# Patient Record
Sex: MALE | Race: WHITE | NOT HISPANIC OR LATINO | Employment: OTHER | URBAN - NONMETROPOLITAN AREA
[De-identification: names, ages, dates, MRNs, and addresses within clinical notes are randomized per-mention and may not be internally consistent; named-entity substitution may affect disease eponyms.]

---

## 2020-06-05 ENCOUNTER — HOSPITAL ENCOUNTER (INPATIENT)
Facility: HOSPITAL | Age: 70
LOS: 5 days | Discharge: HOME | End: 2020-06-11
Attending: FAMILY MEDICINE | Admitting: INTERNAL MEDICINE

## 2020-06-05 ENCOUNTER — APPOINTMENT (OUTPATIENT)
Dept: GENERAL RADIOLOGY | Facility: HOSPITAL | Age: 70
End: 2020-06-05

## 2020-06-05 DIAGNOSIS — I20.8 ANGINA AT REST (HCC): ICD-10-CM

## 2020-06-05 DIAGNOSIS — I21.4 NSTEMI (NON-ST ELEVATED MYOCARDIAL INFARCTION) (HCC): ICD-10-CM

## 2020-06-05 DIAGNOSIS — J44.9 CHRONIC OBSTRUCTIVE PULMONARY DISEASE, UNSPECIFIED COPD TYPE (HCC): ICD-10-CM

## 2020-06-05 DIAGNOSIS — R07.2 PRECORDIAL PAIN: Primary | ICD-10-CM

## 2020-06-05 DIAGNOSIS — J18.9 PNEUMONIA OF RIGHT LOWER LOBE DUE TO INFECTIOUS ORGANISM: ICD-10-CM

## 2020-06-05 DIAGNOSIS — I25.110 CORONARY ARTERY DISEASE INVOLVING NATIVE CORONARY ARTERY OF NATIVE HEART WITH UNSTABLE ANGINA PECTORIS (HCC): ICD-10-CM

## 2020-06-05 PROBLEM — I10 ESSENTIAL HYPERTENSION: Status: ACTIVE | Noted: 2020-06-05

## 2020-06-05 LAB
ALBUMIN SERPL-MCNC: 4.1 G/DL (ref 3.5–5.2)
ALBUMIN/GLOB SERPL: 1.4 G/DL
ALP SERPL-CCNC: 68 U/L (ref 39–117)
ALT SERPL W P-5'-P-CCNC: 18 U/L (ref 1–41)
ANION GAP SERPL CALCULATED.3IONS-SCNC: 11 MMOL/L (ref 5–15)
AST SERPL-CCNC: 20 U/L (ref 1–40)
BASOPHILS # BLD AUTO: 0.06 10*3/MM3 (ref 0–0.2)
BASOPHILS NFR BLD AUTO: 0.5 % (ref 0–1.5)
BILIRUB SERPL-MCNC: 0.3 MG/DL (ref 0.2–1.2)
BUN BLD-MCNC: 17 MG/DL (ref 8–23)
BUN/CREAT SERPL: 9.2 (ref 7–25)
CALCIUM SPEC-SCNC: 8.8 MG/DL (ref 8.6–10.5)
CHLORIDE SERPL-SCNC: 99 MMOL/L (ref 98–107)
CK SERPL-CCNC: 98 U/L (ref 20–200)
CO2 SERPL-SCNC: 31 MMOL/L (ref 22–29)
CREAT BLD-MCNC: 1.84 MG/DL (ref 0.76–1.27)
DEPRECATED RDW RBC AUTO: 45.4 FL (ref 37–54)
EOSINOPHIL # BLD AUTO: 0.23 10*3/MM3 (ref 0–0.4)
EOSINOPHIL NFR BLD AUTO: 2.1 % (ref 0.3–6.2)
ERYTHROCYTE [DISTWIDTH] IN BLOOD BY AUTOMATED COUNT: 12.7 % (ref 12.3–15.4)
GFR SERPL CREATININE-BSD FRML MDRD: 37 ML/MIN/1.73
GLOBULIN UR ELPH-MCNC: 3 GM/DL
GLUCOSE BLD-MCNC: 153 MG/DL (ref 65–99)
HCT VFR BLD AUTO: 45.3 % (ref 37.5–51)
HGB BLD-MCNC: 14.5 G/DL (ref 13–17.7)
HOLD SPECIMEN: NORMAL
HOLD SPECIMEN: NORMAL
IMM GRANULOCYTES # BLD AUTO: 0.05 10*3/MM3 (ref 0–0.05)
IMM GRANULOCYTES NFR BLD AUTO: 0.5 % (ref 0–0.5)
INR PPP: 0.93 (ref 0.8–1.2)
LIPASE SERPL-CCNC: 29 U/L (ref 13–60)
LYMPHOCYTES # BLD AUTO: 1.78 10*3/MM3 (ref 0.7–3.1)
LYMPHOCYTES NFR BLD AUTO: 16.2 % (ref 19.6–45.3)
MAGNESIUM SERPL-MCNC: 1.9 MG/DL (ref 1.6–2.4)
MCH RBC QN AUTO: 31.3 PG (ref 26.6–33)
MCHC RBC AUTO-ENTMCNC: 32 G/DL (ref 31.5–35.7)
MCV RBC AUTO: 97.8 FL (ref 79–97)
MONOCYTES # BLD AUTO: 0.76 10*3/MM3 (ref 0.1–0.9)
MONOCYTES NFR BLD AUTO: 6.9 % (ref 5–12)
NEUTROPHILS # BLD AUTO: 8.08 10*3/MM3 (ref 1.7–7)
NEUTROPHILS NFR BLD AUTO: 73.8 % (ref 42.7–76)
NRBC BLD AUTO-RTO: 0 /100 WBC (ref 0–0.2)
NT-PROBNP SERPL-MCNC: 2023 PG/ML (ref 5–900)
PLATELET # BLD AUTO: 188 10*3/MM3 (ref 140–450)
PMV BLD AUTO: 10.6 FL (ref 6–12)
POTASSIUM BLD-SCNC: 3.9 MMOL/L (ref 3.5–5.2)
PROCALCITONIN SERPL-MCNC: 0.11 NG/ML (ref 0.1–0.25)
PROT SERPL-MCNC: 7.1 G/DL (ref 6–8.5)
PROTHROMBIN TIME: 12.3 SECONDS (ref 11.1–15.3)
RBC # BLD AUTO: 4.63 10*6/MM3 (ref 4.14–5.8)
SODIUM BLD-SCNC: 141 MMOL/L (ref 136–145)
TROPONIN T SERPL-MCNC: 0.12 NG/ML (ref 0–0.03)
TROPONIN T SERPL-MCNC: 0.16 NG/ML (ref 0–0.03)
WBC NRBC COR # BLD: 10.96 10*3/MM3 (ref 3.4–10.8)
WHOLE BLOOD HOLD SPECIMEN: NORMAL

## 2020-06-05 PROCEDURE — G0378 HOSPITAL OBSERVATION PER HR: HCPCS

## 2020-06-05 PROCEDURE — 83690 ASSAY OF LIPASE: CPT | Performed by: FAMILY MEDICINE

## 2020-06-05 PROCEDURE — 93005 ELECTROCARDIOGRAM TRACING: CPT | Performed by: FAMILY MEDICINE

## 2020-06-05 PROCEDURE — 84145 PROCALCITONIN (PCT): CPT | Performed by: INTERNAL MEDICINE

## 2020-06-05 PROCEDURE — 82550 ASSAY OF CK (CPK): CPT | Performed by: FAMILY MEDICINE

## 2020-06-05 PROCEDURE — 99284 EMERGENCY DEPT VISIT MOD MDM: CPT

## 2020-06-05 PROCEDURE — 83735 ASSAY OF MAGNESIUM: CPT | Performed by: FAMILY MEDICINE

## 2020-06-05 PROCEDURE — 93010 ELECTROCARDIOGRAM REPORT: CPT | Performed by: INTERNAL MEDICINE

## 2020-06-05 PROCEDURE — 25010000002 ENOXAPARIN PER 10 MG: Performed by: INTERNAL MEDICINE

## 2020-06-05 PROCEDURE — 87040 BLOOD CULTURE FOR BACTERIA: CPT | Performed by: INTERNAL MEDICINE

## 2020-06-05 PROCEDURE — 80053 COMPREHEN METABOLIC PANEL: CPT | Performed by: FAMILY MEDICINE

## 2020-06-05 PROCEDURE — 25010000002 CEFTRIAXONE PER 250 MG: Performed by: FAMILY MEDICINE

## 2020-06-05 PROCEDURE — 85025 COMPLETE CBC W/AUTO DIFF WBC: CPT | Performed by: FAMILY MEDICINE

## 2020-06-05 PROCEDURE — 93005 ELECTROCARDIOGRAM TRACING: CPT

## 2020-06-05 PROCEDURE — 85610 PROTHROMBIN TIME: CPT | Performed by: INTERNAL MEDICINE

## 2020-06-05 PROCEDURE — 84484 ASSAY OF TROPONIN QUANT: CPT | Performed by: FAMILY MEDICINE

## 2020-06-05 PROCEDURE — 87040 BLOOD CULTURE FOR BACTERIA: CPT | Performed by: FAMILY MEDICINE

## 2020-06-05 PROCEDURE — 87635 SARS-COV-2 COVID-19 AMP PRB: CPT | Performed by: FAMILY MEDICINE

## 2020-06-05 PROCEDURE — 83880 ASSAY OF NATRIURETIC PEPTIDE: CPT | Performed by: FAMILY MEDICINE

## 2020-06-05 PROCEDURE — 71045 X-RAY EXAM CHEST 1 VIEW: CPT

## 2020-06-05 PROCEDURE — 93005 ELECTROCARDIOGRAM TRACING: CPT | Performed by: INTERNAL MEDICINE

## 2020-06-05 PROCEDURE — 36415 COLL VENOUS BLD VENIPUNCTURE: CPT | Performed by: FAMILY MEDICINE

## 2020-06-05 RX ORDER — CLOPIDOGREL BISULFATE 75 MG/1
75 TABLET ORAL DAILY
Status: DISCONTINUED | OUTPATIENT
Start: 2020-06-06 | End: 2020-06-08

## 2020-06-05 RX ORDER — CLOPIDOGREL BISULFATE 75 MG/1
300 TABLET ORAL ONCE
Status: COMPLETED | OUTPATIENT
Start: 2020-06-05 | End: 2020-06-05

## 2020-06-05 RX ORDER — ASPIRIN 81 MG/1
81 TABLET, CHEWABLE ORAL DAILY
COMMUNITY

## 2020-06-05 RX ORDER — IPRATROPIUM BROMIDE AND ALBUTEROL SULFATE 2.5; .5 MG/3ML; MG/3ML
3 SOLUTION RESPIRATORY (INHALATION) EVERY 6 HOURS PRN
Status: DISCONTINUED | OUTPATIENT
Start: 2020-06-05 | End: 2020-06-11 | Stop reason: HOSPADM

## 2020-06-05 RX ORDER — NALOXONE HCL 0.4 MG/ML
0.4 VIAL (ML) INJECTION
Status: DISCONTINUED | OUTPATIENT
Start: 2020-06-05 | End: 2020-06-11 | Stop reason: HOSPADM

## 2020-06-05 RX ORDER — ATORVASTATIN CALCIUM 40 MG/1
40 TABLET, FILM COATED ORAL NIGHTLY
Status: DISCONTINUED | OUTPATIENT
Start: 2020-06-05 | End: 2020-06-11 | Stop reason: HOSPADM

## 2020-06-05 RX ORDER — ASPIRIN 81 MG/1
81 TABLET, CHEWABLE ORAL DAILY
Status: DISCONTINUED | OUTPATIENT
Start: 2020-06-06 | End: 2020-06-11 | Stop reason: HOSPADM

## 2020-06-05 RX ORDER — HYDRALAZINE HYDROCHLORIDE 20 MG/ML
20 INJECTION INTRAMUSCULAR; INTRAVENOUS EVERY 6 HOURS PRN
Status: DISCONTINUED | OUTPATIENT
Start: 2020-06-05 | End: 2020-06-11 | Stop reason: HOSPADM

## 2020-06-05 RX ORDER — LOSARTAN POTASSIUM AND HYDROCHLOROTHIAZIDE 12.5; 1 MG/1; MG/1
1 TABLET ORAL DAILY
COMMUNITY
Start: 2020-02-19 | End: 2020-06-11 | Stop reason: HOSPADM

## 2020-06-05 RX ORDER — AZITHROMYCIN 250 MG/1
500 TABLET, FILM COATED ORAL ONCE
Status: COMPLETED | OUTPATIENT
Start: 2020-06-05 | End: 2020-06-05

## 2020-06-05 RX ORDER — SODIUM CHLORIDE 0.9 % (FLUSH) 0.9 %
10 SYRINGE (ML) INJECTION EVERY 12 HOURS SCHEDULED
Status: DISCONTINUED | OUTPATIENT
Start: 2020-06-05 | End: 2020-06-11 | Stop reason: HOSPADM

## 2020-06-05 RX ORDER — ASPIRIN 81 MG/1
243 TABLET, CHEWABLE ORAL ONCE
Status: COMPLETED | OUTPATIENT
Start: 2020-06-05 | End: 2020-06-05

## 2020-06-05 RX ORDER — VALSARTAN AND HYDROCHLOROTHIAZIDE 320; 12.5 MG/1; MG/1
1 TABLET, FILM COATED ORAL DAILY
Status: ON HOLD | COMMUNITY
End: 2020-06-05

## 2020-06-05 RX ORDER — BUDESONIDE AND FORMOTEROL FUMARATE DIHYDRATE 80; 4.5 UG/1; UG/1
2 AEROSOL RESPIRATORY (INHALATION)
Status: DISCONTINUED | OUTPATIENT
Start: 2020-06-05 | End: 2020-06-11 | Stop reason: HOSPADM

## 2020-06-05 RX ORDER — MORPHINE SULFATE 2 MG/ML
2 INJECTION, SOLUTION INTRAMUSCULAR; INTRAVENOUS EVERY 4 HOURS PRN
Status: DISCONTINUED | OUTPATIENT
Start: 2020-06-05 | End: 2020-06-11 | Stop reason: HOSPADM

## 2020-06-05 RX ORDER — ONDANSETRON 2 MG/ML
4 INJECTION INTRAMUSCULAR; INTRAVENOUS EVERY 6 HOURS PRN
Status: DISCONTINUED | OUTPATIENT
Start: 2020-06-05 | End: 2020-06-11 | Stop reason: HOSPADM

## 2020-06-05 RX ORDER — SODIUM CHLORIDE 0.9 % (FLUSH) 0.9 %
10 SYRINGE (ML) INJECTION AS NEEDED
Status: DISCONTINUED | OUTPATIENT
Start: 2020-06-05 | End: 2020-06-11 | Stop reason: HOSPADM

## 2020-06-05 RX ORDER — ALBUTEROL SULFATE 90 UG/1
2 AEROSOL, METERED RESPIRATORY (INHALATION) EVERY 4 HOURS PRN
COMMUNITY

## 2020-06-05 RX ADMIN — ATORVASTATIN CALCIUM 40 MG: 40 TABLET, FILM COATED ORAL at 21:24

## 2020-06-05 RX ADMIN — CLOPIDOGREL BISULFATE 300 MG: 75 TABLET ORAL at 21:25

## 2020-06-05 RX ADMIN — METOPROLOL TARTRATE 25 MG: 25 TABLET, FILM COATED ORAL at 21:24

## 2020-06-05 RX ADMIN — SODIUM CHLORIDE, PRESERVATIVE FREE 10 ML: 5 INJECTION INTRAVENOUS at 21:24

## 2020-06-05 RX ADMIN — CEFTRIAXONE SODIUM 1 G: 1 INJECTION, POWDER, FOR SOLUTION INTRAMUSCULAR; INTRAVENOUS at 19:52

## 2020-06-05 RX ADMIN — ASPIRIN 81 MG 243 MG: 81 TABLET ORAL at 18:06

## 2020-06-05 RX ADMIN — AZITHROMYCIN MONOHYDRATE 500 MG: 250 TABLET ORAL at 19:55

## 2020-06-05 RX ADMIN — ENOXAPARIN SODIUM 120 MG: 120 INJECTION SUBCUTANEOUS at 21:24

## 2020-06-05 NOTE — ED NOTES
Patient was placed in face mask during first look triage.  Patient was wearing a face mask throughout encounter.  I wore personal protective equipment throughout the encounter.  Hand hygiene was performed before and after patient encounter.       Eileen Carmen RN  06/05/20 1762

## 2020-06-05 NOTE — ED PROVIDER NOTES
Subjective     Chest Pain   Pain location:  L chest  Pain quality: aching    Pain radiates to:  L arm  Pain severity:  Moderate  Onset quality:  Sudden  Duration:  5 days  Timing:  Constant  Progression:  Worsening  Chronicity:  New  Relieved by:  Nothing  Worsened by:  Movement, exertion and deep breathing  Associated symptoms: shortness of breath and weakness    Associated symptoms: no abdominal pain, no cough, no diaphoresis, no dizziness, no dysphagia, no fatigue, no fever, no headache, no nausea and no vomiting    Risk factors: high cholesterol, hypertension, male sex, obesity and smoking    Risk factors: no coronary artery disease and no diabetes mellitus        Review of Systems   Constitutional: Positive for activity change. Negative for appetite change, chills, diaphoresis, fatigue and fever.   HENT: Negative for congestion, ear discharge, ear pain, nosebleeds, rhinorrhea, sinus pressure, sore throat and trouble swallowing.    Eyes: Negative for discharge and redness.   Respiratory: Positive for chest tightness and shortness of breath. Negative for apnea, cough and wheezing.    Cardiovascular: Positive for chest pain.   Gastrointestinal: Negative for abdominal pain, diarrhea, nausea and vomiting.   Endocrine: Negative for polyuria.   Genitourinary: Negative for dysuria, frequency and urgency.   Musculoskeletal: Negative for myalgias and neck pain.   Skin: Negative for color change and rash.   Allergic/Immunologic: Negative for immunocompromised state.   Neurological: Positive for weakness. Negative for dizziness, seizures, syncope, light-headedness and headaches.   Hematological: Negative for adenopathy. Does not bruise/bleed easily.   Psychiatric/Behavioral: Negative for behavioral problems and confusion.   All other systems reviewed and are negative.      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[facility])    • COPD (chronic obstructive pulmonary disease) (CMS/[facility])    • Hypertension         No Known Allergies    Past Surgical History:   Procedure Laterality Date   • CARDIAC CATHETERIZATION N/A 6/8/2020    Procedure: Left Heart Cath;  Surgeon: April Petersen MD;  Location: [facility] CATH INVASIVE LOCATION;  Service: Cardiology;  Laterality: N/A;       Family History   Problem Relation Age of Onset   • Heart disease Mother         MI   • No Known Problems Father        Social History     Socioeconomic History   • Marital status:      Spouse name: Not on file   • Number of children: Not on file   • Years of education: Not on file   • Highest education level: Not on file   Tobacco Use   • Smoking status: Current Every Day Smoker     Packs/day: 0.25     Types: Cigarettes   • Smokeless tobacco: Never Used   Substance and Sexual Activity   • Alcohol use: Never     Frequency: Never   • Drug use: Never   • Sexual activity: Defer           Objective   Physical Exam   Constitutional: He is oriented to person, place, and time. He appears well-developed and well-nourished.   HENT:   Head: Normocephalic and atraumatic.   Nose: Nose normal.   Mouth/Throat: Oropharynx is clear and moist.   Eyes: Pupils are equal, round, and reactive to light. Conjunctivae and EOM are normal. Right eye exhibits no discharge. Left eye exhibits no discharge. No scleral icterus.   Neck: Normal range of motion. Neck supple. No tracheal deviation present.   Cardiovascular: Normal rate, regular rhythm and normal heart sounds.   No murmur heard.  Pulmonary/Chest: Effort normal and breath sounds normal. No stridor. No respiratory distress. He has no wheezes. He has no rales.   Abdominal: Soft. Bowel sounds are normal. He exhibits no distension and no mass. There is no tenderness. There is no rebound and no guarding.   Musculoskeletal: He exhibits no edema.   Neurological: He is alert and oriented to person, place, and time. Coordination normal.   Skin: Skin is warm and dry. No rash noted. No erythema.   Psychiatric: He has a  normal mood and affect. His behavior is normal. Thought content normal.   Nursing note and vitals reviewed.      ECG 12 Lead    Date/Time: 6/5/2020 7:07 PM  Performed by: Robb Thomas MD  Authorized by: Robb Thomas MD   Interpreted by physician  Rhythm: sinus rhythm  Rate: normal  BPM: 89  ST Segments: ST segments normal                   ED Course             Labs Reviewed   TROPONIN (IN-HOUSE) - Abnormal; Notable for the following components:       Result Value    Troponin T 0.121 (*)     All other components within normal limits    Narrative:     Troponin T Reference Range:  <= 0.03 ng/mL-   Negative for AMI  >0.03 ng/mL-     Abnormal for myocardial necrosis.  Clinicians would have to utilize clinical acumen, EKG, Troponin and serial changes to determine if it is an Acute Myocardial Infarction or myocardial injury due to an underlying chronic condition.       Results may be falsely decreased if patient taking Biotin.     TROPONIN (IN-HOUSE) - Abnormal; Notable for the following components:    Troponin T 0.156 (*)     All other components within normal limits    Narrative:     Troponin T Reference Range:  <= 0.03 ng/mL-   Negative for AMI  >0.03 ng/mL-     Abnormal for myocardial necrosis.  Clinicians would have to utilize clinical acumen, EKG, Troponin and serial changes to determine if it is an Acute Myocardial Infarction or myocardial injury due to an underlying chronic condition.       Results may be falsely decreased if patient taking Biotin.     COMPREHENSIVE METABOLIC PANEL - Abnormal; Notable for the following components:    Glucose 153 (*)     Creatinine 1.84 (*)     CO2 31.0 (*)     eGFR Non  Amer 37 (*)     All other components within normal limits    Narrative:     GFR Normal >60  Chronic Kidney Disease <60  Kidney Failure <15     BNP (IN-HOUSE) - Abnormal; Notable for the following components:    proBNP 2,023.0 (*)     All other components within normal limits    Narrative:      Among patients with dyspnea, NT-proBNP is highly sensitive for the detection of acute congestive heart failure. In addition NT-proBNP of <300 pg/ml effectively rules out acute congestive heart failure with 99% negative predictive value.    Results may be falsely decreased if patient taking Biotin.     CBC WITH AUTO DIFFERENTIAL - Abnormal; Notable for the following components:    WBC 10.96 (*)     MCV 97.8 (*)     Lymphocyte % 16.2 (*)     Neutrophils, Absolute 8.08 (*)     All other components within normal limits   TROPONIN (IN-HOUSE) - Abnormal; Notable for the following components:    Troponin T 0.411 (*)     All other components within normal limits    Narrative:     Troponin T Reference Range:  <= 0.03 ng/mL-   Negative for AMI  >0.03 ng/mL-     Abnormal for myocardial necrosis.  Clinicians would have to utilize clinical acumen, EKG, Troponin and serial changes to determine if it is an Acute Myocardial Infarction or myocardial injury due to an underlying chronic condition.       Results may be falsely decreased if patient taking Biotin.     BASIC METABOLIC PANEL - Abnormal; Notable for the following components:    Glucose 110 (*)     Creatinine 1.64 (*)     CO2 31.0 (*)     eGFR Non  Amer 42 (*)     All other components within normal limits    Narrative:     GFR Normal >60  Chronic Kidney Disease <60  Kidney Failure <15     LIPID PANEL - Abnormal; Notable for the following components:    Total Cholesterol 222 (*)     Triglycerides 160 (*)     HDL Cholesterol 28 (*)     LDL Cholesterol  162 (*)     All other components within normal limits    Narrative:     Cholesterol Reference Ranges  (U.S. Department of Health and Human Services ATP III Classifications)    Desirable          <200 mg/dL  Borderline High    200-239 mg/dL  High Risk          >240 mg/dL      Triglyceride Reference Ranges  (U.S. Department of Health and Human Services ATP III Classifications)    Normal           <150 mg/dL  Borderline  High  150-199 mg/dL  High             200-499 mg/dL  Very High        >500 mg/dL    HDL Reference Ranges  (U.S. Department of Health and Human Services ATP III Classifcations)    Low     <40 mg/dl (major risk factor for CHD)  High    >60 mg/dl ('negative' risk factor for CHD)        LDL Reference Ranges  (U.S. Department of Health and Human Services ATP III Classifcations)    Optimal          <100 mg/dL  Near Optimal     100-129 mg/dL  Borderline High  130-159 mg/dL  High             160-189 mg/dL  Very High        >189 mg/dL   CBC WITH AUTO DIFFERENTIAL - Abnormal; Notable for the following components:    MCV 97.1 (*)     Lymphocyte % 18.9 (*)     Neutrophils, Absolute 7.17 (*)     Monocytes, Absolute 0.91 (*)     All other components within normal limits   TROPONIN (IN-HOUSE) - Abnormal; Notable for the following components:    Troponin T 0.628 (*)     All other components within normal limits    Narrative:     Troponin T Reference Range:  <= 0.03 ng/mL-   Negative for AMI  >0.03 ng/mL-     Abnormal for myocardial necrosis.  Clinicians would have to utilize clinical acumen, EKG, Troponin and serial changes to determine if it is an Acute Myocardial Infarction or myocardial injury due to an underlying chronic condition.       Results may be falsely decreased if patient taking Biotin.     HEMOGLOBIN A1C - Abnormal; Notable for the following components:    Hemoglobin A1C 6.60 (*)     All other components within normal limits    Narrative:     Hemoglobin A1C Ranges:    Increased Risk for Diabetes  5.7% to 6.4%  Diabetes                     >= 6.5%  Diabetic Goal                < 7.0%   CK MB - Abnormal; Notable for the following components:    CKMB 22.49 (*)     All other components within normal limits    Narrative:     Results may be falsely decreased if patient taking Biotin.     BASIC METABOLIC PANEL - Abnormal; Notable for the following components:    Glucose 109 (*)     Creatinine 1.58 (*)     eGFR Non   Amer 44 (*)     All other components within normal limits    Narrative:     GFR Normal >60  Chronic Kidney Disease <60  Kidney Failure <15     BNP (IN-HOUSE) - Abnormal; Notable for the following components:    proBNP 2,934.0 (*)     All other components within normal limits    Narrative:     Among patients with dyspnea, NT-proBNP is highly sensitive for the detection of acute congestive heart failure. In addition NT-proBNP of <300 pg/ml effectively rules out acute congestive heart failure with 99% negative predictive value.    Results may be falsely decreased if patient taking Biotin.     CBC WITH AUTO DIFFERENTIAL - Abnormal; Notable for the following components:    MCV 97.8 (*)     Lymphocyte % 18.5 (*)     Monocytes, Absolute 0.91 (*)     All other components within normal limits   BASIC METABOLIC PANEL - Abnormal; Notable for the following components:    BUN 25 (*)     Creatinine 1.71 (*)     CO2 31.0 (*)     eGFR Non  Amer 40 (*)     All other components within normal limits    Narrative:     GFR Normal >60  Chronic Kidney Disease <60  Kidney Failure <15     CBC WITH AUTO DIFFERENTIAL - Abnormal; Notable for the following components:    MCV 97.3 (*)     Lymphocyte % 16.2 (*)     Neutrophils, Absolute 7.42 (*)     All other components within normal limits   COMPREHENSIVE METABOLIC PANEL - Abnormal; Notable for the following components:    Glucose 101 (*)     BUN 27 (*)     Creatinine 1.91 (*)     Chloride 97 (*)     CO2 30.0 (*)     Calcium 8.2 (*)     eGFR Non  Amer 35 (*)     All other components within normal limits    Narrative:     GFR Normal >60  Chronic Kidney Disease <60  Kidney Failure <15     BASIC METABOLIC PANEL - Abnormal; Notable for the following components:    BUN 24 (*)     Creatinine 1.64 (*)     CO2 31.0 (*)     Calcium 8.3 (*)     eGFR Non  Amer 42 (*)     All other components within normal limits    Narrative:     GFR Normal >60  Chronic Kidney Disease <60  Kidney  Failure <15     LIPID PANEL - Abnormal; Notable for the following components:    HDL Cholesterol 26 (*)     LDL Cholesterol  152 (*)     All other components within normal limits    Narrative:     Cholesterol Reference Ranges  (U.S. Department of Health and Human Services ATP III Classifications)    Desirable          <200 mg/dL  Borderline High    200-239 mg/dL  High Risk          >240 mg/dL      Triglyceride Reference Ranges  (U.S. Department of Health and Human Services ATP III Classifications)    Normal           <150 mg/dL  Borderline High  150-199 mg/dL  High             200-499 mg/dL  Very High        >500 mg/dL    HDL Reference Ranges  (U.S. Department of Health and Human Services ATP III Classifcations)    Low     <40 mg/dl (major risk factor for CHD)  High    >60 mg/dl ('negative' risk factor for CHD)        LDL Reference Ranges  (U.S. Department of Health and Human Services ATP III Classifcations)    Optimal          <100 mg/dL  Near Optimal     100-129 mg/dL  Borderline High  130-159 mg/dL  High             160-189 mg/dL  Very High        >189 mg/dL   CBC WITH AUTO DIFFERENTIAL - Abnormal; Notable for the following components:    RBC 4.11 (*)     Neutrophil % 76.6 (*)     Lymphocyte % 13.3 (*)     Neutrophils, Absolute 7.23 (*)     All other components within normal limits   BLOOD GAS, ARTERIAL - Abnormal; Notable for the following components:    pCO2, Arterial 49.2 (*)     pO2, Arterial 80.7 (*)     HCO3, Arterial 28.9 (*)     Base Excess, Arterial 2.8 (*)     All other components within normal limits   COMPREHENSIVE METABOLIC PANEL - Abnormal; Notable for the following components:    Glucose 104 (*)     Creatinine 1.58 (*)     CO2 30.0 (*)     Calcium 8.5 (*)     eGFR Non  Amer 44 (*)     All other components within normal limits    Narrative:     GFR Normal >60  Chronic Kidney Disease <60  Kidney Failure <15     CBC WITH AUTO DIFFERENTIAL - Abnormal; Notable for the following components:    RBC  "4.10 (*)     Hemoglobin 12.9 (*)     Lymphocyte % 13.5 (*)     Neutrophils, Absolute 7.10 (*)     Monocytes, Absolute 0.96 (*)     All other components within normal limits   CBC WITH AUTO DIFFERENTIAL - Abnormal; Notable for the following components:    RBC 3.86 (*)     Hemoglobin 12.3 (*)     MCV 97.7 (*)     Lymphocyte % 11.8 (*)     Neutrophils, Absolute 7.22 (*)     Monocytes, Absolute 0.99 (*)     All other components within normal limits   COVID-19,[facility] IN-HOUSE, NP SWAB IN TRANSPORT MEDIA 8-10 HR TAT - Normal    Narrative:     Testing performed by Real Time RT-PCR  This test has not been approved by the [employer] but is authorized under the Emergency Use Act (EAU)    https://www.fda.gov/media/731752/download    https://www.fda.gov/media/211180/download       MRSA SCREEN, PCR - Normal    Narrative:     Performed by real-time polymerase chain reaction (qPCR).   LIPASE - Normal   CK - Normal   MAGNESIUM - Normal   PROCALCITONIN - Normal    Narrative:     As a Marker for Sepsis (Non-Neonates):   1. <0.5 ng/mL represents a low risk of severe sepsis and/or septic shock.  1. >2 ng/mL represents a high risk of severe sepsis and/or septic shock.    As a Marker for Lower Respiratory Tract Infections that require antibiotic therapy:  PCT on Admission     Antibiotic Therapy             6-12 Hrs later  > 0.5                Strongly Recommended            >0.25 - <0.5         Recommended  0.1 - 0.25           Discouraged                   Remeasure/reassess PCT  <0.1                 Strongly Discouraged          Remeasure/reassess PCT      As 28 day mortality risk marker: \"Change in Procalcitonin Result\" (> 80 % or <=80 %) if Day 0 (or Day 1) and Day 4 values are available. Refer to http://www.[facility]-pct-calculator.com/   Change in PCT <=80 %   A decrease of PCT levels below or equal to 80 % defines a positive change in PCT test result representing a higher risk for 28-day all-cause mortality of patients diagnosed with " severe sepsis or septic shock.  Change in PCT > 80 %   A decrease of PCT levels of more than 80 % defines a negative change in PCT result representing a lower risk for 28-day all-cause mortality of patients diagnosed with severe sepsis or septic shock.                Results may be falsely decreased if patient taking Biotin.    PROTIME-INR - Normal    Narrative:     Therapeutic range for most indications is 2.0-3.0 INR,  or 2.5-3.5 for mechanical heart valves.   URINALYSIS W/ MICROSCOPIC IF INDICATED (NO CULTURE) - Normal    Narrative:     Urine microscopic not indicated.   PROTIME-INR - Normal    Narrative:     Therapeutic range for most indications is 2.0-3.0 INR,  or 2.5-3.5 for mechanical heart valves.   TSH - Normal   BLOOD CULTURE   BLOOD CULTURE   RAINBOW DRAW    Narrative:     The following orders were created for panel order [place] Draw.  Procedure                               Abnormality         Status                     ---------                               -----------         ------                     Light Blue Top[[id number]]                                                              Green Top (Gel)[[id number]]                                  Final result               Lavender Top[[id number]]                                     Final result               Gold Top - SST[[id number]]                                   Final result                 Please view results for these tests on the individual orders.   BLOOD GAS, ARTERIAL   CBC AND DIFFERENTIAL    Narrative:     The following orders were created for panel order CBC & Differential.  Procedure                               Abnormality         Status                     ---------                               -----------         ------                     CBC Auto Differential[[id number]]        Abnormal            Final result                 Please view results for these tests on the individual orders.   GREEN TOP   LAVENDER TOP   GOLD TOP - SST    CBC AND DIFFERENTIAL    Narrative:     The following orders were created for panel order CBC & Differential.  Procedure                               Abnormality         Status                     ---------                               -----------         ------                     CBC Auto Differential[[id number]]        Abnormal            Final result                 Please view results for these tests on the individual orders.   EXTRA TUBES    Narrative:     The following orders were created for panel order Extra Tubes.  Procedure                               Abnormality         Status                     ---------                               -----------         ------                     Lavender Top[[id number]]                                     Final result                 Please view results for these tests on the individual orders.   LAVENDER TOP   CBC AND DIFFERENTIAL    Narrative:     The following orders were created for panel order CBC & Differential.  Procedure                               Abnormality         Status                     ---------                               -----------         ------                     CBC Auto Differential[[id number]]        Abnormal            Final result                 Please view results for these tests on the individual orders.   CBC AND DIFFERENTIAL    Narrative:     The following orders were created for panel order CBC & Differential.  Procedure                               Abnormality         Status                     ---------                               -----------         ------                     CBC Auto Differential[[id number]]        Abnormal            Final result                 Please view results for these tests on the individual orders.   CBC AND DIFFERENTIAL    Narrative:     The following orders were created for panel order CBC & Differential.  Procedure                               Abnormality         Status                      ---------                               -----------         ------                     CBC Auto Differential[[id number]]        Abnormal            Final result                 Please view results for these tests on the individual orders.   CBC AND DIFFERENTIAL    Narrative:     The following orders were created for panel order CBC & Differential.  Procedure                               Abnormality         Status                     ---------                               -----------         ------                     CBC Auto Differential[[id number]]        Abnormal            Final result                 Please view results for these tests on the individual orders.   CBC AND DIFFERENTIAL    Narrative:     The following orders were created for panel order CBC & Differential.  Procedure                               Abnormality         Status                     ---------                               -----------         ------                     CBC Auto Differential[[id number]]        Abnormal            Final result                 Please view results for these tests on the individual orders.   EXTRA TUBES    Narrative:     The following orders were created for panel order Extra Tubes.  Procedure                               Abnormality         Status                     ---------                               -----------         ------                     Green Top (Gel)[[id number]]                                  Final result                 Please view results for these tests on the individual orders.   GREEN TOP       US Vein Mapping Bilateral   Final Result   1. Venous mapping study of both lower extremities   with measurements described above.      Electronically signed by:  Jorge Martinez MD  6/9/2020 8:55 AM CDT   Workstation: [id number]      US Carotid Bilateral   Final Result   Narrowing in the right carotid bifurcation, proximal internal   carotid artery less than 50%.      Areas of narrowing left carotid  bifurcation, internal carotid   artery in the range of 50-60%.      Electronically signed by:  Jorge Martinez MD  6/9/2020 8:58 AM CDT   Workstation: [id number]      US Renal Bilateral   Final Result   1. Normal appearance of the right kidney   2. Simple appearing left renal cysts   3. Prostate protrudes into the base of the bladder, and is   probably at least somewhat enlarged.      Electronically signed by:  Vivienne Whittaker MD  6/6/2020 2:37 PM CDT   Workstation: [id number]      XR Chest 1 View   Final Result   Small patchy area of opacification right lung base, may represent   infiltrate or less likely asymmetric edema. Short-term interval   follow-up suggested to ensure complete resolution         Electronically signed by:  Vivienne Whittaker MD  6/5/2020 6:44 PM CDT   Workstation: [id number]                                          [facility]    Final diagnoses:   Precordial pain   Angina at rest (CMS/HCC)   Pneumonia of right lower lobe due to infectious organism            Robb Thomas MD  06/13/20 3963

## 2020-06-06 ENCOUNTER — APPOINTMENT (OUTPATIENT)
Dept: ULTRASOUND IMAGING | Facility: HOSPITAL | Age: 70
End: 2020-06-06

## 2020-06-06 ENCOUNTER — APPOINTMENT (OUTPATIENT)
Dept: CARDIOLOGY | Facility: HOSPITAL | Age: 70
End: 2020-06-06

## 2020-06-06 LAB
ANION GAP SERPL CALCULATED.3IONS-SCNC: 9 MMOL/L (ref 5–15)
ANION GAP SERPL CALCULATED.3IONS-SCNC: 9 MMOL/L (ref 5–15)
BASOPHILS # BLD AUTO: 0.07 10*3/MM3 (ref 0–0.2)
BASOPHILS NFR BLD AUTO: 0.7 % (ref 0–1.5)
BH CV ECHO MEAS - ACS: 1.9 CM
BH CV ECHO MEAS - AO ISTHMUS: 2.9 CM
BH CV ECHO MEAS - AO MAX PG (FULL): 4.6 MMHG
BH CV ECHO MEAS - AO MAX PG: 6.4 MMHG
BH CV ECHO MEAS - AO MEAN PG (FULL): 3 MMHG
BH CV ECHO MEAS - AO MEAN PG: 4 MMHG
BH CV ECHO MEAS - AO ROOT AREA (BSA CORRECTED): 1.7
BH CV ECHO MEAS - AO ROOT AREA: 11.9 CM^2
BH CV ECHO MEAS - AO ROOT DIAM: 3.9 CM
BH CV ECHO MEAS - AO V2 MAX: 126 CM/SEC
BH CV ECHO MEAS - AO V2 MEAN: 89 CM/SEC
BH CV ECHO MEAS - AO V2 VTI: 25.6 CM
BH CV ECHO MEAS - ASC AORTA: 3.2 CM
BH CV ECHO MEAS - AVA(I,A): 2.2 CM^2
BH CV ECHO MEAS - AVA(I,D): 2.2 CM^2
BH CV ECHO MEAS - AVA(V,A): 2.2 CM^2
BH CV ECHO MEAS - AVA(V,D): 2.2 CM^2
BH CV ECHO MEAS - BSA(HAYCOCK): 2.4 M^2
BH CV ECHO MEAS - BSA: 2.4 M^2
BH CV ECHO MEAS - BZI_BMI: 32.7 KILOGRAMS/M^2
BH CV ECHO MEAS - BZI_METRIC_HEIGHT: 185.4 CM
BH CV ECHO MEAS - BZI_METRIC_WEIGHT: 112.5 KG
BH CV ECHO MEAS - EDV(CUBED): 277.2 ML
BH CV ECHO MEAS - EDV(MOD-SP2): 150 ML
BH CV ECHO MEAS - EDV(MOD-SP4): 152 ML
BH CV ECHO MEAS - EDV(TEICH): 217.5 ML
BH CV ECHO MEAS - EF(CUBED): 32.8 %
BH CV ECHO MEAS - EF(MOD-SP2): 23.3 %
BH CV ECHO MEAS - EF(MOD-SP4): 27.6 %
BH CV ECHO MEAS - EF(TEICH): 26.1 %
BH CV ECHO MEAS - ESV(CUBED): 186.2 ML
BH CV ECHO MEAS - ESV(MOD-SP2): 115 ML
BH CV ECHO MEAS - ESV(MOD-SP4): 110 ML
BH CV ECHO MEAS - ESV(TEICH): 160.7 ML
BH CV ECHO MEAS - FS: 12.4 %
BH CV ECHO MEAS - IVS/LVPW: 1.2
BH CV ECHO MEAS - IVSD: 1.5 CM
BH CV ECHO MEAS - LA DIMENSION: 3.7 CM
BH CV ECHO MEAS - LA/AO: 0.95
BH CV ECHO MEAS - LV DIASTOLIC VOL/BSA (35-75): 64.5 ML/M^2
BH CV ECHO MEAS - LV MASS(C)D: 428.5 GRAMS
BH CV ECHO MEAS - LV MASS(C)DI: 181.7 GRAMS/M^2
BH CV ECHO MEAS - LV MAX PG: 1.8 MMHG
BH CV ECHO MEAS - LV MEAN PG: 1 MMHG
BH CV ECHO MEAS - LV SYSTOLIC VOL/BSA (12-30): 46.7 ML/M^2
BH CV ECHO MEAS - LV V1 MAX: 66.2 CM/SEC
BH CV ECHO MEAS - LV V1 MEAN: 47.4 CM/SEC
BH CV ECHO MEAS - LV V1 VTI: 13.4 CM
BH CV ECHO MEAS - LVIDD: 6.5 CM
BH CV ECHO MEAS - LVIDS: 5.7 CM
BH CV ECHO MEAS - LVLD AP2: 9.1 CM
BH CV ECHO MEAS - LVLD AP4: 8.8 CM
BH CV ECHO MEAS - LVLS AP2: 8.2 CM
BH CV ECHO MEAS - LVLS AP4: 8.3 CM
BH CV ECHO MEAS - LVOT AREA (M): 4.2 CM^2
BH CV ECHO MEAS - LVOT AREA: 4.2 CM^2
BH CV ECHO MEAS - LVOT DIAM: 2.3 CM
BH CV ECHO MEAS - LVPWD: 1.2 CM
BH CV ECHO MEAS - MR MAX PG: 48.2 MMHG
BH CV ECHO MEAS - MR MAX VEL: 347 CM/SEC
BH CV ECHO MEAS - MV A MAX VEL: 110 CM/SEC
BH CV ECHO MEAS - MV DEC SLOPE: 482 CM/SEC^2
BH CV ECHO MEAS - MV E MAX VEL: 60 CM/SEC
BH CV ECHO MEAS - MV E/A: 0.55
BH CV ECHO MEAS - MV MAX PG: 5.7 MMHG
BH CV ECHO MEAS - MV MEAN PG: 3 MMHG
BH CV ECHO MEAS - MV P1/2T MAX VEL: 75.2 CM/SEC
BH CV ECHO MEAS - MV P1/2T: 45.7 MSEC
BH CV ECHO MEAS - MV V2 MAX: 119 CM/SEC
BH CV ECHO MEAS - MV V2 MEAN: 75.3 CM/SEC
BH CV ECHO MEAS - MV V2 VTI: 20.8 CM
BH CV ECHO MEAS - MVA P1/2T LCG: 2.9 CM^2
BH CV ECHO MEAS - MVA(P1/2T): 4.8 CM^2
BH CV ECHO MEAS - MVA(VTI): 2.7 CM^2
BH CV ECHO MEAS - PA MAX PG: 3.2 MMHG
BH CV ECHO MEAS - PA V2 MAX: 90.1 CM/SEC
BH CV ECHO MEAS - RAP SYSTOLE: 5 MMHG
BH CV ECHO MEAS - RVSP: 16.3 MMHG
BH CV ECHO MEAS - SI(AO): 129.7 ML/M^2
BH CV ECHO MEAS - SI(CUBED): 38.6 ML/M^2
BH CV ECHO MEAS - SI(LVOT): 23.6 ML/M^2
BH CV ECHO MEAS - SI(MOD-SP2): 14.8 ML/M^2
BH CV ECHO MEAS - SI(MOD-SP4): 17.8 ML/M^2
BH CV ECHO MEAS - SI(TEICH): 24.1 ML/M^2
BH CV ECHO MEAS - SV(AO): 305.8 ML
BH CV ECHO MEAS - SV(CUBED): 91 ML
BH CV ECHO MEAS - SV(LVOT): 55.7 ML
BH CV ECHO MEAS - SV(MOD-SP2): 35 ML
BH CV ECHO MEAS - SV(MOD-SP4): 42 ML
BH CV ECHO MEAS - SV(TEICH): 56.8 ML
BH CV ECHO MEAS - TR MAX VEL: 168 CM/SEC
BILIRUB UR QL STRIP: NEGATIVE
BUN BLD-MCNC: 17 MG/DL (ref 8–23)
BUN BLD-MCNC: 18 MG/DL (ref 8–23)
BUN/CREAT SERPL: 10.4 (ref 7–25)
BUN/CREAT SERPL: 11.4 (ref 7–25)
CALCIUM SPEC-SCNC: 8.7 MG/DL (ref 8.6–10.5)
CALCIUM SPEC-SCNC: 8.8 MG/DL (ref 8.6–10.5)
CHLORIDE SERPL-SCNC: 100 MMOL/L (ref 98–107)
CHLORIDE SERPL-SCNC: 101 MMOL/L (ref 98–107)
CHOLEST SERPL-MCNC: 222 MG/DL (ref 0–200)
CK MB SERPL-CCNC: 22.49 NG/ML
CLARITY UR: CLEAR
CO2 SERPL-SCNC: 29 MMOL/L (ref 22–29)
CO2 SERPL-SCNC: 31 MMOL/L (ref 22–29)
COLOR UR: YELLOW
CREAT BLD-MCNC: 1.58 MG/DL (ref 0.76–1.27)
CREAT BLD-MCNC: 1.64 MG/DL (ref 0.76–1.27)
DEPRECATED RDW RBC AUTO: 45.3 FL (ref 37–54)
EOSINOPHIL # BLD AUTO: 0.22 10*3/MM3 (ref 0–0.4)
EOSINOPHIL NFR BLD AUTO: 2.1 % (ref 0.3–6.2)
ERYTHROCYTE [DISTWIDTH] IN BLOOD BY AUTOMATED COUNT: 12.7 % (ref 12.3–15.4)
GFR SERPL CREATININE-BSD FRML MDRD: 42 ML/MIN/1.73
GFR SERPL CREATININE-BSD FRML MDRD: 44 ML/MIN/1.73
GLUCOSE BLD-MCNC: 109 MG/DL (ref 65–99)
GLUCOSE BLD-MCNC: 110 MG/DL (ref 65–99)
GLUCOSE UR STRIP-MCNC: NEGATIVE MG/DL
HBA1C MFR BLD: 6.6 % (ref 4.8–5.6)
HCT VFR BLD AUTO: 43.9 % (ref 37.5–51)
HDLC SERPL-MCNC: 28 MG/DL (ref 40–60)
HGB BLD-MCNC: 14.3 G/DL (ref 13–17.7)
HGB UR QL STRIP.AUTO: NEGATIVE
IMM GRANULOCYTES # BLD AUTO: 0.05 10*3/MM3 (ref 0–0.05)
IMM GRANULOCYTES NFR BLD AUTO: 0.5 % (ref 0–0.5)
KETONES UR QL STRIP: NEGATIVE
LDLC SERPL CALC-MCNC: 162 MG/DL (ref 0–100)
LDLC/HDLC SERPL: 5.79 {RATIO}
LEUKOCYTE ESTERASE UR QL STRIP.AUTO: NEGATIVE
LYMPHOCYTES # BLD AUTO: 1.96 10*3/MM3 (ref 0.7–3.1)
LYMPHOCYTES NFR BLD AUTO: 18.9 % (ref 19.6–45.3)
MAXIMAL PREDICTED HEART RATE: 151 BPM
MCH RBC QN AUTO: 31.6 PG (ref 26.6–33)
MCHC RBC AUTO-ENTMCNC: 32.6 G/DL (ref 31.5–35.7)
MCV RBC AUTO: 97.1 FL (ref 79–97)
MONOCYTES # BLD AUTO: 0.91 10*3/MM3 (ref 0.1–0.9)
MONOCYTES NFR BLD AUTO: 8.8 % (ref 5–12)
NEUTROPHILS # BLD AUTO: 7.17 10*3/MM3 (ref 1.7–7)
NEUTROPHILS NFR BLD AUTO: 69 % (ref 42.7–76)
NITRITE UR QL STRIP: NEGATIVE
NRBC BLD AUTO-RTO: 0 /100 WBC (ref 0–0.2)
NT-PROBNP SERPL-MCNC: 2934 PG/ML (ref 5–900)
PH UR STRIP.AUTO: 7 [PH] (ref 5–9)
PLATELET # BLD AUTO: 169 10*3/MM3 (ref 140–450)
PMV BLD AUTO: 10.6 FL (ref 6–12)
POTASSIUM BLD-SCNC: 4.1 MMOL/L (ref 3.5–5.2)
POTASSIUM BLD-SCNC: 4.3 MMOL/L (ref 3.5–5.2)
PROT UR QL STRIP: NEGATIVE
RBC # BLD AUTO: 4.52 10*6/MM3 (ref 4.14–5.8)
SARS-COV-2 RNA PNL SPEC NAA+PROBE: NOT DETECTED
SODIUM BLD-SCNC: 138 MMOL/L (ref 136–145)
SODIUM BLD-SCNC: 141 MMOL/L (ref 136–145)
SP GR UR STRIP: 1.01 (ref 1–1.03)
STRESS TARGET HR: 128 BPM
TRIGL SERPL-MCNC: 160 MG/DL (ref 0–150)
TROPONIN T SERPL-MCNC: 0.41 NG/ML (ref 0–0.03)
TROPONIN T SERPL-MCNC: 0.63 NG/ML (ref 0–0.03)
UROBILINOGEN UR QL STRIP: NORMAL
VLDLC SERPL-MCNC: 32 MG/DL
WBC NRBC COR # BLD: 10.38 10*3/MM3 (ref 3.4–10.8)
WHOLE BLOOD HOLD SPECIMEN: NORMAL

## 2020-06-06 PROCEDURE — 93306 TTE W/DOPPLER COMPLETE: CPT

## 2020-06-06 PROCEDURE — 4A023N7 MEASUREMENT OF CARDIAC SAMPLING AND PRESSURE, LEFT HEART, PERCUTANEOUS APPROACH: ICD-10-PCS | Performed by: INTERNAL MEDICINE

## 2020-06-06 PROCEDURE — 80061 LIPID PANEL: CPT | Performed by: INTERNAL MEDICINE

## 2020-06-06 PROCEDURE — 94799 UNLISTED PULMONARY SVC/PX: CPT

## 2020-06-06 PROCEDURE — 83036 HEMOGLOBIN GLYCOSYLATED A1C: CPT | Performed by: INTERNAL MEDICINE

## 2020-06-06 PROCEDURE — 99223 1ST HOSP IP/OBS HIGH 75: CPT | Performed by: INTERNAL MEDICINE

## 2020-06-06 PROCEDURE — 83880 ASSAY OF NATRIURETIC PEPTIDE: CPT | Performed by: INTERNAL MEDICINE

## 2020-06-06 PROCEDURE — 25010000002 ENOXAPARIN PER 10 MG: Performed by: INTERNAL MEDICINE

## 2020-06-06 PROCEDURE — 85025 COMPLETE CBC W/AUTO DIFF WBC: CPT | Performed by: INTERNAL MEDICINE

## 2020-06-06 PROCEDURE — B2111ZZ FLUOROSCOPY OF MULTIPLE CORONARY ARTERIES USING LOW OSMOLAR CONTRAST: ICD-10-PCS | Performed by: INTERNAL MEDICINE

## 2020-06-06 PROCEDURE — 94640 AIRWAY INHALATION TREATMENT: CPT

## 2020-06-06 PROCEDURE — 80048 BASIC METABOLIC PNL TOTAL CA: CPT | Performed by: INTERNAL MEDICINE

## 2020-06-06 PROCEDURE — 81003 URINALYSIS AUTO W/O SCOPE: CPT | Performed by: INTERNAL MEDICINE

## 2020-06-06 PROCEDURE — 93306 TTE W/DOPPLER COMPLETE: CPT | Performed by: INTERNAL MEDICINE

## 2020-06-06 PROCEDURE — 25010000002 ONDANSETRON PER 1 MG: Performed by: INTERNAL MEDICINE

## 2020-06-06 PROCEDURE — 82553 CREATINE MB FRACTION: CPT | Performed by: INTERNAL MEDICINE

## 2020-06-06 PROCEDURE — 84484 ASSAY OF TROPONIN QUANT: CPT | Performed by: INTERNAL MEDICINE

## 2020-06-06 PROCEDURE — 25010000002 FUROSEMIDE PER 20 MG: Performed by: INTERNAL MEDICINE

## 2020-06-06 PROCEDURE — 76775 US EXAM ABDO BACK WALL LIM: CPT

## 2020-06-06 RX ORDER — RANOLAZINE 500 MG/1
1000 TABLET, EXTENDED RELEASE ORAL EVERY 12 HOURS SCHEDULED
Status: DISCONTINUED | OUTPATIENT
Start: 2020-06-06 | End: 2020-06-11 | Stop reason: HOSPADM

## 2020-06-06 RX ORDER — FUROSEMIDE 10 MG/ML
40 INJECTION INTRAMUSCULAR; INTRAVENOUS ONCE
Status: COMPLETED | OUTPATIENT
Start: 2020-06-06 | End: 2020-06-06

## 2020-06-06 RX ADMIN — BUDESONIDE AND FORMOTEROL FUMARATE DIHYDRATE 2 PUFF: 80; 4.5 AEROSOL RESPIRATORY (INHALATION) at 20:02

## 2020-06-06 RX ADMIN — RANOLAZINE 1000 MG: 500 TABLET, FILM COATED, EXTENDED RELEASE ORAL at 21:13

## 2020-06-06 RX ADMIN — ONDANSETRON 4 MG: 2 INJECTION INTRAMUSCULAR; INTRAVENOUS at 22:34

## 2020-06-06 RX ADMIN — FUROSEMIDE 40 MG: 10 INJECTION, SOLUTION INTRAMUSCULAR; INTRAVENOUS at 11:20

## 2020-06-06 RX ADMIN — SODIUM CHLORIDE, PRESERVATIVE FREE 10 ML: 5 INJECTION INTRAVENOUS at 21:17

## 2020-06-06 RX ADMIN — METOPROLOL TARTRATE 25 MG: 25 TABLET, FILM COATED ORAL at 08:42

## 2020-06-06 RX ADMIN — ASPIRIN 81 MG 81 MG: 81 TABLET ORAL at 08:42

## 2020-06-06 RX ADMIN — ENOXAPARIN SODIUM 120 MG: 120 INJECTION SUBCUTANEOUS at 08:42

## 2020-06-06 RX ADMIN — ENOXAPARIN SODIUM 120 MG: 120 INJECTION SUBCUTANEOUS at 21:14

## 2020-06-06 RX ADMIN — METOPROLOL TARTRATE 25 MG: 25 TABLET, FILM COATED ORAL at 21:13

## 2020-06-06 RX ADMIN — RANOLAZINE 1000 MG: 500 TABLET, FILM COATED, EXTENDED RELEASE ORAL at 11:20

## 2020-06-06 RX ADMIN — ATORVASTATIN CALCIUM 40 MG: 40 TABLET, FILM COATED ORAL at 21:13

## 2020-06-06 RX ADMIN — SODIUM CHLORIDE, PRESERVATIVE FREE 10 ML: 5 INJECTION INTRAVENOUS at 09:31

## 2020-06-06 RX ADMIN — BUDESONIDE AND FORMOTEROL FUMARATE DIHYDRATE 2 PUFF: 80; 4.5 AEROSOL RESPIRATORY (INHALATION) at 10:59

## 2020-06-06 RX ADMIN — CLOPIDOGREL BISULFATE 75 MG: 75 TABLET ORAL at 08:42

## 2020-06-06 NOTE — NURSING NOTE
Pt states he feels SOB when he tries to lay flat or as he is falling asleep. I have placed 2L NC on pt and will monitor.

## 2020-06-06 NOTE — PLAN OF CARE
Problem: Patient Care Overview  Goal: Plan of Care Review  Outcome: Ongoing (interventions implemented as appropriate)  Flowsheets (Taken 6/6/2020 0001)  Progress: no change  Plan of Care Reviewed With: patient  Outcome Summary: New admit to the floor. No complaints of chest pain. Patient currently resting. Vital signs stable, will continue to monitor.

## 2020-06-06 NOTE — PLAN OF CARE
Problem: Patient Care Overview  Goal: Plan of Care Review  Outcome: Ongoing (interventions implemented as appropriate)  Flowsheets  Taken 6/6/2020 7187  Progress: improving  Outcome Summary: VSS, no c/o chest pain, pt placed on 2L NC d/t c/o SOB when lying down or falling asleep.  Echo and renal u/s performed today.  Pt is COVID negative, currently trying to transfer off covid wing.  will continue to monitor.  Taken 6/6/2020 0840  Plan of Care Reviewed With: patient

## 2020-06-06 NOTE — CONSULTS
[facility]  INPATIENT CONSULT NOTE  Jose Barrett  69 y.o. male    Referring Provider: Robb Thomas MD    Reason for the consult: Chest pain, elevated troponin      Chief complaint: Chest pain      History of present Illness: Patient came in with feeling of chest discomfort.  He is describes in the mid chest somewhat sharp.  He had noticed that over the last 2 or 3 weeks.  First time he had noticed it was when he was doing activities.  He got his tractor stuck in a ditch and try to get help and he said he had problems getting back up to the top of the hill.  Associated with this is shortness of air though he has baseline COPD.  He said he had some arm tingling but not really discomfort into the left arm.  He did have some nausea but no diaphoresis.  He is resting comfortably now.    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  He does have a history of hypertension.  His lipid status is been unknown.            Allergies: No Known Allergies      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[facility])    • COPD (chronic obstructive pulmonary disease) (CMS/[facility])    • Hypertension          History reviewed. No pertinent surgical history.      Family History   Problem Relation Age of Onset   • Heart disease Mother         MI   • No Known Problems Father          Social History     Socioeconomic History   • Marital status:      Spouse name: Not on file   • Number of children: Not on file   • Years of education: Not on file   • Highest education level: Not on file   Tobacco Use   • Smoking status: Current Every Day Smoker     Packs/day: 0.25     Types: Cigarettes   • Smokeless tobacco: Never Used   Substance and Sexual Activity   • Alcohol use: Never     Frequency: Never   • Drug use: Never   • Sexual activity: Defer         Current Facility-Administered Medications   Medication Dose Route Frequency Provider Last Rate Last Dose   • aspirin  chewable tablet 81 mg  81 mg Oral Daily Jc Olivia MD   81 mg at 06/06/20 0842   • atorvastatin (LIPITOR) tablet 40 mg  40 mg Oral Nightly Jc Olivia MD   40 mg at 06/05/20 2124   • budesonide-formoterol (SYMBICORT) 80-4.5 MCG/ACT inhaler 2 puff  2 puff Inhalation BID - RT Jc Olivia MD       • clopidogrel (PLAVIX) tablet 75 mg  75 mg Oral Daily Jc Olivia MD   75 mg at 06/06/20 0842   • enoxaparin (LOVENOX) syringe 120 mg  1 mg/kg Subcutaneous Q12H Jc Olivia MD   120 mg at 06/06/20 0842   • hydrALAZINE (APRESOLINE) injection 20 mg  20 mg Intravenous Q6H PRN Jc Olivia MD       • ipratropium-albuterol (DUO-NEB) nebulizer solution 3 mL  3 mL Nebulization Q6H PRN Jc Olivia MD       • metoprolol tartrate (LOPRESSOR) tablet 25 mg  25 mg Oral Q12H Jc Olivia MD   25 mg at 06/06/20 0842   • morphine injection 2 mg  2 mg Intravenous Q4H PRN Jc Olivia MD        And   • naloxone (NARCAN) injection 0.4 mg  0.4 mg Intravenous Q5 Min PRN Jc Olivia MD       • nitroglycerin (NITROSTAT) ointment 1 inch  1 inch Topical Q6H PRN Jc Olivia MD       • ondansetron (ZOFRAN) injection 4 mg  4 mg Intravenous Q6H PRN Jc Olivia MD       • Pharmacy to Dose enoxaparin (LOVENOX)   Does not apply Continuous PRN Jc Olivia MD       • sodium chloride 0.9 % flush 10 mL  10 mL Intravenous PRN Jc Olivia MD       • sodium chloride 0.9 % flush 10 mL  10 mL Intravenous Q12H Jc Olivia MD   10 mL at 06/05/20 2124   • sodium chloride 0.9 % flush 10 mL  10 mL Intravenous PRN Jc Olivia MD             Review of Systems:     Constitution:  Denies any fatigue, fever or chills.    HENT:  Denies any headache, hearing impairment.    Eyes:  Denies any blurring of vision, or photophobia.     Cardiovascular:  As per history of present illness.     Respiratory:  Denies any COPD, shortness of breath.     Endocrine:  No  "history of hyperlipidemia, diabetes.       Musculoskeletal:  No history of arthritis with musculoskeletal problems.    Gastrointestinal:  No nausea, vomiting, or melena.    Genitourinary:  No dysuria or hematuria.    Neurological:  No history of seizure disorder, stroke, or memory problems.    Psychiatric/Behavioral:  No history of depression, bipolar disorder or schizophrenia.     Hematological:  No history of easy bruising.            OBJECTIVE:    /77   Pulse 74   Temp 97.5 °F (36.4 °C) (Temporal)   Resp 18   Ht 185.4 cm (73\")   Wt 113 kg (248 lb 6.4 oz)   SpO2 95%   BMI 32.77 kg/m²       Physical Exam:     Constitutional:  Well developed and nourished, in no acute distress .  Is oriented to person, place, and time.     Skin:  Warm and dry.     Head:  Normocephalic and atraumatic.     Eyes:  Pupils are equal, round, and reactive to light.     Neck:  Neck supple. No bruit in the carotids.  No elevation of JVD.    Cardiovascular:  [place] in the fifth intercostal space, regular rate, and rhythm.  S1 greater than S2, S4 gallop, 1/6 systolic murmur at the left upper sternal border.  Pulmonary/Chest:  Air entry is equal on both sides.  No wheezing or crackles.      Abdominal:  Soft. No hepatosplenomegaly, bowel sounds are present.    Musculoskeletal:  No kyphoscoliosis.    Neurological:  Is alert and oriented to person, place, and time.  Cranial nerves are intact.  No motor or sensory deficit.    Extremities:  No edema, no radial femoral delay.    Psychiatric:  The patient has a normal mood and affect.  Behavior is normal.        Lab Results (last 24 hours)     Procedure Component Value Units Date/Time    Troponin [[id number]]  (Abnormal) Collected:  06/05/20 1749    Specimen:  Blood Updated:  06/05/20 1821     Troponin T 0.121 ng/mL     Narrative:       Troponin T Reference Range:  <= 0.03 ng/mL-   Negative for AMI  >0.03 ng/mL-     Abnormal for myocardial necrosis.  Clinicians would have to utilize clinical " acumen, EKG, Troponin and serial changes to determine if it is an Acute Myocardial Infarction or myocardial injury due to an underlying chronic condition.       Results may be falsely decreased if patient taking Biotin.      CBC & Differential [[id number]] Collected:  06/05/20 1749    Specimen:  Blood Updated:  06/05/20 1758    Narrative:       The following orders were created for panel order CBC & Differential.  Procedure                               Abnormality         Status                     ---------                               -----------         ------                     CBC Auto Differential[[id number]]        Abnormal            Final result                 Please view results for these tests on the individual orders.    Comprehensive Metabolic Panel [[id number]]  (Abnormal) Collected:  06/05/20 1749    Specimen:  Blood Updated:  06/05/20 1819     Glucose 153 mg/dL      BUN 17 mg/dL      Creatinine 1.84 mg/dL      Sodium 141 mmol/L      Potassium 3.9 mmol/L      Chloride 99 mmol/L      CO2 31.0 mmol/L      Calcium 8.8 mg/dL      Total Protein 7.1 g/dL      Albumin 4.10 g/dL      ALT (SGPT) 18 U/L      AST (SGOT) 20 U/L      Alkaline Phosphatase 68 U/L      Total Bilirubin 0.3 mg/dL      eGFR Non African Amer 37 mL/min/1.73      Globulin 3.0 gm/dL      A/G Ratio 1.4 g/dL      BUN/Creatinine Ratio 9.2     Anion Gap 11.0 mmol/L     Narrative:       GFR Normal >60  Chronic Kidney Disease <60  Kidney Failure <15      BNP [[id number]]  (Abnormal) Collected:  06/05/20 1749    Specimen:  Blood Updated:  06/05/20 1817     proBNP 2,023.0 pg/mL     Narrative:       Among patients with dyspnea, NT-proBNP is highly sensitive for the detection of acute congestive heart failure. In addition NT-proBNP of <300 pg/ml effectively rules out acute congestive heart failure with 99% negative predictive value.    Results may be falsely decreased if patient taking Biotin.      CBC Auto Differential [[id number]]  (Abnormal)  Collected:  06/05/20 1749    Specimen:  Blood Updated:  06/05/20 1758     WBC 10.96 10*3/mm3      RBC 4.63 10*6/mm3      Hemoglobin 14.5 g/dL      Hematocrit 45.3 %      MCV 97.8 fL      MCH 31.3 pg      MCHC 32.0 g/dL      RDW 12.7 %      RDW-SD 45.4 fl      MPV 10.6 fL      Platelets 188 10*3/mm3      Neutrophil % 73.8 %      Lymphocyte % 16.2 %      Monocyte % 6.9 %      Eosinophil % 2.1 %      Basophil % 0.5 %      Immature Grans % 0.5 %      Neutrophils, Absolute 8.08 10*3/mm3      Lymphocytes, Absolute 1.78 10*3/mm3      Monocytes, Absolute 0.76 10*3/mm3      Eosinophils, Absolute 0.23 10*3/mm3      Basophils, Absolute 0.06 10*3/mm3      Immature Grans, Absolute 0.05 10*3/mm3      nRBC 0.0 /100 WBC     Lipase [[id number]]  (Normal) Collected:  06/05/20 1749    Specimen:  Blood Updated:  06/05/20 1842     Lipase 29 U/L     CK [[id number]]  (Normal) Collected:  06/05/20 1749    Specimen:  Blood Updated:  06/05/20 1842     Creatine Kinase 98 U/L     Magnesium [[id number]]  (Normal) Collected:  06/05/20 1749    Specimen:  Blood Updated:  06/05/20 1842     Magnesium 1.9 mg/dL     Protime-INR [[id number]]  (Normal) Collected:  06/05/20 1749    Specimen:  Blood Updated:  06/05/20 2030     Protime 12.3 Seconds      INR 0.93    Narrative:       Therapeutic range for most indications is 2.0-3.0 INR,  or 2.5-3.5 for mechanical heart valves.    COVID-19, [facility] IN-HOUSE, NP SWAB IN TRANSPORT MEDIA 8-10 HR TAT - Swab, Nasopharynx [[id number]]  (Normal) Collected:  06/05/20 1948    Specimen:  Swab from Nasopharynx Updated:  06/06/20 0149     COVID19 Not Detected    Narrative:       Testing performed by Real Time RT-PCR  This test has not been approved by the [facility] but is authorized under the Emergency Use Act (EAU)    https://www.fda.gov/media/167409/download    https://www.fda.gov/media/909049/download        Blood Culture - Blood, Arm, Left [[id number]] Collected:  06/05/20 1952    Specimen:  Blood from Arm, Left Updated:   "06/05/20 1958    Troponin [[id number]]  (Abnormal) Collected:  06/05/20 2032    Specimen:  Blood Updated:  06/05/20 2110     Troponin T 0.156 ng/mL     Narrative:       Troponin T Reference Range:  <= 0.03 ng/mL-   Negative for AMI  >0.03 ng/mL-     Abnormal for myocardial necrosis.  Clinicians would have to utilize clinical acumen, EKG, Troponin and serial changes to determine if it is an Acute Myocardial Infarction or myocardial injury due to an underlying chronic condition.       Results may be falsely decreased if patient taking Biotin.      Procalcitonin [[id number]]  (Normal) Collected:  06/05/20 2032    Specimen:  Blood Updated:  06/05/20 2228     Procalcitonin 0.11 ng/mL     Narrative:       As a Marker for Sepsis (Non-Neonates):   1. <0.5 ng/mL represents a low risk of severe sepsis and/or septic shock.  1. >2 ng/mL represents a high risk of severe sepsis and/or septic shock.    As a Marker for Lower Respiratory Tract Infections that require antibiotic therapy:  PCT on Admission     Antibiotic Therapy             6-12 Hrs later  > 0.5                Strongly Recommended            >0.25 - <0.5         Recommended  0.1 - 0.25           Discouraged                   Remeasure/reassess PCT  <0.1                 Strongly Discouraged          Remeasure/reassess PCT      As 28 day mortality risk marker: \"Change in Procalcitonin Result\" (> 80 % or <=80 %) if Day 0 (or Day 1) and Day 4 values are available. Refer to http://[email or web address]-pct-calculator.[email or web address]/   Change in PCT <=80 %   A decrease of PCT levels below or equal to 80 % defines a positive change in PCT test result representing a higher risk for 28-day all-cause mortality of patients diagnosed with severe sepsis or septic shock.  Change in PCT > 80 %   A decrease of PCT levels of more than 80 % defines a negative change in PCT result representing a lower risk for 28-day all-cause mortality of patients diagnosed with severe sepsis or septic " shock.                Results may be falsely decreased if patient taking Biotin.     Blood Culture - Blood, Arm, Left [[id number]] Collected:  06/05/20 2032    Specimen:  Blood from Arm, Left Updated:  06/05/20 2038    Troponin [[id number]]  (Abnormal) Collected:  06/06/20 0208    Specimen:  Blood Updated:  06/06/20 0304     Troponin T 0.411 ng/mL     Narrative:       Troponin T Reference Range:  <= 0.03 ng/mL-   Negative for AMI  >0.03 ng/mL-     Abnormal for myocardial necrosis.  Clinicians would have to utilize clinical acumen, EKG, Troponin and serial changes to determine if it is an Acute Myocardial Infarction or myocardial injury due to an underlying chronic condition.       Results may be falsely decreased if patient taking Biotin.      Basic Metabolic Panel [[id number]]  (Abnormal) Collected:  06/06/20 0716    Specimen:  Blood Updated:  06/06/20 0756     Glucose 110 mg/dL      BUN 17 mg/dL      Creatinine 1.64 mg/dL      Sodium 141 mmol/L      Potassium 4.3 mmol/L      Chloride 101 mmol/L      CO2 31.0 mmol/L      Calcium 8.8 mg/dL      eGFR Non African Amer 42 mL/min/1.73      BUN/Creatinine Ratio 10.4     Anion Gap 9.0 mmol/L     Narrative:       GFR Normal >60  Chronic Kidney Disease <60  Kidney Failure <15      CBC & Differential [[id number]] Collected:  06/06/20 0716    Specimen:  Blood Updated:  06/06/20 0726    Narrative:       The following orders were created for panel order CBC & Differential.  Procedure                               Abnormality         Status                     ---------                               -----------         ------                     CBC Auto Differential[[id number]]        Abnormal            Final result                 Please view results for these tests on the individual orders.    Lipid Panel [[id number]]  (Abnormal) Collected:  06/06/20 0716    Specimen:  Blood Updated:  06/06/20 0756     Total Cholesterol 222 mg/dL      Triglycerides 160 mg/dL      HDL Cholesterol  28 mg/dL      LDL Cholesterol  162 mg/dL      VLDL Cholesterol 32 mg/dL      LDL/HDL Ratio 5.79    Narrative:       Cholesterol Reference Ranges  (U.S. Department of Health and Human Services ATP III Classifications)    Desirable          <200 mg/dL  Borderline High    200-239 mg/dL  High Risk          >240 mg/dL      Triglyceride Reference Ranges  (U.S. Department of Health and Human Services ATP III Classifications)    Normal           <150 mg/dL  Borderline High  150-199 mg/dL  High             200-499 mg/dL  Very High        >500 mg/dL    HDL Reference Ranges  (U.S. Department of Health and Human Services ATP III Classifcations)    Low     <40 mg/dl (major risk factor for CHD)  High    >60 mg/dl ('negative' risk factor for CHD)        LDL Reference Ranges  (U.S. Department of Health and Human Services ATP III Classifcations)    Optimal          <100 mg/dL  Near Optimal     100-129 mg/dL  Borderline High  130-159 mg/dL  High             160-189 mg/dL  Very High        >189 mg/dL    CBC Auto Differential [[id number]]  (Abnormal) Collected:  06/06/20 0716    Specimen:  Blood Updated:  06/06/20 0726     WBC 10.38 10*3/mm3      RBC 4.52 10*6/mm3      Hemoglobin 14.3 g/dL      Hematocrit 43.9 %      MCV 97.1 fL      MCH 31.6 pg      MCHC 32.6 g/dL      RDW 12.7 %      RDW-SD 45.3 fl      MPV 10.6 fL      Platelets 169 10*3/mm3      Neutrophil % 69.0 %      Lymphocyte % 18.9 %      Monocyte % 8.8 %      Eosinophil % 2.1 %      Basophil % 0.7 %      Immature Grans % 0.5 %      Neutrophils, Absolute 7.17 10*3/mm3      Lymphocytes, Absolute 1.96 10*3/mm3      Monocytes, Absolute 0.91 10*3/mm3      Eosinophils, Absolute 0.22 10*3/mm3      Basophils, Absolute 0.07 10*3/mm3      Immature Grans, Absolute 0.05 10*3/mm3      nRBC 0.0 /100 WBC     Troponin [[id number]]  (Abnormal) Collected:  06/06/20 0716    Specimen:  Blood Updated:  06/06/20 0800     Troponin T 0.628 ng/mL     Narrative:       Troponin T Reference Range:  <=  0.03 ng/mL-   Negative for AMI  >0.03 ng/mL-     Abnormal for myocardial necrosis.  Clinicians would have to utilize clinical acumen, EKG, Troponin and serial changes to determine if it is an Acute Myocardial Infarction or myocardial injury due to an underlying chronic condition.       Results may be falsely decreased if patient taking Biotin.                   A/P: 1.  Elevated troponin consistent with a non-STEMI    2.  Renal insufficiency    3.  Nicotine abuse    4. COPD    5. Hypertension    6.  Heart murmur    7.  Hyperlipidemia      This patient presents with symptoms of chest discomfort consistent with unstable angina.  His troponin is elevated and has slightly risen.  He is pain-free for now.  He is on good dual antiplatelet therapy and anticoagulant therapy.  He has noted that his renal function is higher than it had been on the labs test we have.  This could secondary to dehydration or it could be an effect of his hypertension.      He is on atorvastatin for his elevated cholesterol.    I have drawn a BNP and would like to check his CK-MB also.    We await his echo to assess his LV function and also his heart murmur.    This time he is stable and we will go ahead and add Ranexa.  We will get nephrology to see him and hopefully we could proceed on with cardiac catheterization early in the new week.    Risk and benefits of cardiac catheterization is been explained.  His ASA score is 2 and his MAL score is 2              This document has been electronically signed by April Petersen MD on June 6, 2020 09:06           Part of this note may be an electronic transcription/translation of spoken language to printed text using the Dragon Dictation System.

## 2020-06-06 NOTE — NURSING NOTE
Spoke with Dr. Olivia regarding new troponin result of 0.628. Pt is not having any chest pain at this time.  Dr. Olivia says this is expected and will wait for Cardiology to see pt today.

## 2020-06-06 NOTE — NURSING NOTE
"Patients troponin level trending up. Vital signs are stable. Patient reports an \"occasional tightness in chest, that goes away.\" No pain currently present. MD notified and wants patient and troponin levels to continue to be monitored.   "

## 2020-06-06 NOTE — H&P
[facility] Medicine Services  INPATIENT HISTORY AND PHYSICAL       Patient Care Team:  Provider, No Known as PCP - General    Chief complaint   Chief Complaint   Patient presents with   • Chest Pain       Subjective     Patient is a 69 y.o. male with a past medical history of COPD, black lung disease, essential hypertension and cigarette smoking who presents with complaints of worsening intermittent retrosternal chest pain and shortness of breath of one-week duration.    Patient first noticed left arm numbness and discomfort and retrosternal chest pain with shortness of breath when he was walking uphill about a week ago.  Symptoms lasted about 10 to 15 minutes and subsided with rest.  He has been having episodes intermittently during the week.  His latest episode of retrosternal discomfort started about this afternoon when he woke up a flight of steps to his room.  He immediately felt left arm numbness and retrosternal chest pain which was nonradiating and constant, squeezing in nature.  It was relieved after 15 minutes of rest and patient decided to come to the emergency room for evaluation.  At time of my evaluation patient states that his chest pain is resolved.  He denies shortness of breath.    Review of Systems   Constitutional: Negative for appetite change, chills, fatigue and fever.   HENT: Negative for congestion and sore throat.    Eyes: Negative for pain and redness.   Respiratory: Negative for cough, chest tightness, shortness of breath and wheezing.    Cardiovascular: Positive for chest pain. Negative for palpitations and leg swelling.   Gastrointestinal: Negative for abdominal pain, constipation, diarrhea, nausea and vomiting.   Genitourinary: Negative for dysuria and hematuria.   Musculoskeletal: Negative for arthralgias, joint swelling and neck pain.   Skin: Negative for color change and rash.   Neurological: Negative for dizziness, syncope,  light-headedness and headaches.   Hematological: Negative for adenopathy.   Psychiatric/Behavioral: Negative for agitation and confusion. The patient is not nervous/anxious.      History  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[facility])    • COPD (chronic obstructive pulmonary disease) (CMS/[facility])    • Hypertension      History reviewed. No pertinent surgical history.  Family History   Problem Relation Age of Onset   • Heart disease Mother         MI   • No Known Problems Father      Social History     Tobacco Use   • Smoking status: Current Every Day Smoker     Packs/day: 0.25     Types: Cigarettes   • Smokeless tobacco: Never Used   Substance Use Topics   • Alcohol use: Never     Frequency: Never   • Drug use: Never     Medications Prior to Admission   Medication Sig Dispense Refill Last Dose   • albuterol sulfate  (90 Base) MCG/ACT inhaler Inhale 2 puffs Every 4 (Four) Hours As Needed for Wheezing.   6/5/2020 at Unknown time   • aspirin 81 MG chewable tablet Chew 81 mg Daily.   6/5/2020 at Unknown time   • Fluticasone Furoate-Vilanterol (Breo Ellipta) 100-25 MCG/INH inhaler Inhale 1 puff Daily.   6/4/2020 at Unknown time   • losartan-hydrochlorothiazide (HYZAAR) 100-12.5 MG per tablet Take 1 tablet by mouth Daily.   6/5/2020 at Unknown time     Allergies:  Patient has no known allergies.  Prior to Admission medications    Medication Sig Start Date End Date Taking? Authorizing Provider   albuterol sulfate  (90 Base) MCG/ACT inhaler Inhale 2 puffs Every 4 (Four) Hours As Needed for Wheezing.   Yes ProviderAdela MD   aspirin 81 MG chewable tablet Chew 81 mg Daily.   Yes ProviderAdela MD   Fluticasone Furoate-Vilanterol (Breo Ellipta) 100-25 MCG/INH inhaler Inhale 1 puff Daily.   Yes Adela Madrigal MD   losartan-hydrochlorothiazide (HYZAAR) 100-12.5 MG per tablet Take 1 tablet by mouth Daily. 2/19/20  Yes Adela Madrigal MD      valsartan-hydrochlorothiazide (DIOVAN-HCT) 320-12.5 MG per tablet Take 1 tablet by mouth Daily.  6/5/20  Provider, Adela, MD MAJANO have reviewed the patient's current medications    Objective        Vital Signs  Temp:  [97.9 °F (36.6 °C)-98.2 °F (36.8 °C)] 97.9 °F (36.6 °C)  Heart Rate:  [78-92] 78  Resp:  [20] 20  BP: (142-196)/(81-97) 142/88      Physical Exam   Constitutional: He is oriented to person, place, and time. No distress.   Obese   HENT:   Head: Normocephalic and atraumatic.   Mouth/Throat: Oropharynx is clear and moist. No oropharyngeal exudate.   Eyes: Pupils are equal, round, and reactive to light. Conjunctivae and EOM are normal. No scleral icterus.   Neck: Normal range of motion. Neck supple. No JVD present. No tracheal deviation present. No thyromegaly present.   Cardiovascular: Normal rate, regular rhythm, normal heart sounds and intact distal pulses. Exam reveals no gallop and no friction rub.   No murmur heard.  Pulmonary/Chest: Effort normal and breath sounds normal. No stridor. No respiratory distress. He has no wheezes. He has no rales. He exhibits no tenderness.   Abdominal: Soft. Bowel sounds are normal. He exhibits no distension and no mass. There is no tenderness. There is no rebound and no guarding. No hernia.   Musculoskeletal: Normal range of motion. He exhibits no edema, tenderness or deformity.   Lymphadenopathy:     He has no cervical adenopathy.   Neurological: He is alert and oriented to person, place, and time. No cranial nerve deficit. He exhibits normal muscle tone.   Skin: Skin is warm and dry. No rash noted. He is not diaphoretic. No erythema. No pallor.   Psychiatric: He has a normal mood and affect. His behavior is normal. Judgment and thought content normal.     Results Review:     Results from last 7 days   Lab Units 06/05/20  1749   SODIUM mmol/L 141   POTASSIUM mmol/L 3.9   CHLORIDE mmol/L 99   CO2 mmol/L 31.0*   BUN mg/dL 17   CREATININE mg/dL 1.84*   GLUCOSE  mg/dL 153*   CALCIUM mg/dL 8.8   BILIRUBIN mg/dL 0.3   ALK PHOS U/L 68   ALT (SGPT) U/L 18   AST (SGOT) U/L 20       Results from last 7 days   Lab Units 06/05/20  1749   MAGNESIUM mg/dL 1.9       Results from last 7 days   Lab Units 06/05/20  1749   WBC 10*3/mm3 10.96*   HEMOGLOBIN g/dL 14.5   HEMATOCRIT % 45.3   PLATELETS 10*3/mm3 188       Results from last 7 days   Lab Units 06/05/20  1749   INR  0.93     Imaging Results (Last 7 Days)     Procedure Component Value Units Date/Time    XR Chest 1 View [[id number]] Collected:  06/05/20 1809     Updated:  06/05/20 1845    Narrative:       PROCEDURE: XR CHEST 1 VW    VIEWS:Single    INDICATION: Chest pain    COMPARISON: None    FINDINGS:       - lines/tubes: None    - cardiac: Size within normal limits.  Mild aortic tortuosity. Contour within normal limits.     - lungs: Mild patchy ill-defined opacity in the right lung  base, may represent asymmetric edema or infiltrate.     - pleura: No evidence of  fluid.      - osseous: Unremarkable for age.      Impression:       Small patchy area of opacification right lung base, may represent  infiltrate or less likely asymmetric edema. Short-term interval  follow-up suggested to ensure complete resolution      Electronically signed by:  Vivienne Whittaker MD  6/5/2020 6:44 PM CDT  Workstation: [id number]          Assessment / Plan       Hospital Problem List:  Principal Problem:    NSTEMI (non-ST elevated myocardial infarction) (CMS/[facility])  Active Problems:    Precordial pain    Essential hypertension    COPD (chronic obstructive pulmonary disease) (CMS/[facility])  DWIGHT versus CKD stage III    Plan  - Patient has symptoms concerning for NSTEMI.  - We will monitor the patient on telemetry  - Start subcutaneous Lovenox treatment dose with pharmacy to dose  - Loading dose Plavix and then Plavix 75 mg daily  -Continue aspirin 81 mg daily.  Patient received loading dose of aspirin in the emergency room  - Serial EKGs and troponins to monitor trend  -  P.o. metoprolol 25 mg twice daily  - Nitroglycerin patch PRN for chest weakness- atorvastatin 40 mg daily  -Lipid panel in the morning, hemoglobin A1c  - Echocardiogram  -Cardiology consultation has been sent.  -Duo nebs for COPD.  Oxygen by nasal cannula to keep O2 sat greater than 90%  -COVID-19 test has been sent.  Will follow results.  Patient's chest x-ray picture likely from his known black lung disease rather than pneumonia.  Watch off antibiotics.  - Monitor renal function closely.  Unclear patient's baseline creatinine.  Will have nephrology consultation in the morning  - DVT prophylaxis with subcutaneous Lovenox  -CODE STATUS is full code    I discussed the patient's findings and my recommendations with patient.    Jc Olivia MD  06/05/20  21:54        Part of this note may be an electronic transcription/translation of spoken language to printed text using the Dragon Dictation System.

## 2020-06-06 NOTE — CONSULTS
[facility]  [address] [place] [address]. [address]   - [phone]  F  [phone]     Consultation         PATIENT  DEMOGRAPHICS   PATIENT NAME: Jose Barrett                      PHYSICIAN: Russ Amaya MD  : 1950  MRN: [id number]    Subjective   SUBJECTIVE   Referring Provider: Dr Olivia  Reason for Consultation: [id number] domitila  History of present illness:      Mr. Barrett is a 69-year gentleman who has history of COPD/coal workers pneumoconiosis, hypertension history of smoking came in with shortness of air on exertion for almost 1 week.  According to him he was doing mowing in his yard and noticed shortness of air with chest pain and tingling on his left arm and numbness a week ago.  Since then he has been having now intermittent chest pain for the last 1 week.  Yesterday he was going uphill and noted the similar complaints which improved with rest but then again restarted at rest.  He therefore came to the ER for further evaluation.  Patient has been worked up for acute coronary syndrome.    We have been asked to evaluate for worsening creatinine.  Patient is not aware of any kidney disease in the past.  He usually sees the physician at [facility].  He didn't have any blood work in the last few years.  His creatinine is slowly improving.  It is down to 1.5 now.  He still has some shortness of air and unable to sleep last night.  He denies any problem with urination but does complain of increased frequency and nocturia.    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[facility])    • COPD (chronic obstructive pulmonary disease) (CMS/[facility])    • Hypertension      History reviewed. No pertinent surgical history.  Family History   Problem Relation Age of Onset   • Heart disease Mother         MI   • No Known Problems Father      Social History     Tobacco Use   • Smoking status: Current Every Day Smoker     Packs/day: 0.25     Types: Cigarettes   • Smokeless tobacco:  "Never Used   Substance Use Topics   • Alcohol use: Never     Frequency: Never   • Drug use: Never     Allergies:  Patient has no known allergies.     REVIEW OF SYSTEMS    Review of Systems   Constitutional: Negative for chills and fever.   Respiratory: Positive for shortness of breath. Negative for chest tightness.    Cardiovascular: Positive for chest pain. Negative for leg swelling.   Gastrointestinal: Negative for abdominal pain, diarrhea and nausea.   Genitourinary: Negative for dysuria, flank pain and hematuria.   Neurological: Negative for dizziness, syncope and weakness.       Objective   OBJECTIVE   Vital Signs  Temp:  [97.2 °F (36.2 °C)-98.2 °F (36.8 °C)] 97.5 °F (36.4 °C)  Heart Rate:  [65-92] 75  Resp:  [16-20] 16  BP: (132-196)/(76-97) 142/77    Flowsheet Rows      First Filed Value   Admission Height  185.4 cm (73\") Documented at 06/05/2020 1732   Admission Weight  117 kg (257 lb 9.6 oz) Documented at 06/05/2020 1732           No intake/output data recorded.    PHYSICAL EXAM    Physical Exam   Constitutional: He is oriented to person, place, and time. He appears well-developed.   HENT:   Head: Normocephalic.   Eyes: Pupils are equal, round, and reactive to light.   Cardiovascular: Normal rate, regular rhythm and normal heart sounds.   Pulmonary/Chest: Effort normal and breath sounds normal.   Abdominal: Soft. Bowel sounds are normal.   Musculoskeletal: He exhibits no edema.   Neurological: He is alert and oriented to person, place, and time.   Skin: No rash noted.       RESULTS   Results Review:    Results from last 7 days   Lab Units 06/06/20  0935 06/06/20  0716 06/05/20  1749   SODIUM mmol/L 138 141 141   POTASSIUM mmol/L 4.1 4.3 3.9   CHLORIDE mmol/L 100 101 99   CO2 mmol/L 29.0 31.0* 31.0*   BUN mg/dL 18 17 17   CREATININE mg/dL 1.58* 1.64* 1.84*   CALCIUM mg/dL 8.7 8.8 8.8   BILIRUBIN mg/dL  --   --  0.3   ALK PHOS U/L  --   --  68   ALT (SGPT) U/L  --   --  18   AST (SGOT) U/L  --   --  20   "   GLUCOSE mg/dL 109* 110* 153*       Estimated Creatinine Clearance: 58.1 mL/min (A) (by C-G formula based on SCr of 1.58 mg/dL (H)).    Results from last 7 days   Lab Units 06/05/20  1749   MAGNESIUM mg/dL 1.9             Results from last 7 days   Lab Units 06/06/20  0716 06/05/20  1749   WBC 10*3/mm3 10.38 10.96*   HEMOGLOBIN g/dL 14.3 14.5   PLATELETS 10*3/mm3 169 188       Results from last 7 days   Lab Units 06/05/20  1749   INR  0.93        MEDICATIONS      aspirin 81 mg Oral Daily   atorvastatin 40 mg Oral Nightly   budesonide-formoterol 2 puff Inhalation BID - RT   clopidogrel 75 mg Oral Daily   enoxaparin 1 mg/kg Subcutaneous Q12H   furosemide 40 mg Intravenous Once   metoprolol tartrate 25 mg Oral Q12H   ranolazine 1,000 mg Oral Q12H   sodium chloride 10 mL Intravenous Q12H       Pharmacy to Dose enoxaparin (LOVENOX)      Medications Prior to Admission   Medication Sig Dispense Refill Last Dose   • albuterol sulfate  (90 Base) MCG/ACT inhaler Inhale 2 puffs Every 4 (Four) Hours As Needed for Wheezing.   6/5/2020 at Unknown time   • aspirin 81 MG chewable tablet Chew 81 mg Daily.   6/5/2020 at Unknown time   • Fluticasone Furoate-Vilanterol (Breo Ellipta) 100-25 MCG/INH inhaler Inhale 1 puff Daily.   6/4/2020 at Unknown time   • losartan-hydrochlorothiazide (HYZAAR) 100-12.5 MG per tablet Take 1 tablet by mouth Daily.   6/5/2020 at Unknown time     Assessment/Plan   ASSESSMENT / PLAN      NSTEMI (non-ST elevated myocardial infarction) (CMS/[facility])    Precordial pain    Essential hypertension    COPD (chronic obstructive pulmonary disease) (CMS/[facility])    1.acute kidney injury with a background of CKD 3.  We do not have any baseline creatinine.  His peak creatinine is 1.84 and it is down to 1.5.  He has chronic orthopnea but now complaining of difficulty sleeping due to shortness of air.    I will give him a dose of Lasix today to help with his shortness of air and likely background of LV systolic  dysfunction.  I will get ultrasound of the kidney and urine analysis.  His losartan and hydrochlorothiazide has already been stopped.     2.coal workers pneumoconiosis/COPD    3.non-ST elevation MI with intermittent chest pain and now at rest.  Patient will need a coronary heart catheterization likely on Monday.  We have discussed in detail about contrast-induced nephropathy and its risk.  His GFR is 44 and I think so it can be done in a controlled setting.  He will need IV fluid prior to the heart catheterization.    4.dyslipidemia patient LDL is up to 162.  His HDL is on the low side only 28.  Patient is currently on Lipitor    Thank you for the referral will continue to follow the patient during his hospital stay         I discussed the patients findings and my recommendations with patient and primary care team         This document has been electronically signed by Russ Amaya MD on June 6, 2020 11:14

## 2020-06-07 LAB
ANION GAP SERPL CALCULATED.3IONS-SCNC: 8 MMOL/L (ref 5–15)
BASOPHILS # BLD AUTO: 0.06 10*3/MM3 (ref 0–0.2)
BASOPHILS NFR BLD AUTO: 0.6 % (ref 0–1.5)
BUN BLD-MCNC: 25 MG/DL (ref 8–23)
BUN/CREAT SERPL: 14.6 (ref 7–25)
CALCIUM SPEC-SCNC: 8.6 MG/DL (ref 8.6–10.5)
CHLORIDE SERPL-SCNC: 99 MMOL/L (ref 98–107)
CO2 SERPL-SCNC: 31 MMOL/L (ref 22–29)
CREAT BLD-MCNC: 1.71 MG/DL (ref 0.76–1.27)
DEPRECATED RDW RBC AUTO: 45.4 FL (ref 37–54)
EOSINOPHIL # BLD AUTO: 0.15 10*3/MM3 (ref 0–0.4)
EOSINOPHIL NFR BLD AUTO: 1.5 % (ref 0.3–6.2)
ERYTHROCYTE [DISTWIDTH] IN BLOOD BY AUTOMATED COUNT: 12.7 % (ref 12.3–15.4)
GFR SERPL CREATININE-BSD FRML MDRD: 40 ML/MIN/1.73
GLUCOSE BLD-MCNC: 99 MG/DL (ref 65–99)
HCT VFR BLD AUTO: 44.2 % (ref 37.5–51)
HGB BLD-MCNC: 14.2 G/DL (ref 13–17.7)
IMM GRANULOCYTES # BLD AUTO: 0.03 10*3/MM3 (ref 0–0.05)
IMM GRANULOCYTES NFR BLD AUTO: 0.3 % (ref 0–0.5)
INR PPP: 1.06 (ref 0.8–1.2)
LYMPHOCYTES # BLD AUTO: 1.79 10*3/MM3 (ref 0.7–3.1)
LYMPHOCYTES NFR BLD AUTO: 18.5 % (ref 19.6–45.3)
MCH RBC QN AUTO: 31.4 PG (ref 26.6–33)
MCHC RBC AUTO-ENTMCNC: 32.1 G/DL (ref 31.5–35.7)
MCV RBC AUTO: 97.8 FL (ref 79–97)
MONOCYTES # BLD AUTO: 0.91 10*3/MM3 (ref 0.1–0.9)
MONOCYTES NFR BLD AUTO: 9.4 % (ref 5–12)
NEUTROPHILS # BLD AUTO: 6.76 10*3/MM3 (ref 1.7–7)
NEUTROPHILS NFR BLD AUTO: 69.7 % (ref 42.7–76)
NRBC BLD AUTO-RTO: 0 /100 WBC (ref 0–0.2)
PLATELET # BLD AUTO: 176 10*3/MM3 (ref 140–450)
PMV BLD AUTO: 10.6 FL (ref 6–12)
POTASSIUM BLD-SCNC: 3.9 MMOL/L (ref 3.5–5.2)
PROTHROMBIN TIME: 13.6 SECONDS (ref 11.1–15.3)
RBC # BLD AUTO: 4.52 10*6/MM3 (ref 4.14–5.8)
SODIUM BLD-SCNC: 138 MMOL/L (ref 136–145)
WBC NRBC COR # BLD: 9.7 10*3/MM3 (ref 3.4–10.8)

## 2020-06-07 PROCEDURE — 99232 SBSQ HOSP IP/OBS MODERATE 35: CPT | Performed by: INTERNAL MEDICINE

## 2020-06-07 PROCEDURE — 25010000002 ENOXAPARIN PER 10 MG: Performed by: INTERNAL MEDICINE

## 2020-06-07 PROCEDURE — 85610 PROTHROMBIN TIME: CPT | Performed by: INTERNAL MEDICINE

## 2020-06-07 PROCEDURE — 94799 UNLISTED PULMONARY SVC/PX: CPT

## 2020-06-07 PROCEDURE — 85025 COMPLETE CBC W/AUTO DIFF WBC: CPT | Performed by: INTERNAL MEDICINE

## 2020-06-07 PROCEDURE — 80048 BASIC METABOLIC PNL TOTAL CA: CPT | Performed by: INTERNAL MEDICINE

## 2020-06-07 PROCEDURE — 94660 CPAP INITIATION&MGMT: CPT

## 2020-06-07 RX ORDER — SODIUM CHLORIDE 9 MG/ML
50 INJECTION, SOLUTION INTRAVENOUS CONTINUOUS
Status: DISCONTINUED | OUTPATIENT
Start: 2020-06-07 | End: 2020-06-08

## 2020-06-07 RX ADMIN — SODIUM CHLORIDE, PRESERVATIVE FREE 10 ML: 5 INJECTION INTRAVENOUS at 09:14

## 2020-06-07 RX ADMIN — ASPIRIN 81 MG 81 MG: 81 TABLET ORAL at 09:13

## 2020-06-07 RX ADMIN — ENOXAPARIN SODIUM 120 MG: 120 INJECTION SUBCUTANEOUS at 09:12

## 2020-06-07 RX ADMIN — CLOPIDOGREL BISULFATE 75 MG: 75 TABLET ORAL at 09:13

## 2020-06-07 RX ADMIN — METOPROLOL TARTRATE 25 MG: 25 TABLET, FILM COATED ORAL at 21:00

## 2020-06-07 RX ADMIN — RANOLAZINE 1000 MG: 500 TABLET, FILM COATED, EXTENDED RELEASE ORAL at 09:12

## 2020-06-07 RX ADMIN — ATORVASTATIN CALCIUM 40 MG: 40 TABLET, FILM COATED ORAL at 21:00

## 2020-06-07 RX ADMIN — SODIUM CHLORIDE, PRESERVATIVE FREE 10 ML: 5 INJECTION INTRAVENOUS at 21:01

## 2020-06-07 RX ADMIN — BUDESONIDE AND FORMOTEROL FUMARATE DIHYDRATE 2 PUFF: 80; 4.5 AEROSOL RESPIRATORY (INHALATION) at 06:55

## 2020-06-07 RX ADMIN — METOPROLOL TARTRATE 25 MG: 25 TABLET, FILM COATED ORAL at 09:12

## 2020-06-07 RX ADMIN — SODIUM CHLORIDE 50 ML/HR: 9 INJECTION, SOLUTION INTRAVENOUS at 21:02

## 2020-06-07 RX ADMIN — RANOLAZINE 1000 MG: 500 TABLET, FILM COATED, EXTENDED RELEASE ORAL at 21:00

## 2020-06-07 NOTE — PROGRESS NOTES
[facility]  INPATIENT PROGRESS NOTE    Name: Jose Barrett  Age/Sex: 69 y.o. male  :  1950        PCP: Provider, No Known    Vital Signs  Temp:  [97.5 °F (36.4 °C)-97.9 °F (36.6 °C)] 97.9 °F (36.6 °C)  Heart Rate:  [61-79] 66  Resp:  [16-18] 18  BP: (109-130)/(56-83) 109/56  Body mass index is 32.77 kg/m².     Subjective   Feeling better.  Patient Active Problem List   Diagnosis   • Precordial pain   • NSTEMI (non-ST elevated myocardial infarction) (CMS/[facility])   • Essential hypertension   • COPD (chronic obstructive pulmonary disease) (CMS/[facility])       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[facility])    • COPD (chronic obstructive pulmonary disease) (CMS/[facility])    • Hypertension        Current Facility-Administered Medications   Medication Dose Route Frequency Provider Last Rate Last Dose   • aspirin chewable tablet 81 mg  81 mg Oral Daily Jc Olivia MD   81 mg at 20 0842   • atorvastatin (LIPITOR) tablet 40 mg  40 mg Oral Nightly Jc Olivia MD   40 mg at 20   • budesonide-formoterol (SYMBICORT) 80-4.5 MCG/ACT inhaler 2 puff  2 puff Inhalation BID - RT Jc Olivia MD   2 puff at 20 0655   • clopidogrel (PLAVIX) tablet 75 mg  75 mg Oral Daily Jc Olivia MD   75 mg at 20 0842   • enoxaparin (LOVENOX) syringe 120 mg  1 mg/kg Subcutaneous Q12H Jc Olivia MD   120 mg at 20   • hydrALAZINE (APRESOLINE) injection 20 mg  20 mg Intravenous Q6H PRN Jc Olivia MD       • ipratropium-albuterol (DUO-NEB) nebulizer solution 3 mL  3 mL Nebulization Q6H PRN Jc Olivia MD       • metoprolol tartrate (LOPRESSOR) tablet 25 mg  25 mg Oral Q12H Jc Olivia MD   25 mg at 20   • morphine injection 2 mg  2 mg Intravenous Q4H PRN Jc Olivia MD        And   • naloxone (NARCAN) injection 0.4 mg  0.4 mg Intravenous Q5 Min PRN Jc Olivia MD       •  nitroglycerin (NITROSTAT) ointment 1 inch  1 inch Topical Q6H PRN Jc Olivia MD       • ondansetron (ZOFRAN) injection 4 mg  4 mg Intravenous Q6H PRN Jc Olivia MD   4 mg at 06/06/20 2234   • Pharmacy to Dose enoxaparin (LOVENOX)   Does not apply Continuous PRN Jc Olivia MD       • ranolazine (RANEXA) 12 hr tablet 1,000 mg  1,000 mg Oral Q12H April Petersen MD   1,000 mg at 06/06/20 2113   • sodium chloride 0.9 % flush 10 mL  10 mL Intravenous PRN Jc Olivia MD       • sodium chloride 0.9 % flush 10 mL  10 mL Intravenous Q12H Jc Olivia MD   10 mL at 06/06/20 2117   • sodium chloride 0.9 % flush 10 mL  10 mL Intravenous PRN Jc Olivia MD           History reviewed. No pertinent surgical history.    Social History     Socioeconomic History   • Marital status:      Spouse name: Not on file   • Number of children: Not on file   • Years of education: Not on file   • Highest education level: Not on file   Tobacco Use   • Smoking status: Current Every Day Smoker     Packs/day: 0.25     Types: Cigarettes   • Smokeless tobacco: Never Used   Substance and Sexual Activity   • Alcohol use: Never     Frequency: Never   • Drug use: Never   • Sexual activity: Defer       O/E    Neck: Supple.  No JVD, no thyroid enlargement.  Chest: Air entry equal, normal respiration.  No rhonchi or creps.  Cardiovascular system:  Regular rate and rhythm, no murmurs.  Abdomen: Soft, no tenderness, bowel sounds present, no hepatosplenomegaly.  CNS: Alert, oriented to place and time.  No motor or sensory deficit.  Cranial nerves intact.  Musculoskeletal: No deformity of the back or spine.  Extremities:  No edema.  Pulses equal on both sides.    Lab Results (last 24 hours)     Procedure Component Value Units Date/Time    Basic Metabolic Panel [[id number]]  (Abnormal) Collected:  06/06/20 0935    Specimen:  Blood Updated:  06/06/20 1003     Glucose 109 mg/dL      BUN 18 mg/dL       Creatinine 1.58 mg/dL      Sodium 138 mmol/L      Potassium 4.1 mmol/L      Chloride 100 mmol/L      CO2 29.0 mmol/L      Calcium 8.7 mg/dL      eGFR Non African Amer 44 mL/min/1.73      BUN/Creatinine Ratio 11.4     Anion Gap 9.0 mmol/L     Narrative:       GFR Normal >60  Chronic Kidney Disease <60  Kidney Failure <15      Urinalysis With Microscopic If Indicated (No Culture) - Urine, Clean Catch [[id number]]  (Normal) Collected:  06/06/20 1128    Specimen:  Urine, Clean Catch Updated:  06/06/20 1144     Color, UA Yellow     Appearance, UA Clear     pH, UA 7.0     Specific Gravity, UA 1.008     Glucose, UA Negative     Ketones, UA Negative     Bilirubin, UA Negative     Blood, UA Negative     Protein, UA Negative     Leuk Esterase, UA Negative     Nitrite, UA Negative     Urobilinogen, UA 1.0 E.U./dL    Narrative:       Urine microscopic not indicated.    CBC & Differential [[id number]] Collected:  06/07/20 0710    Specimen:  Blood Updated:  06/07/20 0731    Narrative:       The following orders were created for panel order CBC & Differential.  Procedure                               Abnormality         Status                     ---------                               -----------         ------                     CBC Auto Differential[[id number]]        Abnormal            Final result                 Please view results for these tests on the individual orders.    CBC Auto Differential [[id number]]  (Abnormal) Collected:  06/07/20 0710    Specimen:  Blood Updated:  06/07/20 0731     WBC 9.70 10*3/mm3      RBC 4.52 10*6/mm3      Hemoglobin 14.2 g/dL      Hematocrit 44.2 %      MCV 97.8 fL      MCH 31.4 pg      MCHC 32.1 g/dL      RDW 12.7 %      RDW-SD 45.4 fl      MPV 10.6 fL      Platelets 176 10*3/mm3      Neutrophil % 69.7 %      Lymphocyte % 18.5 %      Monocyte % 9.4 %      Eosinophil % 1.5 %      Basophil % 0.6 %      Immature Grans % 0.3 %      Neutrophils, Absolute 6.76 10*3/mm3      Lymphocytes, Absolute  1.79 10*3/mm3      Monocytes, Absolute 0.91 10*3/mm3      Eosinophils, Absolute 0.15 10*3/mm3      Basophils, Absolute 0.06 10*3/mm3      Immature Grans, Absolute 0.03 10*3/mm3      nRBC 0.0 /100 WBC     Basic Metabolic Panel [[id number]] Collected:  06/07/20 0814    Specimen:  Blood Updated:  06/07/20 0820          A/P  1.NSTEMI-  Patient is feeling better.  Renal saw and gave the patient diuretics with good diuresis.  I am waiting the results of his BMP but the plan will be doing a heart catheterization in the morning and if it remains stable.        The indications, risks and benefits of diagnostic left heart cardiac catheterization, angiography, conscious sedation, and possible blood transfusion were discussed in detail with the patient. The potential complications of 1/2000 chance of death, 1/1000 chance of heart attack or stroke, 1/500 chance of bleeding or clotting of the femoral artery, and 1/500 chance of allergic reaction to contrast were discussed. We also reviewed possible complications of infection and kidney dysfunction. If PCI were performed and intra-coronary stents indicated, we discussed the details about  ESTEVAN. This included a review of the risks of the infrequent, but relatively higher incidence of late thrombosis with ESTEVAN. The importance of maintaining a consistent daily regimen of aspirin and an additional anti-platelet agent  for as long as directed after implantation was emphasized. No contraindications were found.  The patient  appeared to understand and agree to the above.     asa2 mal2          This document has been electronically signed by April Petersen MD on June 7, 2020 08:42         Part of this note may be an electronic transcription/translation of spoken language to printed text using the Dragon Dictation System.

## 2020-06-07 NOTE — PLAN OF CARE
Attempted pt on CPAP 8 Pt stated that he couldn't tolerate the mask, due to being claustrophobic. BIPAP currently on standby.

## 2020-06-07 NOTE — PROGRESS NOTES
[facility]  INPATIENT PROGRESS NOTE    Length of Stay: 0  Date of Admission: 6/5/2020  Primary Care Physician: Provider, No Known    Subjective   Chief Complaint: Chest pain    HPI: Patient is being managed for NSTEMI.  He denies complaints at this time but does note that he has a choking sensation when he falls asleep and suspects he has obstructive sleep apnea.  He is free of chest pain today.    Review of Systems   Constitutional: Negative for appetite change, chills, fatigue and fever.   HENT: Negative for congestion and sore throat.    Eyes: Negative for pain and redness.   Respiratory: Negative for cough, chest tightness, shortness of breath and wheezing.    Cardiovascular: Negative for chest pain, palpitations and leg swelling.   Gastrointestinal: Negative for abdominal pain, constipation, diarrhea, nausea and vomiting.   Genitourinary: Negative for dysuria and hematuria.   Musculoskeletal: Negative for arthralgias, joint swelling and neck pain.   Skin: Negative for color change and rash.   Neurological: Negative for dizziness, syncope, light-headedness and headaches.   Hematological: Negative for adenopathy.   Psychiatric/Behavioral: Negative for agitation and confusion. The patient is not nervous/anxious.      Objective    Temp:  [97.2 °F (36.2 °C)-97.9 °F (36.6 °C)] 97.8 °F (36.6 °C)  Heart Rate:  [65-79] 71  Resp:  [16-20] 18  BP: (130-142)/(72-88) 130/72    Physical Exam   Constitutional: He is oriented to person, place, and time. He appears well-developed and well-nourished. No distress.   HENT:   Head: Normocephalic and atraumatic.   Mouth/Throat: Oropharynx is clear and moist. No oropharyngeal exudate.   Eyes: Pupils are equal, round, and reactive to light. Conjunctivae and EOM are normal. No scleral icterus.   Neck: Normal range of motion. Neck supple. No JVD present. No tracheal deviation present. No thyromegaly present.   Cardiovascular:  Normal rate, regular rhythm, normal heart sounds and intact distal pulses. Exam reveals no gallop and no friction rub.   No murmur heard.  Pulmonary/Chest: Effort normal and breath sounds normal. No stridor. No respiratory distress. He has no wheezes. He has no rales. He exhibits no tenderness.   Abdominal: Soft. Bowel sounds are normal. He exhibits no distension and no mass. There is no tenderness. There is no rebound and no guarding. No hernia.   Musculoskeletal: Normal range of motion. He exhibits no edema, tenderness or deformity.   Lymphadenopathy:     He has no cervical adenopathy.   Neurological: He is alert and oriented to person, place, and time. No cranial nerve deficit. He exhibits normal muscle tone.   Skin: Skin is warm and dry. No rash noted. He is not diaphoretic. No erythema. No pallor.   Psychiatric: He has a normal mood and affect. His behavior is normal. Judgment and thought content normal.     Medication Review:    Current Facility-Administered Medications:   •  aspirin chewable tablet 81 mg, 81 mg, Oral, Daily, Jc Olivia MD, 81 mg at 06/06/20 0842  •  atorvastatin (LIPITOR) tablet 40 mg, 40 mg, Oral, Nightly, Jc Olivia MD, 40 mg at 06/05/20 2124  •  budesonide-formoterol (SYMBICORT) 80-4.5 MCG/ACT inhaler 2 puff, 2 puff, Inhalation, BID - RT, Jc Olivia MD, 2 puff at 06/06/20 1059  •  clopidogrel (PLAVIX) tablet 75 mg, 75 mg, Oral, Daily, Jc Olivia MD, 75 mg at 06/06/20 0842  •  enoxaparin (LOVENOX) syringe 120 mg, 1 mg/kg, Subcutaneous, Q12H, Jc Olivia MD, 120 mg at 06/06/20 0842  •  hydrALAZINE (APRESOLINE) injection 20 mg, 20 mg, Intravenous, Q6H PRN, Jc Olivia MD  •  ipratropium-albuterol (DUO-NEB) nebulizer solution 3 mL, 3 mL, Nebulization, Q6H PRN, Jc Olivia MD  •  metoprolol tartrate (LOPRESSOR) tablet 25 mg, 25 mg, Oral, Q12H, Jc Olivia MD, 25 mg at 06/06/20 0842  •  morphine injection 2 mg, 2 mg,  Intravenous, Q4H PRN **AND** naloxone (NARCAN) injection 0.4 mg, 0.4 mg, Intravenous, Q5 Min PRN, Jc Olivia MD  •  nitroglycerin (NITROSTAT) ointment 1 inch, 1 inch, Topical, Q6H PRN, Jc Olivia MD  •  ondansetron (ZOFRAN) injection 4 mg, 4 mg, Intravenous, Q6H PRN, Jc Olivia MD  •  Pharmacy to Dose enoxaparin (LOVENOX), , Does not apply, Continuous PRN, Jc Olivia MD  •  ranolazine (RANEXA) 12 hr tablet 1,000 mg, 1,000 mg, Oral, Q12H, April Petersen MD, 1,000 mg at 06/06/20 1120  •  sodium chloride 0.9 % flush 10 mL, 10 mL, Intravenous, PRN, Jc Olivia MD  •  sodium chloride 0.9 % flush 10 mL, 10 mL, Intravenous, Q12H, Jc Olivia MD, 10 mL at 06/06/20 0931  •  sodium chloride 0.9 % flush 10 mL, 10 mL, Intravenous, PRN, Jc Olivia MD    I have reviewed the patient's current medications.     Results Review:  I have reviewed the labs, radiology results, and diagnostic studies.    Laboratory Data:   Results from last 7 days   Lab Units 06/06/20  0935 06/06/20  0716 06/05/20  1749   SODIUM mmol/L 138 141 141   POTASSIUM mmol/L 4.1 4.3 3.9   CHLORIDE mmol/L 100 101 99   CO2 mmol/L 29.0 31.0* 31.0*   BUN mg/dL 18 17 17   CREATININE mg/dL 1.58* 1.64* 1.84*   GLUCOSE mg/dL 109* 110* 153*   CALCIUM mg/dL 8.7 8.8 8.8   BILIRUBIN mg/dL  --   --  0.3   ALK PHOS U/L  --   --  68   ALT (SGPT) U/L  --   --  18   AST (SGOT) U/L  --   --  20   ANION GAP mmol/L 9.0 9.0 11.0     Estimated Creatinine Clearance: 58.1 mL/min (A) (by C-G formula based on SCr of 1.58 mg/dL (H)).  Results from last 7 days   Lab Units 06/05/20  1749   MAGNESIUM mg/dL 1.9         Results from last 7 days   Lab Units 06/06/20  0716 06/05/20  1749   WBC 10*3/mm3 10.38 10.96*   HEMOGLOBIN g/dL 14.3 14.5   HEMATOCRIT % 43.9 45.3   PLATELETS 10*3/mm3 169 188     Results from last 7 days   Lab Units 06/05/20  1749   INR  0.93       Culture Data:   Blood Culture   Date Value Ref Range Status      06/05/2020 No growth at 24 hours  Preliminary     No results found for: URINECX  No results found for: RESPCX  No results found for: WOUNDCX  No results found for: STOOLCX  No components found for: BODYFLD    Radiology Data:   Imaging Results (Last 24 Hours)     Procedure Component Value Units Date/Time    US Renal Bilateral [[id number]] Collected:  06/06/20 1326     Updated:  06/06/20 1438    Narrative:       PROCEDURE: US RENAL BILATERAL    INDICATION:  CKD 3, DWIGHT     COMPARISON:  None    TECHNIQUE:  Ultrasound, renal    FINDINGS:    Kidney, right:      size:  Normal, measuring 12.2 x 5.8 x 5.2 cm    echotexture:  Normal    No nephrolithiasis, solid mass, or collecting system dilation    Kidney, left:      size:  Normal, measuring 12.0 x 6.3 x 5.2 cm    echotexture:  Normal    No nephrolithiasis, solid mass, or collecting system dilation.  Simple appearing renal cysts are present, including an exophytic  cyst which measures 3.9 x 3.4 x 4.0 cm. A smaller cyst at the  upper pole measures 2.8 x 1.6 x 1.5 cm.    Urinary bladder:  Grossly within normal limits    Misc:  The prostate protrudes into the base of the bladder and  appears likely prominent        Impression:       1. Normal appearance of the right kidney  2. Simple appearing left renal cysts  3. Prostate protrudes into the base of the bladder, and is  probably at least somewhat enlarged.    Electronically signed by:  Vivienne Whittaker MD  6/6/2020 2:37 PM CDT  Workstation: [id number]          Assessment/Plan     Hospital Problem List:  Principal Problem:    NSTEMI (non-ST elevated myocardial infarction) (CMS/[facility])  Active Problems:    Precordial pain    Essential hypertension    COPD (chronic obstructive pulmonary disease) (CMS/HCC)  Acute systolic congestive heart failure  Coworkers pneumoconiosis  DWIGHT versus CKD stage III  Type 2 diabetes mellitus  Suspected sleep apnea     Plan  - Patient has symptoms concerning for NSTEMI.  - We will monitor the patient on  telemetry  -  Continue subcutaneous Lovenox treatment dose with pharmacy to dose  - Plavix 75 mg daily  -Continue aspirin 81 mg daily.  - P.o. metoprolol 25 mg twice daily  - Nitroglycerin patch PRN for chest weakness  - atorvastatin 40 mg daily  -Lipid panel result noted  -Hemoglobin A1c is in diabetic range.  We will start NovoLog sliding scale for type 2 diabetes mellitus  - Echocardiogram shows EF of 31 to 35% with hypokinetic wall segments.  -Cardiology consultation has been sent.  Input appreciated.  Patient is planned for cardiac catheterization.  -Duo nebs for COPD.  Oxygen by nasal cannula to keep O2 sat greater than 90%  -COVID-19 test was negative. Patient's chest x-ray picture likely from his known black lung disease rather than pneumonia.  Watch off antibiotics.  -Nephrology consultation input appreciated.  Patient received IV Lasix.  -Start CPAP at night with auto CPAP as needed for suspected sleep apnea.  Patient will have outpatient sleep study upon discharge.  -  DVT prophylaxis with subcutaneous Lovenox  -CODE STATUS is full code    Discharge Planning: In progress    Jc Olivia MD   06/06/20   20:22

## 2020-06-07 NOTE — PROGRESS NOTES
"[facility]  [address] [place] [address]. [id number]  T - [phone]  F - [phone]     Progress Note          PATIENT  DEMOGRAPHICS   PATIENT NAME: Jose Barrett                      PHYSICIAN: Russ Amaya MD  : 1950  MRN: [id number]   LOS: 1 day    Patient Care Team:  Provider, No Known as PCP - General  Subjective   SUBJECTIVE   Breathing lot better         Objective   OBJECTIVE   Vital Signs  Temp:  [97.5 °F (36.4 °C)-97.9 °F (36.6 °C)] 97.9 °F (36.6 °C)  Heart Rate:  [61-79] 66  Resp:  [16-18] 18  BP: (109-130)/(56-83) 109/56    Flowsheet Rows      First Filed Value   Admission Height  185.4 cm (73\") Documented at 2020 1732   Admission Weight  117 kg (257 lb 9.6 oz) Documented at 2020 1732           No intake/output data recorded.    PHYSICAL EXAM    Physical Exam   Constitutional: He is oriented to person, place, and time. He appears well-developed. He appears distressed.   HENT:   Head: Normocephalic.   Eyes: Pupils are equal, round, and reactive to light.   Cardiovascular: Normal rate, regular rhythm and normal heart sounds.   Pulmonary/Chest: He is in respiratory distress. He has rales.   Abdominal: Soft. Bowel sounds are normal.   Musculoskeletal: He exhibits edema.   Neurological: He is alert and oriented to person, place, and time.       RESULTS   Results Review:    Results from last 7 days   Lab Units 20  0814 20  0935 20  0716 20  1749   SODIUM mmol/L 138 138 141 141   POTASSIUM mmol/L 3.9 4.1 4.3 3.9   CHLORIDE mmol/L 99 100 101 99   CO2 mmol/L 31.0* 29.0 31.0* 31.0*   BUN mg/dL 25* 18 17 17   CREATININE mg/dL 1.71* 1.58* 1.64* 1.84*   CALCIUM mg/dL 8.6 8.7 8.8 8.8   BILIRUBIN mg/dL  --   --   --  0.3   ALK PHOS U/L  --   --   --  68   ALT (SGPT) U/L  --   --   --  18   AST (SGOT) U/L  --   --   --  20   GLUCOSE mg/dL 99 109* 110* 153*       Estimated Creatinine Clearance: 53.7 mL/min (A) (by C-G formula based on SCr of " 1.71 mg/dL (H)).    Results from last 7 days   Lab Units 06/05/20  1749   MAGNESIUM mg/dL 1.9             Results from last 7 days   Lab Units 06/07/20  0710 06/06/20  0716 06/05/20  1749   WBC 10*3/mm3 9.70 10.38 10.96*   HEMOGLOBIN g/dL 14.2 14.3 14.5   PLATELETS 10*3/mm3 176 169 188       Results from last 7 days   Lab Units 06/05/20  1749   INR  0.93         Imaging Results (Last 24 Hours)     Procedure Component Value Units Date/Time    US Renal Bilateral [[id number]] Collected:  06/06/20 1326     Updated:  06/06/20 1438    Narrative:       PROCEDURE: US RENAL BILATERAL    INDICATION:  CKD 3, DWIGHT     COMPARISON:  None    TECHNIQUE:  Ultrasound, renal    FINDINGS:    Kidney, right:      size:  Normal, measuring 12.2 x 5.8 x 5.2 cm    echotexture:  Normal    No nephrolithiasis, solid mass, or collecting system dilation    Kidney, left:      size:  Normal, measuring 12.0 x 6.3 x 5.2 cm    echotexture:  Normal    No nephrolithiasis, solid mass, or collecting system dilation.  Simple appearing renal cysts are present, including an exophytic  cyst which measures 3.9 x 3.4 x 4.0 cm. A smaller cyst at the  upper pole measures 2.8 x 1.6 x 1.5 cm.    Urinary bladder:  Grossly within normal limits    Misc:  The prostate protrudes into the base of the bladder and  appears likely prominent        Impression:       1. Normal appearance of the right kidney  2. Simple appearing left renal cysts  3. Prostate protrudes into the base of the bladder, and is  probably at least somewhat enlarged.    Electronically signed by:  Vivienne Whittaker MD  6/6/2020 2:37 PM CDT  Workstation: [id number]           MEDICATIONS      aspirin 81 mg Oral Daily   atorvastatin 40 mg Oral Nightly   budesonide-formoterol 2 puff Inhalation BID - RT   clopidogrel 75 mg Oral Daily   metoprolol tartrate 25 mg Oral Q12H   ranolazine 1,000 mg Oral Q12H   sodium chloride 10 mL Intravenous Q12H       Pharmacy to Dose enoxaparin (LOVENOX)        Assessment/Plan      ASSESSMENT / PLAN      NSTEMI (non-ST elevated myocardial infarction) (CMS/[facility])    Precordial pain    Essential hypertension    COPD (chronic obstructive pulmonary disease) (CMS/[facility])    1.acute kidney injury with a background of CKD 3.  We do not have any baseline creatinine.  His peak creatinine is 1.84 and it is down to 1.5 yesterday and now high with diuretic.  He has chronic orthopnea but now complaining of difficulty sleeping due to shortness of air. This helped with iv lasix yesterday. Off O2 now.    ivf from tonight low rate. U/s left renal cyst. bph on u/s UA neg for blood and protein      His losartan and hydrochlorothiazide has already been stopped.      2.coal workers pneumoconiosis/COPD     3.non-ST elevation MI with intermittent chest pain and now at rest.  Patient will need a coronary heart catheterization on Monday.  We have discussed in detail about contrast-induced nephropathy and its risk.  His GFR is 40 and I think so it can be done in a controlled setting.  He will need IV fluid prior to the heart catheterization. Also on lovenox     4.dyslipidemia patient LDL is up to 162.  His HDL is on the low side only 28.  Patient is currently on Lipitor              This document has been electronically signed by Russ Amaya MD on June 7, 2020 10:36

## 2020-06-08 PROBLEM — I25.110 CORONARY ARTERY DISEASE INVOLVING NATIVE CORONARY ARTERY OF NATIVE HEART WITH UNSTABLE ANGINA PECTORIS: Status: ACTIVE | Noted: 2020-06-08

## 2020-06-08 LAB
ALBUMIN SERPL-MCNC: 4.1 G/DL (ref 3.5–5.2)
ALBUMIN/GLOB SERPL: 1.5 G/DL
ALP SERPL-CCNC: 60 U/L (ref 39–117)
ALT SERPL W P-5'-P-CCNC: 17 U/L (ref 1–41)
ANION GAP SERPL CALCULATED.3IONS-SCNC: 9 MMOL/L (ref 5–15)
AST SERPL-CCNC: 19 U/L (ref 1–40)
BASOPHILS # BLD AUTO: 0.07 10*3/MM3 (ref 0–0.2)
BASOPHILS NFR BLD AUTO: 0.7 % (ref 0–1.5)
BILIRUB SERPL-MCNC: 0.7 MG/DL (ref 0.2–1.2)
BUN BLD-MCNC: 27 MG/DL (ref 8–23)
BUN/CREAT SERPL: 14.1 (ref 7–25)
CALCIUM SPEC-SCNC: 8.2 MG/DL (ref 8.6–10.5)
CHLORIDE SERPL-SCNC: 97 MMOL/L (ref 98–107)
CO2 SERPL-SCNC: 30 MMOL/L (ref 22–29)
CREAT BLD-MCNC: 1.91 MG/DL (ref 0.76–1.27)
DEPRECATED RDW RBC AUTO: 45.4 FL (ref 37–54)
EOSINOPHIL # BLD AUTO: 0.17 10*3/MM3 (ref 0–0.4)
EOSINOPHIL NFR BLD AUTO: 1.7 % (ref 0.3–6.2)
ERYTHROCYTE [DISTWIDTH] IN BLOOD BY AUTOMATED COUNT: 12.7 % (ref 12.3–15.4)
GFR SERPL CREATININE-BSD FRML MDRD: 35 ML/MIN/1.73
GLOBULIN UR ELPH-MCNC: 2.8 GM/DL
GLUCOSE BLD-MCNC: 101 MG/DL (ref 65–99)
HCT VFR BLD AUTO: 42.8 % (ref 37.5–51)
HGB BLD-MCNC: 14.1 G/DL (ref 13–17.7)
IMM GRANULOCYTES # BLD AUTO: 0.05 10*3/MM3 (ref 0–0.05)
IMM GRANULOCYTES NFR BLD AUTO: 0.5 % (ref 0–0.5)
LYMPHOCYTES # BLD AUTO: 1.66 10*3/MM3 (ref 0.7–3.1)
LYMPHOCYTES NFR BLD AUTO: 16.2 % (ref 19.6–45.3)
MCH RBC QN AUTO: 32 PG (ref 26.6–33)
MCHC RBC AUTO-ENTMCNC: 32.9 G/DL (ref 31.5–35.7)
MCV RBC AUTO: 97.3 FL (ref 79–97)
MONOCYTES # BLD AUTO: 0.89 10*3/MM3 (ref 0.1–0.9)
MONOCYTES NFR BLD AUTO: 8.7 % (ref 5–12)
MRSA DNA SPEC QL NAA+PROBE: NEGATIVE
NEUTROPHILS # BLD AUTO: 7.42 10*3/MM3 (ref 1.7–7)
NEUTROPHILS NFR BLD AUTO: 72.2 % (ref 42.7–76)
NRBC BLD AUTO-RTO: 0 /100 WBC (ref 0–0.2)
PLATELET # BLD AUTO: 175 10*3/MM3 (ref 140–450)
PMV BLD AUTO: 10.8 FL (ref 6–12)
POTASSIUM BLD-SCNC: 3.8 MMOL/L (ref 3.5–5.2)
PROT SERPL-MCNC: 6.9 G/DL (ref 6–8.5)
RBC # BLD AUTO: 4.4 10*6/MM3 (ref 4.14–5.8)
SODIUM BLD-SCNC: 136 MMOL/L (ref 136–145)
WBC NRBC COR # BLD: 10.26 10*3/MM3 (ref 3.4–10.8)

## 2020-06-08 PROCEDURE — 94799 UNLISTED PULMONARY SVC/PX: CPT

## 2020-06-08 PROCEDURE — C1760 CLOSURE DEV, VASC: HCPCS | Performed by: INTERNAL MEDICINE

## 2020-06-08 PROCEDURE — 87641 MR-STAPH DNA AMP PROBE: CPT | Performed by: NURSE PRACTITIONER

## 2020-06-08 PROCEDURE — 93458 L HRT ARTERY/VENTRICLE ANGIO: CPT | Performed by: INTERNAL MEDICINE

## 2020-06-08 PROCEDURE — 25010000002 MIDAZOLAM PER 1 MG: Performed by: INTERNAL MEDICINE

## 2020-06-08 PROCEDURE — C1894 INTRO/SHEATH, NON-LASER: HCPCS | Performed by: INTERNAL MEDICINE

## 2020-06-08 PROCEDURE — C1769 GUIDE WIRE: HCPCS | Performed by: INTERNAL MEDICINE

## 2020-06-08 PROCEDURE — 85025 COMPLETE CBC W/AUTO DIFF WBC: CPT | Performed by: INTERNAL MEDICINE

## 2020-06-08 PROCEDURE — 94660 CPAP INITIATION&MGMT: CPT

## 2020-06-08 PROCEDURE — 80053 COMPREHEN METABOLIC PANEL: CPT | Performed by: INTERNAL MEDICINE

## 2020-06-08 PROCEDURE — 99223 1ST HOSP IP/OBS HIGH 75: CPT | Performed by: THORACIC SURGERY (CARDIOTHORACIC VASCULAR SURGERY)

## 2020-06-08 PROCEDURE — 25010000002 FENTANYL CITRATE (PF) 100 MCG/2ML SOLUTION: Performed by: INTERNAL MEDICINE

## 2020-06-08 PROCEDURE — 0 IOPAMIDOL PER 1 ML: Performed by: INTERNAL MEDICINE

## 2020-06-08 RX ORDER — DIPHENHYDRAMINE HYDROCHLORIDE 50 MG/ML
25 INJECTION INTRAMUSCULAR; INTRAVENOUS EVERY 6 HOURS PRN
Status: DISCONTINUED | OUTPATIENT
Start: 2020-06-08 | End: 2020-06-11 | Stop reason: HOSPADM

## 2020-06-08 RX ORDER — SODIUM CHLORIDE 9 MG/ML
100 INJECTION, SOLUTION INTRAVENOUS CONTINUOUS
Status: DISCONTINUED | OUTPATIENT
Start: 2020-06-08 | End: 2020-06-09

## 2020-06-08 RX ORDER — FENTANYL CITRATE 50 UG/ML
25 INJECTION, SOLUTION INTRAMUSCULAR; INTRAVENOUS
Status: DISCONTINUED | OUTPATIENT
Start: 2020-06-08 | End: 2020-06-11 | Stop reason: HOSPADM

## 2020-06-08 RX ORDER — HYDROCODONE BITARTRATE AND ACETAMINOPHEN 5; 325 MG/1; MG/1
1 TABLET ORAL EVERY 4 HOURS PRN
Status: DISCONTINUED | OUTPATIENT
Start: 2020-06-08 | End: 2020-06-11 | Stop reason: HOSPADM

## 2020-06-08 RX ORDER — TEMAZEPAM 7.5 MG/1
7.5 CAPSULE ORAL NIGHTLY PRN
Status: DISCONTINUED | OUTPATIENT
Start: 2020-06-08 | End: 2020-06-11 | Stop reason: HOSPADM

## 2020-06-08 RX ORDER — ACETAMINOPHEN 325 MG/1
650 TABLET ORAL EVERY 4 HOURS PRN
Status: DISCONTINUED | OUTPATIENT
Start: 2020-06-08 | End: 2020-06-11 | Stop reason: HOSPADM

## 2020-06-08 RX ORDER — ALPRAZOLAM 0.25 MG/1
0.25 TABLET ORAL 3 TIMES DAILY PRN
Status: DISCONTINUED | OUTPATIENT
Start: 2020-06-08 | End: 2020-06-11 | Stop reason: HOSPADM

## 2020-06-08 RX ORDER — FENTANYL CITRATE 50 UG/ML
INJECTION, SOLUTION INTRAMUSCULAR; INTRAVENOUS AS NEEDED
Status: DISCONTINUED | OUTPATIENT
Start: 2020-06-08 | End: 2020-06-08 | Stop reason: HOSPADM

## 2020-06-08 RX ORDER — LIDOCAINE HYDROCHLORIDE 20 MG/ML
INJECTION, SOLUTION INFILTRATION; PERINEURAL AS NEEDED
Status: DISCONTINUED | OUTPATIENT
Start: 2020-06-08 | End: 2020-06-08 | Stop reason: HOSPADM

## 2020-06-08 RX ORDER — SODIUM CHLORIDE 0.9 % (FLUSH) 0.9 %
3 SYRINGE (ML) INJECTION EVERY 12 HOURS SCHEDULED
Status: DISCONTINUED | OUTPATIENT
Start: 2020-06-08 | End: 2020-06-11 | Stop reason: HOSPADM

## 2020-06-08 RX ORDER — MIDAZOLAM HYDROCHLORIDE 1 MG/ML
INJECTION INTRAMUSCULAR; INTRAVENOUS AS NEEDED
Status: DISCONTINUED | OUTPATIENT
Start: 2020-06-08 | End: 2020-06-08 | Stop reason: HOSPADM

## 2020-06-08 RX ORDER — SODIUM CHLORIDE 0.9 % (FLUSH) 0.9 %
10 SYRINGE (ML) INJECTION AS NEEDED
Status: DISCONTINUED | OUTPATIENT
Start: 2020-06-08 | End: 2020-06-11 | Stop reason: HOSPADM

## 2020-06-08 RX ORDER — NALOXONE HCL 0.4 MG/ML
0.4 VIAL (ML) INJECTION
Status: DISCONTINUED | OUTPATIENT
Start: 2020-06-08 | End: 2020-06-11 | Stop reason: HOSPADM

## 2020-06-08 RX ADMIN — RANOLAZINE 1000 MG: 500 TABLET, FILM COATED, EXTENDED RELEASE ORAL at 20:39

## 2020-06-08 RX ADMIN — SODIUM CHLORIDE 100 ML/HR: 900 INJECTION, SOLUTION INTRAVENOUS at 22:30

## 2020-06-08 RX ADMIN — RANOLAZINE 1000 MG: 500 TABLET, FILM COATED, EXTENDED RELEASE ORAL at 10:31

## 2020-06-08 RX ADMIN — SODIUM CHLORIDE, PRESERVATIVE FREE 10 ML: 5 INJECTION INTRAVENOUS at 20:40

## 2020-06-08 RX ADMIN — ASPIRIN 81 MG 81 MG: 81 TABLET ORAL at 10:31

## 2020-06-08 RX ADMIN — METOPROLOL TARTRATE 25 MG: 25 TABLET, FILM COATED ORAL at 20:39

## 2020-06-08 RX ADMIN — METOPROLOL TARTRATE 25 MG: 25 TABLET, FILM COATED ORAL at 10:31

## 2020-06-08 RX ADMIN — BUDESONIDE AND FORMOTEROL FUMARATE DIHYDRATE 2 PUFF: 80; 4.5 AEROSOL RESPIRATORY (INHALATION) at 07:29

## 2020-06-08 RX ADMIN — BUDESONIDE AND FORMOTEROL FUMARATE DIHYDRATE 2 PUFF: 80; 4.5 AEROSOL RESPIRATORY (INHALATION) at 20:14

## 2020-06-08 RX ADMIN — SODIUM CHLORIDE 100 ML/HR: 900 INJECTION, SOLUTION INTRAVENOUS at 09:16

## 2020-06-08 RX ADMIN — ATORVASTATIN CALCIUM 40 MG: 40 TABLET, FILM COATED ORAL at 20:39

## 2020-06-08 NOTE — CONSULTS
CVTS CONSULTATION          Patient Care Team:  Provider, No Known as PCP - General  Requesting Provider: Nicola     Chief complaint: CAD      SUBJECTIVE       History of Present Illness:  69 y.o. male with HTN(stable, increased risk stroke, rupture), Hyperlipidemia(stable, increased risk cardiovascular events), Obesity(uncontrolled, increased risk cardiovascular events), Smoker(uncontrolled, increased risk cardiovascular events), COPD(stable, increased risk pulmonary complications) and Chronic Kidney Disease(DWIGHT post cath, increased risk renal failure)  Black lung disease.  smokes .25 PPD.  Chest pain with exertion after dealing with his tractor getting stuck in a ditch and walking up hill. He reports the pain has not recurred. He presented for inpatient evaluation with positive troponin. Cardiac cath demonstrated severe multivessel disease and depressed ejection fraction 35%.   No TIA stroke amaurosis.  No MI claudication. No other associated signs, symptoms or modifying factors. Dr. Donis consulted for surgery consideration.     The following portions of the patient's history were reviewed and updated as appropriate: allergies, current medications, past family history, past medical history, past social history, past surgical history and problem list.  Recent images independently reviewed.  Available laboratory values reviewed.    Review of Systems   Constitutional: Negative for activity change, diaphoresis and fatigue.   Eyes: Negative for visual disturbance.   Respiratory: Positive for chest tightness, shortness of breath and wheezing.    Cardiovascular: Positive for chest pain. Negative for palpitations and leg swelling.   Gastrointestinal: Negative for abdominal distention and abdominal pain.   Genitourinary: Negative for difficulty urinating.   Musculoskeletal: Positive for arthralgias.   Skin: Negative.    Neurological: Negative for dizziness, seizures, syncope, speech difficulty and numbness.    "  Hematological: Does not bruise/bleed easily.   Psychiatric/Behavioral: Negative for agitation. The patient is not nervous/anxious.    All other systems reviewed and are negative.       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[facility])    • COPD (chronic obstructive pulmonary disease) (CMS/[facility])    • Hypertension      History reviewed. No pertinent surgical history.  Family History   Problem Relation Age of Onset   • Heart disease Mother         MI   • No Known Problems Father      Social History     Tobacco Use   • Smoking status: Current Every Day Smoker     Packs/day: 0.25     Types: Cigarettes   • Smokeless tobacco: Never Used   Substance Use Topics   • Alcohol use: Never     Frequency: Never   • Drug use: Never     Medications Prior to Admission   Medication Sig Dispense Refill Last Dose   • albuterol sulfate  (90 Base) MCG/ACT inhaler Inhale 2 puffs Every 4 (Four) Hours As Needed for Wheezing.   6/5/2020 at Unknown time   • aspirin 81 MG chewable tablet Chew 81 mg Daily.   6/5/2020 at Unknown time   • Fluticasone Furoate-Vilanterol (Breo Ellipta) 100-25 MCG/INH inhaler Inhale 1 puff Daily.   6/4/2020 at Unknown time   • losartan-hydrochlorothiazide (HYZAAR) 100-12.5 MG per tablet Take 1 tablet by mouth Daily.   6/5/2020 at Unknown time       aspirin 81 mg Oral Daily   atorvastatin 40 mg Oral Nightly   budesonide-formoterol 2 puff Inhalation BID - RT   metoprolol tartrate 25 mg Oral Q12H   ranolazine 1,000 mg Oral Q12H   sodium chloride 10 mL Intravenous Q12H   sodium chloride 3 mL Intravenous Q12H     Allergies:  Patient has no known allergies.    OBJECTIVE        Vital Signs  Temp:  [97.3 °F (36.3 °C)-98.6 °F (37 °C)] 97.9 °F (36.6 °C)  Heart Rate:  [60-79] 66  Resp:  [18-20] 19  BP: (116-153)/(67-88) 150/82    Flowsheet Rows      First Filed Value   Admission Height  185.4 cm (73\") Documented at 06/05/2020 1732   Admission Weight  117 kg (257 lb 9.6 oz) Documented at 06/05/2020 " "1732        185.4 cm (73\")    Physical Exam   Constitutional: He is oriented to person, place, and time. He appears well-nourished.   HENT:   Head: Atraumatic.   Eyes: EOM are normal.   Neck: Neck supple. Carotid bruit is not present.   Cardiovascular: Normal rate, regular rhythm and normal heart sounds.   Pulses:       Dorsalis pedis pulses are 2+ on the right side, and 2+ on the left side.        Posterior tibial pulses are 2+ on the right side, and 2+ on the left side.   Pulmonary/Chest: Effort normal and breath sounds normal.   Abdominal: Soft. Bowel sounds are normal.   Musculoskeletal: Normal range of motion. He exhibits no edema.   Neurological: He is alert and oriented to person, place, and time.   Skin: Skin is warm and dry. Capillary refill takes less than 2 seconds.   Psychiatric: He has a normal mood and affect. Thought content normal.   Vitals reviewed.      Results Review:   Lab Results   Component Value Date    WBC 10.26 06/08/2020    HGB 14.1 06/08/2020    HCT 42.8 06/08/2020    MCV 97.3 (H) 06/08/2020     06/08/2020     Lab Results   Component Value Date    GLUCOSE 101 (H) 06/08/2020    BUN 27 (H) 06/08/2020    CREATININE 1.91 (H) 06/08/2020    EGFRIFNONA 35 (L) 06/08/2020    BCR 14.1 06/08/2020    K 3.8 06/08/2020    CO2 30.0 (H) 06/08/2020    CALCIUM 8.2 (L) 06/08/2020    ALBUMIN 4.10 06/08/2020    AST 19 06/08/2020    ALT 17 06/08/2020     Lab Results   Component Value Date    HGBA1C 6.60 (H) 06/06/2020     Results for orders placed during the hospital encounter of 06/05/20   Transthoracic Echo Complete With Contrast if Necessary Per Protocol    Narrative · The left ventricular cavity is mild-to-moderately dilated.  · The following left ventricular wall segments are hypokinetic: mid   inferolateral and mid inferoseptal.  · Mild mitral valve regurgitation is present  · Estimated EF appears to be in the range of 31 - 35%. Normal left   ventricular wall thickness noted. The following left " ventricular wall   segments are hypokinetic: mid inferolateral and mid inferoseptal. The left   ventricular cavity is mild-to-moderately dilated.        [facility]  CARDIAC CATHETERIZATION NOTE     Date of procedure: 6/8/2020     Referring physician-hospitalist service     Procedures performed:      1.  Selective coronary angiography  2.  Left heart catheterization  3.  Left ventriculogram     Indication: Non-STEMI  Site: Right femoral  Catheters: 5FR JL4, 5FR JR4, 5 Taiwanese pigtail catheter     Procedural details:  The patient was brought to the cath lab and prepped and draped in the usual fashion. Modified Seldinger technique was used to place a 5 Fr sheath in the Common femoral artery. Catheter exchanges were made over a guide-wire through the sheath under fluoroscopic guidance. That was exchanged for a 5 Fr JL4 catheter that was engaged in the ostium of the Left Main coronary artery for left system angiography. That was exchanged for a 5 Fr JR4 catheter which was engaged in the ostium of the Right coronary artery for right system angiography.  6 Taiwanese sheath was placed in the right femoral vein after it was cannulated.  CPK was elevated.  The catheter and sheath were then removed and arterial hemostasis was obtained . There were no obvious complications      Findings:     Blood Loss: <50cc     Specimens: None     Complications: None     Hemodynamics:  Ao 174/70   ,   /12     LVEDP-31     Left ventriculography: LV ANGIO NOT DONE  Selective coronary angiography:   Dominance: RCA     Left Main coronary artery: DISTAL 50%     Left anterior descending artery:OSTIAL 90%, Mid  75 long lesion     Left circumflex:  Ostial Cx looks involve as part of distal LM,Ostial LAD plaque complex.  Mid Cx has Aneurysmal dilatation with what appears to be a plaque between the 2 areas of approximately 75-85%.The vessel then bifurcates to 2 marginals.     Right coronary artery: long plaque complex  startingproximal portion of mid  vessel that begins at 99% and then is 60-70%.  Distal RCA is 75% before PDA and PLM bifurcation. PLM has 80% mid lesion.     Conclusion/Plan: This patient who presents with a  multivessel vessel disease.  There appears to be a involvement of the distal left main ostial LAD and this plaque appears to also involve the ostial circumflex.  Disease in the mid circumflex there is also right coronary is diffusely disease and he has left ventricular dysfunction by echo with a EF about 30 to 35%.     He is not PCI candidate as they do not think we can safely stent the ostial LAD without involving the left main and the ostial circumflex.  Also I do not believe we could do complete revascularization and the risk of his kidney failure would be increased.  We will have cardiovascular surgery reviewed his films but obviously  He is a higher risk candidate but this would give him more complete revascularization.  I spoken to Dr. Donis.           ASSESSMENT/PLAN         NSTEMI (non-ST elevated myocardial infarction) (CMS/[facility])    Precordial pain    Essential hypertension    COPD (chronic obstructive pulmonary disease) (CMS/[facility])    Coronary artery disease involving native coronary artery of native heart with unstable angina pectoris (CMS/[facility])      Assessment & Plan    Detailed discussion with Jose Barrett regarding situation options and plans.  Severe symptomatic CAD not amendable for PCI consideration.  Coronary Artery Bypass Grafting considered pending Myocardial viability study.  Will proceed with preoperative testing as inpatient carotid duplex, MRSA screen, vein mapping, PFT. Dr. Donis to review and plan accordingly.     Risks including but not limited to Mortality 2-3%, Stroke 1-2%, bleeding (return to surgery), transfusion, infection, pulmonary (prolonged mechanical ventilation, tracheostomy), renal dysfunction (dialysis).  Benefits:  Relief of symptoms, reduction in cardiac events  and hospitalization, and improved survival.  Options:  Medical therapy, multivessel PCI discussed. Preoperative teaching completed and questions answered. .Understands and wishes to proceed.      Thank you for the opportunity to participate in this patient's care.       I discussed the patients findings and my recommendations with the patient.               This document has been electronically signed by ZHANNA José on June 8, 2020 14:22

## 2020-06-08 NOTE — PROGRESS NOTES
[facility]  INPATIENT PROGRESS NOTE    Length of Stay: 1  Date of Admission: 6/5/2020  Primary Care Physician: Provider, No Known    Subjective   Chief Complaint: Chest pain    HPI: Patient is being managed for NSTEMI.  He denies complaints at this time but does note that he has a choking sensation when he falls asleep and suspects he has obstructive sleep apnea.  However patient could not tolerate CPAP yesterday due to claustrophobia.  We did sleep better with nasal oxygen nocturnally.  He denies complaints today.    Review of Systems   Constitutional: Negative for appetite change, chills, fatigue and fever.   HENT: Negative for congestion and sore throat.    Eyes: Negative for pain and redness.   Respiratory: Negative for cough, chest tightness, shortness of breath and wheezing.    Cardiovascular: Negative for chest pain, palpitations and leg swelling.   Gastrointestinal: Negative for abdominal pain, constipation, diarrhea, nausea and vomiting.   Genitourinary: Negative for dysuria and hematuria.   Musculoskeletal: Negative for arthralgias, joint swelling and neck pain.   Skin: Negative for color change and rash.   Neurological: Negative for dizziness, syncope, light-headedness and headaches.   Hematological: Negative for adenopathy.   Psychiatric/Behavioral: Negative for agitation and confusion. The patient is not nervous/anxious.      Objective    Temp:  [97.4 °F (36.3 °C)-97.9 °F (36.6 °C)] 97.6 °F (36.4 °C)  Heart Rate:  [61-74] 68  Resp:  [18] 18  BP: (109-128)/(56-83) 116/67    Physical Exam   Constitutional: He is oriented to person, place, and time. He appears well-developed and well-nourished. No distress.   HENT:   Head: Normocephalic and atraumatic.   Mouth/Throat: Oropharynx is clear and moist. No oropharyngeal exudate.   Eyes: Pupils are equal, round, and reactive to light. Conjunctivae and EOM are normal. No scleral icterus.   Neck: Normal  range of motion. Neck supple. No JVD present. No tracheal deviation present. No thyromegaly present.   Cardiovascular: Normal rate, regular rhythm, normal heart sounds and intact distal pulses. Exam reveals no gallop and no friction rub.   No murmur heard.  Pulmonary/Chest: Effort normal and breath sounds normal. No stridor. No respiratory distress. He has no wheezes. He has no rales. He exhibits no tenderness.   Abdominal: Soft. Bowel sounds are normal. He exhibits no distension and no mass. There is no tenderness. There is no rebound and no guarding. No hernia.   Musculoskeletal: Normal range of motion. He exhibits no edema, tenderness or deformity.   Lymphadenopathy:     He has no cervical adenopathy.   Neurological: He is alert and oriented to person, place, and time. No cranial nerve deficit. He exhibits normal muscle tone.   Skin: Skin is warm and dry. No rash noted. He is not diaphoretic. No erythema. No pallor.   Psychiatric: He has a normal mood and affect. His behavior is normal. Judgment and thought content normal.     Medication Review:    Current Facility-Administered Medications:   •  aspirin chewable tablet 81 mg, 81 mg, Oral, Daily, Jc Olivia MD, 81 mg at 06/07/20 0913  •  atorvastatin (LIPITOR) tablet 40 mg, 40 mg, Oral, Nightly, Jc Olivia MD, 40 mg at 06/06/20 2113  •  budesonide-formoterol (SYMBICORT) 80-4.5 MCG/ACT inhaler 2 puff, 2 puff, Inhalation, BID - RT, Jc Olivia MD, 2 puff at 06/07/20 0655  •  clopidogrel (PLAVIX) tablet 75 mg, 75 mg, Oral, Daily, Jc Olivia MD, 75 mg at 06/07/20 0913  •  hydrALAZINE (APRESOLINE) injection 20 mg, 20 mg, Intravenous, Q6H PRN, Jc Olivia MD  •  ipratropium-albuterol (DUO-NEB) nebulizer solution 3 mL, 3 mL, Nebulization, Q6H PRN, Jc Olivia MD  •  metoprolol tartrate (LOPRESSOR) tablet 25 mg, 25 mg, Oral, Q12H, Jc Olivia MD, 25 mg at 06/07/20 0912  •  morphine injection 2 mg, 2 mg,  Intravenous, Q4H PRN **AND** naloxone (NARCAN) injection 0.4 mg, 0.4 mg, Intravenous, Q5 Min PRN, Jc Olivia MD  •  nitroglycerin (NITROSTAT) ointment 1 inch, 1 inch, Topical, Q6H PRN, Jc Olivia MD  •  ondansetron (ZOFRAN) injection 4 mg, 4 mg, Intravenous, Q6H PRN, Jc Olivia MD, 4 mg at 06/06/20 2234  •  ranolazine (RANEXA) 12 hr tablet 1,000 mg, 1,000 mg, Oral, Q12H, April Petersen MD, 1,000 mg at 06/07/20 0912  •  sodium chloride 0.9 % flush 10 mL, 10 mL, Intravenous, PRN, Jc Olivia MD  •  sodium chloride 0.9 % flush 10 mL, 10 mL, Intravenous, Q12H, Jc Olivia MD, 10 mL at 06/07/20 0914  •  sodium chloride 0.9 % flush 10 mL, 10 mL, Intravenous, PRN, Jc Olivia MD  •  sodium chloride 0.9 % infusion, 50 mL/hr, Intravenous, Continuous, Russ Amaya MD    I have reviewed the patient's current medications.     Results Review:  I have reviewed the labs, radiology results, and diagnostic studies.    Laboratory Data:   Results from last 7 days   Lab Units 06/07/20  0814 06/06/20  0935 06/06/20  0716 06/05/20  1749   SODIUM mmol/L 138 138 141 141   POTASSIUM mmol/L 3.9 4.1 4.3 3.9   CHLORIDE mmol/L 99 100 101 99   CO2 mmol/L 31.0* 29.0 31.0* 31.0*   BUN mg/dL 25* 18 17 17   CREATININE mg/dL 1.71* 1.58* 1.64* 1.84*   GLUCOSE mg/dL 99 109* 110* 153*   CALCIUM mg/dL 8.6 8.7 8.8 8.8   BILIRUBIN mg/dL  --   --   --  0.3   ALK PHOS U/L  --   --   --  68   ALT (SGPT) U/L  --   --   --  18   AST (SGOT) U/L  --   --   --  20   ANION GAP mmol/L 8.0 9.0 9.0 11.0     Estimated Creatinine Clearance: 53.7 mL/min (A) (by C-G formula based on SCr of 1.71 mg/dL (H)).  Results from last 7 days   Lab Units 06/05/20  1749   MAGNESIUM mg/dL 1.9         Results from last 7 days   Lab Units 06/07/20  0710 06/06/20  0716 06/05/20  1749   WBC 10*3/mm3 9.70 10.38 10.96*   HEMOGLOBIN g/dL 14.2 14.3 14.5   HEMATOCRIT % 44.2 43.9 45.3   PLATELETS 10*3/mm3 176 169 188     Results from  last 7 days   Lab Units 06/05/20  1749   INR  0.93       Culture Data:   Blood Culture   Date Value Ref Range Status   06/05/2020 No growth at 24 hours  Preliminary   06/05/2020 No growth at 24 hours  Preliminary     No results found for: URINECX  No results found for: RESPCX  No results found for: WOUNDCX  No results found for: STOOLCX  No components found for: BODYFLD    Radiology Data:   Imaging Results (Last 24 Hours)     ** No results found for the last 24 hours. **          Assessment/Plan     Hospital Problem List:  Principal Problem:    NSTEMI (non-ST elevated myocardial infarction) (CMS/[facility])  Active Problems:    Precordial pain    Essential hypertension    COPD (chronic obstructive pulmonary disease) (CMS/[facility])  Acute systolic congestive heart failure  Coworkers pneumoconiosis  DWIGHT versus CKD stage III  Type 2 diabetes mellitus  Suspected sleep apnea     Plan  - Patient has symptoms concerning for NSTEMI.  - We will monitor the patient on telemetry  -  Continue subcutaneous Lovenox treatment dose with pharmacy to dose  - Plavix 75 mg daily  -Continue aspirin 81 mg daily.  - P.o. metoprolol 25 mg twice daily  - Nitroglycerin patch PRN for chest weakness  - atorvastatin 40 mg daily  -Lipid panel result noted  -Hemoglobin A1c is in diabetic range.  We will start NovoLog sliding scale for type 2 diabetes mellitus  - Echocardiogram shows EF of 31 to 35% with hypokinetic wall segments.  -Cardiology consultation has been sent.  Input appreciated.  Patient is planned for cardiac catheterization Monday.  -Duo nebs for COPD. Oxygen by nasal cannula to keep O2 sat greater than 90%  -COVID-19 test was negative. Patient's chest x-ray picture likely from his known black lung disease rather than pneumonia.  Watch off antibiotics.  -Nephrology consultation input appreciated.  Patient received IV Lasix.  -Patient did not tolerate CPAP at night due to claustrophobia.  If patient is willing, consider outpatient sleep study  upon discharge.  -  DVT prophylaxis with subcutaneous Lovenox  -CODE STATUS is full code    Discharge Planning: In progress    Jc Olivia MD   06/07/20   19:06

## 2020-06-08 NOTE — PLAN OF CARE
Problem: Patient Care Overview  Goal: Plan of Care Review  Note:   Patient had heart cath today; has tolerated well today. Will continue to monitor.

## 2020-06-08 NOTE — PROGRESS NOTES
"[facility]  [address] [place] [address]. [id number]  T - [phone]  F - [phone]     Progress Note          PATIENT  DEMOGRAPHICS   PATIENT NAME: Jose Barrett                      PHYSICIAN: Russ Amaya MD  : 1950  MRN: [id number]   LOS: 2 days    Patient Care Team:  Provider, No Known as PCP - General  Subjective   SUBJECTIVE   S/P [facility] HAS SOME SOA         Objective   OBJECTIVE   Vital Signs  Temp:  [97.3 °F (36.3 °C)-98.6 °F (37 °C)] 97.9 °F (36.6 °C)  Heart Rate:  [60-79] 64  Resp:  [18-20] 18  BP: (116-153)/(67-78) 151/78    Flowsheet Rows      First Filed Value   Admission Height  185.4 cm (73\") Documented at 2020 1732   Admission Weight  117 kg (257 lb 9.6 oz) Documented at 2020 1732           I/O last 3 completed shifts:  In: 240 [P.O.:240]  Out: -     PHYSICAL EXAM    Physical Exam   Constitutional: He is oriented to person, place, and time. He appears well-developed. He appears distressed.   HENT:   Head: Normocephalic.   Eyes: Pupils are equal, round, and reactive to light.   Cardiovascular: Normal rate, regular rhythm and normal heart sounds.   Pulmonary/Chest: He is in respiratory distress. He has rales.   Abdominal: Soft. Bowel sounds are normal.   Musculoskeletal: He exhibits edema.   Neurological: He is alert and oriented to person, place, and time.       RESULTS   Results Review:    Results from last 7 days   Lab Units 20  0547 20  0814 20  0935  20  1749   SODIUM mmol/L 136 138 138   < > 141   POTASSIUM mmol/L 3.8 3.9 4.1   < > 3.9   CHLORIDE mmol/L 97* 99 100   < > 99   CO2 mmol/L 30.0* 31.0* 29.0   < > 31.0*   BUN mg/dL 27* 25* 18   < > 17   CREATININE mg/dL 1.91* 1.71* 1.58*   < > 1.84*   CALCIUM mg/dL 8.2* 8.6 8.7   < > 8.8   BILIRUBIN mg/dL 0.7  --   --   --  0.3   ALK PHOS U/L 60  --   --   --  68   ALT (SGPT) U/L 17  --   --   --  18   AST (SGOT) U/L 19  --   --   --  20   GLUCOSE mg/dL 101* 99 109*   < > 153* "    < > = values in this interval not displayed.       Estimated Creatinine Clearance: 48.1 mL/min (A) (by C-G formula based on SCr of 1.91 mg/dL (H)).    Results from last 7 days   Lab Units 06/05/20  1749   MAGNESIUM mg/dL 1.9             Results from last 7 days   Lab Units 06/08/20  0547 06/07/20  0710 06/06/20  0716 06/05/20  1749   WBC 10*3/mm3 10.26 9.70 10.38 10.96*   HEMOGLOBIN g/dL 14.1 14.2 14.3 14.5   PLATELETS 10*3/mm3 175 176 169 188       Results from last 7 days   Lab Units 06/07/20  2116 06/05/20  1749   INR  1.06 0.93         Imaging Results (Last 24 Hours)     ** No results found for the last 24 hours. **           MEDICATIONS      aspirin 81 mg Oral Daily   atorvastatin 40 mg Oral Nightly   budesonide-formoterol 2 puff Inhalation BID - RT   metoprolol tartrate 25 mg Oral Q12H   ranolazine 1,000 mg Oral Q12H   sodium chloride 10 mL Intravenous Q12H   sodium chloride 3 mL Intravenous Q12H       sodium chloride 50 mL/hr Last Rate: 50 mL/hr (06/07/20 2102)   sodium chloride 100 mL/hr Last Rate: 100 mL/hr (06/08/20 0916)       Assessment/Plan   ASSESSMENT / PLAN      NSTEMI (non-ST elevated myocardial infarction) (CMS/[facility])    Precordial pain    Essential hypertension    COPD (chronic obstructive pulmonary disease) (CMS/[facility])    1.acute kidney injury with a background of CKD 3.  We do not have any baseline creatinine.  His peak creatinine is 1.9. He has chronic orthopnea but now complaining of difficulty sleeping due to shortness of air. This helped with iv lasix earlier Off O2 now.    . U/s left renal cyst. bph on u/s UA neg for blood and protein      His losartan and hydrochlorothiazide has already been stopped. May need lasix if gets soa     2.coal workers pneumoconiosis/COPD     3.non-ST elevation MI with intermittent chest pain and now at rest.  Patient will need a coronary heart catheterization on Monday.  multivessel CAD and plan for CT surgery evaluation     4.dyslipidemia patient LDL is up to  162.  His HDL is on the low side only 28.  Patient is currently on Lipitor              This document has been electronically signed by Russ Amaya MD on June 8, 2020 11:01

## 2020-06-08 NOTE — PROGRESS NOTES
[facility] Medicine Services  INPATIENT PROGRESS NOTE    Length of Stay: 2  Date of Admission: 6/5/2020  Primary Care Physician: Provider, No Known    Subjective   Chief Complaint: Chest pain  HPI: Patient states she feels better today.  No specific pain.    Review of Systems   Constitutional: Positive for activity change. Negative for appetite change, chills, fatigue and fever.   Respiratory: Positive for chest tightness. Negative for shortness of breath.    Cardiovascular: Negative for chest pain, palpitations and leg swelling.   Gastrointestinal: Negative for abdominal pain, constipation, diarrhea, nausea and vomiting.   Skin: Negative for wound.   Neurological: Negative for dizziness, weakness, light-headedness, numbness and headaches.        All pertinent negatives and positives are as above. All other systems have been reviewed and are negative unless otherwise stated.     Objective    Temp:  [97.1 °F (36.2 °C)-98.6 °F (37 °C)] 97.1 °F (36.2 °C)  Heart Rate:  [60-79] 67  Resp:  [18-20] 18  BP: (133-153)/(67-88) 137/73    Physical Exam   Constitutional: He appears well-developed and well-nourished.   HENT:   Head: Normocephalic and atraumatic.   Eyes: Pupils are equal, round, and reactive to light. EOM are normal.   Neck: Normal range of motion. Neck supple.   Cardiovascular: Normal rate, regular rhythm, normal heart sounds and intact distal pulses. Exam reveals no gallop and no friction rub.   No murmur heard.  Pulmonary/Chest: Effort normal and breath sounds normal. No respiratory distress. He has no wheezes. He has no rales. He exhibits no tenderness.   Abdominal: Soft. Bowel sounds are normal. He exhibits no distension. There is no tenderness.   Psychiatric: He has a normal mood and affect. His behavior is normal.   Vitals reviewed.      Results Review:  I have reviewed the labs, radiology results, and diagnostic studies.    Laboratory Data:   Results from last 7  days   Lab Units 06/08/20  0547 06/07/20  0814 06/06/20  0935  06/05/20  1749   SODIUM mmol/L 136 138 138   < > 141   POTASSIUM mmol/L 3.8 3.9 4.1   < > 3.9   CHLORIDE mmol/L 97* 99 100   < > 99   CO2 mmol/L 30.0* 31.0* 29.0   < > 31.0*   BUN mg/dL 27* 25* 18   < > 17   CREATININE mg/dL 1.91* 1.71* 1.58*   < > 1.84*   GLUCOSE mg/dL 101* 99 109*   < > 153*   CALCIUM mg/dL 8.2* 8.6 8.7   < > 8.8   BILIRUBIN mg/dL 0.7  --   --   --  0.3   ALK PHOS U/L 60  --   --   --  68   ALT (SGPT) U/L 17  --   --   --  18   AST (SGOT) U/L 19  --   --   --  20   ANION GAP mmol/L 9.0 8.0 9.0   < > 11.0    < > = values in this interval not displayed.     Estimated Creatinine Clearance: 48.1 mL/min (A) (by C-G formula based on SCr of 1.91 mg/dL (H)).  Results from last 7 days   Lab Units 06/05/20  1749   MAGNESIUM mg/dL 1.9         Results from last 7 days   Lab Units 06/08/20  0547 06/07/20  0710 06/06/20  0716 06/05/20  1749   WBC 10*3/mm3 10.26 9.70 10.38 10.96*   HEMOGLOBIN g/dL 14.1 14.2 14.3 14.5   HEMATOCRIT % 42.8 44.2 43.9 45.3   PLATELETS 10*3/mm3 175 176 169 188     Results from last 7 days   Lab Units 06/07/20  2116 06/05/20  1749   INR  1.06 0.93       Culture Data:   Blood Culture   Date Value Ref Range Status   06/05/2020 No growth at 2 days  Preliminary   06/05/2020 No growth at 2 days  Preliminary     No results found for: URINECX  No results found for: RESPCX  No results found for: WOUNDCX  No results found for: STOOLCX  No components found for: BODYFLD    Radiology Data:   Imaging Results (Last 24 Hours)     ** No results found for the last 24 hours. **          I have reviewed the patient's current medications.     Assessment/Plan     Active Hospital Problems    Diagnosis   • **NSTEMI (non-ST elevated myocardial infarction) (CMS/[facility])   • Coronary artery disease involving native coronary artery of native heart with unstable angina pectoris (CMS/[facility])   • Precordial pain   • Essential hypertension   • COPD (chronic  obstructive pulmonary disease) (CMS/[facility])       Plan:    1.  Non-ST elevation myocardial infarction: Patient had left heart cath today and was found to have multivessel coronary artery disease.  Dr. Petersen felt he is not a PCI candidate.  CT surgery has been consulted.  2.  Systolic congestive heart failure with ejection fraction of 30 to 35%: Not in overt heart failure.  3.  Hypertension: Continue home medication.  4.  COPD/coal workers pneumoconiosis: Continue current treatment.  5.  Dyslipidemia: Continue Lipitor.  6.  Acute kidney injury: Nephrology following.  7.  DVT prophylaxis: Lovenox      The patient was evaluated during the global COVID-19 pandemic, and the diagnosis was suspected/considered upon their initial presentation.  Evaluation, treatment, and testing were consistent with current guidelines for patients who present with complaints or symptoms that may be related to COVID-19.    Discharge Planning: I expect patient to be discharged to home in 2-3 days.        This document has been electronically signed by Chapin Khan MD on June 8, 2020 18:13

## 2020-06-09 ENCOUNTER — APPOINTMENT (OUTPATIENT)
Dept: NUCLEAR MEDICINE | Facility: HOSPITAL | Age: 70
End: 2020-06-09

## 2020-06-09 ENCOUNTER — APPOINTMENT (OUTPATIENT)
Dept: ULTRASOUND IMAGING | Facility: HOSPITAL | Age: 70
End: 2020-06-09

## 2020-06-09 ENCOUNTER — APPOINTMENT (OUTPATIENT)
Dept: PULMONOLOGY | Facility: HOSPITAL | Age: 70
End: 2020-06-09

## 2020-06-09 LAB
ANION GAP SERPL CALCULATED.3IONS-SCNC: 5 MMOL/L (ref 5–15)
ARTERIAL PATENCY WRIST A: POSITIVE
ATMOSPHERIC PRESS: 741 MMHG
BASE EXCESS BLDA CALC-SCNC: 2.8 MMOL/L (ref 0–2)
BASOPHILS # BLD AUTO: 0.03 10*3/MM3 (ref 0–0.2)
BASOPHILS NFR BLD AUTO: 0.3 % (ref 0–1.5)
BDY SITE: ABNORMAL
BUN BLD-MCNC: 24 MG/DL (ref 8–23)
BUN/CREAT SERPL: 14.6 (ref 7–25)
CALCIUM SPEC-SCNC: 8.3 MG/DL (ref 8.6–10.5)
CHLORIDE SERPL-SCNC: 102 MMOL/L (ref 98–107)
CHOLEST SERPL-MCNC: 198 MG/DL (ref 0–200)
CO2 SERPL-SCNC: 31 MMOL/L (ref 22–29)
CREAT BLD-MCNC: 1.64 MG/DL (ref 0.76–1.27)
DEPRECATED RDW RBC AUTO: 44.6 FL (ref 37–54)
EOSINOPHIL # BLD AUTO: 0.07 10*3/MM3 (ref 0–0.4)
EOSINOPHIL NFR BLD AUTO: 0.7 % (ref 0.3–6.2)
ERYTHROCYTE [DISTWIDTH] IN BLOOD BY AUTOMATED COUNT: 12.6 % (ref 12.3–15.4)
GFR SERPL CREATININE-BSD FRML MDRD: 42 ML/MIN/1.73
GLUCOSE BLD-MCNC: 95 MG/DL (ref 65–99)
HCO3 BLDA-SCNC: 28.9 MMOL/L (ref 20–26)
HCT VFR BLD AUTO: 39.8 % (ref 37.5–51)
HDLC SERPL-MCNC: 26 MG/DL (ref 40–60)
HGB BLD-MCNC: 13.1 G/DL (ref 13–17.7)
IMM GRANULOCYTES # BLD AUTO: 0.03 10*3/MM3 (ref 0–0.05)
IMM GRANULOCYTES NFR BLD AUTO: 0.3 % (ref 0–0.5)
LDLC SERPL CALC-MCNC: 152 MG/DL (ref 0–100)
LDLC/HDLC SERPL: 5.83 {RATIO}
LYMPHOCYTES # BLD AUTO: 1.26 10*3/MM3 (ref 0.7–3.1)
LYMPHOCYTES NFR BLD AUTO: 13.3 % (ref 19.6–45.3)
Lab: ABNORMAL
MCH RBC QN AUTO: 31.9 PG (ref 26.6–33)
MCHC RBC AUTO-ENTMCNC: 32.9 G/DL (ref 31.5–35.7)
MCV RBC AUTO: 96.8 FL (ref 79–97)
MODALITY: ABNORMAL
MONOCYTES # BLD AUTO: 0.83 10*3/MM3 (ref 0.1–0.9)
MONOCYTES NFR BLD AUTO: 8.8 % (ref 5–12)
NEUTROPHILS # BLD AUTO: 7.23 10*3/MM3 (ref 1.7–7)
NEUTROPHILS NFR BLD AUTO: 76.6 % (ref 42.7–76)
NRBC BLD AUTO-RTO: 0 /100 WBC (ref 0–0.2)
PCO2 BLDA: 49.2 MM HG (ref 35–45)
PH BLDA: 7.38 PH UNITS (ref 7.35–7.45)
PLATELET # BLD AUTO: 156 10*3/MM3 (ref 140–450)
PMV BLD AUTO: 11.2 FL (ref 6–12)
PO2 BLDA: 80.7 MM HG (ref 83–108)
POTASSIUM BLD-SCNC: 4.3 MMOL/L (ref 3.5–5.2)
RBC # BLD AUTO: 4.11 10*6/MM3 (ref 4.14–5.8)
SAO2 % BLDCOA: 95.7 % (ref 94–99)
SODIUM BLD-SCNC: 138 MMOL/L (ref 136–145)
TRIGL SERPL-MCNC: 102 MG/DL (ref 0–150)
TSH SERPL DL<=0.05 MIU/L-ACNC: 1.68 UIU/ML (ref 0.27–4.2)
VENTILATOR MODE: ABNORMAL
VLDLC SERPL-MCNC: 20.4 MG/DL
WBC NRBC COR # BLD: 9.45 10*3/MM3 (ref 3.4–10.8)

## 2020-06-09 PROCEDURE — 94799 UNLISTED PULMONARY SVC/PX: CPT

## 2020-06-09 PROCEDURE — 94729 DIFFUSING CAPACITY: CPT | Performed by: INTERNAL MEDICINE

## 2020-06-09 PROCEDURE — 94010 BREATHING CAPACITY TEST: CPT

## 2020-06-09 PROCEDURE — 82803 BLOOD GASES ANY COMBINATION: CPT

## 2020-06-09 PROCEDURE — 78469 HEART INFARCT IMAGE (3D): CPT | Performed by: INTERNAL MEDICINE

## 2020-06-09 PROCEDURE — 97162 PT EVAL MOD COMPLEX 30 MIN: CPT

## 2020-06-09 PROCEDURE — 99232 SBSQ HOSP IP/OBS MODERATE 35: CPT | Performed by: NURSE PRACTITIONER

## 2020-06-09 PROCEDURE — 80048 BASIC METABOLIC PNL TOTAL CA: CPT | Performed by: INTERNAL MEDICINE

## 2020-06-09 PROCEDURE — 94010 BREATHING CAPACITY TEST: CPT | Performed by: INTERNAL MEDICINE

## 2020-06-09 PROCEDURE — 78469 HEART INFARCT IMAGE (3D): CPT

## 2020-06-09 PROCEDURE — 36600 WITHDRAWAL OF ARTERIAL BLOOD: CPT

## 2020-06-09 PROCEDURE — 94729 DIFFUSING CAPACITY: CPT

## 2020-06-09 PROCEDURE — 93970 EXTREMITY STUDY: CPT

## 2020-06-09 PROCEDURE — 97750 PHYSICAL PERFORMANCE TEST: CPT

## 2020-06-09 PROCEDURE — 93880 EXTRACRANIAL BILAT STUDY: CPT

## 2020-06-09 PROCEDURE — 85025 COMPLETE CBC W/AUTO DIFF WBC: CPT | Performed by: INTERNAL MEDICINE

## 2020-06-09 PROCEDURE — 0 THALLOUS CHLORIDE 1 MCI/ML SOLUTION: Performed by: THORACIC SURGERY (CARDIOTHORACIC VASCULAR SURGERY)

## 2020-06-09 PROCEDURE — 80061 LIPID PANEL: CPT | Performed by: INTERNAL MEDICINE

## 2020-06-09 PROCEDURE — 84443 ASSAY THYROID STIM HORMONE: CPT | Performed by: NURSE PRACTITIONER

## 2020-06-09 PROCEDURE — A9505 TL201 THALLIUM: HCPCS | Performed by: THORACIC SURGERY (CARDIOTHORACIC VASCULAR SURGERY)

## 2020-06-09 RX ORDER — THALLOUS CHLORIDE TL-201 1 MCI/ML
3.2 INJECTION, POWDER, LYOPHILIZED, FOR SOLUTION INTRAVENOUS
Status: COMPLETED | OUTPATIENT
Start: 2020-06-09 | End: 2020-06-09

## 2020-06-09 RX ADMIN — ATORVASTATIN CALCIUM 40 MG: 40 TABLET, FILM COATED ORAL at 20:40

## 2020-06-09 RX ADMIN — TEMAZEPAM 7.5 MG: 7.5 CAPSULE ORAL at 21:34

## 2020-06-09 RX ADMIN — METOPROLOL TARTRATE 25 MG: 25 TABLET, FILM COATED ORAL at 10:03

## 2020-06-09 RX ADMIN — RANOLAZINE 1000 MG: 500 TABLET, FILM COATED, EXTENDED RELEASE ORAL at 10:03

## 2020-06-09 RX ADMIN — THALLOUS CHLORIDE TL-201 3.2 MILLICURIE: 1 INJECTION, POWDER, LYOPHILIZED, FOR SOLUTION INTRAVENOUS at 09:05

## 2020-06-09 RX ADMIN — RANOLAZINE 1000 MG: 500 TABLET, FILM COATED, EXTENDED RELEASE ORAL at 20:40

## 2020-06-09 RX ADMIN — BUDESONIDE AND FORMOTEROL FUMARATE DIHYDRATE 2 PUFF: 80; 4.5 AEROSOL RESPIRATORY (INHALATION) at 08:02

## 2020-06-09 RX ADMIN — ALPRAZOLAM 0.25 MG: 0.25 TABLET ORAL at 08:49

## 2020-06-09 RX ADMIN — ASPIRIN 81 MG 81 MG: 81 TABLET ORAL at 10:03

## 2020-06-09 RX ADMIN — SODIUM CHLORIDE, PRESERVATIVE FREE 10 ML: 5 INJECTION INTRAVENOUS at 20:41

## 2020-06-09 RX ADMIN — BUDESONIDE AND FORMOTEROL FUMARATE DIHYDRATE 2 PUFF: 80; 4.5 AEROSOL RESPIRATORY (INHALATION) at 19:52

## 2020-06-09 RX ADMIN — METOPROLOL TARTRATE 25 MG: 25 TABLET, FILM COATED ORAL at 20:40

## 2020-06-09 NOTE — PLAN OF CARE
Problem: Patient Care Overview  Goal: Plan of Care Review  Outcome: Ongoing (interventions implemented as appropriate)  Flowsheets (Taken 6/9/2020 8474)  Progress: no change  Plan of Care Reviewed With: patient

## 2020-06-09 NOTE — THERAPY DISCHARGE NOTE
Patient Name: Jose Barrett  : 1950    MRN: [id number]                              Today's Date: 2020       Admit Date: 2020    Visit Dx:     ICD-10-CM ICD-9-CM   1. Precordial pain R07.2 786.51   2. Angina at rest (CMS/[facility]) I20.8 413.9   3. Pneumonia of right lower lobe due to infectious organism J18.9 486   4. NSTEMI (non-ST elevated myocardial infarction) (CMS/[facility]) I21.4 410.70     Patient Active Problem List   Diagnosis   • Precordial pain   • NSTEMI (non-ST elevated myocardial infarction) (CMS/[facility])   • Essential hypertension   • COPD (chronic obstructive pulmonary disease) (CMS/[facility])   • Coronary artery disease involving native coronary artery of native heart with unstable angina pectoris (CMS/[facility])     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[facility])    • COPD (chronic obstructive pulmonary disease) (CMS/[facility])    • Hypertension      History reviewed. No pertinent surgical history.  General Information     Row Name 20 1400          PT Evaluation Time/Intention    Document Type  evaluation  -     Mode of Treatment  individual therapy;physical therapy  -     Row Name 20 1400          General Information    Patient Profile Reviewed?  yes  -     Prior Level of Function  independent:;all household mobility;community mobility;bed mobility;ADL's;driving;yard work;using stairs  -     Row Name 20 1400          Relationship/Environment    Lives With  spouse  -[facility] Name 20 1400          Resource/Environmental Concerns    Current Living Arrangements  home/apartment/condo 1 level house  -     Row Name 20 1400          Cognitive Assessment/Intervention- PT/OT    Orientation Status (Cognition)  oriented x 4  -     Row Name 20 1400          Safety Issues, Functional Mobility    Safety Issues Affecting Function (Mobility)  insight into deficits/self awareness  -       User Key  (r) = Recorded By, (t) = Taken By, (c) = Cosigned By     Initials Name Provider Type    Julia Soto, PT Physical Therapist        Mobility     Row Name 06/09/20 1400          Bed Mobility Assessment/Treatment    Bed Mobility Assessment/Treatment  supine-sit;sit-supine  -     Supine-Sit [place] (Bed Mobility)  independent  -     Sit-Supine [place] (Bed Mobility)  independent  -     Row Name 06/09/20 1400          Bed-Chair Transfer    Bed-Chair [place] (Transfers)  independent  -     Row Name 06/09/20 1400          Sit-Stand Transfer    Sit-Stand [place] (Transfers)  independent  -[facility] Name 06/09/20 1400          Gait/Stairs Assessment/Training    Gait/Stairs Assessment/Training  gait/ambulation independence  -     [place] Level (Gait)  independent  -     Distance in Feet (Gait)  70ft to complete 5 meter walk test  -       User Key  (r) = Recorded By, (t) = Taken By, (c) = Cosigned By    Initials Name Provider Type    Julia oSto, PT Physical Therapist        Obj/Interventions     Row Name 06/09/20 1400          General ROM    GENERAL ROM COMMENTS  AROM WFL all extremities  -     Row Name 06/09/20 1400          MMT (Manual Muscle Testing)    General MMT Comments  BUE: grossly 5/5; BLE: grossly 5/5  -     Row Name 06/09/20 1400          Static Sitting Balance    Level of [place] (Unsupported Sitting, Static Balance)  independent  -     Time Able to Maintain Position (Unsupported Sitting, Static Balance)  more than 5 minutes  -[facility] Name 06/09/20 1400          Dynamic Sitting Balance    Level of [place], Reaches Outside Midline (Sitting, Dynamic Balance)  independent  -[facility] Name 06/09/20 1400          Static Standing Balance    Level of [place] (Supported Standing, Static Balance)  independent  -     Time Able to Maintain Position (Supported Standing, Static Balance)  2 to 3 minutes  -[facility] Name 06/09/20 1400          Dynamic Standing Balance    Level of [place], Reaches  Outside Midline (Standing, Dynamic Balance)  independent  -     Time Able to Maintain Position, Reaches Outside Midline (Standing, Dynamic Balance)  more than 5 minutes  -     Row Name 06/09/20 1400          Sensory Assessment/Intervention    Sensory General Assessment  -- pt reports sone numbness in LUE since this episode  -       User Key  (r) = Recorded By, (t) = Taken By, (c) = Cosigned By    Initials Name Provider Type    Julia Soto, PT Physical Therapist        Goals/Plan     [facility] Name 06/09/20 1400          Patient Education Goal (PT)    Activity (Patient Education Goal, PT)  Pt will complete 5 meter walk test with VSS  -     Time Frame (Patient Education Goal, PT)  by discharge  -     Progress/Outcome (Patient Education Goal, PT)  goal met  -       User Key  (r) = Recorded By, (t) = Taken By, (c) = Cosigned By    Initials Name Provider Type    Julia Soto, PT Physical Therapist        Clinical Impression     Row Name 06/09/20 1423 06/09/20 1400       Pain Assessment    Additional Documentation  --  -  Pain Scale: Numbers Pre/Post-Treatment (Group)  -[facility] Name 06/09/20 1423 06/09/20 1400       Pain Scale: Numbers Pre/Post-Treatment    Pain Scale: Numbers, Pretreatment  --  -CYNTHIA  0/10 - no pain  -    Pain Scale: Numbers, Post-Treatment  --  -CYNTHIA  0/10 - no pain  -[facility] Name 06/09/20 1423 06/09/20 1400       Plan of Care Review    Plan of Care Reviewed With  --  -  patient;spouse  -    Outcome Summary  --  PT evaluation and 5 meter walk test completed. Pt independent with transfers and ambulation required to complete test. Pt tolerated test well: trial 1) a4.68 s, trial 2) 4.41s trial 3) 4.69. Patient goal of completely 5 meter walk test;;will discontinue PT at this time.  -[facility] Name 06/09/20 1423 06/09/20 1400       Physical Therapy Clinical Impression    Patient/Family Goals Statement (PT Clinical Impression)  --  return home  -    Criteria for Skilled  Interventions Met (PT Clinical Impression)  --  yes  -CYNTHIA    Rehab Potential (PT Clinical Summary)  --  -CYNTHIA  good, to achieve stated therapy goals  -CYNTHIA    Predicted Duration of Therapy (PT)  --  1 visit  -    Row Name 06/09/20 1400          Vital Signs    Pre Systolic BP Rehab  122  -CYNTHIA     Pre Treatment Diastolic BP  60  -CYNTHIA     Post Systolic BP Rehab  120  -CYNTHIA     Post Treatment Diastolic BP  70  -CYNTHIA     Pretreatment Heart Rate (beats/min)  66  -CYNTHIA     Posttreatment Heart Rate (beats/min)  74  -CYNTHIA     Pre SpO2 (%)  94  -CYNTHIA     O2 Delivery Pre Treatment  room air  -CYNTHIA     Intra SpO2 (%)  90  -CYNTHIA     O2 Delivery Intra Treatment  room air  -CYNTHIA     Post SpO2 (%)  94  -CYNTHIA     O2 Delivery Post Treatment  room air  -CYNTHIA     Pre Patient Position  Sitting  -CYNTHIA     Intra Patient Position  Standing  -CYNTHIA     Post Patient Position  Sitting  -CYNTHIA     Row Name 06/09/20 1400          Positioning and Restraints    Pre-Treatment Position  in bed  -CYNTHIA     Post Treatment Position  bed  -CYNTHIA     In Bed  sitting EOB;notified nsg;call light within reach;encouraged to call for assist;with family/caregiver  -       User Key  (r) = Recorded By, (t) = Taken By, (c) = Cosigned By    Initials Name Provider Type    Julia Soto, PT Physical Therapist        Outcome Measures     Row Name 06/09/20 1400          How much help from another person do you currently need...    Turning from your back to your side while in flat bed without using bedrails?  4  -CYNTHIA     Moving from lying on back to sitting on the side of a flat bed without bedrails?  4  -CYNTHIA     Moving to and from a bed to a chair (including a wheelchair)?  4  -CYNTHIA     Standing up from a chair using your arms (e.g., wheelchair, bedside chair)?  4  -CYNTHIA     Climbing 3-5 steps with a railing?  4  -CYNTHIA     To walk in hospital room?  4  -CYNTHIA     AM-PAC 6 Clicks Score (PT)  24  -     Row Name 06/09/20 1400          Functional Assessment    Outcome Measure Options  AM-PAC 6 Clicks Basic  Mobility (PT)  -       User Key  (r) = Recorded By, (t) = Taken By, (c) = Cosigned By    Initials Name Provider Type    Julia Soto PT Physical Therapist          PT Recommendation and Plan     Outcome Summary/Treatment Plan (PT)  Anticipated Discharge Disposition (PT): home  Plan of Care Reviewed With: patient  Outcome Summary: PT evaluation and 5 meter walk test completed. Pt independent with transfers and ambulation required to complete test. Pt tolerated test well: trial 1) a4.68 s, trial 2) 4.41s trial 3) 4.69. Patient goal of completely 5 meter walk test;;will discontinue PT at this time.     Time Calculation:   PT Charges     Row Name 06/09/20 1549             Time Calculation    Start Time  1400  -CYNTHIA      Stop Time  1429  -      Time Calculation (min)  29 min  -      PT Received On  06/09/20  -        User Key  (r) = Recorded By, (t) = Taken By, (c) = Cosigned By    Initials Name Provider Type    Julia Soto PT Physical Therapist        Therapy Charges for Today     Code Description Service Date Service Provider Modifiers Qty    28915574083  PT EVAL MOD COMPLEXITY 1 6/9/2020 Julia Blood, PT GP 1    [id number]  PT PHYS PERF TEST/MEASURE EA 15MIN 6/9/2020 Julia Blood, PT GP 1          PT G-Codes  Outcome Measure Options: AM-PAC 6 Clicks Basic Mobility (PT)  AM-PAC 6 Clicks Score (PT): 24    PT Discharge Summary  Anticipated Discharge Disposition (PT): home  Reason for Discharge: All goals achieved  Outcomes Achieved: Able to achieve all goals within established timeline    Julia Blood PT  6/9/2020

## 2020-06-09 NOTE — PROGRESS NOTES
[facility] Medicine Services  INPATIENT PROGRESS NOTE    Length of Stay: 3  Date of Admission: 6/5/2020  Primary Care Physician: Provider, No Known    Subjective   Chief Complaint: Chest pain  HPI: Patient states he has no pain.  Feels good.    Review of Systems   Constitutional: Positive for activity change. Negative for appetite change, chills, fatigue and fever.   Respiratory: Positive for chest tightness. Negative for shortness of breath.    Cardiovascular: Negative for chest pain, palpitations and leg swelling.   Gastrointestinal: Negative for abdominal pain, constipation, diarrhea, nausea and vomiting.   Skin: Negative for wound.   Neurological: Negative for dizziness, weakness, light-headedness, numbness and headaches.        All pertinent negatives and positives are as above. All other systems have been reviewed and are negative unless otherwise stated.     Objective    Temp:  [96 °F (35.6 °C)-98.4 °F (36.9 °C)] 96 °F (35.6 °C)  Heart Rate:  [65-78] 65  Resp:  [18-20] 20  BP: (124-150)/(65-78) 138/74    Physical Exam   Constitutional: He appears well-developed and well-nourished.   HENT:   Head: Normocephalic and atraumatic.   Eyes: Pupils are equal, round, and reactive to light. EOM are normal.   Neck: Normal range of motion. Neck supple.   Cardiovascular: Normal rate, regular rhythm, normal heart sounds and intact distal pulses. Exam reveals no gallop and no friction rub.   No murmur heard.  Pulmonary/Chest: Effort normal and breath sounds normal. No respiratory distress. He has no wheezes. He has no rales. He exhibits no tenderness.   Abdominal: Soft. Bowel sounds are normal. He exhibits no distension. There is no tenderness.   Psychiatric: He has a normal mood and affect. His behavior is normal.   Vitals reviewed.      Results Review:  I have reviewed the labs, radiology results, and diagnostic studies.    Laboratory Data:   Results from last 7 days   Lab Units  06/09/20  0650 06/08/20  0547 06/07/20  0814  06/05/20  1749   SODIUM mmol/L 138 136 138   < > 141   POTASSIUM mmol/L 4.3 3.8 3.9   < > 3.9   CHLORIDE mmol/L 102 97* 99   < > 99   CO2 mmol/L 31.0* 30.0* 31.0*   < > 31.0*   BUN mg/dL 24* 27* 25*   < > 17   CREATININE mg/dL 1.64* 1.91* 1.71*   < > 1.84*   GLUCOSE mg/dL 95 101* 99   < > 153*   CALCIUM mg/dL 8.3* 8.2* 8.6   < > 8.8   BILIRUBIN mg/dL  --  0.7  --   --  0.3   ALK PHOS U/L  --  60  --   --  68   ALT (SGPT) U/L  --  17  --   --  18   AST (SGOT) U/L  --  19  --   --  20   ANION GAP mmol/L 5.0 9.0 8.0   < > 11.0    < > = values in this interval not displayed.     Estimated Creatinine Clearance: 55.7 mL/min (A) (by C-G formula based on SCr of 1.64 mg/dL (H)).  Results from last 7 days   Lab Units 06/05/20  1749   MAGNESIUM mg/dL 1.9         Results from last 7 days   Lab Units 06/09/20  0650 06/08/20  0547 06/07/20  0710 06/06/20  0716 06/05/20  1749   WBC 10*3/mm3 9.45 10.26 9.70 10.38 10.96*   HEMOGLOBIN g/dL 13.1 14.1 14.2 14.3 14.5   HEMATOCRIT % 39.8 42.8 44.2 43.9 45.3   PLATELETS 10*3/mm3 156 175 176 169 188     Results from last 7 days   Lab Units 06/07/20  2116 06/05/20  1749   INR  1.06 0.93       Culture Data:   No results found for: BLOODCX  No results found for: URINECX  No results found for: RESPCX  No results found for: WOUNDCX  No results found for: STOOLCX  No components found for: BODYFLD    Radiology Data:   Imaging Results (Last 24 Hours)     Procedure Component Value Units Date/Time    US Carotid Bilateral [[id number]] Collected:  06/09/20 0807     Updated:  06/09/20 0859    Narrative:       Doppler duplex carotid exam.         HISTORY:Atherosclerosis.  Carotid imaging prior to coronary  bypass surgery.    COMPARISON: None       TECHNIQUE: Grayscale, color Doppler and pulsed Doppler  interrogation of the common, internal and external carotid  arteries was performed with special attention to the internal  carotids. Limited evaluation of the  vertebral and subclavian  arteries was also performed.    FINDINGS:     On the right, peak systolic internal carotid artery velocity  114.1 cm/s. ICA over CCA ratio 1.6. This suggest narrowing of  less than 50%.    On the left, peak systolic internal carotid artery velocity 127  cm/s. ICA over CCA ratio 2.1. This suggests narrowing in the  borderline range 50-60%.     Each vertebral artery has antegrade flow. There are mild  atherosclerotic changes, intimal thickening.      Impression:       Narrowing in the right carotid bifurcation, proximal internal  carotid artery less than 50%.    Areas of narrowing left carotid bifurcation, internal carotid  artery in the range of 50-60%.    Electronically signed by:  Jorge Martinez MD  6/9/2020 8:58 AM CDT  Workstation: [email or web address]     Vein Mapping Bilateral [[id number]] Collected:  06/09/20 0807     Updated:  06/09/20 0857    Narrative:         PROCEDURE: Ultrasound, venous mapping of both lower extremities    COMPARISON: No comparison    HISTORY: Venous mapping prior to coronary artery bypass surgery    FINDINGS:     Right lower extremity:  Greater saphenous vein, proximal thigh: 3.69    mm  Greater saphenous vein, mid thigh: 4.01    mm  Greater saphenous vein, distal thigh: 2.89    mm  Greater saphenous vein, knee: 3.36    mm  Greater saphenous vein, proximal calf: 2.41    mm  Greater saphenous vein, mid calf: 2.24    mm  Greater saphenous vein, distal calf: 2.96    mm    Left lower extremity:  Greater saphenous vein, proximal thigh: 4.56    mm  Greater saphenous vein, mid thigh: 3.68    mm  Greater saphenous vein, distal thigh: 3.84    mm  Greater saphenous vein, knee: 3.45    mm  Greater saphenous vein, proximal calf: 3.76    mm  Greater saphenous vein, mid calf: 3.52    mm  Greater saphenous vein, distal calf: 2.32    mm      Impression:       1. Venous mapping study of both lower extremities  with measurements described above.    Electronically signed by:  Jorge Martinez MD   6/9/2020 8:55 AM CDT  Workstation: [email or web address]          I have reviewed the patient's current medications.     Assessment/Plan     Active Hospital Problems    Diagnosis   • **NSTEMI (non-ST elevated myocardial infarction) (CMS/[facility])   • Coronary artery disease involving native coronary artery of native heart with unstable angina pectoris (CMS/[facility])   • Precordial pain   • Essential hypertension   • COPD (chronic obstructive pulmonary disease) (CMS/[facility])       Plan:    1.  Non-ST elevation myocardial infarction: Work-up in progress to determine the viability of patient is a candidate for drug revascularization  2.  Systolic congestive heart failure with ejection fraction of 30 to 35%: Not in overt heart failure.  Continue fluid restriction, salt restriction, and weight monitoring.  3.  Hypertension: Continue home medication.  4.  COPD/coal workers pneumoconiosis: Continue current treatment.  5.  Dyslipidemia: Continue Lipitor.  6.  Acute kidney injury: Nephrology following.  7.  DVT prophylaxis: Lovenox      The patient was evaluated during the global COVID-19 pandemic, and the diagnosis was suspected/considered upon their initial presentation.  Evaluation, treatment, and testing were consistent with current guidelines for patients who present with complaints or symptoms that may be related to COVID-19.    Discharge Planning: I expect patient to be discharged to home in 2-3 days.        This document has been electronically signed by Chapin Khan MD on June 9, 2020 15:07

## 2020-06-09 NOTE — PROGRESS NOTES
"[facility]  [address] [place] [address]. [address]  T - [phone]  F - [phone]     Progress Note          PATIENT  DEMOGRAPHICS   PATIENT NAME: Jose Barrett                      PHYSICIAN: Russ Amaya MD  : 1950  MRN: [id number]   LOS: 3 days    Patient Care Team:  Provider, No Known as PCP - General  Subjective   SUBJECTIVE   Feels well, no soa no chest pain         Objective   OBJECTIVE   Vital Signs  Temp:  [97.1 °F (36.2 °C)-98.4 °F (36.9 °C)] 97.9 °F (36.6 °C)  Heart Rate:  [63-78] 76  Resp:  [18-20] 20  BP: (124-153)/(65-88) 141/76    Flowsheet Rows      First Filed Value   Admission Height  185.4 cm (73\") Documented at 2020 1732   Admission Weight  117 kg (257 lb 9.6 oz) Documented at 2020 1732           I/O last 3 completed shifts:  In: 1960 [P.O.:720; I.V.:1240]  Out: -     PHYSICAL EXAM    Physical Exam   Constitutional: He is oriented to person, place, and time. He appears well-developed. He appears distressed.   HENT:   Head: Normocephalic.   Eyes: Pupils are equal, round, and reactive to light.   Cardiovascular: Normal rate, regular rhythm and normal heart sounds.   Pulmonary/Chest: He is in respiratory distress. He has rales.   Abdominal: Soft. Bowel sounds are normal.   Musculoskeletal: He exhibits edema.   Neurological: He is alert and oriented to person, place, and time.       RESULTS   Results Review:    Results from last 7 days   Lab Units 20  0650 20  0547 20  0814  20  1749   SODIUM mmol/L 138 136 138   < > 141   POTASSIUM mmol/L 4.3 3.8 3.9   < > 3.9   CHLORIDE mmol/L 102 97* 99   < > 99   CO2 mmol/L 31.0* 30.0* 31.0*   < > 31.0*   BUN mg/dL 24* 27* 25*   < > 17   CREATININE mg/dL 1.64* 1.91* 1.71*   < > 1.84*   CALCIUM mg/dL 8.3* 8.2* 8.6   < > 8.8   BILIRUBIN mg/dL  --  0.7  --   --  0.3   ALK PHOS U/L  --  60  --   --  68   ALT (SGPT) U/L  --  17  --   --  18   AST (SGOT) U/L  --  19  --   --  20   GLUCOSE " mg/dL 95 101* 99   < > 153*    < > = values in this interval not displayed.       Estimated Creatinine Clearance: 55.7 mL/min (A) (by C-G formula based on SCr of 1.64 mg/dL (H)).    Results from last 7 days   Lab Units 06/05/20  1749   MAGNESIUM mg/dL 1.9             Results from last 7 days   Lab Units 06/09/20  0650 06/08/20  0547 06/07/20  0710 06/06/20  0716 06/05/20  1749   WBC 10*3/mm3 9.45 10.26 9.70 10.38 10.96*   HEMOGLOBIN g/dL 13.1 14.1 14.2 14.3 14.5   PLATELETS 10*3/mm3 156 175 176 169 188       Results from last 7 days   Lab Units 06/07/20  2116 06/05/20  1749   INR  1.06 0.93         Imaging Results (Last 24 Hours)     Procedure Component Value Units Date/Time    US Carotid Bilateral [[id number]] Collected:  06/09/20 0807     Updated:  06/09/20 0859    Narrative:       Doppler duplex carotid exam.         HISTORY:Atherosclerosis.  Carotid imaging prior to coronary  bypass surgery.    COMPARISON: None       TECHNIQUE: Grayscale, color Doppler and pulsed Doppler  interrogation of the common, internal and external carotid  arteries was performed with special attention to the internal  carotids. Limited evaluation of the vertebral and subclavian  arteries was also performed.    FINDINGS:     On the right, peak systolic internal carotid artery velocity  114.1 cm/s. ICA over CCA ratio 1.6. This suggest narrowing of  less than 50%.    On the left, peak systolic internal carotid artery velocity 127  cm/s. ICA over CCA ratio 2.1. This suggests narrowing in the  borderline range 50-60%.     Each vertebral artery has antegrade flow. There are mild  atherosclerotic changes, intimal thickening.      Impression:       Narrowing in the right carotid bifurcation, proximal internal  carotid artery less than 50%.    Areas of narrowing left carotid bifurcation, internal carotid  artery in the range of 50-60%.    Electronically signed by:  Jorge Martinez MD  6/9/2020 8:58 AM CDT  Workstation: [id number]    US Vein Mapping  Bilateral [[id number]] Collected:  06/09/20 0807     Updated:  06/09/20 0857    Narrative:         PROCEDURE: Ultrasound, venous mapping of both lower extremities    COMPARISON: No comparison    HISTORY: Venous mapping prior to coronary artery bypass surgery    FINDINGS:     Right lower extremity:  Greater saphenous vein, proximal thigh: 3.69    mm  Greater saphenous vein, mid thigh: 4.01    mm  Greater saphenous vein, distal thigh: 2.89    mm  Greater saphenous vein, knee: 3.36    mm  Greater saphenous vein, proximal calf: 2.41    mm  Greater saphenous vein, mid calf: 2.24    mm  Greater saphenous vein, distal calf: 2.96    mm    Left lower extremity:  Greater saphenous vein, proximal thigh: 4.56    mm  Greater saphenous vein, mid thigh: 3.68    mm  Greater saphenous vein, distal thigh: 3.84    mm  Greater saphenous vein, knee: 3.45    mm  Greater saphenous vein, proximal calf: 3.76    mm  Greater saphenous vein, mid calf: 3.52    mm  Greater saphenous vein, distal calf: 2.32    mm      Impression:       1. Venous mapping study of both lower extremities  with measurements described above.    Electronically signed by:  Jorge Martinez MD  6/9/2020 8:55 AM CDT  Workstation: [id number]           MEDICATIONS      aspirin 81 mg Oral Daily   atorvastatin 40 mg Oral Nightly   budesonide-formoterol 2 puff Inhalation BID - RT   metoprolol tartrate 25 mg Oral Q12H   ranolazine 1,000 mg Oral Q12H   sodium chloride 10 mL Intravenous Q12H   sodium chloride 3 mL Intravenous Q12H       sodium chloride 100 mL/hr Last Rate: 100 mL/hr (06/08/20 2230)       Assessment/Plan   ASSESSMENT / PLAN      NSTEMI (non-ST elevated myocardial infarction) (CMS/HCC)    Precordial pain    Essential hypertension    COPD (chronic obstructive pulmonary disease) (CMS/HCC)    Coronary artery disease involving native coronary artery of native heart with unstable angina pectoris (CMS/HCC)    1.acute kidney injury with a background of CKD 3.  We do not have any  baseline creatinine.  His peak creatinine is 1.9.     . U/s left renal cyst. bph on u/s UA neg for blood and protein      His losartan and hydrochlorothiazide has already been stopped.cr better. Stop ivf. May try diuretic / losartan tomorrow      2.coal workers pneumoconiosis/COPD     3.non-ST elevation MI with intermittent chest pain and now at rest.  Patient will need a coronary heart catheterization on Monday.  multivessel CAD and currently doing work up for surgery.ejection fraction 35%     4.dyslipidemia patient LDL is up to 162.  His HDL is on the low side only 28.  Patient is currently on Lipitor              This document has been electronically signed by Russ Amaya MD on June 9, 2020 10:26

## 2020-06-09 NOTE — PLAN OF CARE
Problem: Patient Care Overview  Goal: Plan of Care Review  Flowsheets (Taken 6/9/2020 8591)  Outcome Summary: patient has had multiple tests today. no complaints of pain. soa at times.

## 2020-06-09 NOTE — PROGRESS NOTES
"Discharge Planning Assessment  [facility]     Patient Name: Jose Barrett  MRN: [id number]  Today's Date: 6/9/2020    Admit Date: 6/5/2020    Discharge Needs Assessment     Row Name 06/09/20 1014       Living Environment    Lives With  spouse    Name(s) of Who Lives With Patient  Larry Barrett (spouse)    Current Living Arrangements  home/apartment/condo Information on face sheet reviewed with patient.     Primary Care Provided by  self    Provides Primary Care For  no one    Family Caregiver if Needed  spouse    Family Caregiver Names  Peewee Barrett (spouse)    Able to Return to Prior Arrangements  yes       Resource/Environmental Concerns    Resource/Environmental Concerns  none    Transportation Concerns  car, none       Transition Planning    Patient/Family Anticipates Transition to  home with family    Patient/Family Anticipated Services at Transition  none    Transportation Anticipated  car, drives self;family or friend will provide If unable to drive, spouse available to assist.        Discharge Needs Assessment    Readmission Within the Last 30 Days  no previous admission in last 30 days    Concerns to be Addressed  denies needs/concerns at this time;no discharge needs identified    Equipment Currently Used at Home  none    Anticipated Changes Related to Illness  none    Equipment Needed After Discharge  none        Discharge Plan     Row Name 06/09/20 1016       Plan    Plan  home with no services    Plan Comments  LACE assessment completed via telephone with patient. Patient was unsure if he had rx coverage. NAILA contacted [facility] to verify coverage. SWRK informed that patient receives some medication through work comp and others . MARY GRACEK confirmed patient has Medicare part D on file at pharmacy. Note left on CM handoff to f/u and verify ranexa coverage for patient at discharge. Patient did not have a PCP listed. He is retired but voiced that he f/u as needed with provider at "[facility] [place] and is unable to provide name of provider. Patient does not have a preference re: PCP, if needed at d/c. Patient denies any needs and/or concerns at this time. Anticipate patient will f/u outpatient with CT surgeon and  return for scheduled CABG. Continue with medical management.....Gabbie CASTANON    Row Name 06/09/20 0923       Plan    Plan Comments  Chart notes reviewed. SWRK attempted to contact patient's room to complete LACE assessment. No answer.....Gabbie CASTANON    Row Name 06/09/20 0844       Plan    Plan Comments  Continue stay review - Patients heart cath showing severe MVD. CT surgery consulted and doing CABG work-up. Dr. Donis to review and plan accordingly.   JESSICA Peace        Destination      Coordination has not been started for this encounter.      Durable Medical Equipment      Coordination has not been started for this encounter.      Dialysis/Infusion      Coordination has not been started for this encounter.      Home Medical Care      Coordination has not been started for this encounter.      Therapy      Coordination has not been started for this encounter.      Community Resources      Coordination has not been started for this encounter.        Expected Discharge Date and Time     Expected Discharge Date Expected Discharge Time    Jun 12, 2020         Demographic Summary     Row Name 06/09/20 1011       General Information    Admission Type  inpatient    Arrived From  emergency department    Referral Source  high risk screening LACE score 10    Preferred Language  English     Used During This Interaction  no    General Information Comments  SWRK contacted patient and completed LACE assessment with patient via telephone.        Contact Information    Contact Information Obtained for          Functional Status     Row Name 06/09/20 1013       Functional Status    Usual Activity Tolerance  good    Current Activity Tolerance  good    Functional Status " Comments  Patient is independent with ADL's.        Functional Status, IADL    Medications  independent Pharmacy, [facility], confirmed. Patient unsure of rx coverage. SWRK called [facility]. Patient has part D plan on file.     Meal Preparation  independent    Housekeeping  independent    Laundry  independent    Shopping  independent    IADL Comments  Patient has support from spouse.        Mental Status Summary    Recent Changes in Mental Status/Cognitive Functioning  no changes       Employment/    Employment Status  retired        Psychosocial    No documentation.       Abuse/Neglect    No documentation.       Legal    No documentation.       Substance Abuse    No documentation.       Patient Forms    No documentation.           LG Ramos

## 2020-06-10 ENCOUNTER — APPOINTMENT (OUTPATIENT)
Dept: NUCLEAR MEDICINE | Facility: HOSPITAL | Age: 70
End: 2020-06-10

## 2020-06-10 ENCOUNTER — ANESTHESIA EVENT (OUTPATIENT)
Dept: PERIOP | Facility: HOSPITAL | Age: 70
End: 2020-06-10

## 2020-06-10 LAB
ALBUMIN SERPL-MCNC: 3.6 G/DL (ref 3.5–5.2)
ALBUMIN/GLOB SERPL: 1.3 G/DL
ALP SERPL-CCNC: 55 U/L (ref 39–117)
ALT SERPL W P-5'-P-CCNC: 25 U/L (ref 1–41)
ANION GAP SERPL CALCULATED.3IONS-SCNC: 6 MMOL/L (ref 5–15)
AST SERPL-CCNC: 27 U/L (ref 1–40)
BACTERIA SPEC AEROBE CULT: NORMAL
BACTERIA SPEC AEROBE CULT: NORMAL
BASOPHILS # BLD AUTO: 0.04 10*3/MM3 (ref 0–0.2)
BASOPHILS NFR BLD AUTO: 0.4 % (ref 0–1.5)
BILIRUB SERPL-MCNC: 0.6 MG/DL (ref 0.2–1.2)
BUN BLD-MCNC: 22 MG/DL (ref 8–23)
BUN/CREAT SERPL: 13.9 (ref 7–25)
CALCIUM SPEC-SCNC: 8.5 MG/DL (ref 8.6–10.5)
CHLORIDE SERPL-SCNC: 100 MMOL/L (ref 98–107)
CO2 SERPL-SCNC: 30 MMOL/L (ref 22–29)
CREAT BLD-MCNC: 1.58 MG/DL (ref 0.76–1.27)
DEPRECATED RDW RBC AUTO: 43.9 FL (ref 37–54)
EOSINOPHIL # BLD AUTO: 0.15 10*3/MM3 (ref 0–0.4)
EOSINOPHIL NFR BLD AUTO: 1.6 % (ref 0.3–6.2)
ERYTHROCYTE [DISTWIDTH] IN BLOOD BY AUTOMATED COUNT: 12.5 % (ref 12.3–15.4)
GFR SERPL CREATININE-BSD FRML MDRD: 44 ML/MIN/1.73
GLOBULIN UR ELPH-MCNC: 2.7 GM/DL
GLUCOSE BLD-MCNC: 104 MG/DL (ref 65–99)
HCT VFR BLD AUTO: 39.7 % (ref 37.5–51)
HGB BLD-MCNC: 12.9 G/DL (ref 13–17.7)
IMM GRANULOCYTES # BLD AUTO: 0.03 10*3/MM3 (ref 0–0.05)
IMM GRANULOCYTES NFR BLD AUTO: 0.3 % (ref 0–0.5)
LYMPHOCYTES # BLD AUTO: 1.29 10*3/MM3 (ref 0.7–3.1)
LYMPHOCYTES NFR BLD AUTO: 13.5 % (ref 19.6–45.3)
MCH RBC QN AUTO: 31.5 PG (ref 26.6–33)
MCHC RBC AUTO-ENTMCNC: 32.5 G/DL (ref 31.5–35.7)
MCV RBC AUTO: 96.8 FL (ref 79–97)
MONOCYTES # BLD AUTO: 0.96 10*3/MM3 (ref 0.1–0.9)
MONOCYTES NFR BLD AUTO: 10 % (ref 5–12)
NEUTROPHILS # BLD AUTO: 7.1 10*3/MM3 (ref 1.7–7)
NEUTROPHILS NFR BLD AUTO: 74.2 % (ref 42.7–76)
NRBC BLD AUTO-RTO: 0 /100 WBC (ref 0–0.2)
PLATELET # BLD AUTO: 161 10*3/MM3 (ref 140–450)
PMV BLD AUTO: 11.1 FL (ref 6–12)
POTASSIUM BLD-SCNC: 4 MMOL/L (ref 3.5–5.2)
PROT SERPL-MCNC: 6.3 G/DL (ref 6–8.5)
RBC # BLD AUTO: 4.1 10*6/MM3 (ref 4.14–5.8)
SODIUM BLD-SCNC: 136 MMOL/L (ref 136–145)
WBC NRBC COR # BLD: 9.57 10*3/MM3 (ref 3.4–10.8)

## 2020-06-10 PROCEDURE — A9505 TL201 THALLIUM: HCPCS | Performed by: THORACIC SURGERY (CARDIOTHORACIC VASCULAR SURGERY)

## 2020-06-10 PROCEDURE — 94660 CPAP INITIATION&MGMT: CPT

## 2020-06-10 PROCEDURE — 99232 SBSQ HOSP IP/OBS MODERATE 35: CPT | Performed by: NURSE PRACTITIONER

## 2020-06-10 PROCEDURE — 99232 SBSQ HOSP IP/OBS MODERATE 35: CPT | Performed by: INTERNAL MEDICINE

## 2020-06-10 PROCEDURE — 94762 N-INVAS EAR/PLS OXIMTRY CONT: CPT

## 2020-06-10 PROCEDURE — 94799 UNLISTED PULMONARY SVC/PX: CPT

## 2020-06-10 PROCEDURE — 85025 COMPLETE CBC W/AUTO DIFF WBC: CPT | Performed by: INTERNAL MEDICINE

## 2020-06-10 PROCEDURE — 80053 COMPREHEN METABOLIC PANEL: CPT | Performed by: NURSE PRACTITIONER

## 2020-06-10 PROCEDURE — 0 THALLOUS CHLORIDE 1 MCI/ML SOLUTION: Performed by: THORACIC SURGERY (CARDIOTHORACIC VASCULAR SURGERY)

## 2020-06-10 RX ORDER — ATORVASTATIN CALCIUM 40 MG/1
40 TABLET, FILM COATED ORAL NIGHTLY
Qty: 30 TABLET | Refills: 0 | Status: SHIPPED | OUTPATIENT
Start: 2020-06-10 | End: 2020-08-07

## 2020-06-10 RX ORDER — SODIUM CHLORIDE 0.9 % (FLUSH) 0.9 %
10 SYRINGE (ML) INJECTION AS NEEDED
Status: CANCELLED | OUTPATIENT
Start: 2020-06-18

## 2020-06-10 RX ORDER — ISOSORBIDE MONONITRATE 60 MG/1
60 TABLET, EXTENDED RELEASE ORAL
Qty: 30 TABLET | Refills: 0 | Status: SHIPPED | OUTPATIENT
Start: 2020-06-11 | End: 2020-07-01 | Stop reason: HOSPADM

## 2020-06-10 RX ORDER — RANOLAZINE 1000 MG/1
1000 TABLET, EXTENDED RELEASE ORAL EVERY 12 HOURS SCHEDULED
Qty: 60 TABLET | Refills: 0 | Status: SHIPPED | OUTPATIENT
Start: 2020-06-10 | End: 2020-07-01 | Stop reason: HOSPADM

## 2020-06-10 RX ORDER — BUPIVACAINE HCL/0.9 % NACL/PF 0.1 %
2 PLASTIC BAG, INJECTION (ML) EPIDURAL ONCE
Status: CANCELLED | OUTPATIENT
Start: 2020-06-18 | End: 2020-06-10

## 2020-06-10 RX ORDER — THALLOUS CHLORIDE TL-201 1 MCI/ML
1.1 INJECTION, POWDER, LYOPHILIZED, FOR SOLUTION INTRAVENOUS
Status: COMPLETED | OUTPATIENT
Start: 2020-06-10 | End: 2020-06-10

## 2020-06-10 RX ORDER — LOSARTAN POTASSIUM 25 MG/1
25 TABLET ORAL
Qty: 30 TABLET | Refills: 0 | Status: SHIPPED | OUTPATIENT
Start: 2020-06-11 | End: 2020-07-01 | Stop reason: HOSPADM

## 2020-06-10 RX ORDER — SODIUM CHLORIDE 0.9 % (FLUSH) 0.9 %
3 SYRINGE (ML) INJECTION EVERY 12 HOURS SCHEDULED
Status: CANCELLED | OUTPATIENT
Start: 2020-06-18

## 2020-06-10 RX ORDER — DEXTROSE AND SODIUM CHLORIDE 5; .45 G/100ML; G/100ML
100 INJECTION, SOLUTION INTRAVENOUS CONTINUOUS
Status: CANCELLED | OUTPATIENT
Start: 2020-06-18

## 2020-06-10 RX ORDER — NITROGLYCERIN 0.4 MG/1
0.4 TABLET SUBLINGUAL
Status: CANCELLED | OUTPATIENT
Start: 2020-06-18

## 2020-06-10 RX ORDER — ISOSORBIDE MONONITRATE 60 MG/1
60 TABLET, EXTENDED RELEASE ORAL
Status: DISCONTINUED | OUTPATIENT
Start: 2020-06-10 | End: 2020-06-11 | Stop reason: HOSPADM

## 2020-06-10 RX ORDER — LOSARTAN POTASSIUM 25 MG/1
25 TABLET ORAL
Status: DISCONTINUED | OUTPATIENT
Start: 2020-06-10 | End: 2020-06-11 | Stop reason: HOSPADM

## 2020-06-10 RX ORDER — DOCUSATE SODIUM 100 MG/1
100 CAPSULE, LIQUID FILLED ORAL 2 TIMES DAILY
Status: DISCONTINUED | OUTPATIENT
Start: 2020-06-10 | End: 2020-06-11 | Stop reason: HOSPADM

## 2020-06-10 RX ADMIN — ATORVASTATIN CALCIUM 40 MG: 40 TABLET, FILM COATED ORAL at 20:39

## 2020-06-10 RX ADMIN — DOCUSATE SODIUM 100 MG: 100 CAPSULE, LIQUID FILLED ORAL at 20:38

## 2020-06-10 RX ADMIN — TEMAZEPAM 7.5 MG: 7.5 CAPSULE ORAL at 21:44

## 2020-06-10 RX ADMIN — ASPIRIN 81 MG 81 MG: 81 TABLET ORAL at 08:44

## 2020-06-10 RX ADMIN — BUDESONIDE AND FORMOTEROL FUMARATE DIHYDRATE 2 PUFF: 80; 4.5 AEROSOL RESPIRATORY (INHALATION) at 07:58

## 2020-06-10 RX ADMIN — BUDESONIDE AND FORMOTEROL FUMARATE DIHYDRATE 2 PUFF: 80; 4.5 AEROSOL RESPIRATORY (INHALATION) at 19:45

## 2020-06-10 RX ADMIN — RANOLAZINE 1000 MG: 500 TABLET, FILM COATED, EXTENDED RELEASE ORAL at 20:38

## 2020-06-10 RX ADMIN — LOSARTAN POTASSIUM 25 MG: 25 TABLET, FILM COATED ORAL at 13:34

## 2020-06-10 RX ADMIN — METOPROLOL TARTRATE 25 MG: 25 TABLET, FILM COATED ORAL at 08:44

## 2020-06-10 RX ADMIN — METOPROLOL TARTRATE 25 MG: 25 TABLET, FILM COATED ORAL at 20:39

## 2020-06-10 RX ADMIN — RANOLAZINE 1000 MG: 500 TABLET, FILM COATED, EXTENDED RELEASE ORAL at 08:43

## 2020-06-10 RX ADMIN — SODIUM CHLORIDE, PRESERVATIVE FREE 10 ML: 5 INJECTION INTRAVENOUS at 09:46

## 2020-06-10 RX ADMIN — THALLOUS CHLORIDE TL-201 1.1 MILLICURIE: 1 INJECTION, POWDER, LYOPHILIZED, FOR SOLUTION INTRAVENOUS at 09:01

## 2020-06-10 RX ADMIN — ISOSORBIDE MONONITRATE 60 MG: 60 TABLET, EXTENDED RELEASE ORAL at 09:46

## 2020-06-10 RX ADMIN — SODIUM CHLORIDE, PRESERVATIVE FREE 10 ML: 5 INJECTION INTRAVENOUS at 20:39

## 2020-06-10 NOTE — NURSING NOTE
D/c orders were placed, but Codi Holloway had ordered overnight oximetry. Called Dr. Khan regarding this. Patient and family request to stay tonight to have this test done.  Dr. Khan said to cancel d/c for tonight

## 2020-06-10 NOTE — PLAN OF CARE
Problem: Patient Care Overview  Goal: Plan of Care Review  Outcome: Ongoing (interventions implemented as appropriate)  Flowsheets (Taken 6/10/2020 3285)  Progress: no change  Plan of Care Reviewed With: patient

## 2020-06-10 NOTE — PLAN OF CARE
Problem: NPPV/CPAP (Adult)  Goal: Signs and Symptoms of Listed Potential Problems Will be Absent, Minimized or Managed (NPPV/CPAP)  Outcome: Ongoing (interventions implemented as appropriate)  Note:   Standby.

## 2020-06-10 NOTE — PLAN OF CARE
Problem: Patient Care Overview  Goal: Plan of Care Review  Note:   Patient to have overnight oximetry. He states that has soa at night and will put his oxygen on that is at bedside when he feels like he needs it.

## 2020-06-10 NOTE — PROGRESS NOTES
[facility]  INPATIENT PROGRESS NOTE    Name: Jose Barrett  Age/Sex: 69 y.o. male  :  1950        PCP: Provider, No Known    Vital Signs  Temp:  [96 °F (35.6 °C)-98.3 °F (36.8 °C)] 97.2 °F (36.2 °C)  Heart Rate:  [63-80] 64  Resp:  [18-20] 18  BP: (130-165)/(61-87) 154/72  Body mass index is 32.51 kg/m².     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[facility])    • COPD (chronic obstructive pulmonary disease) (CMS/[facility])    • Hypertension        Current Facility-Administered Medications   Medication Dose Route Frequency Provider Last Rate Last Dose   • acetaminophen (TYLENOL) tablet 650 mg  650 mg Oral Q4H PRN April Petersen MD       • ALPRAZolam (XANAX) tablet 0.25 mg  0.25 mg Oral TID PRN April Petersen MD   0.25 mg at 20 0849   • aspirin chewable tablet 81 mg  81 mg Oral Daily April Petersen MD   81 mg at 20 1003   • atorvastatin (LIPITOR) tablet 40 mg  40 mg Oral Nightly April Petersen MD   40 mg at 20 2040   • budesonide-formoterol (SYMBICORT) 80-4.5 MCG/ACT inhaler 2 puff  2 puff Inhalation BID - RT April Petersen MD   2 puff at 06/10/20 0758   • diphenhydrAMINE (BENADRYL) injection 25 mg  25 mg Intravenous Q6H PRN April Petersen MD       • fentaNYL citrate (PF) (SUBLIMAZE) injection 25 mcg  25 mcg Intravenous Q1H PRN April Petersen MD        And   • naloxone (NARCAN) injection 0.4 mg  0.4 mg Intravenous Q5 Min PRN April Petersen MD       • hydrALAZINE (APRESOLINE) injection 20 mg  20 mg Intravenous Q6H PRN April Petersen MD        • HYDROcodone-acetaminophen (NORCO) 5-325 MG per tablet 1 tablet  1 tablet Oral Q4H PRN April Petersen MD       • ipratropium-albuterol (DUO-NEB) nebulizer solution 3 mL  3 mL Nebulization Q6H PRN April Petersen MD       • isosorbide mononitrate (IMDUR) 24 hr tablet 60 mg  60 mg Oral Q24H April Petersen MD       • metoprolol tartrate (LOPRESSOR) tablet 25 mg  25 mg Oral Q12H April Petersen MD   25 mg at 06/09/20 2040   • morphine injection 2 mg  2 mg Intravenous Q4H PRN April Petersen MD        And   • naloxone (NARCAN) injection 0.4 mg  0.4 mg Intravenous Q5 Min PRN April Petersen MD       • ondansetron (ZOFRAN) injection 4 mg  4 mg Intravenous Q6H PRN April Petersen MD   4 mg at 06/06/20 2234   • ranolazine (RANEXA) 12 hr tablet 1,000 mg  1,000 mg Oral Q12H April Petersen MD   1,000 mg at 06/09/20 2040   • sodium chloride 0.9 % flush 10 mL  10 mL Intravenous PRN April Petersen MD       • sodium chloride 0.9 % flush 10 mL  10 mL Intravenous Q12H April Petersen MD   10 mL at 06/09/20 2041   • sodium chloride 0.9 % flush 10 mL  10 mL Intravenous PRN April Petersen MD       • sodium chloride 0.9 % flush 10 mL  10 mL Intravenous PRN April Petersen MD       • sodium chloride 0.9 % flush 3 mL  3 mL Intravenous Q12H April Petersen MD       • temazepam (RESTORIL) capsule 7.5 mg  7.5 mg Oral Nightly PRN April Petersen MD   7.5 mg at 06/09/20 2134       History reviewed. No pertinent surgical history.    Social History     Socioeconomic History   • Marital status:      Spouse name: Not on file   • Number of children: Not on file   • Years of education: Not on file   • Highest education level: Not on file   Tobacco Use   • Smoking status: Current Every Day Smoker     Packs/day: 0.25     Types: Cigarettes   • Smokeless tobacco: Never Used   Substance and Sexual Activity   • Alcohol use: Never     Frequency: Never   • Drug use: Never      • Sexual activity: Defer       O/E    Neck: Supple.  No JVD, no thyroid enlargement.  Chest: Air entry equal, normal respiration.  No rhonchi or creps.  Cardiovascular system:  Regular rate and rhythm, no murmurs.  Abdomen: Soft, no tenderness, bowel sounds present, no hepatosplenomegaly.  CNS: Alert, oriented to place and time.  No motor or sensory deficit.  Cranial nerves intact.  Musculoskeletal: No deformity of the back or spine.  Extremities:  No edema.  Pulses equal on both sides.    Lab Results (last 24 hours)     Procedure Component Value Units Date/Time    Blood Gas, Arterial [[id number]]  (Abnormal) Collected:  06/09/20 1200    Specimen:  Arterial Blood Updated:  06/09/20 1212     Site Right Radial     Terrence's Test Positive     pH, Arterial 7.377 pH units      pCO2, Arterial 49.2 mm Hg      Comment: 83 Value above reference range        pO2, Arterial 80.7 mm Hg      Comment: 84 Value below reference range        HCO3, Arterial 28.9 mmol/L      Comment: 83 Value above reference range        Base Excess, Arterial 2.8 mmol/L      Comment: 83 Value above reference range        O2 Saturation, Arterial 95.7 %      Barometric Pressure for Blood Gas 741 mmHg      Modality Room Air     Ventilator Mode NA     Collected by PM     Comment: Meter: [id number]     :  [id number]       Comprehensive Metabolic Panel [[id number]]  (Abnormal) Collected:  06/10/20 0608    Specimen:  Blood Updated:  06/10/20 0629     Glucose 104 mg/dL      BUN 22 mg/dL      Creatinine 1.58 mg/dL      Sodium 136 mmol/L      Potassium 4.0 mmol/L      Chloride 100 mmol/L      CO2 30.0 mmol/L      Calcium 8.5 mg/dL      Total Protein 6.3 g/dL      Albumin 3.60 g/dL      ALT (SGPT) 25 U/L      AST (SGOT) 27 U/L      Alkaline Phosphatase 55 U/L      Total Bilirubin 0.6 mg/dL      eGFR Non African Amer 44 mL/min/1.73      Globulin 2.7 gm/dL      A/G Ratio 1.3 g/dL      BUN/Creatinine Ratio 13.9     Anion Gap 6.0 mmol/L     Narrative:        GFR Normal >60  Chronic Kidney Disease <60  Kidney Failure <15      CBC & Differential [[id number]] Collected:  06/10/20 0609    Specimen:  Blood Updated:  06/10/20 0612    Narrative:       The following orders were created for panel order CBC & Differential.  Procedure                               Abnormality         Status                     ---------                               -----------         ------                     CBC Auto Differential[[id number]]        Abnormal            Final result                 Please view results for these tests on the individual orders.    CBC Auto Differential [[id number]]  (Abnormal) Collected:  06/10/20 0609    Specimen:  Blood Updated:  06/10/20 0612     WBC 9.57 10*3/mm3      RBC 4.10 10*6/mm3      Hemoglobin 12.9 g/dL      Hematocrit 39.7 %      MCV 96.8 fL      MCH 31.5 pg      MCHC 32.5 g/dL      RDW 12.5 %      RDW-SD 43.9 fl      MPV 11.1 fL      Platelets 161 10*3/mm3      Neutrophil % 74.2 %      Lymphocyte % 13.5 %      Monocyte % 10.0 %      Eosinophil % 1.6 %      Basophil % 0.4 %      Immature Grans % 0.3 %      Neutrophils, Absolute 7.10 10*3/mm3      Lymphocytes, Absolute 1.29 10*3/mm3      Monocytes, Absolute 0.96 10*3/mm3      Eosinophils, Absolute 0.15 10*3/mm3      Basophils, Absolute 0.04 10*3/mm3      Immature Grans, Absolute 0.03 10*3/mm3      nRBC 0.0 /100 WBC           A/P  nstemi-  Stable. Change to po imdur.  TL-201 Viability study.  nimco lovett in process..        This document has been electronically signed by April Petersen MD on Pau 10, 2020 08:37         Part of this note may be an electronic transcription/translation of spoken language to printed text using the Dragon Dictation System.

## 2020-06-10 NOTE — PROGRESS NOTES
"  Cardiothoracic - Vascular Surgery Daily Note        LOS: 4 days   Patient Care Team:  Provider, No Known as PCP - General    Chief Complaint: CAD      Subjective     The following portions of the patient's history were reviewed and updated as appropriate: allergies, current medications, past family history, past medical history, past social history, past surgical history and problem list.     Subjective:  Symptoms:  He reports chest pain (short period yesterday. resolved).    Diet:  Adequate intake.    Activity level: Normal.          Objective     Vital Signs  Temp:  [96 °F (35.6 °C)-98.3 °F (36.8 °C)] 97.2 °F (36.2 °C)  Heart Rate:  [63-80] 64  Resp:  [18-20] 18  BP: (130-165)/(61-87) 154/72  Body mass index is 32.51 kg/m².    Intake/Output Summary (Last 24 hours) at 6/10/2020 1110  Last data filed at 6/10/2020 0800  Gross per 24 hour   Intake 1720 ml   Output --   Net 1720 ml     I/O this shift:  In: 240 [P.O.:240]  Out: -     Wt Readings from Last 3 Encounters:   06/10/20 112 kg (246 lb 6.4 oz)         Physical Exam   Objective:  General Appearance:  Well-appearing.    Vital signs: (most recent): Blood pressure 154/72, pulse 64, temperature 97.2 °F (36.2 °C), temperature source Oral, resp. rate 18, height 185.4 cm (73\"), weight 112 kg (246 lb 6.4 oz), SpO2 97 %.  Vital signs are normal.    Output: Producing urine.    Lungs:  Normal effort and normal respiratory rate.  Breath sounds clear to auscultation.    Heart: Normal rate.  Regular rhythm.    Abdomen: Abdomen is soft.  Bowel sounds are normal.     Extremities: Normal range of motion.    Neurological: Patient is alert and oriented to person, place and time.    Skin:  Warm and dry.                Results Review:    Lab Results   Component Value Date    WBC 9.57 06/10/2020    HGB 12.9 (L) 06/10/2020    HCT 39.7 06/10/2020    MCV 96.8 06/10/2020     06/10/2020     Lab Results   Component Value Date    GLUCOSE 104 (H) 06/10/2020    BUN 22 06/10/2020    " "CREATININE 1.58 (H) 06/10/2020    EGFRIFNONA 44 (L) 06/10/2020    BCR 13.9 06/10/2020    K 4.0 06/10/2020    CO2 30.0 (H) 06/10/2020    CALCIUM 8.5 (L) 06/10/2020    ALBUMIN 3.60 06/10/2020    AST 27 06/10/2020    ALT 25 06/10/2020       Calcium Calcium   Date/Time Value Ref Range Status   06/10/2020 0608 8.5 (L) 8.6 - 10.5 mg/dL Final   06/09/2020 0650 8.3 (L) 8.6 - 10.5 mg/dL Final   06/08/2020 0547 8.2 (L) 8.6 - 10.5 mg/dL Final      Magnesium No results found for: MG     Imaging Results (Last 24 Hours)     ** No results found for the last 24 hours. **                                aspirin 81 mg Oral Daily   atorvastatin 40 mg Oral Nightly   budesonide-formoterol 2 puff Inhalation BID - RT   isosorbide mononitrate 60 mg Oral Q24H   losartan 25 mg Oral Q24H   metoprolol tartrate 25 mg Oral Q12H   [START ON 6/17/2020] mupirocin 1 application Each Nare Q12H   ranolazine 1,000 mg Oral Q12H   sodium chloride 10 mL Intravenous Q12H   sodium chloride 3 mL Intravenous Q12H                ASSESSMENT/PLAN       NSTEMI (non-ST elevated myocardial infarction) (CMS/[facility])    Precordial pain    Essential hypertension    COPD (chronic obstructive pulmonary disease) (CMS/[facility])    Coronary artery disease involving native coronary artery of native heart with unstable angina pectoris (CMS/[facility])      Assessment & Plan    CAD: Medical management ASA, STATIN, BETA, ARB. Short period of chest pain yesterday. Resolved. On Imdur. Reports \"feels better with oxygen\", does not meet for home oxygen currently. Will order overnight oximetry. Viability study pending. Will go ahead and schedule CABG for Thursday 18th. Will Pre-Op with anesthesia today. May dc home from CT standpoint to return as outpatient next week.          This document has been electronically signed by ZHANNA Joés on Pau 10, 2020 11:10        "

## 2020-06-10 NOTE — PROGRESS NOTES
[facility] Medicine Services  INPATIENT PROGRESS NOTE    Length of Stay: 4  Date of Admission: 6/5/2020  Primary Care Physician: Provider, No Known    Subjective   Chief Complaint: Chest pain  HPI: Patient states he has no pain.  Feels good.  He did have a little bit of chest pain last night.    Review of Systems   Constitutional: Positive for activity change. Negative for appetite change, chills, fatigue and fever.   Respiratory: Positive for chest tightness. Negative for shortness of breath.    Cardiovascular: Negative for chest pain, palpitations and leg swelling.   Gastrointestinal: Negative for abdominal pain, constipation, diarrhea, nausea and vomiting.   Skin: Negative for wound.   Neurological: Negative for dizziness, weakness, light-headedness, numbness and headaches.        All pertinent negatives and positives are as above. All other systems have been reviewed and are negative unless otherwise stated.     Objective    Temp:  [97.2 °F (36.2 °C)-98.3 °F (36.8 °C)] 97.5 °F (36.4 °C)  Heart Rate:  [62-80] 66  Resp:  [18] 18  BP: (108-165)/(58-87) 117/61    Physical Exam   Constitutional: He appears well-developed and well-nourished.   HENT:   Head: Normocephalic and atraumatic.   Eyes: Pupils are equal, round, and reactive to light. EOM are normal.   Neck: Normal range of motion. Neck supple.   Cardiovascular: Normal rate, regular rhythm, normal heart sounds and intact distal pulses. Exam reveals no gallop and no friction rub.   No murmur heard.  Pulmonary/Chest: Effort normal and breath sounds normal. No respiratory distress. He has no wheezes. He has no rales. He exhibits no tenderness.   Abdominal: Soft. Bowel sounds are normal. He exhibits no distension. There is no tenderness.   Psychiatric: He has a normal mood and affect. His behavior is normal.   Vitals reviewed.      Results Review:  I have reviewed the labs, radiology results, and diagnostic  studies.    Laboratory Data:   Results from last 7 days   Lab Units 06/10/20  0608 06/09/20  0650 06/08/20  0547  06/05/20  1749   SODIUM mmol/L 136 138 136   < > 141   POTASSIUM mmol/L 4.0 4.3 3.8   < > 3.9   CHLORIDE mmol/L 100 102 97*   < > 99   CO2 mmol/L 30.0* 31.0* 30.0*   < > 31.0*   BUN mg/dL 22 24* 27*   < > 17   CREATININE mg/dL 1.58* 1.64* 1.91*   < > 1.84*   GLUCOSE mg/dL 104* 95 101*   < > 153*   CALCIUM mg/dL 8.5* 8.3* 8.2*   < > 8.8   BILIRUBIN mg/dL 0.6  --  0.7  --  0.3   ALK PHOS U/L 55  --  60  --  68   ALT (SGPT) U/L 25  --  17  --  18   AST (SGOT) U/L 27  --  19  --  20   ANION GAP mmol/L 6.0 5.0 9.0   < > 11.0    < > = values in this interval not displayed.     Estimated Creatinine Clearance: 57.9 mL/min (A) (by C-G formula based on SCr of 1.58 mg/dL (H)).  Results from last 7 days   Lab Units 06/05/20  1749   MAGNESIUM mg/dL 1.9         Results from last 7 days   Lab Units 06/10/20  0609 06/09/20  0650 06/08/20  0547 06/07/20  0710 06/06/20  0716   WBC 10*3/mm3 9.57 9.45 10.26 9.70 10.38   HEMOGLOBIN g/dL 12.9* 13.1 14.1 14.2 14.3   HEMATOCRIT % 39.7 39.8 42.8 44.2 43.9   PLATELETS 10*3/mm3 161 156 175 176 169     Results from last 7 days   Lab Units 06/07/20  2116 06/05/20  1749   INR  1.06 0.93       Culture Data:   No results found for: BLOODCX  No results found for: URINECX  No results found for: RESPCX  No results found for: WOUNDCX  No results found for: STOOLCX  No components found for: BODYFLD    Radiology Data:   Imaging Results (Last 24 Hours)     ** No results found for the last 24 hours. **          I have reviewed the patient's current medications.     Assessment/Plan     Active Hospital Problems    Diagnosis   • **NSTEMI (non-ST elevated myocardial infarction) (CMS/[facility])   • Coronary artery disease involving native coronary artery of native heart with unstable angina pectoris (CMS/[facility])   • Precordial pain   • Essential hypertension   • COPD (chronic obstructive pulmonary disease)  (CMS/[facility])       Plan:    1.  Non-ST elevation myocardial infarction: Work-up in progress to determine the viability of patient is a candidate for drug revascularization.  Viability study pending.  Will do overnight oximetry tonight.  2.  Systolic congestive heart failure with ejection fraction of 30 to 35%: Not in overt heart failure.  Continue fluid restriction, salt restriction, and weight monitoring.  3.  Hypertension: Continue home medication.  4.  COPD/coal workers pneumoconiosis: Continue current treatment.  5.  Dyslipidemia: Continue Lipitor.  6.  Acute kidney injury: Nephrology following.  7.  DVT prophylaxis: Lovenox      The patient was evaluated during the global COVID-19 pandemic, and the diagnosis was suspected/considered upon their initial presentation.  Evaluation, treatment, and testing were consistent with current guidelines for patients who present with complaints or symptoms that may be related to COVID-19.    Discharge Planning: I expect patient to be discharged to home in 1-2 days.        This document has been electronically signed by Chapin Khan MD on Pau 10, 2020 15:39

## 2020-06-10 NOTE — ANESTHESIA PREPROCEDURE EVALUATION
Anesthesia Evaluation     Patient summary reviewed and Nursing notes reviewed   no history of anesthetic complications:  NPO Solid Status: > 8 hours  NPO Liquid Status: > 2 hours           Airway   Mallampati: III  TM distance: >3 FB  Neck ROM: full  Large neck circumference and Possible difficult intubation  Dental - normal exam   (+) upper dentures and lower dentures    Pulmonary     breath sounds clear to auscultation  (+) a smoker (quit 2 weeks ago) Current Abstained day of surgery, COPD (with hx of Black Lung disease) severe, shortness of breath, sleep apnea, decreased breath sounds,   (-) wheezes  Cardiovascular - normal exam  Exercise tolerance: poor (<4 METS)    ECG reviewed  PT is on anticoagulation therapy  Patient on routine beta blocker and Beta blocker given within 24 hours of surgery  Rhythm: regular  Rate: normal    (+) hypertension well controlled 2 medications or greater, past MI  1-7 days, CAD, angina with exertion, CHF (EF 30-35%) Systolic <55%, orthopnea, VUONG, hyperlipidemia,  carotid artery disease right carotid  (-) PND, PVD    ROS comment: Cath 6/10/20:    Left Main DISTAL 50%  LAD: OSTIAL 90%, Mid  75 long lesion   Left circumflex: diffuse disease  Mid Cx has Aneurysmal dilatation with what appears to be a plaque between the 2 areas of approximately 75-85%.  RCA:60-99%    · The left ventricular cavity is mild-to-moderately dilated.  · The following left ventricular wall segments are hypokinetic: mid inferolateral and mid inferoseptal.  · Mild mitral valve regurgitation is present  · Estimated EF appears to be in the range of 31 - 35%. Normal left ventricular wall thickness noted. The following left ventricular wall segments are hypokinetic: mid inferolateral and mid inferoseptal. The left ventricular cavity is mild-to-moderately dilated.    Neuro/Psych- negative ROS  (-) seizures, neuromuscular disease, TIA, CVA, dizziness/light headedness, weakness, numbness  GI/Hepatic/Renal/Endo    (+)  obesity,   renal disease CRI,   (-) GERD, hepatitis, liver disease, diabetes, no thyroid disorder    Musculoskeletal (-) negative ROS    Abdominal  - normal exam   Substance History - negative use  (-) alcohol use, drug use     OB/GYN negative ob/gyn ROS         Other - negative ROS       (-) history of cancer                Anesthesia Plan    ASA 4     general   (A-line/CVC/SGC/Post OP Vent  [place] at induction    I discussed with the patient the relevant risks of general anesthesia including, but not limited to, nausea, vomiting, disorientation, post-op delirium, nerve injury, oral/dental injury, awareness, stroke, and death.  I also reviewed any clinically relevant lab and imaging results.)  intravenous induction   Postoperative Plan: Expected vent after surgery  Anesthetic plan, all risks, benefits, and alternatives have been provided, discussed and informed consent has been obtained with: patient and spouse/significant other.  Use of blood products discussed with patient  Consented to blood products.

## 2020-06-10 NOTE — PROGRESS NOTES
"[facility]  [address] [place] [address]. [address]   - [phone]  F - [phone]     Progress Note          PATIENT  DEMOGRAPHICS   PATIENT NAME: Jose Barrett                      PHYSICIAN: Russ Amaya MD  : 1950  MRN: [id number]   LOS: 4 days    Patient Care Team:  Provider, No Known as PCP - General  Subjective   SUBJECTIVE   Feels well, no soa no chest pain         Objective   OBJECTIVE   Vital Signs  Temp:  [96 °F (35.6 °C)-98.3 °F (36.8 °C)] 97.2 °F (36.2 °C)  Heart Rate:  [63-80] 64  Resp:  [18-20] 18  BP: (130-165)/(61-87) 154/72    Flowsheet Rows      First Filed Value   Admission Height  185.4 cm (73\") Documented at 2020 1732   Admission Weight  117 kg (257 lb 9.6 oz) Documented at 2020 1732           I/O last 3 completed shifts:  In: 1480 [P.O.:480; I.V.:1000]  Out: -     PHYSICAL EXAM    Physical Exam   Constitutional: He is oriented to person, place, and time. He appears well-developed. He appears distressed.   HENT:   Head: Normocephalic.   Eyes: Pupils are equal, round, and reactive to light.   Cardiovascular: Normal rate, regular rhythm and normal heart sounds.   Pulmonary/Chest: He is in respiratory distress. He has rales.   Abdominal: Soft. Bowel sounds are normal.   Musculoskeletal: He exhibits edema.   Neurological: He is alert and oriented to person, place, and time.       RESULTS   Results Review:    Results from last 7 days   Lab Units 06/10/20  0608 20  0650 20  0547  20  1749   SODIUM mmol/L 136 138 136   < > 141   POTASSIUM mmol/L 4.0 4.3 3.8   < > 3.9   CHLORIDE mmol/L 100 102 97*   < > 99   CO2 mmol/L 30.0* 31.0* 30.0*   < > 31.0*   BUN mg/dL 22 24* 27*   < > 17   CREATININE mg/dL 1.58* 1.64* 1.91*   < > 1.84*   CALCIUM mg/dL 8.5* 8.3* 8.2*   < > 8.8   BILIRUBIN mg/dL 0.6  --  0.7  --  0.3   ALK PHOS U/L 55  --  60  --  68   ALT (SGPT) U/L 25  --  17  --  18   AST (SGOT) U/L 27  --  19  --  20   GLUCOSE mg/dL " 104* 95 101*   < > 153*    < > = values in this interval not displayed.       Estimated Creatinine Clearance: 57.9 mL/min (A) (by C-G formula based on SCr of 1.58 mg/dL (H)).    Results from last 7 days   Lab Units 06/05/20  1749   MAGNESIUM mg/dL 1.9             Results from last 7 days   Lab Units 06/10/20  0609 06/09/20  0650 06/08/20  0547 06/07/20  0710 06/06/20  0716   WBC 10*3/mm3 9.57 9.45 10.26 9.70 10.38   HEMOGLOBIN g/dL 12.9* 13.1 14.1 14.2 14.3   PLATELETS 10*3/mm3 161 156 175 176 169       Results from last 7 days   Lab Units 06/07/20  2116 06/05/20  1749   INR  1.06 0.93         Imaging Results (Last 24 Hours)     ** No results found for the last 24 hours. **           MEDICATIONS      aspirin 81 mg Oral Daily   atorvastatin 40 mg Oral Nightly   budesonide-formoterol 2 puff Inhalation BID - RT   isosorbide mononitrate 60 mg Oral Q24H   metoprolol tartrate 25 mg Oral Q12H   ranolazine 1,000 mg Oral Q12H   sodium chloride 10 mL Intravenous Q12H   sodium chloride 3 mL Intravenous Q12H          Assessment/Plan   ASSESSMENT / PLAN      NSTEMI (non-ST elevated myocardial infarction) (CMS/HCC)    Precordial pain    Essential hypertension    COPD (chronic obstructive pulmonary disease) (CMS/[facility])    Coronary artery disease involving native coronary artery of native heart with unstable angina pectoris (CMS/[facility])    1.acute kidney injury with a background of CKD 3.  We do not have any baseline creatinine.  His peak creatinine is 1.9. cr down to 1.5.    . U/s left renal cyst. bph on u/s UA neg for blood and protein      His losartan and hydrochlorothiazide has already been stopped.cr better. off ivf. Try losartan low dose     2.coal workers pneumoconiosis/COPD     3.non-ST elevation MI with intermittent chest pain and now at rest.  Patient will need a coronary heart catheterization on Monday.  multivessel CAD and currently doing work up for surgery.ejection fraction 35%     4.dyslipidemia patient LDL is up to 162.   His HDL is on the low side only 28.  Patient is currently on Lipitor              This document has been electronically signed by Russ Amaya MD on Pau 10, 2020 10:37

## 2020-06-11 VITALS
OXYGEN SATURATION: 97 % | DIASTOLIC BLOOD PRESSURE: 65 MMHG | WEIGHT: 246.2 LBS | BODY MASS INDEX: 32.63 KG/M2 | SYSTOLIC BLOOD PRESSURE: 128 MMHG | HEART RATE: 62 BPM | HEIGHT: 73 IN | TEMPERATURE: 97.2 F | RESPIRATION RATE: 16 BRPM

## 2020-06-11 LAB
BASOPHILS # BLD AUTO: 0.03 10*3/MM3 (ref 0–0.2)
BASOPHILS NFR BLD AUTO: 0.3 % (ref 0–1.5)
DEPRECATED RDW RBC AUTO: 45 FL (ref 37–54)
EOSINOPHIL # BLD AUTO: 0.11 10*3/MM3 (ref 0–0.4)
EOSINOPHIL NFR BLD AUTO: 1.2 % (ref 0.3–6.2)
ERYTHROCYTE [DISTWIDTH] IN BLOOD BY AUTOMATED COUNT: 12.7 % (ref 12.3–15.4)
HCT VFR BLD AUTO: 37.7 % (ref 37.5–51)
HGB BLD-MCNC: 12.3 G/DL (ref 13–17.7)
HOLD SPECIMEN: NORMAL
IMM GRANULOCYTES # BLD AUTO: 0.04 10*3/MM3 (ref 0–0.05)
IMM GRANULOCYTES NFR BLD AUTO: 0.4 % (ref 0–0.5)
LYMPHOCYTES # BLD AUTO: 1.12 10*3/MM3 (ref 0.7–3.1)
LYMPHOCYTES NFR BLD AUTO: 11.8 % (ref 19.6–45.3)
MCH RBC QN AUTO: 31.9 PG (ref 26.6–33)
MCHC RBC AUTO-ENTMCNC: 32.6 G/DL (ref 31.5–35.7)
MCV RBC AUTO: 97.7 FL (ref 79–97)
MONOCYTES # BLD AUTO: 0.99 10*3/MM3 (ref 0.1–0.9)
MONOCYTES NFR BLD AUTO: 10.4 % (ref 5–12)
NEUTROPHILS # BLD AUTO: 7.22 10*3/MM3 (ref 1.7–7)
NEUTROPHILS NFR BLD AUTO: 75.9 % (ref 42.7–76)
NRBC BLD AUTO-RTO: 0 /100 WBC (ref 0–0.2)
PLATELET # BLD AUTO: 150 10*3/MM3 (ref 140–450)
PMV BLD AUTO: 11 FL (ref 6–12)
RBC # BLD AUTO: 3.86 10*6/MM3 (ref 4.14–5.8)
WBC NRBC COR # BLD: 9.51 10*3/MM3 (ref 3.4–10.8)

## 2020-06-11 PROCEDURE — 94799 UNLISTED PULMONARY SVC/PX: CPT

## 2020-06-11 PROCEDURE — 85025 COMPLETE CBC W/AUTO DIFF WBC: CPT | Performed by: INTERNAL MEDICINE

## 2020-06-11 PROCEDURE — 99232 SBSQ HOSP IP/OBS MODERATE 35: CPT | Performed by: NURSE PRACTITIONER

## 2020-06-11 RX ADMIN — LOSARTAN POTASSIUM 25 MG: 25 TABLET, FILM COATED ORAL at 08:01

## 2020-06-11 RX ADMIN — ASPIRIN 81 MG 81 MG: 81 TABLET ORAL at 08:01

## 2020-06-11 RX ADMIN — RANOLAZINE 1000 MG: 500 TABLET, FILM COATED, EXTENDED RELEASE ORAL at 08:01

## 2020-06-11 RX ADMIN — ISOSORBIDE MONONITRATE 60 MG: 60 TABLET, EXTENDED RELEASE ORAL at 08:02

## 2020-06-11 RX ADMIN — DOCUSATE SODIUM 100 MG: 100 CAPSULE, LIQUID FILLED ORAL at 08:02

## 2020-06-11 RX ADMIN — BUDESONIDE AND FORMOTEROL FUMARATE DIHYDRATE 2 PUFF: 80; 4.5 AEROSOL RESPIRATORY (INHALATION) at 07:37

## 2020-06-11 RX ADMIN — SODIUM CHLORIDE, PRESERVATIVE FREE 10 ML: 5 INJECTION INTRAVENOUS at 08:02

## 2020-06-11 RX ADMIN — METOPROLOL TARTRATE 25 MG: 25 TABLET, FILM COATED ORAL at 08:02

## 2020-06-11 NOTE — PLAN OF CARE
Problem: Patient Care Overview  Goal: Plan of Care Review  Outcome: Ongoing (interventions implemented as appropriate)  Flowsheets (Taken 6/11/2020 0616)  Progress: no change  Plan of Care Reviewed With: patient

## 2020-06-11 NOTE — PROGRESS NOTES
"[facility]  [address] [place] [address]. [address]  T - [phone]  F - [phone]     Progress Note          PATIENT  DEMOGRAPHICS   PATIENT NAME: Jose Barrett                      PHYSICIAN: Russ Amaya MD  : 1950  MRN: [id number]   LOS: 5 days    Patient Care Team:  Provider, No Known as PCP - General  Subjective   SUBJECTIVE   Feels well, no soa no chest pain. Slept well with O2         Objective   OBJECTIVE   Vital Signs  Temp:  [97.2 °F (36.2 °C)-98.1 °F (36.7 °C)] 97.2 °F (36.2 °C)  Heart Rate:  [60-88] 62  Resp:  [16-18] 16  BP: (108-155)/(55-76) 128/65    Flowsheet Rows      First Filed Value   Admission Height  185.4 cm (73\") Documented at 2020 1732   Admission Weight  117 kg (257 lb 9.6 oz) Documented at 2020 1732           I/O last 3 completed shifts:  In: 720 [P.O.:720]  Out: -     PHYSICAL EXAM    Physical Exam   Constitutional: He is oriented to person, place, and time. He appears well-developed. He appears distressed.   HENT:   Head: Normocephalic.   Eyes: Pupils are equal, round, and reactive to light.   Cardiovascular: Normal rate, regular rhythm and normal heart sounds.   Pulmonary/Chest: He is in respiratory distress. He has rales.   Abdominal: Soft. Bowel sounds are normal.   Musculoskeletal: He exhibits edema.   Neurological: He is alert and oriented to person, place, and time.       RESULTS   Results Review:    Results from last 7 days   Lab Units 06/10/20  0608 20  0650 20  0547  20  1749   SODIUM mmol/L 136 138 136   < > 141   POTASSIUM mmol/L 4.0 4.3 3.8   < > 3.9   CHLORIDE mmol/L 100 102 97*   < > 99   CO2 mmol/L 30.0* 31.0* 30.0*   < > 31.0*   BUN mg/dL 22 24* 27*   < > 17   CREATININE mg/dL 1.58* 1.64* 1.91*   < > 1.84*   CALCIUM mg/dL 8.5* 8.3* 8.2*   < > 8.8   BILIRUBIN mg/dL 0.6  --  0.7  --  0.3   ALK PHOS U/L 55  --  60  --  68   ALT (SGPT) U/L 25  --  17  --  18   AST (SGOT) U/L 27  --  19  --  20   "   GLUCOSE mg/dL 104* 95 101*   < > 153*    < > = values in this interval not displayed.       Estimated Creatinine Clearance: 57.9 mL/min (A) (by C-G formula based on SCr of 1.58 mg/dL (H)).    Results from last 7 days   Lab Units 06/05/20  1749   MAGNESIUM mg/dL 1.9             Results from last 7 days   Lab Units 06/11/20  0613 06/10/20  0609 06/09/20  0650 06/08/20  0547 06/07/20  0710   WBC 10*3/mm3 9.51 9.57 9.45 10.26 9.70   HEMOGLOBIN g/dL 12.3* 12.9* 13.1 14.1 14.2   PLATELETS 10*3/mm3 150 161 156 175 176       Results from last 7 days   Lab Units 06/07/20  2116 06/05/20  1749   INR  1.06 0.93         Imaging Results (Last 24 Hours)     ** No results found for the last 24 hours. **           MEDICATIONS      aspirin 81 mg Oral Daily   atorvastatin 40 mg Oral Nightly   budesonide-formoterol 2 puff Inhalation BID - RT   docusate sodium 100 mg Oral BID   isosorbide mononitrate 60 mg Oral Q24H   losartan 25 mg Oral Q24H   metoprolol tartrate 25 mg Oral Q12H   [START ON 6/17/2020] mupirocin 1 application Each Nare Q12H   ranolazine 1,000 mg Oral Q12H   sodium chloride 10 mL Intravenous Q12H   sodium chloride 3 mL Intravenous Q12H          Assessment/Plan   ASSESSMENT / PLAN      NSTEMI (non-ST elevated myocardial infarction) (CMS/[facility])    Precordial pain    Essential hypertension    COPD (chronic obstructive pulmonary disease) (CMS/[facility])    Coronary artery disease involving native coronary artery of native heart with unstable angina pectoris (CMS/[facility])    1.acute kidney injury with a background of CKD 3.  We do not have any baseline creatinine.  His peak creatinine is 1.9. cr down to 1.5. No new labs    U/s left renal cyst. bph on u/s UA neg for blood and protein      His losartan and hydrochlorothiazide has already been stopped.cr better. off ivf. On losartan low dose     2.coal workers pneumoconiosis/COPD     3.non-ST elevation MI with intermittent chest pain and now at rest.  Patient will need a coronary heart  catheterization on Monday.  multivessel CAD and currently doing work up for surgery.ejection fraction 35% cabg next week     4.dyslipidemia patient LDL is up to 162.  His HDL is on the low side only 28.  Patient is currently on Lipitor              This document has been electronically signed by Russ Amaya MD on June 11, 2020 11:10

## 2020-06-11 NOTE — PLAN OF CARE
Problem: NPPV/CPAP (Adult)  Goal: Signs and Symptoms of Listed Potential Problems Will be Absent, Minimized or Managed (NPPV/CPAP)  Outcome: Ongoing (interventions implemented as appropriate)  Note:   Standby for sleep study.

## 2020-06-11 NOTE — DISCHARGE PLACEMENT REQUEST
"Patricia Barrett (69 y.o. Male)     Date of Birth Social Security Number Address Home Phone MRN    [id number]  [id number] [facility] [address] [phone] [id number]    Judaism Marital Status          Oriental orthodox        Admission Date Admission Type Admitting Provider Attending Provider Department, Room/Bed    6/5/20 Emergency Levi Costello MD Popescu, Tudor, MD [address], 332/1    Discharge Date Discharge Disposition Discharge Destination         Home or Self Care              Attending Provider:  Levi Costello MD    Allergies:  No Known Allergies    Isolation:  None   Infection:  None   Code Status:  CPR    Ht:  185.4 cm (73\")   Wt:  112 kg (246 lb 3.2 oz)    Admission Cmt:  None   Principal Problem:  NSTEMI (non-ST elevated myocardial infarction) (CMS/[facility]) [I21.4]                 Active Insurance as of 6/5/2020     Primary Coverage     Payor Plan Insurance Group Employer/Plan Group    MEDICARE MEDICARE A & B      Payor Plan Address Payor Plan Phone Number Payor Plan Fax Number Effective Dates    [address] [phone]  8/1/2015 - None Entered    [place] [address]       Subscriber Name Subscriber Birth Date Member ID       PATRICIA BARRETT 1950 [id number]                 Emergency Contacts      (Rel.) Home Phone Work Phone Mobile Phone    YOKASTA BARRETT (Spouse) [phone] -- [phone]            Emergency Contact Information     Name Relation Home Work Mobile    YOKASTA BARRETT Spouse [phone]          Insurance Information                MEDICARE/MEDICARE A & B Phone: [phone]    Subscriber: Patricia Barrettne Subscriber#: [id number]    Group#:  Precert#:              History & Physical      Jc Olivia MD at 06/05/20 2003                [facility] Medicine Services  INPATIENT HISTORY AND PHYSICAL       Patient Care Team:  Provider, No Known as PCP - " General    Chief complaint   Chief Complaint   Patient presents with   • Chest Pain       Subjective     Patient is a 69 y.o. male with a past medical history of COPD, black lung disease, essential hypertension and cigarette smoking who presents with complaints of worsening intermittent retrosternal chest pain and shortness of breath of one-week duration.    Patient first noticed left arm numbness and discomfort and retrosternal chest pain with shortness of breath when he was walking uphill about a week ago.  Symptoms lasted about 10 to 15 minutes and subsided with rest.  He has been having episodes intermittently during the week.  His latest episode of retrosternal discomfort started about this afternoon when he woke up a flight of steps to his room.  He immediately felt left arm numbness and retrosternal chest pain which was nonradiating and constant, squeezing in nature.  It was relieved after 15 minutes of rest and patient decided to come to the emergency room for evaluation.  At time of my evaluation patient states that his chest pain is resolved.  He denies shortness of breath.    Review of Systems   Constitutional: Negative for appetite change, chills, fatigue and fever.   HENT: Negative for congestion and sore throat.    Eyes: Negative for pain and redness.   Respiratory: Negative for cough, chest tightness, shortness of breath and wheezing.    Cardiovascular: Positive for chest pain. Negative for palpitations and leg swelling.   Gastrointestinal: Negative for abdominal pain, constipation, diarrhea, nausea and vomiting.   Genitourinary: Negative for dysuria and hematuria.   Musculoskeletal: Negative for arthralgias, joint swelling and neck pain.   Skin: Negative for color change and rash.   Neurological: Negative for dizziness, syncope, light-headedness and headaches.   Hematological: Negative for adenopathy.   Psychiatric/Behavioral: Negative for agitation and confusion. The patient is not nervous/anxious.       History  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[facility])    • COPD (chronic obstructive pulmonary disease) (CMS/[facility])    • Hypertension      History reviewed. No pertinent surgical history.  Family History   Problem Relation Age of Onset   • Heart disease Mother         MI   • No Known Problems Father      Social History     Tobacco Use   • Smoking status: Current Every Day Smoker     Packs/day: 0.25     Types: Cigarettes   • Smokeless tobacco: Never Used   Substance Use Topics   • Alcohol use: Never     Frequency: Never   • Drug use: Never     Medications Prior to Admission   Medication Sig Dispense Refill Last Dose   • albuterol sulfate  (90 Base) MCG/ACT inhaler Inhale 2 puffs Every 4 (Four) Hours As Needed for Wheezing.   6/5/2020 at Unknown time   • aspirin 81 MG chewable tablet Chew 81 mg Daily.   6/5/2020 at Unknown time   • Fluticasone Furoate-Vilanterol (Breo Ellipta) 100-25 MCG/INH inhaler Inhale 1 puff Daily.   6/4/2020 at Unknown time   • losartan-hydrochlorothiazide (HYZAAR) 100-12.5 MG per tablet Take 1 tablet by mouth Daily.   6/5/2020 at Unknown time     Allergies:  Patient has no known allergies.  Prior to Admission medications    Medication Sig Start Date End Date Taking? Authorizing Provider   albuterol sulfate  (90 Base) MCG/ACT inhaler Inhale 2 puffs Every 4 (Four) Hours As Needed for Wheezing.   Yes Adela Madrigal MD   aspirin 81 MG chewable tablet Chew 81 mg Daily.   Yes Adela Madrigal MD   Fluticasone Furoate-Vilanterol (Breo Ellipta) 100-25 MCG/INH inhaler Inhale 1 puff Daily.   Yes Adela Madrigal MD   losartan-hydrochlorothiazide (HYZAAR) 100-12.5 MG per tablet Take 1 tablet by mouth Daily. 2/19/20  Yes Adela Madrigal MD   valsartan-hydrochlorothiazide (DIOVAN-HCT) 320-12.5 MG per tablet Take 1 tablet by mouth Daily.  6/5/20  Adela Madrigal MD       I have reviewed the patient's current medications    Objective         Vital Signs  Temp:  [97.9 °F (36.6 °C)-98.2 °F (36.8 °C)] 97.9 °F (36.6 °C)  Heart Rate:  [78-92] 78  Resp:  [20] 20  BP: (142-196)/(81-97) 142/88      Physical Exam   Constitutional: He is oriented to person, place, and time. No distress.   Obese   HENT:   Head: Normocephalic and atraumatic.   Mouth/Throat: Oropharynx is clear and moist. No oropharyngeal exudate.   Eyes: Pupils are equal, round, and reactive to light. Conjunctivae and EOM are normal. No scleral icterus.   Neck: Normal range of motion. Neck supple. No JVD present. No tracheal deviation present. No thyromegaly present.   Cardiovascular: Normal rate, regular rhythm, normal heart sounds and intact distal pulses. Exam reveals no gallop and no friction rub.   No murmur heard.  Pulmonary/Chest: Effort normal and breath sounds normal. No stridor. No respiratory distress. He has no wheezes. He has no rales. He exhibits no tenderness.   Abdominal: Soft. Bowel sounds are normal. He exhibits no distension and no mass. There is no tenderness. There is no rebound and no guarding. No hernia.   Musculoskeletal: Normal range of motion. He exhibits no edema, tenderness or deformity.   Lymphadenopathy:     He has no cervical adenopathy.   Neurological: He is alert and oriented to person, place, and time. No cranial nerve deficit. He exhibits normal muscle tone.   Skin: Skin is warm and dry. No rash noted. He is not diaphoretic. No erythema. No pallor.   Psychiatric: He has a normal mood and affect. His behavior is normal. Judgment and thought content normal.     Results Review:     Results from last 7 days   Lab Units 06/05/20  1749   SODIUM mmol/L 141   POTASSIUM mmol/L 3.9   CHLORIDE mmol/L 99   CO2 mmol/L 31.0*   BUN mg/dL 17   CREATININE mg/dL 1.84*   GLUCOSE mg/dL 153*   CALCIUM mg/dL 8.8   BILIRUBIN mg/dL 0.3   ALK PHOS U/L 68   ALT (SGPT) U/L 18   AST (SGOT) U/L 20       Results from last 7 days   Lab Units 06/05/20  1749   MAGNESIUM mg/dL 1.9        Results from last 7 days   Lab Units 06/05/20  1749   WBC 10*3/mm3 10.96*   HEMOGLOBIN g/dL 14.5   HEMATOCRIT % 45.3   PLATELETS 10*3/mm3 188       Results from last 7 days   Lab Units 06/05/20  1749   INR  0.93     Imaging Results (Last 7 Days)     Procedure Component Value Units Date/Time    XR Chest 1 View [[id number]] Collected:  06/05/20 1809     Updated:  06/05/20 1845    Narrative:       PROCEDURE: XR CHEST 1 VW    VIEWS:Single    INDICATION: Chest pain    COMPARISON: None    FINDINGS:       - lines/tubes: None    - cardiac: Size within normal limits.  Mild aortic tortuosity. Contour within normal limits.     - lungs: Mild patchy ill-defined opacity in the right lung  base, may represent asymmetric edema or infiltrate.     - pleura: No evidence of  fluid.      - osseous: Unremarkable for age.      Impression:       Small patchy area of opacification right lung base, may represent  infiltrate or less likely asymmetric edema. Short-term interval  follow-up suggested to ensure complete resolution      Electronically signed by:  Vivienne Whittaker MD  6/5/2020 6:44 PM CDT  Workstation: [id number]          Assessment / Plan       Hospital Problem List:  Principal Problem:    NSTEMI (non-ST elevated myocardial infarction) (CMS/HCC)  Active Problems:    Precordial pain    Essential hypertension    COPD (chronic obstructive pulmonary disease) (CMS/[facility])  DWIGHT versus CKD stage III    Plan  - Patient has symptoms concerning for NSTEMI.  - We will monitor the patient on telemetry  - Start subcutaneous Lovenox treatment dose with pharmacy to dose  - Loading dose Plavix and then Plavix 75 mg daily  -Continue aspirin 81 mg daily.  Patient received loading dose of aspirin in the emergency room  - Serial EKGs and troponins to monitor trend  - P.o. metoprolol 25 mg twice daily  - Nitroglycerin patch PRN for chest weakness- atorvastatin 40 mg daily  -Lipid panel in the morning, hemoglobin A1c  - Echocardiogram  -Cardiology  "consultation has been sent.  -Duo nebs for COPD.  Oxygen by nasal cannula to keep O2 sat greater than 90%  -COVID-19 test has been sent.  Will follow results.  Patient's chest x-ray picture likely from his known black lung disease rather than pneumonia.  Watch off antibiotics.  - Monitor renal function closely.  Unclear patient's baseline creatinine.  Will have nephrology consultation in the morning  - DVT prophylaxis with subcutaneous Lovenox  -CODE STATUS is full code    I discussed the patient's findings and my recommendations with patient.    Jc Olivia MD  20  21:54        Part of this note may be an electronic transcription/translation of spoken language to printed text using the Dragon Dictation System.           Electronically signed by Jc Olivia MD at 20 1114          Physician Progress Notes (last 24 hours) (Notes from 06/10/20 1126 through 20 1126)      Russ Amaya MD at 20 1110          [facility]  [address] [place] [address]. [address]  T - [phone]  F - [phone]     Progress Note          PATIENT  DEMOGRAPHICS   PATIENT NAME: Jose Barrett                      PHYSICIAN: Russ Amaya MD  : 1950  MRN: [id number]   LOS: 5 days    Patient Care Team:  Provider, No Known as PCP - General  Subjective   SUBJECTIVE   Feels well, no soa no chest pain. Slept well with O2         Objective   OBJECTIVE   Vital Signs  Temp:  [97.2 °F (36.2 °C)-98.1 °F (36.7 °C)] 97.2 °F (36.2 °C)  Heart Rate:  [60-88] 62  Resp:  [16-18] 16  BP: (108-155)/(55-76) 128/65    Flowsheet Rows      First Filed Value   Admission Height  185.4 cm (73\") Documented at 2020   Admission Weight  117 kg (257 lb 9.6 oz) Documented at 2020           I/O last 3 completed shifts:  In: 720 [P.O.:720]  Out: -     PHYSICAL EXAM    Physical Exam   Constitutional: He is oriented to person, place, and time. He appears well-developed. He " appears distressed.   HENT:   Head: Normocephalic.   Eyes: Pupils are equal, round, and reactive to light.   Cardiovascular: Normal rate, regular rhythm and normal heart sounds.   Pulmonary/Chest: He is in respiratory distress. He has rales.   Abdominal: Soft. Bowel sounds are normal.   Musculoskeletal: He exhibits edema.   Neurological: He is alert and oriented to person, place, and time.       RESULTS   Results Review:    Results from last 7 days   Lab Units 06/10/20  0608 06/09/20  0650 06/08/20  0547  06/05/20  1749   SODIUM mmol/L 136 138 136   < > 141   POTASSIUM mmol/L 4.0 4.3 3.8   < > 3.9   CHLORIDE mmol/L 100 102 97*   < > 99   CO2 mmol/L 30.0* 31.0* 30.0*   < > 31.0*   BUN mg/dL 22 24* 27*   < > 17   CREATININE mg/dL 1.58* 1.64* 1.91*   < > 1.84*   CALCIUM mg/dL 8.5* 8.3* 8.2*   < > 8.8   BILIRUBIN mg/dL 0.6  --  0.7  --  0.3   ALK PHOS U/L 55  --  60  --  68   ALT (SGPT) U/L 25  --  17  --  18   AST (SGOT) U/L 27  --  19  --  20   GLUCOSE mg/dL 104* 95 101*   < > 153*    < > = values in this interval not displayed.       Estimated Creatinine Clearance: 57.9 mL/min (A) (by C-G formula based on SCr of 1.58 mg/dL (H)).    Results from last 7 days   Lab Units 06/05/20  1749   MAGNESIUM mg/dL 1.9             Results from last 7 days   Lab Units 06/11/20  0613 06/10/20  0609 06/09/20  0650 06/08/20  0547 06/07/20  0710   WBC 10*3/mm3 9.51 9.57 9.45 10.26 9.70   HEMOGLOBIN g/dL 12.3* 12.9* 13.1 14.1 14.2   PLATELETS 10*3/mm3 150 161 156 175 176       Results from last 7 days   Lab Units 06/07/20  2116 06/05/20  1749   INR  1.06 0.93         Imaging Results (Last 24 Hours)     ** No results found for the last 24 hours. **           MEDICATIONS      aspirin 81 mg Oral Daily   atorvastatin 40 mg Oral Nightly   budesonide-formoterol 2 puff Inhalation BID - RT   docusate sodium 100 mg Oral BID   isosorbide mononitrate 60 mg Oral Q24H   losartan 25 mg Oral Q24H   metoprolol tartrate 25 mg Oral Q12H   [START ON  6/17/2020] mupirocin 1 application Each Nare Q12H   ranolazine 1,000 mg Oral Q12H   sodium chloride 10 mL Intravenous Q12H   sodium chloride 3 mL Intravenous Q12H          Assessment/Plan   ASSESSMENT / PLAN      NSTEMI (non-ST elevated myocardial infarction) (CMS/[facility])    Precordial pain    Essential hypertension    COPD (chronic obstructive pulmonary disease) (CMS/HCC)    Coronary artery disease involving native coronary artery of native heart with unstable angina pectoris (CMS/[facility])    1.acute kidney injury with a background of CKD 3.  We do not have any baseline creatinine.  His peak creatinine is 1.9. cr down to 1.5. No new labs    U/s left renal cyst. bph on u/s UA neg for blood and protein      His losartan and hydrochlorothiazide has already been stopped.cr better. off ivf. On losartan low dose     2.coal workers pneumoconiosis/COPD     3.non-ST elevation MI with intermittent chest pain and now at rest.  Patient will need a coronary heart catheterization on Monday.  multivessel CAD and currently doing work up for surgery.ejection fraction 35% cabg next week     4.dyslipidemia patient LDL is up to 162.  His HDL is on the low side only 28.  Patient is currently on Lipitor              This document has been electronically signed by Russ Amaya MD on June 11, 2020 11:10           Electronically signed by Russ Amaya MD at 06/11/20 1111     Gabbie Enriquez APRN at 06/11/20 1026          [facility] Progress Note   LOS: 5 days   Patient Care Team:  Provider, No Known as PCP - General    Chief Complaint:    Chief Complaint   Patient presents with   • Chest Pain        Subjective     Interval History:   Patient Denies: no complaints today  Patient Complaints: no complaints today  History taken from: patient family RN    Patient sitting on side of bed, states is ready to go home. O2 dropped while walking, so he will be going home with oxygen.      Objective     Vital Sign Min/Max for last 24 hours  Temp   "Min: 97.5 °F (36.4 °C)  Max: 98.1 °F (36.7 °C)   BP  Min: 108/60  Max: 155/76   Pulse  Min: 60  Max: 88   Resp  Min: 16  Max: 18   SpO2  Min: 85 %  Max: 97 %   Flow (L/min)  Min: 2  Max: 2   Weight  Min: 112 kg (246 lb 3.2 oz)  Max: 112 kg (246 lb 3.2 oz)     Flowsheet Rows      First Filed Value   Admission Height  185.4 cm (73\") Documented at 06/05/2020 1732   Admission Weight  117 kg (257 lb 9.6 oz) Documented at 06/05/2020 1732            06/09/20  0607 06/10/20  0606 06/11/20  0441   Weight: 112 kg (247 lb 3.2 oz) 112 kg (246 lb 6.4 oz) 112 kg (246 lb 3.2 oz)       Physical Exam:  Physical Exam   Constitutional: He is oriented to person, place, and time. He appears well-developed and well-nourished. No distress.   HENT:   Head: Normocephalic and atraumatic.   Right Ear: External ear normal.   Left Ear: External ear normal.   Eyes: Pupils are equal, round, and reactive to light. Conjunctivae are normal.   Neck: Neck supple. No JVD present.   Cardiovascular: Normal rate, regular rhythm, S1 normal, S2 normal, normal heart sounds and intact distal pulses. PMI is not displaced. Exam reveals no gallop, no S3, no S4 and no friction rub.   No murmur heard.  Pulmonary/Chest: Effort normal. He has no wheezes. He has rales.   Abdominal: Soft. Bowel sounds are normal. He exhibits no distension.   Musculoskeletal: He exhibits no edema or tenderness.   Neurological: He is alert and oriented to person, place, and time.   Skin: Skin is warm and dry. Capillary refill takes 2 to 3 seconds. No rash noted. He is not diaphoretic. No erythema. No pallor.   Psychiatric: He has a normal mood and affect. His behavior is normal. Judgment and thought content normal.        Results Review:     Results from last 7 days   Lab Units 06/10/20  0608 06/09/20  0650 06/08/20  0547  06/05/20  1749   SODIUM mmol/L 136 138 136   < > 141   POTASSIUM mmol/L 4.0 4.3 3.8   < > 3.9   CHLORIDE mmol/L 100 102 97*   < > 99   CO2 mmol/L 30.0* 31.0* 30.0*   " < > 31.0*   BUN mg/dL 22 24* 27*   < > 17   CREATININE mg/dL 1.58* 1.64* 1.91*   < > 1.84*   CALCIUM mg/dL 8.5* 8.3* 8.2*   < > 8.8   BILIRUBIN mg/dL 0.6  --  0.7  --  0.3   ALK PHOS U/L 55  --  60  --  68   ALT (SGPT) U/L 25  --  17  --  18   AST (SGOT) U/L 27  --  19  --  20   GLUCOSE mg/dL 104* 95 101*   < > 153*    < > = values in this interval not displayed.       Estimated Creatinine Clearance: 57.9 mL/min (A) (by C-G formula based on SCr of 1.58 mg/dL (H)).    Results from last 7 days   Lab Units 06/05/20  1749   MAGNESIUM mg/dL 1.9             Results from last 7 days   Lab Units 06/11/20  0613 06/10/20  0609 06/09/20  0650 06/08/20  0547 06/07/20  0710   WBC 10*3/mm3 9.51 9.57 9.45 10.26 9.70   HEMOGLOBIN g/dL 12.3* 12.9* 13.1 14.1 14.2   PLATELETS 10*3/mm3 150 161 156 175 176       Results from last 7 days   Lab Units 06/07/20  2116 06/05/20  1749   INR  1.06 0.93     Lab Results   Component Value Date    PROBNP 2,934.0 (H) 06/06/2020       I/O last 3 completed shifts:  In: 720 [P.O.:720]  Out: -     Cardiographics:  ECG/EMG Results (last 24 hours)     Procedure Component Value Units Date/Time    SCANNED EKG [[id number]] Resulted:  06/05/20      Updated:  06/08/20 1147        Results for orders placed during the hospital encounter of 06/05/20   Transthoracic Echo Complete With Contrast if Necessary Per Protocol    Narrative · The left ventricular cavity is mild-to-moderately dilated.  · The following left ventricular wall segments are hypokinetic: mid   inferolateral and mid inferoseptal.  · Mild mitral valve regurgitation is present  · Estimated EF appears to be in the range of 31 - 35%. Normal left   ventricular wall thickness noted. The following left ventricular wall   segments are hypokinetic: mid inferolateral and mid inferoseptal. The left   ventricular cavity is mild-to-moderately dilated.          @LPGRADLAST    Xr Chest 1 View    Result Date: 6/5/2020  Small patchy area of opacification right lung  base, may represent infiltrate or less likely asymmetric edema. Short-term interval follow-up suggested to ensure complete resolution Electronically signed by:  Vivienne Whittaker MD  6/5/2020 6:44 PM CDT Workstation: [id number]    Us Carotid Bilateral    Result Date: 6/9/2020  Narrowing in the right carotid bifurcation, proximal internal carotid artery less than 50%. Areas of narrowing left carotid bifurcation, internal carotid artery in the range of 50-60%. Electronically signed by:  Jorge Martinez MD  6/9/2020 8:58 AM CDT Workstation: [email or web address]    Us Renal Bilateral    Result Date: 6/6/2020  1. Normal appearance of the right kidney 2. Simple appearing left renal cysts 3. Prostate protrudes into the base of the bladder, and is probably at least somewhat enlarged. Electronically signed by:  Vivienne Whittaker MD  6/6/2020 2:37 PM CDT Workstation: [id number]    Us Vein Mapping Bilateral    Result Date: 6/9/2020  1. Venous mapping study of both lower extremities with measurements described above. Electronically signed by:  Jorge Martinez MD  6/9/2020 8:55 AM CDT Workstation: [id number]      Medication Review:     Current Facility-Administered Medications:   •  acetaminophen (TYLENOL) tablet 650 mg, 650 mg, Oral, Q4H PRN, April Petersen MD  •  ALPRAZolam (XANAX) tablet 0.25 mg, 0.25 mg, Oral, TID PRN, April Petersen MD, 0.25 mg at 06/09/20 0849  •  aspirin chewable tablet 81 mg, 81 mg, Oral, Daily, April Petersen MD, 81 mg at 06/11/20 0801  •  atorvastatin (LIPITOR) tablet 40 mg, 40 mg, Oral, Nightly, April Petersen MD, 40 mg at 06/10/20 2039  •  budesonide-formoterol (SYMBICORT) 80-4.5 MCG/ACT inhaler 2 puff, 2 puff, Inhalation, BID - RT, April Petersen MD, 2 puff at 06/11/20 0737  •  diphenhydrAMINE (BENADRYL) injection 25 mg, 25 mg, Intravenous, Q6H PRN, April Petersen MD  •  docusate sodium (COLACE) capsule 100 mg, 100 mg, Oral, BID, Chapin Khan MD, 100 mg at 06/11/20 0802  •  fentaNYL citrate (PF)  (SUBLIMAZE) injection 25 mcg, 25 mcg, Intravenous, Q1H PRN **AND** naloxone (NARCAN) injection 0.4 mg, 0.4 mg, Intravenous, Q5 Min PRN, April Petersen MD  •  hydrALAZINE (APRESOLINE) injection 20 mg, 20 mg, Intravenous, Q6H PRN, April Petersen MD  •  HYDROcodone-acetaminophen (NORCO) 5-325 MG per tablet 1 tablet, 1 tablet, Oral, Q4H PRN, April Petersen MD  •  ipratropium-albuterol (DUO-NEB) nebulizer solution 3 mL, 3 mL, Nebulization, Q6H PRN, April Petersen MD  •  isosorbide mononitrate (IMDUR) 24 hr tablet 60 mg, 60 mg, Oral, Q24H, April Petersen MD, 60 mg at 06/11/20 0802  •  losartan (COZAAR) tablet 25 mg, 25 mg, Oral, Q24H, Russ Amaya MD, 25 mg at 06/11/20 0801  •  metoprolol tartrate (LOPRESSOR) tablet 25 mg, 25 mg, Oral, Q12H, April Petersne MD, 25 mg at 06/11/20 0802  •  morphine injection 2 mg, 2 mg, Intravenous, Q4H PRN **AND** naloxone (NARCAN) injection 0.4 mg, 0.4 mg, Intravenous, Q5 Min PRN, April Petersen MD  •  [START ON 6/17/2020] mupirocin (BACTROBAN) 2 % nasal ointment 1 application, 1 application, Each Nare, Q12H, Codi Holloway, [email or web address]  •  ondansetron (ZOFRAN) injection 4 mg, 4 mg, Intravenous, Q6H PRN, April Petersen MD, 4 mg at 06/06/20 2234  •  ranolazine (RANEXA) 12 hr tablet 1,000 mg, 1,000 mg, Oral, Q12H, April Petersen MD, 1,000 mg at 06/11/20 0801  •  sodium chloride 0.9 % flush 10 mL, 10 mL, Intravenous, PRN, April Petersen MD  •  sodium chloride 0.9 % flush 10 mL, 10 mL, Intravenous, Q12H, April Petersen MD, 10 mL at 06/11/20 0802  •  sodium chloride 0.9 % flush 10 mL, 10 mL, Intravenous, PRNicola ALONSO Dolph Martel, MD  •  sodium chloride 0.9 % flush 10 mL, 10 mL, Intravenous, PRNNicola Dolph Martel, MD  •  sodium chloride 0.9 % flush 3 mL, 3 mL, Intravenous, Q12H, April Petersen MD  •  temazepam (RESTORIL) capsule 7.5 mg, 7.5 mg, Oral, Nightly PRNicola ALONSO Dolph Martel, MD, 7.5 mg at 06/10/20  2144    Assessment/Plan       NSTEMI (non-ST elevated myocardial infarction) (CMS/[facility])    Precordial pain    Essential hypertension    COPD (chronic obstructive pulmonary disease) (CMS/[facility])    Coronary artery disease involving native coronary artery of native heart with unstable angina pectoris (CMS/[facility])    NSTEMI: echocardiogram as well showed some component of ICM with LV dysfunction with a reduced EF to 35%. No s/s of HF upon exam, VSS.   S/p LHC performed yesterday with Dr. Petersen was found to have MVD. Severe symptomatic CAD not amendable for PCI consideration.  C ABG scheduled for next week.  ·  Myocardial viability study testing- viability noted in each wall area by 3 and 24 hours with the least uptake in the Basal portion of the Inferior lateral wall , where there is a small focal segment that does not take up the Thallium. This appears to be the only area that does not show any viabilty.     -81mg ASA  -Atorvastatin 40mg  -Metoprolol 25mg BID  -Imdur 60mg  -Ranexa 1000mg BID  Recommended daily weight monitoring. Information provided.  Recommended restricted dietary sodium intake of 2g/day  Fluid restrictions of 2L/day.  Discussed patient action plan for heart failure;  Recommended avoiding NSAIDs use.       DWIGHT in the setting of CKDIII: Baseline unknown. Creatinine improved  -Losartan restarted .     COPD: hx of coal miners pneumoconiosis; Management per primary      Plan for disposition: d/c home today per primary              This document has been electronically signed by ZHANNA Burrell on June 11, 2020 10:26       Electronically signed by Gabbie Enriquez APRN at 06/11/20 1034     Chapin Khan MD at 06/10/20 1539              [facility] Medicine Services  INPATIENT PROGRESS NOTE    Length of Stay: 4  Date of Admission: 6/5/2020  Primary Care Physician: Provider, No Known    Subjective   Chief Complaint: Chest pain  HPI: Patient states he has no  pain.  Feels good.  He did have a little bit of chest pain last night.    Review of Systems   Constitutional: Positive for activity change. Negative for appetite change, chills, fatigue and fever.   Respiratory: Positive for chest tightness. Negative for shortness of breath.    Cardiovascular: Negative for chest pain, palpitations and leg swelling.   Gastrointestinal: Negative for abdominal pain, constipation, diarrhea, nausea and vomiting.   Skin: Negative for wound.   Neurological: Negative for dizziness, weakness, light-headedness, numbness and headaches.        All pertinent negatives and positives are as above. All other systems have been reviewed and are negative unless otherwise stated.     Objective    Temp:  [97.2 °F (36.2 °C)-98.3 °F (36.8 °C)] 97.5 °F (36.4 °C)  Heart Rate:  [62-80] 66  Resp:  [18] 18  BP: (108-165)/(58-87) 117/61    Physical Exam   Constitutional: He appears well-developed and well-nourished.   HENT:   Head: Normocephalic and atraumatic.   Eyes: Pupils are equal, round, and reactive to light. EOM are normal.   Neck: Normal range of motion. Neck supple.   Cardiovascular: Normal rate, regular rhythm, normal heart sounds and intact distal pulses. Exam reveals no gallop and no friction rub.   No murmur heard.  Pulmonary/Chest: Effort normal and breath sounds normal. No respiratory distress. He has no wheezes. He has no rales. He exhibits no tenderness.   Abdominal: Soft. Bowel sounds are normal. He exhibits no distension. There is no tenderness.   Psychiatric: He has a normal mood and affect. His behavior is normal.   Vitals reviewed.      Results Review:  I have reviewed the labs, radiology results, and diagnostic studies.    Laboratory Data:   Results from last 7 days   Lab Units 06/10/20  0608 06/09/20  0650 06/08/20  0547  06/05/20  1749   SODIUM mmol/L 136 138 136   < > 141   POTASSIUM mmol/L 4.0 4.3 3.8   < > 3.9   CHLORIDE mmol/L 100 102 97*   < > 99   CO2 mmol/L 30.0* 31.0* 30.0*   <  > 31.0*   BUN mg/dL 22 24* 27*   < > 17   CREATININE mg/dL 1.58* 1.64* 1.91*   < > 1.84*   GLUCOSE mg/dL 104* 95 101*   < > 153*   CALCIUM mg/dL 8.5* 8.3* 8.2*   < > 8.8   BILIRUBIN mg/dL 0.6  --  0.7  --  0.3   ALK PHOS U/L 55  --  60  --  68   ALT (SGPT) U/L 25  --  17  --  18   AST (SGOT) U/L 27  --  19  --  20   ANION GAP mmol/L 6.0 5.0 9.0   < > 11.0    < > = values in this interval not displayed.     Estimated Creatinine Clearance: 57.9 mL/min (A) (by C-G formula based on SCr of 1.58 mg/dL (H)).  Results from last 7 days   Lab Units 06/05/20  1749   MAGNESIUM mg/dL 1.9         Results from last 7 days   Lab Units 06/10/20  0609 06/09/20  0650 06/08/20  0547 06/07/20  0710 06/06/20  0716   WBC 10*3/mm3 9.57 9.45 10.26 9.70 10.38   HEMOGLOBIN g/dL 12.9* 13.1 14.1 14.2 14.3   HEMATOCRIT % 39.7 39.8 42.8 44.2 43.9   PLATELETS 10*3/mm3 161 156 175 176 169     Results from last 7 days   Lab Units 06/07/20  2116 06/05/20  1749   INR  1.06 0.93       Culture Data:   No results found for: BLOODCX  No results found for: URINECX  No results found for: RESPCX  No results found for: WOUNDCX  No results found for: STOOLCX  No components found for: BODYFLD    Radiology Data:   Imaging Results (Last 24 Hours)     ** No results found for the last 24 hours. **          I have reviewed the patient's current medications.     Assessment/Plan     Active Hospital Problems    Diagnosis   • **NSTEMI (non-ST elevated myocardial infarction) (CMS/[facility])   • Coronary artery disease involving native coronary artery of native heart with unstable angina pectoris (CMS/[facility])   • Precordial pain   • Essential hypertension   • COPD (chronic obstructive pulmonary disease) (CMS/[facility])       Plan:    1.  Non-ST elevation myocardial infarction: Work-up in progress to determine the viability of patient is a candidate for drug revascularization.  Viability study pending.  Will do overnight oximetry tonight.  2.  Systolic congestive heart failure with  "ejection fraction of 30 to 35%: Not in overt heart failure.  Continue fluid restriction, salt restriction, and weight monitoring.  3.  Hypertension: Continue home medication.  4.  COPD/coal workers pneumoconiosis: Continue current treatment.  5.  Dyslipidemia: Continue Lipitor.  6.  Acute kidney injury: Nephrology following.  7.  DVT prophylaxis: Lovenox      The patient was evaluated during the global COVID-19 pandemic, and the diagnosis was suspected/considered upon their initial presentation.  Evaluation, treatment, and testing were consistent with current guidelines for patients who present with complaints or symptoms that may be related to COVID-19.    Discharge Planning: I expect patient to be discharged to home in 1-2 days.        This document has been electronically signed by Chapin Khan MD on Pau 10, 2020 15:39        Electronically signed by Chapin Khan MD at 06/10/20 1540       [address] [address]  Dept. Phone:  [phone]  Dept. Fax:   Date Ordered: 2020         Patient:  Jose Barrett MRN:  [id number]   [address] [facility] [address] :  1950  SSN:    Phone: [phone] Sex:  M     Weight: 112 kg (246 lb 3.2 oz)         Ht Readings from Last 1 Encounters:   20 185.4 cm (73\")         Home Oxygen Therapy          (Order ID: [id number])    Diagnosis:  NSTEMI (non-ST elevated myocardial infarction) (CMS/HCC) (I21.4 [ICD-10-CM] 410.70 [ICD-9-CM])  Chronic obstructive pulmonary disease, unspecified COPD type (CMS/HCC) (J44.9 [ICD-10-CM] 496 [ICD-9-CM])   Quantity:  1     Delivery Modality: Nasal Cannula  Liters Per Minute: 2  Duration: Continuous  Equipment:  Oxygen Concentrator &  &  Portable Gaseous Oxygen System & Portable Oxygen Contents Gaseous &  Conserving Regulator  Length of Need (99 Months = Lifetime): 99 Months = Lifetime        Authorizing Provider's " Phone: [phone]   Authorizing Provider:Chapin Khan MD  Authorizing Provider's NPI: [id number]  Order Entered By: Chapin Khan MD 6/11/2020 10:13 AM     Electronically signed by: Chapin Khan MD 6/11/2020 10:13 AM           06/11/20 0918 06/11/20 0924   Oxygen Therapy   SpO2 (!) 85 %  (ambulating) 95 %  (ambulating)   Pulse Oximetry Type Intermittent Intermittent   Device (Oxygen Therapy) room air  (ambulating) nasal cannula  (ambulating)   Flow (L/min)  --  2

## 2020-06-11 NOTE — SIGNIFICANT NOTE
06/11/20 0918 06/11/20 0924   Oxygen Therapy   SpO2 (!) 85 %  (ambulating) 95 %  (ambulating)   Pulse Oximetry Type Intermittent Intermittent   Device (Oxygen Therapy) room air  (ambulating) nasal cannula  (ambulating)   Flow (L/min)  --  2

## 2020-06-11 NOTE — PROGRESS NOTES
"[facility] Progress Note   LOS: 5 days   Patient Care Team:  Provider, No Known as PCP - General    Chief Complaint:    Chief Complaint   Patient presents with   • Chest Pain        Subjective     Interval History:   Patient Denies: no complaints today  Patient Complaints: no complaints today  History taken from: patient family RN    Patient sitting on side of bed, states is ready to go home. O2 dropped while walking, so he will be going home with oxygen.      Objective     Vital Sign Min/Max for last 24 hours  Temp  Min: 97.5 °F (36.4 °C)  Max: 98.1 °F (36.7 °C)   BP  Min: 108/60  Max: 155/76   Pulse  Min: 60  Max: 88   Resp  Min: 16  Max: 18   SpO2  Min: 85 %  Max: 97 %   Flow (L/min)  Min: 2  Max: 2   Weight  Min: 112 kg (246 lb 3.2 oz)  Max: 112 kg (246 lb 3.2 oz)     Flowsheet Rows      First Filed Value   Admission Height  185.4 cm (73\") Documented at 06/05/2020 1732   Admission Weight  117 kg (257 lb 9.6 oz) Documented at 06/05/2020 1732            06/09/20  0607 06/10/20  0606 06/11/20  0441   Weight: 112 kg (247 lb 3.2 oz) 112 kg (246 lb 6.4 oz) 112 kg (246 lb 3.2 oz)       Physical Exam:  Physical Exam   Constitutional: He is oriented to person, place, and time. He appears well-developed and well-nourished. No distress.   HENT:   Head: Normocephalic and atraumatic.   Right Ear: External ear normal.   Left Ear: External ear normal.   Eyes: Pupils are equal, round, and reactive to light. Conjunctivae are normal.   Neck: Neck supple. No JVD present.   Cardiovascular: Normal rate, regular rhythm, S1 normal, S2 normal, normal heart sounds and intact distal pulses. PMI is not displaced. Exam reveals no gallop, no S3, no S4 and no friction rub.   No murmur heard.  Pulmonary/Chest: Effort normal. He has no wheezes. He has rales.   Abdominal: Soft. Bowel sounds are normal. He exhibits no distension.   Musculoskeletal: He exhibits no edema or tenderness.   Neurological: He is alert and oriented to person, " place, and time.   Skin: Skin is warm and dry. Capillary refill takes 2 to 3 seconds. No rash noted. He is not diaphoretic. No erythema. No pallor.   Psychiatric: He has a normal mood and affect. His behavior is normal. Judgment and thought content normal.        Results Review:     Results from last 7 days   Lab Units 06/10/20  0608 06/09/20  0650 06/08/20  0547  06/05/20  1749   SODIUM mmol/L 136 138 136   < > 141   POTASSIUM mmol/L 4.0 4.3 3.8   < > 3.9   CHLORIDE mmol/L 100 102 97*   < > 99   CO2 mmol/L 30.0* 31.0* 30.0*   < > 31.0*   BUN mg/dL 22 24* 27*   < > 17   CREATININE mg/dL 1.58* 1.64* 1.91*   < > 1.84*   CALCIUM mg/dL 8.5* 8.3* 8.2*   < > 8.8   BILIRUBIN mg/dL 0.6  --  0.7  --  0.3   ALK PHOS U/L 55  --  60  --  68   ALT (SGPT) U/L 25  --  17  --  18   AST (SGOT) U/L 27  --  19  --  20   GLUCOSE mg/dL 104* 95 101*   < > 153*    < > = values in this interval not displayed.       Estimated Creatinine Clearance: 57.9 mL/min (A) (by C-G formula based on SCr of 1.58 mg/dL (H)).    Results from last 7 days   Lab Units 06/05/20  1749   MAGNESIUM mg/dL 1.9             Results from last 7 days   Lab Units 06/11/20  0613 06/10/20  0609 06/09/20  0650 06/08/20  0547 06/07/20  0710   WBC 10*3/mm3 9.51 9.57 9.45 10.26 9.70   HEMOGLOBIN g/dL 12.3* 12.9* 13.1 14.1 14.2   PLATELETS 10*3/mm3 150 161 156 175 176       Results from last 7 days   Lab Units 06/07/20  2116 06/05/20  1749   INR  1.06 0.93     Lab Results   Component Value Date    PROBNP 2,934.0 (H) 06/06/2020       I/O last 3 completed shifts:  In: 720 [P.O.:720]  Out: -     Cardiographics:  ECG/EMG Results (last 24 hours)     Procedure Component Value Units Date/Time    SCANNED EKG [[id number]] Resulted:  06/05/20      Updated:  06/08/20 1147        Results for orders placed during the hospital encounter of 06/05/20   Transthoracic Echo Complete With Contrast if Necessary Per Protocol    Narrative · The left ventricular cavity is mild-to-moderately  dilated.  · The following left ventricular wall segments are hypokinetic: mid   inferolateral and mid inferoseptal.  · Mild mitral valve regurgitation is present  · Estimated EF appears to be in the range of 31 - 35%. Normal left   ventricular wall thickness noted. The following left ventricular wall   segments are hypokinetic: mid inferolateral and mid inferoseptal. The left   ventricular cavity is mild-to-moderately dilated.          @[email or web address]    Xr Chest 1 View    Result Date: 6/5/2020  Small patchy area of opacification right lung base, may represent infiltrate or less likely asymmetric edema. Short-term interval follow-up suggested to ensure complete resolution Electronically signed by:  Vivienne Whittaker MD  6/5/2020 6:44 PM CDT Workstation: [id number]    Us Carotid Bilateral    Result Date: 6/9/2020  Narrowing in the right carotid bifurcation, proximal internal carotid artery less than 50%. Areas of narrowing left carotid bifurcation, internal carotid artery in the range of 50-60%. Electronically signed by:  Jorge Martinez MD  6/9/2020 8:58 AM CDT Workstation: [id number]    Us Renal Bilateral    Result Date: 6/6/2020  1. Normal appearance of the right kidney 2. Simple appearing left renal cysts 3. Prostate protrudes into the base of the bladder, and is probably at least somewhat enlarged. Electronically signed by:  Vivienne Whittaker MD  6/6/2020 2:37 PM CDT Workstation: [id number]    Us Vein Mapping Bilateral    Result Date: 6/9/2020  1. Venous mapping study of both lower extremities with measurements described above. Electronically signed by:  Jorge Martinez MD  6/9/2020 8:55 AM CDT Workstation: [id number]      Medication Review:     Current Facility-Administered Medications:   •  acetaminophen (TYLENOL) tablet 650 mg, 650 mg, Oral, Q4H PRN, April Petersen MD  •  ALPRAZolam (XANAX) tablet 0.25 mg, 0.25 mg, Oral, TID PRN, April Petersen MD, 0.25 mg at 06/09/20 0849  •  aspirin chewable tablet 81 mg, 81 mg, Oral, Daily,  April Petersen MD, 81 mg at 06/11/20 0801  •  atorvastatin (LIPITOR) tablet 40 mg, 40 mg, Oral, Nightly, April Petersen MD, 40 mg at 06/10/20 2039  •  budesonide-formoterol (SYMBICORT) 80-4.5 MCG/ACT inhaler 2 puff, 2 puff, Inhalation, BID - RT, April Petersen MD, 2 puff at 06/11/20 0737  •  diphenhydrAMINE (BENADRYL) injection 25 mg, 25 mg, Intravenous, Q6H PRN, April Petersen MD  •  docusate sodium (COLACE) capsule 100 mg, 100 mg, Oral, BID, Chapin Khan MD, 100 mg at 06/11/20 0802  •  fentaNYL citrate (PF) (SUBLIMAZE) injection 25 mcg, 25 mcg, Intravenous, Q1H PRN **AND** naloxone (NARCAN) injection 0.4 mg, 0.4 mg, Intravenous, Q5 Min PRN, April Petersen MD  •  hydrALAZINE (APRESOLINE) injection 20 mg, 20 mg, Intravenous, Q6H PRN, April Petersen MD  •  HYDROcodone-acetaminophen (NORCO) 5-325 MG per tablet 1 tablet, 1 tablet, Oral, Q4H PRN, April Petersen MD  •  ipratropium-albuterol (DUO-NEB) nebulizer solution 3 mL, 3 mL, Nebulization, Q6H PRN, April Petersen MD  •  isosorbide mononitrate (IMDUR) 24 hr tablet 60 mg, 60 mg, Oral, Q24H, April Petersen MD, 60 mg at 06/11/20 0802  •  losartan (COZAAR) tablet 25 mg, 25 mg, Oral, Q24H, Russ Amaya MD, 25 mg at 06/11/20 0801  •  metoprolol tartrate (LOPRESSOR) tablet 25 mg, 25 mg, Oral, Q12H, April Petersen MD, 25 mg at 06/11/20 0802  •  morphine injection 2 mg, 2 mg, Intravenous, Q4H PRN **AND** naloxone (NARCAN) injection 0.4 mg, 0.4 mg, Intravenous, Q5 Min PRN, April Petersen MD  •  [START ON 6/17/2020] mupirocin (BACTROBAN) 2 % nasal ointment 1 application, 1 application, Each Nare, Q12H, Codi Holloway APRN  •  ondansetron (ZOFRAN) injection 4 mg, 4 mg, Intravenous, Q6H PRN, April Petersen MD, 4 mg at 06/06/20 2234  •  ranolazine (RANEXA) 12 hr tablet 1,000 mg, 1,000 mg, Oral, Q12H, April Petersen MD, 1,000 mg at 06/11/20 0801  •  sodium chloride 0.9 % flush 10 mL, 10 mL,  Intravenous, PRN, April Petersen MD  •  sodium chloride 0.9 % flush 10 mL, 10 mL, Intravenous, Q12H, April Petersen MD, 10 mL at 06/11/20 0802  •  sodium chloride 0.9 % flush 10 mL, 10 mL, Intravenous, PRNNicola Dolph Martel, MD  •  sodium chloride 0.9 % flush 10 mL, 10 mL, Intravenous, PRNNicola Dolph Martel, MD  •  sodium chloride 0.9 % flush 3 mL, 3 mL, Intravenous, Q12H, April Petersen MD  •  temazepam (RESTORIL) capsule 7.5 mg, 7.5 mg, Oral, Nightly PRN, April Petersen MD, 7.5 mg at 06/10/20 2144    Assessment/Plan       NSTEMI (non-ST elevated myocardial infarction) (CMS/[facility])    Precordial pain    Essential hypertension    COPD (chronic obstructive pulmonary disease) (CMS/[facility])    Coronary artery disease involving native coronary artery of native heart with unstable angina pectoris (CMS/[facility])    NSTEMI: echocardiogram as well showed some component of ICM with LV dysfunction with a reduced EF to 35%. No s/s of HF upon exam, VSS.   S/p LHC performed yesterday with Dr. Petersen was found to have MVD. Severe symptomatic CAD not amendable for PCI consideration.  CABG scheduled for next week.  ·  Myocardial viability study testing- viability noted in each wall area by 3 and 24 hours with the least uptake in the Basal portion of the Inferior lateral wall , where there is a small focal segment that does not take up the Thallium. This appears to be the only area that does not show any viabilty.     -81mg ASA  -Atorvastatin 40mg  -Metoprolol 25mg BID  -Imdur 60mg  -Ranexa 1000mg BID  Recommended daily weight monitoring. Information provided.  Recommended restricted dietary sodium intake of 2g/day  Fluid restrictions of 2L/day.  Discussed patient action plan for heart failure;  Recommended avoiding NSAIDs use.       DWIGHT in the setting of CKDIII: Baseline unknown. Creatinine improved  -Losartan restarted .     COPD: hx of coal miners pneumoconiosis; Management per primary      Plan for disposition:  d/c home today per primary              This document has been electronically signed by ZHANNA Burrell on June 11, 2020 10:26

## 2020-06-12 ENCOUNTER — READMISSION MANAGEMENT (OUTPATIENT)
Dept: CALL CENTER | Facility: HOSPITAL | Age: 70
End: 2020-06-12

## 2020-06-12 ENCOUNTER — NURSE TRIAGE (OUTPATIENT)
Dept: CALL CENTER | Facility: HOSPITAL | Age: 70
End: 2020-06-12

## 2020-06-12 NOTE — OUTREACH NOTE
Prep Survey      Responses   [facility] facility patient discharged from?  [place]   Is LACE score < 7 ?  No   Eligibility  Readm Mgmt   Discharge diagnosis  NSTEMI (non-ST elevated myocardial infarction   Does the patient have one of the following disease processes/diagnoses(primary or secondary)?  Acute MI (STEMI,NSTEMI)   Does the patient have Home health ordered?  No   Is there a DME ordered?  Yes   What DME was ordered?  Oxygen per [facility]    General alerts for this patient  Heart Cath    Prep survey completed?  Yes          Celina Lira RN

## 2020-06-13 NOTE — TELEPHONE ENCOUNTER
States he has had not had a BM in several days. States he was given colace in the hospital without any relief. Asking if okay to give colace with senna? Explained should be fine since not severe kidney disease, but if no improvement after a couple doses check with provider Monday.     Discussed coffee, prunes, and suppository as well. Call back if any problems/questions.     Reason for Disposition  • Treating constipation with Over-The-Counter (OTC) medicines, questions about    Additional Information  • Negative: [1] Abdomen pain is main symptom AND [2] male  • Negative: [1] Abdomen pain is main symptom AND [2] adult female  • Negative: Rectal bleeding or blood in stool is main symptom  • Negative: Rectal pain or itching is main symptom  • Negative: Constipation in a cancer patient who is currently (or recently) receiving chemotherapy or radiation therapy, or cancer patient who has metastatic or end-stage cancer and is receiving palliative care  • Negative: Patient sounds very sick or weak to the triager  • Negative: [1] Vomiting AND [2] abdomen looks much more swollen than usual  • Negative: [1] Vomiting AND [2] contains bile (green color)  • Negative: [1] Constant abdominal pain AND [2] present > 2 hours  • Negative: [1] Rectal pain or fullness from fecal impaction (rectum full of stool) AND [2] NOT better after SITZ bath, suppository or enema  • Negative: [1] Intermittent mild abdominal pain AND [2] fever  • Negative: Abdomen is more swollen than usual  • Negative: Last bowel movement (BM) > 4 days ago  • Negative: Leaking stool  • Negative: Unable to have a bowel movement (BM) without manually removing stool (using finger to pull out stool or perform disimpaction)  • Negative: Unable to have a bowel movement (BM) without laxative or enema  • Negative: [1] Constipation persists > 1 week AND [2] no improvement after using CARE ADVICE  • Negative: [1] Weight loss > 10 pounds (5 kg) AND [2] not dieting  • Negative:  "Pencil-like, narrow stools  • Negative: Taking new prescription medication  • Negative: [1] Minor bleeding from rectum (e.g., blood just on toilet paper, few drops, streaks on surface of normal formed BM) AND [2] 3 or more times  • Negative: [1] Uses laxative (e.g., PEG / Miralax. Milk of magnesia) or enema AND [2] > once a month  • Negative: Constipation is a chronic symptom (recurrent or ongoing AND present > 4 weeks)  • Negative: Mild constipation    Answer Assessment - Initial Assessment Questions  1. STOOL PATTERN OR FREQUENCY: \"How often do you pass bowel movements (BMs)?\"  (Normal range: tid to q 3 days)  \"When was the last BM passed?\"        See note  2. STRAINING: \"Do you have to strain to have a BM?\"       n/a  3. RECTAL PAIN: \"Does your rectum hurt when the stool comes out?\" If so, ask: \"Do you have hemorrhoids? How bad is the pain?\"  (Scale 1-10; or mild, moderate, severe)      N/a  4. STOOL COMPOSITION: \"Are the stools hard?\"       mA  5. BLOOD ON STOOLS: \"Has there been any blood on the toilet tissue or on the surface of the BM?\" If so, ask: \"When was the last time?\"       n/a  6. CHRONIC CONSTIPATION: \"Is this a new problem for you?\"  If no, ask: \"How long have you had this problem?\" (days, weeks, months)       n/a  7. CHANGES IN DIET: \"Have there been any recent changes in your diet?\"       n/a  8. MEDICATIONS: \"Have you been taking any new medications?\"      n/a  9. LAXATIVES: \"Have you been using any laxatives or enemas?\"  If yes, ask \"What, how often, and when was the last time?\"      n/a  10. CAUSE: \"What do you think is causing the constipation?\"         n/a  11. OTHER SYMPTOMS: \"Do you have any other symptoms?\" (e.g., abdominal pain, fever, vomiting)        n/a  12. PREGNANCY: \"Is there any chance you are pregnant?\" \"When was your last menstrual period?\"        n/a    Protocols used: CONSTIPATION-ADULT-      "

## 2020-06-15 ENCOUNTER — READMISSION MANAGEMENT (OUTPATIENT)
Dept: CALL CENTER | Facility: HOSPITAL | Age: 70
End: 2020-06-15

## 2020-06-15 NOTE — OUTREACH NOTE
AMI Week 1 Survey      Responses   [facility] patient discharged from?  [place]   Does the patient have one of the following disease processes/diagnoses(primary or secondary)?  Acute MI (STEMI,NSTEMI)   Is there a successful TCM telephone encounter documented?  No   Week 1 attempt successful?  Yes   Call start time  1016   Call end time  1041   Discharge diagnosis  NSTEMI (non-ST elevated myocardial infarction   Person spoke with today (if not patient) and relationship  pt and wife, [place]   Meds reviewed with patient/caregiver?  Yes   Is the patient having any side effects they believe may be caused by any medication additions or changes?  No   Does the patient have all prescriptions related to this admission filled (includes statins,anticoagulants,HTN meds,anti-arrhythmia meds)  Yes   Is the patient taking all medications as directed (includes completed medication regime)?  Yes   Comments regarding appointments  cardiac surgery scheduled for 6/18/20   Does the patient have a primary care provider?   No   Does the patient have an appointment with their PCP,cardiologist,or clinic within 7 days of discharge?  Yes   Comments regarding PCP  pt in process of getting PCP, has nephrologist, cardiac surgeon and cardiologist   Has the patient kept scheduled appointments due by today?  N/A   Has home health visited the patient within 72 hours of discharge?  N/A   Psychosocial issues?  No   Did the patient receive a copy of their discharge instructions?  Yes   Nursing interventions  Reviewed instructions with patient   What is the patient's perception of their health status since discharge?  Improving   Nursing interventions  Nurse provided patient education   Is the patient/caregiver able to teach back signs and symptoms of when to call for help immediately:  Sudden chest discomfort, Sudden discomfort in arms, back, neck or jaw, Shortness of breath at any time, Sudden sweating or clammy skin, Nausea or vomiting,  Dizziness or lightheadedness   Nursing interventions  Nurse provided patient education   Is the pateint /caregiver able to teach back the importance of cardiac rehab?  Yes   Nursing interventions  Provided education on importance of cardiac rehab   Is the patient/caregiver able to teach back lifestyle changes to help prevent MIs  Quit smoking, Heart healthy diet, Regular exercise as approved by provider, Reducing stress   Is the patient/caregiver able to teach back ways to prevent a second heart attack:  Take medications, Follow up with MD, Participate in Cardiac Rehab, Manage risk factors   If the patient is a current smoker, are they able to teach back resources for cessation?  -- [pt has not smiked since hospital stay, plans to quit from now on]   Is the patient/caregiver able to teach back the hierarchy of who to call/visit for symptoms/problems? PCP, Specialist, Home health nurse, Urgent Care, ED, 911  Yes   Additional teach back comments  wife states pt has severe constipation, has been taking stool softeners, but no BM, had stopped taking usual Fibercon , states was afraid of renal damage, advised to contact cardiac surgeon for advice on taking miralax, magnesium citrate which he has in the past, encouraged fresh fruit and prune juice, wife agreed with plan, pt will be receiving pre-op instructions this week for cardiac surgery on 6/18/20, and wife stated wanted to resolve constipation prior to surgery   Week 1 call completed?  Yes          Emely Brower RN

## 2020-06-16 LAB — SARS-COV-2 N GENE RESP QL NAA+PROBE: NOT DETECTED

## 2020-06-16 PROCEDURE — 87635 SARS-COV-2 COVID-19 AMP PRB: CPT | Performed by: THORACIC SURGERY (CARDIOTHORACIC VASCULAR SURGERY)

## 2020-06-17 RX ORDER — TRANEXAMIC ACID 100 MG/ML
2.5 INJECTION, SOLUTION INTRAVENOUS ONCE
Status: DISCONTINUED | OUTPATIENT
Start: 2020-06-18 | End: 2020-06-18

## 2020-06-18 ENCOUNTER — HOSPITAL ENCOUNTER (INPATIENT)
Facility: HOSPITAL | Age: 70
LOS: 13 days | Discharge: HOME | End: 2020-07-01
Attending: THORACIC SURGERY (CARDIOTHORACIC VASCULAR SURGERY) | Admitting: THORACIC SURGERY (CARDIOTHORACIC VASCULAR SURGERY)

## 2020-06-18 ENCOUNTER — APPOINTMENT (OUTPATIENT)
Dept: GENERAL RADIOLOGY | Facility: HOSPITAL | Age: 70
End: 2020-06-18

## 2020-06-18 ENCOUNTER — ANESTHESIA (OUTPATIENT)
Dept: PERIOP | Facility: HOSPITAL | Age: 70
End: 2020-06-18

## 2020-06-18 DIAGNOSIS — Z74.09 IMPAIRED MOBILITY AND ADLS: ICD-10-CM

## 2020-06-18 DIAGNOSIS — Z48.812 SURGICAL AFTERCARE, CIRCULATORY SYSTEM: ICD-10-CM

## 2020-06-18 DIAGNOSIS — Z74.09 IMPAIRED FUNCTIONAL MOBILITY, BALANCE, GAIT, AND ENDURANCE: Primary | ICD-10-CM

## 2020-06-18 DIAGNOSIS — N17.9 AKI (ACUTE KIDNEY INJURY) (HCC): ICD-10-CM

## 2020-06-18 DIAGNOSIS — Z78.9 IMPAIRED MOBILITY AND ADLS: ICD-10-CM

## 2020-06-18 PROBLEM — I25.110 ATHEROSCLEROTIC HEART DISEASE OF NATIVE CORONARY ARTERY WITH UNSTABLE ANGINA PECTORIS: Status: ACTIVE | Noted: 2020-06-18

## 2020-06-18 LAB
A-A DO2: 185.2 MMHG
A-A DO2: 223 MMHG
A-A DO2: 336.6 MMHG
ABO GROUP BLD: NORMAL
ACT BLD: 142 SECONDS (ref 82–152)
ACT BLD: 155 SECONDS (ref 82–152)
ACT BLD: 364 SECONDS (ref 82–152)
ACT BLD: 421 SECONDS (ref 82–152)
ACT BLD: 425 SECONDS (ref 82–152)
ACT BLD: 429 SECONDS (ref 82–152)
ACT BLD: 493 SECONDS (ref 82–152)
ALBUMIN SERPL-MCNC: 3.2 G/DL (ref 3.5–5.2)
ANION GAP SERPL CALCULATED.3IONS-SCNC: 7 MMOL/L (ref 5–15)
ANISOCYTOSIS BLD QL: ABNORMAL
APTT PPP: 34.9 SECONDS (ref 20–40.3)
ARTERIAL PATENCY WRIST A: ABNORMAL
ATMOSPHERIC PRESS: 747 MMHG
ATMOSPHERIC PRESS: 747 MMHG
ATMOSPHERIC PRESS: 748 MMHG
BASE EXCESS BLDA CALC-SCNC: -1 MMOL/L (ref 0–2)
BASE EXCESS BLDA CALC-SCNC: -1.8 MMOL/L (ref 0–2)
BASE EXCESS BLDA CALC-SCNC: -2 MMOL/L (ref 0–2)
BASE EXCESS BLDA CALC-SCNC: -2.7 MMOL/L (ref 0–2)
BASE EXCESS BLDA CALC-SCNC: -3 MMOL/L (ref 0–2)
BASE EXCESS BLDA CALC-SCNC: 1 MMOL/L (ref -5–5)
BASE EXCESS BLDA CALC-SCNC: 2 MMOL/L (ref -5–5)
BASE EXCESS BLDA CALC-SCNC: 4 MMOL/L (ref -5–5)
BASE EXCESS BLDA CALC-SCNC: 4 MMOL/L (ref -5–5)
BASE EXCESS BLDA CALC-SCNC: 5 MMOL/L (ref -5–5)
BASE EXCESS BLDA CALC-SCNC: 6 MMOL/L (ref -5–5)
BASE EXCESS BLDA CALC-SCNC: 6 MMOL/L (ref -5–5)
BDY SITE: ABNORMAL
BLD GP AB SCN SERPL QL: NEGATIVE
BUN BLD-MCNC: 21 MG/DL (ref 8–23)
BUN/CREAT SERPL: 11.2 (ref 7–25)
CA-I BLD-MCNC: 4.62 MG/DL (ref 4.6–5.6)
CA-I BLD-MCNC: 4.65 MG/DL (ref 4.6–5.6)
CA-I BLD-MCNC: 5 MG/DL (ref 4.6–5.6)
CA-I BLD-MCNC: 5.03 MG/DL (ref 4.6–5.6)
CA-I BLDA-SCNC: 0.94 MMOL/L (ref 1.2–1.32)
CA-I BLDA-SCNC: 0.98 MMOL/L (ref 1.2–1.32)
CA-I BLDA-SCNC: 1 MMOL/L (ref 1.2–1.32)
CA-I BLDA-SCNC: 1.02 MMOL/L (ref 1.2–1.32)
CA-I BLDA-SCNC: 1.03 MMOL/L (ref 1.2–1.32)
CA-I BLDA-SCNC: 1.11 MMOL/L (ref 1.2–1.32)
CA-I BLDA-SCNC: 1.14 MMOL/L (ref 1.2–1.32)
CALCIUM SPEC-SCNC: 8.9 MG/DL (ref 8.6–10.5)
CHLORIDE SERPL-SCNC: 105 MMOL/L (ref 98–107)
CO2 BLDA-SCNC: 29 MMOL/L (ref 24–29)
CO2 BLDA-SCNC: 29 MMOL/L (ref 24–29)
CO2 BLDA-SCNC: 30 MMOL/L (ref 24–29)
CO2 BLDA-SCNC: 31 MMOL/L (ref 24–29)
CO2 BLDA-SCNC: 32 MMOL/L (ref 24–29)
CO2 SERPL-SCNC: 24 MMOL/L (ref 22–29)
COHGB MFR BLD: 0.8 % (ref 0–5)
COHGB MFR BLD: 0.9 % (ref 0–5)
COHGB MFR BLD: 1.2 % (ref 0–5)
COHGB MFR BLD: 1.3 % (ref 0–5)
CREAT BLD-MCNC: 1.87 MG/DL (ref 0.76–1.27)
DEPRECATED RDW RBC AUTO: 43.8 FL (ref 37–54)
ERYTHROCYTE [DISTWIDTH] IN BLOOD BY AUTOMATED COUNT: 12.6 % (ref 12.3–15.4)
GFR SERPL CREATININE-BSD FRML MDRD: 36 ML/MIN/1.73
GLUCOSE BLD-MCNC: 126 MG/DL (ref 65–99)
GLUCOSE BLDA-MCNC: 123 MMOL/L (ref 65–95)
GLUCOSE BLDA-MCNC: 128 MMOL/L (ref 65–95)
GLUCOSE BLDA-MCNC: 150 MMOL/L (ref 65–95)
GLUCOSE BLDA-MCNC: 156 MMOL/L (ref 65–95)
GLUCOSE BLDC GLUCOMTR-MCNC: 102 MG/DL (ref 70–130)
GLUCOSE BLDC GLUCOMTR-MCNC: 112 MG/DL (ref 70–130)
GLUCOSE BLDC GLUCOMTR-MCNC: 121 MG/DL (ref 70–130)
GLUCOSE BLDC GLUCOMTR-MCNC: 123 MG/DL (ref 70–130)
GLUCOSE BLDC GLUCOMTR-MCNC: 124 MG/DL (ref 70–130)
GLUCOSE BLDC GLUCOMTR-MCNC: 134 MG/DL (ref 70–130)
GLUCOSE BLDC GLUCOMTR-MCNC: 99 MG/DL (ref 70–130)
HCO3 BLDA-SCNC: 23 MMOL/L (ref 20–26)
HCO3 BLDA-SCNC: 23.3 MMOL/L (ref 20–26)
HCO3 BLDA-SCNC: 24.2 MMOL/L (ref 20–26)
HCO3 BLDA-SCNC: 24.8 MMOL/L (ref 20–26)
HCO3 BLDA-SCNC: 26.4 MMOL/L (ref 20–26)
HCO3 BLDA-SCNC: 27.1 MMOL/L (ref 22–26)
HCO3 BLDA-SCNC: 27.2 MMOL/L (ref 22–26)
HCO3 BLDA-SCNC: 28.9 MMOL/L (ref 22–26)
HCO3 BLDA-SCNC: 29.3 MMOL/L (ref 22–26)
HCO3 BLDA-SCNC: 30.1 MMOL/L (ref 22–26)
HCO3 BLDA-SCNC: 30.2 MMOL/L (ref 22–26)
HCO3 BLDA-SCNC: 30.5 MMOL/L (ref 22–26)
HCT VFR BLD AUTO: 31.1 % (ref 37.5–51)
HCT VFR BLD CALC: 30.9 % (ref 38–51)
HCT VFR BLD CALC: 31 % (ref 38–51)
HCT VFR BLD CALC: 32.3 % (ref 38–51)
HCT VFR BLD CALC: 33.9 % (ref 38–51)
HCT VFR BLDA CALC: 24 % (ref 38–51)
HCT VFR BLDA CALC: 24 % (ref 38–51)
HCT VFR BLDA CALC: 25 % (ref 38–51)
HCT VFR BLDA CALC: 32 % (ref 38–51)
HGB BLD-MCNC: 10.2 G/DL (ref 13–17.7)
HGB BLDA-MCNC: 10.1 G/DL (ref 14–18)
HGB BLDA-MCNC: 10.1 G/DL (ref 14–18)
HGB BLDA-MCNC: 10.5 G/DL (ref 14–18)
HGB BLDA-MCNC: 10.9 G/DL (ref 12–17)
HGB BLDA-MCNC: 11 G/DL (ref 14–18)
HGB BLDA-MCNC: 8.2 G/DL (ref 12–17)
HGB BLDA-MCNC: 8.2 G/DL (ref 12–17)
HGB BLDA-MCNC: 8.5 G/DL (ref 12–17)
HOROWITZ INDEX BLD+IHG-RTO: 50 %
HOROWITZ INDEX BLD+IHG-RTO: 50 %
HOROWITZ INDEX BLD+IHG-RTO: 60 %
HOROWITZ INDEX BLD+IHG-RTO: 70 %
HOROWITZ INDEX BLD+IHG-RTO: 70 %
HYPOCHROMIA BLD QL: ABNORMAL
INR PPP: 1.55 (ref 0.8–1.2)
LYMPHOCYTES # BLD MANUAL: 1.54 10*3/MM3 (ref 0.7–3.1)
LYMPHOCYTES NFR BLD MANUAL: 8 % (ref 19.6–45.3)
LYMPHOCYTES NFR BLD MANUAL: 9 % (ref 5–12)
Lab: ABNORMAL
Lab: NORMAL
MAGNESIUM SERPL-MCNC: 3 MG/DL (ref 1.6–2.4)
MCH RBC QN AUTO: 31.3 PG (ref 26.6–33)
MCHC RBC AUTO-ENTMCNC: 32.8 G/DL (ref 31.5–35.7)
MCV RBC AUTO: 95.4 FL (ref 79–97)
METHGB BLD QL: 0.3 % (ref 0–3)
METHGB BLD QL: 0.3 % (ref 0–3)
METHGB BLD QL: 0.9 % (ref 0–3)
METHGB BLD QL: 1 % (ref 0–3)
MODALITY: ABNORMAL
MONOCYTES # BLD AUTO: 1.73 10*3/MM3 (ref 0.1–0.9)
NEUTROPHILS # BLD AUTO: 15.93 10*3/MM3 (ref 1.7–7)
NEUTROPHILS NFR BLD MANUAL: 83 % (ref 42.7–76)
NOTE: ABNORMAL
OXYHGB MFR BLDV: 62.3 % (ref 94–99)
OXYHGB MFR BLDV: 93 % (ref 94–99)
OXYHGB MFR BLDV: 96.2 % (ref 94–99)
OXYHGB MFR BLDV: 97.4 % (ref 94–99)
PAW @ PEAK INSP FLOW SETTING VENT: 29 CMH2O
PCO2 BLDA: 44.5 MM HG (ref 35–45)
PCO2 BLDA: 44.5 MM HG (ref 35–45)
PCO2 BLDA: 45.5 MM HG (ref 35–45)
PCO2 BLDA: 46.2 MM HG (ref 35–45)
PCO2 BLDA: 47.5 MM HG (ref 35–45)
PCO2 BLDA: 47.5 MM HG (ref 35–45)
PCO2 BLDA: 48.8 MM HG (ref 35–45)
PCO2 BLDA: 49.1 MM HG (ref 35–45)
PCO2 BLDA: 49.3 MM HG (ref 35–45)
PCO2 BLDA: 50.5 MM HG (ref 35–45)
PCO2 BLDA: 51.4 MM HG (ref 35–45)
PCO2 BLDA: 56.2 MM HG (ref 35–45)
PCO2 TEMP ADJ BLD: ABNORMAL MM[HG]
PEEP RESPIRATORY: 5 CM[H2O]
PH BLDA: 7.28 PH UNITS (ref 7.35–7.45)
PH BLDA: 7.3 PH UNITS (ref 7.35–7.45)
PH BLDA: 7.32 PH UNITS (ref 7.35–7.45)
PH BLDA: 7.33 PH UNITS (ref 7.35–7.45)
PH BLDA: 7.33 PH UNITS (ref 7.35–7.45)
PH BLDA: 7.33 PH UNITS (ref 7.35–7.6)
PH BLDA: 7.37 PH UNITS (ref 7.35–7.6)
PH BLDA: 7.38 PH UNITS (ref 7.35–7.6)
PH BLDA: 7.38 PH UNITS (ref 7.35–7.6)
PH BLDA: 7.4 PH UNITS (ref 7.35–7.6)
PH BLDA: 7.41 PH UNITS (ref 7.35–7.6)
PH BLDA: 7.42 PH UNITS (ref 7.35–7.6)
PH, TEMP CORRECTED: ABNORMAL
PHOSPHATE SERPL-MCNC: 3.2 MG/DL (ref 2.5–4.5)
PLATELET # BLD AUTO: 158 10*3/MM3 (ref 140–450)
PMV BLD AUTO: 10.7 FL (ref 6–12)
PO2 BLDA: 101 MM HG (ref 83–108)
PO2 BLDA: 117 MM HG (ref 83–108)
PO2 BLDA: 359 MMHG (ref 80–105)
PO2 BLDA: 369 MMHG (ref 80–105)
PO2 BLDA: 38.9 MM HG (ref 83–108)
PO2 BLDA: 45 MMHG (ref 80–105)
PO2 BLDA: 451 MMHG (ref 80–105)
PO2 BLDA: 452 MMHG (ref 80–105)
PO2 BLDA: 456 MMHG (ref 80–105)
PO2 BLDA: 493 MMHG (ref 80–105)
PO2 BLDA: 79.1 MM HG (ref 83–108)
PO2 BLDA: 81.7 MM HG (ref 83–108)
PO2 TEMP ADJ BLD: ABNORMAL MM[HG]
POTASSIUM BLD-SCNC: 4.5 MMOL/L (ref 3.5–5.2)
POTASSIUM BLDA-SCNC: 3.8 MMOL/L (ref 3.5–4.9)
POTASSIUM BLDA-SCNC: 4.3 MMOL/L (ref 3.4–4.5)
POTASSIUM BLDA-SCNC: 4.3 MMOL/L (ref 3.4–4.5)
POTASSIUM BLDA-SCNC: 4.3 MMOL/L (ref 3.5–4.9)
POTASSIUM BLDA-SCNC: 4.4 MMOL/L (ref 3.4–4.5)
POTASSIUM BLDA-SCNC: 4.4 MMOL/L (ref 3.5–4.9)
POTASSIUM BLDA-SCNC: 4.5 MMOL/L (ref 3.4–4.5)
POTASSIUM BLDA-SCNC: 4.7 MMOL/L (ref 3.5–4.9)
POTASSIUM BLDA-SCNC: 4.8 MMOL/L (ref 3.5–4.9)
POTASSIUM BLDA-SCNC: 4.9 MMOL/L (ref 3.5–4.9)
POTASSIUM BLDA-SCNC: 5 MMOL/L (ref 3.5–4.9)
PROTHROMBIN TIME: 18.4 SECONDS (ref 11.1–15.3)
RBC # BLD AUTO: 3.26 10*6/MM3 (ref 4.14–5.8)
RH BLD: POSITIVE
SAO2 % BLDA: 100 % (ref 95–98)
SAO2 % BLDA: 79 % (ref 95–98)
SAO2 % BLDCOA: 63.8 % (ref 94–99)
SAO2 % BLDCOA: 95.1 % (ref 94–99)
SAO2 % BLDCOA: 95.4 % (ref 94–99)
SAO2 % BLDCOA: 97.3 % (ref 94–99)
SAO2 % BLDCOA: 98.5 % (ref 94–99)
SET MECH RESP RATE: 14
SET MECH RESP RATE: 16
SMALL PLATELETS BLD QL SMEAR: ADEQUATE
SODIUM BLD-SCNC: 136 MMOL/L (ref 136–145)
SODIUM BLDA-SCNC: 134 MMOL/L (ref 138–146)
SODIUM BLDA-SCNC: 135 MMOL/L (ref 138–146)
SODIUM BLDA-SCNC: 136 MMOL/L (ref 136–146)
SODIUM BLDA-SCNC: 137 MMOL/L (ref 136–146)
SODIUM BLDA-SCNC: 137 MMOL/L (ref 138–146)
T&S EXPIRATION DATE: NORMAL
VENTILATOR MODE: ABNORMAL
VENTILATOR MODE: AC
VT ON VENT VENT: 600 ML
VT ON VENT VENT: 650 ML
VT ON VENT VENT: 650 ML
VT ON VENT VENT: 659 ML
WBC MORPH BLD: NORMAL
WBC NRBC COR # BLD: 19.19 10*3/MM3 (ref 3.4–10.8)

## 2020-06-18 PROCEDURE — 82803 BLOOD GASES ANY COMBINATION: CPT

## 2020-06-18 PROCEDURE — C1729 CATH, DRAINAGE: HCPCS | Performed by: THORACIC SURGERY (CARDIOTHORACIC VASCULAR SURGERY)

## 2020-06-18 PROCEDURE — 85007 BL SMEAR W/DIFF WBC COUNT: CPT | Performed by: NURSE PRACTITIONER

## 2020-06-18 PROCEDURE — C1894 INTRO/SHEATH, NON-LASER: HCPCS | Performed by: THORACIC SURGERY (CARDIOTHORACIC VASCULAR SURGERY)

## 2020-06-18 PROCEDURE — 33518 CABG ARTERY-VEIN TWO: CPT | Performed by: THORACIC SURGERY (CARDIOTHORACIC VASCULAR SURGERY)

## 2020-06-18 PROCEDURE — 33508 ENDOSCOPIC VEIN HARVEST: CPT | Performed by: THORACIC SURGERY (CARDIOTHORACIC VASCULAR SURGERY)

## 2020-06-18 PROCEDURE — 86900 BLOOD TYPING SEROLOGIC ABO: CPT

## 2020-06-18 PROCEDURE — 94640 AIRWAY INHALATION TREATMENT: CPT

## 2020-06-18 PROCEDURE — 25010000002 FENTANYL CITRATE (PF) 100 MCG/2ML SOLUTION: Performed by: NURSE ANESTHETIST, CERTIFIED REGISTERED

## 2020-06-18 PROCEDURE — 25010000002 DOBUTAMINE 250 MG/20ML SOLUTION: Performed by: NURSE ANESTHETIST, CERTIFIED REGISTERED

## 2020-06-18 PROCEDURE — C1751 CATH, INF, PER/CENT/MIDLINE: HCPCS | Performed by: ANESTHESIOLOGY

## 2020-06-18 PROCEDURE — 85730 THROMBOPLASTIN TIME PARTIAL: CPT | Performed by: NURSE PRACTITIONER

## 2020-06-18 PROCEDURE — 86850 RBC ANTIBODY SCREEN: CPT | Performed by: NURSE PRACTITIONER

## 2020-06-18 PROCEDURE — 93010 ELECTROCARDIOGRAM REPORT: CPT | Performed by: INTERNAL MEDICINE

## 2020-06-18 PROCEDURE — 63710000001 INSULIN DETEMIR PER 5 UNITS: Performed by: NURSE PRACTITIONER

## 2020-06-18 PROCEDURE — 85347 COAGULATION TIME ACTIVATED: CPT

## 2020-06-18 PROCEDURE — 94002 VENT MGMT INPAT INIT DAY: CPT

## 2020-06-18 PROCEDURE — 84132 ASSAY OF SERUM POTASSIUM: CPT

## 2020-06-18 PROCEDURE — 25010000002 CEFAZOLIN PER 500 MG: Performed by: NURSE PRACTITIONER

## 2020-06-18 PROCEDURE — 94799 UNLISTED PULMONARY SVC/PX: CPT

## 2020-06-18 PROCEDURE — 25010000002 MORPHINE PER 10 MG: Performed by: NURSE PRACTITIONER

## 2020-06-18 PROCEDURE — 85014 HEMATOCRIT: CPT

## 2020-06-18 PROCEDURE — 25010000002 PHENYLEPHRINE PER 1 ML: Performed by: NURSE ANESTHETIST, CERTIFIED REGISTERED

## 2020-06-18 PROCEDURE — 82330 ASSAY OF CALCIUM: CPT

## 2020-06-18 PROCEDURE — 02100Z9 BYPASS CORONARY ARTERY, ONE ARTERY FROM LEFT INTERNAL MAMMARY, OPEN APPROACH: ICD-10-PCS | Performed by: THORACIC SURGERY (CARDIOTHORACIC VASCULAR SURGERY)

## 2020-06-18 PROCEDURE — 86901 BLOOD TYPING SEROLOGIC RH(D): CPT

## 2020-06-18 PROCEDURE — 33967 INSERT I-AORT PERCUT DEVICE: CPT | Performed by: THORACIC SURGERY (CARDIOTHORACIC VASCULAR SURGERY)

## 2020-06-18 PROCEDURE — 25010000002 PROTAMINE SULFATE PER 10 MG: Performed by: NURSE ANESTHETIST, CERTIFIED REGISTERED

## 2020-06-18 PROCEDURE — 86901 BLOOD TYPING SEROLOGIC RH(D): CPT | Performed by: NURSE PRACTITIONER

## 2020-06-18 PROCEDURE — 85025 COMPLETE CBC W/AUTO DIFF WBC: CPT | Performed by: NURSE PRACTITIONER

## 2020-06-18 PROCEDURE — 25010000002 HEPARIN (PORCINE) PER 1000 UNITS: Performed by: NURSE ANESTHETIST, CERTIFIED REGISTERED

## 2020-06-18 PROCEDURE — A4648 IMPLANTABLE TISSUE MARKER: HCPCS | Performed by: THORACIC SURGERY (CARDIOTHORACIC VASCULAR SURGERY)

## 2020-06-18 PROCEDURE — 25010000003 INSULIN REGULAR HUMAN PER 5 UNITS: Performed by: NURSE PRACTITIONER

## 2020-06-18 PROCEDURE — 86900 BLOOD TYPING SEROLOGIC ABO: CPT | Performed by: NURSE PRACTITIONER

## 2020-06-18 PROCEDURE — 06BP4ZZ EXCISION OF RIGHT SAPHENOUS VEIN, PERCUTANEOUS ENDOSCOPIC APPROACH: ICD-10-PCS | Performed by: THORACIC SURGERY (CARDIOTHORACIC VASCULAR SURGERY)

## 2020-06-18 PROCEDURE — 82947 ASSAY GLUCOSE BLOOD QUANT: CPT

## 2020-06-18 PROCEDURE — 33533 CABG ARTERIAL SINGLE: CPT | Performed by: THORACIC SURGERY (CARDIOTHORACIC VASCULAR SURGERY)

## 2020-06-18 PROCEDURE — 83050 HGB METHEMOGLOBIN QUAN: CPT

## 2020-06-18 PROCEDURE — 86923 COMPATIBILITY TEST ELECTRIC: CPT

## 2020-06-18 PROCEDURE — 25010000002 DOBUTAMINE PER 250 MG: Performed by: NURSE PRACTITIONER

## 2020-06-18 PROCEDURE — 83735 ASSAY OF MAGNESIUM: CPT | Performed by: NURSE PRACTITIONER

## 2020-06-18 PROCEDURE — 25010000002 AMIODARONE IN DEXTROSE 5% 360-4.14 MG/200ML-% SOLUTION: Performed by: NURSE PRACTITIONER

## 2020-06-18 PROCEDURE — C1713 ANCHOR/SCREW BN/BN,TIS/BN: HCPCS | Performed by: THORACIC SURGERY (CARDIOTHORACIC VASCULAR SURGERY)

## 2020-06-18 PROCEDURE — 82805 BLOOD GASES W/O2 SATURATION: CPT

## 2020-06-18 PROCEDURE — 84295 ASSAY OF SERUM SODIUM: CPT

## 2020-06-18 PROCEDURE — 25010000002 AMIODARONE PER 30 MG: Performed by: NURSE ANESTHETIST, CERTIFIED REGISTERED

## 2020-06-18 PROCEDURE — 25010000002 MIDAZOLAM PER 1 MG: Performed by: NURSE ANESTHETIST, CERTIFIED REGISTERED

## 2020-06-18 PROCEDURE — 85610 PROTHROMBIN TIME: CPT | Performed by: NURSE PRACTITIONER

## 2020-06-18 PROCEDURE — 021109W BYPASS CORONARY ARTERY, TWO ARTERIES FROM AORTA WITH AUTOLOGOUS VENOUS TISSUE, OPEN APPROACH: ICD-10-PCS | Performed by: THORACIC SURGERY (CARDIOTHORACIC VASCULAR SURGERY)

## 2020-06-18 PROCEDURE — 25010000003 HEPARIN LOCK FLUSH PER 10 UNITS: Performed by: THORACIC SURGERY (CARDIOTHORACIC VASCULAR SURGERY)

## 2020-06-18 PROCEDURE — 82375 ASSAY CARBOXYHB QUANT: CPT

## 2020-06-18 PROCEDURE — 93005 ELECTROCARDIOGRAM TRACING: CPT | Performed by: NURSE PRACTITIONER

## 2020-06-18 PROCEDURE — 5A02210 ASSISTANCE WITH CARDIAC OUTPUT USING BALLOON PUMP, CONTINUOUS: ICD-10-PCS | Performed by: THORACIC SURGERY (CARDIOTHORACIC VASCULAR SURGERY)

## 2020-06-18 PROCEDURE — C1760 CLOSURE DEV, VASC: HCPCS | Performed by: THORACIC SURGERY (CARDIOTHORACIC VASCULAR SURGERY)

## 2020-06-18 PROCEDURE — 25010000002 HEPARIN (PORCINE) PER 1000 UNITS: Performed by: THORACIC SURGERY (CARDIOTHORACIC VASCULAR SURGERY)

## 2020-06-18 PROCEDURE — 25010000002 PAPAVERINE PER 60 MG: Performed by: THORACIC SURGERY (CARDIOTHORACIC VASCULAR SURGERY)

## 2020-06-18 PROCEDURE — 25010000002 AMIODARONE IN DEXTROSE 5% 360-4.14 MG/200ML-% SOLUTION: Performed by: NURSE ANESTHETIST, CERTIFIED REGISTERED

## 2020-06-18 PROCEDURE — 36600 WITHDRAWAL OF ARTERIAL BLOOD: CPT

## 2020-06-18 PROCEDURE — 82962 GLUCOSE BLOOD TEST: CPT

## 2020-06-18 PROCEDURE — 25010000002 HYDROMORPHONE 1 MG/ML SOLUTION: Performed by: NURSE ANESTHETIST, CERTIFIED REGISTERED

## 2020-06-18 PROCEDURE — 71045 X-RAY EXAM CHEST 1 VIEW: CPT

## 2020-06-18 PROCEDURE — 80069 RENAL FUNCTION PANEL: CPT | Performed by: NURSE PRACTITIONER

## 2020-06-18 PROCEDURE — 5A1221Z PERFORMANCE OF CARDIAC OUTPUT, CONTINUOUS: ICD-10-PCS | Performed by: THORACIC SURGERY (CARDIOTHORACIC VASCULAR SURGERY)

## 2020-06-18 DEVICE — SS SUTURE, 3 PER SLEEVE
Type: IMPLANTABLE DEVICE | Site: STERNUM | Status: FUNCTIONAL
Brand: MYO/WIRE II

## 2020-06-18 DEVICE — ABSORBABLE HEMOSTAT (OXIDIZED REGENERATED CELLULOSE)
Type: IMPLANTABLE DEVICE | Site: CHEST | Status: FUNCTIONAL
Brand: SURGICEL NU-KNIT

## 2020-06-18 DEVICE — WR SUT NONABS MF SS V40 1/2CIR TC 6/0 18IN M649G: Type: IMPLANTABLE DEVICE | Site: STERNUM | Status: FUNCTIONAL

## 2020-06-18 DEVICE — CLIP LIGAT VASC HORIZON TI SM/WD RED 24CT: Type: IMPLANTABLE DEVICE | Site: CHEST | Status: FUNCTIONAL

## 2020-06-18 DEVICE — CLIP LIGAT VASC HORIZON TI MD BLU 6CT: Type: IMPLANTABLE DEVICE | Site: CHEST | Status: FUNCTIONAL

## 2020-06-18 DEVICE — PLEDGET INCISIONLINE REINF TFE SFT PTFE 1.5X4.8X9.5MM WHT: Type: IMPLANTABLE DEVICE | Site: CHEST | Status: FUNCTIONAL

## 2020-06-18 RX ORDER — NITROGLYCERIN 20 MG/100ML
5-200 INJECTION INTRAVENOUS CONTINUOUS PRN
Status: DISCONTINUED | OUTPATIENT
Start: 2020-06-18 | End: 2020-06-20

## 2020-06-18 RX ORDER — SODIUM CHLORIDE 9 MG/ML
INJECTION, SOLUTION INTRAVENOUS CONTINUOUS PRN
Status: DISCONTINUED | OUTPATIENT
Start: 2020-06-18 | End: 2020-06-18 | Stop reason: SURG

## 2020-06-18 RX ORDER — MAGNESIUM SULFATE HEPTAHYDRATE 40 MG/ML
2 INJECTION, SOLUTION INTRAVENOUS 4 TIMES DAILY PRN
Status: DISPENSED | OUTPATIENT
Start: 2020-06-18 | End: 2020-06-19

## 2020-06-18 RX ORDER — POLYETHYLENE GLYCOL 3350 17 G/17G
17 POWDER, FOR SOLUTION ORAL DAILY PRN
Status: DISCONTINUED | OUTPATIENT
Start: 2020-06-18 | End: 2020-06-26

## 2020-06-18 RX ORDER — ACETAMINOPHEN 325 MG/1
650 TABLET ORAL EVERY 4 HOURS PRN
Status: DISCONTINUED | OUTPATIENT
Start: 2020-06-18 | End: 2020-06-24 | Stop reason: SDUPTHER

## 2020-06-18 RX ORDER — DEXTROSE AND SODIUM CHLORIDE 5; .45 G/100ML; G/100ML
30 INJECTION, SOLUTION INTRAVENOUS CONTINUOUS
Status: DISCONTINUED | OUTPATIENT
Start: 2020-06-18 | End: 2020-06-20

## 2020-06-18 RX ORDER — OXYCODONE HYDROCHLORIDE 5 MG/1
5 TABLET ORAL EVERY 4 HOURS PRN
Status: DISCONTINUED | OUTPATIENT
Start: 2020-06-18 | End: 2020-07-01 | Stop reason: HOSPADM

## 2020-06-18 RX ORDER — VECURONIUM BROMIDE 20 MG/20ML
INJECTION, POWDER, LYOPHILIZED, FOR SOLUTION INTRAVENOUS AS NEEDED
Status: DISCONTINUED | OUTPATIENT
Start: 2020-06-18 | End: 2020-06-18 | Stop reason: SURG

## 2020-06-18 RX ORDER — SODIUM CHLORIDE 9 MG/ML
INJECTION, SOLUTION INTRAVENOUS
Status: DISPENSED
Start: 2020-06-18 | End: 2020-06-18

## 2020-06-18 RX ORDER — PHENYLEPHRINE HCL IN 0.9% NACL 0.5 MG/5ML
.5-3 SYRINGE (ML) INTRAVENOUS CONTINUOUS PRN
Status: DISCONTINUED | OUTPATIENT
Start: 2020-06-18 | End: 2020-06-20

## 2020-06-18 RX ORDER — GUAIFENESIN 600 MG/1
1200 TABLET, EXTENDED RELEASE ORAL EVERY 12 HOURS SCHEDULED
Status: DISCONTINUED | OUTPATIENT
Start: 2020-06-18 | End: 2020-07-01 | Stop reason: HOSPADM

## 2020-06-18 RX ORDER — DEXTROSE AND SODIUM CHLORIDE 5; .45 G/100ML; G/100ML
100 INJECTION, SOLUTION INTRAVENOUS CONTINUOUS
Status: DISCONTINUED | OUTPATIENT
Start: 2020-06-18 | End: 2020-06-18

## 2020-06-18 RX ORDER — SODIUM CHLORIDE 9 MG/ML
75 INJECTION, SOLUTION INTRAVENOUS CONTINUOUS
Status: DISCONTINUED | OUTPATIENT
Start: 2020-06-18 | End: 2020-06-18

## 2020-06-18 RX ORDER — FAMOTIDINE 20 MG/1
20 TABLET, FILM COATED ORAL EVERY 12 HOURS SCHEDULED
Status: DISCONTINUED | OUTPATIENT
Start: 2020-06-18 | End: 2020-07-01 | Stop reason: HOSPADM

## 2020-06-18 RX ORDER — PAPAVERINE HYDROCHLORIDE 30 MG/ML
INJECTION INTRAMUSCULAR; INTRAVENOUS AS NEEDED
Status: DISCONTINUED | OUTPATIENT
Start: 2020-06-18 | End: 2020-06-18 | Stop reason: HOSPADM

## 2020-06-18 RX ORDER — FAMOTIDINE 10 MG/ML
20 INJECTION, SOLUTION INTRAVENOUS EVERY 12 HOURS SCHEDULED
Status: DISCONTINUED | OUTPATIENT
Start: 2020-06-18 | End: 2020-06-21 | Stop reason: CLARIF

## 2020-06-18 RX ORDER — HEPARIN SODIUM 1000 [USP'U]/ML
INJECTION, SOLUTION INTRAVENOUS; SUBCUTANEOUS AS NEEDED
Status: DISCONTINUED | OUTPATIENT
Start: 2020-06-18 | End: 2020-06-18 | Stop reason: SURG

## 2020-06-18 RX ORDER — MORPHINE SULFATE 2 MG/ML
2 INJECTION, SOLUTION INTRAMUSCULAR; INTRAVENOUS
Status: DISPENSED | OUTPATIENT
Start: 2020-06-18 | End: 2020-06-19

## 2020-06-18 RX ORDER — MIDAZOLAM HYDROCHLORIDE 1 MG/ML
INJECTION INTRAMUSCULAR; INTRAVENOUS AS NEEDED
Status: DISCONTINUED | OUTPATIENT
Start: 2020-06-18 | End: 2020-06-18 | Stop reason: SURG

## 2020-06-18 RX ORDER — SODIUM CHLORIDE, SODIUM GLUCONATE, SODIUM ACETATE, POTASSIUM CHLORIDE AND MAGNESIUM CHLORIDE 526; 502; 368; 37; 30 MG/100ML; MG/100ML; MG/100ML; MG/100ML; MG/100ML
INJECTION, SOLUTION INTRAVENOUS CONTINUOUS PRN
Status: COMPLETED | OUTPATIENT
Start: 2020-06-18 | End: 2020-06-18

## 2020-06-18 RX ORDER — ALBUTEROL SULFATE 2.5 MG/3ML
2.5 SOLUTION RESPIRATORY (INHALATION) ONCE AS NEEDED
Status: DISCONTINUED | OUTPATIENT
Start: 2020-06-18 | End: 2020-06-18

## 2020-06-18 RX ORDER — OXYCODONE HYDROCHLORIDE 5 MG/1
10 TABLET ORAL EVERY 4 HOURS PRN
Status: DISCONTINUED | OUTPATIENT
Start: 2020-06-18 | End: 2020-07-01 | Stop reason: HOSPADM

## 2020-06-18 RX ORDER — FENTANYL CITRATE 50 UG/ML
INJECTION, SOLUTION INTRAMUSCULAR; INTRAVENOUS AS NEEDED
Status: DISCONTINUED | OUTPATIENT
Start: 2020-06-18 | End: 2020-06-18 | Stop reason: SURG

## 2020-06-18 RX ORDER — DEXTROSE MONOHYDRATE 25 G/50ML
25-50 INJECTION, SOLUTION INTRAVENOUS
Status: DISCONTINUED | OUTPATIENT
Start: 2020-06-18 | End: 2020-07-01 | Stop reason: HOSPADM

## 2020-06-18 RX ORDER — ONDANSETRON 2 MG/ML
4 INJECTION INTRAMUSCULAR; INTRAVENOUS EVERY 6 HOURS PRN
Status: DISCONTINUED | OUTPATIENT
Start: 2020-06-18 | End: 2020-07-01 | Stop reason: HOSPADM

## 2020-06-18 RX ORDER — DOBUTAMINE HYDROCHLORIDE 100 MG/100ML
5 INJECTION INTRAVENOUS CONTINUOUS PRN
Status: DISCONTINUED | OUTPATIENT
Start: 2020-06-18 | End: 2020-06-20

## 2020-06-18 RX ORDER — HEPARIN SODIUM (PORCINE) LOCK FLUSH IV SOLN 100 UNIT/ML 100 UNIT/ML
SOLUTION INTRAVENOUS AS NEEDED
Status: DISCONTINUED | OUTPATIENT
Start: 2020-06-18 | End: 2020-06-18 | Stop reason: HOSPADM

## 2020-06-18 RX ORDER — BUPIVACAINE HCL/0.9 % NACL/PF 0.1 %
2 PLASTIC BAG, INJECTION (ML) EPIDURAL ONCE
Status: COMPLETED | OUTPATIENT
Start: 2020-06-18 | End: 2020-06-18

## 2020-06-18 RX ORDER — ACETAMINOPHEN 500 MG
1000 TABLET ORAL EVERY 6 HOURS
Status: DISCONTINUED | OUTPATIENT
Start: 2020-06-18 | End: 2020-07-01 | Stop reason: HOSPADM

## 2020-06-18 RX ORDER — ATORVASTATIN CALCIUM 40 MG/1
40 TABLET, FILM COATED ORAL NIGHTLY
Status: DISCONTINUED | OUTPATIENT
Start: 2020-06-18 | End: 2020-07-01 | Stop reason: HOSPADM

## 2020-06-18 RX ORDER — BUPIVACAINE HCL/0.9 % NACL/PF 0.1 %
2 PLASTIC BAG, INJECTION (ML) EPIDURAL EVERY 8 HOURS
Status: COMPLETED | OUTPATIENT
Start: 2020-06-18 | End: 2020-06-20

## 2020-06-18 RX ORDER — IPRATROPIUM BROMIDE AND ALBUTEROL SULFATE 2.5; .5 MG/3ML; MG/3ML
3 SOLUTION RESPIRATORY (INHALATION) ONCE
Status: COMPLETED | OUTPATIENT
Start: 2020-06-18 | End: 2020-06-18

## 2020-06-18 RX ORDER — BISACODYL 10 MG
10 SUPPOSITORY, RECTAL RECTAL DAILY PRN
Status: DISCONTINUED | OUTPATIENT
Start: 2020-06-19 | End: 2020-07-01 | Stop reason: HOSPADM

## 2020-06-18 RX ORDER — SODIUM CHLORIDE 0.9 % (FLUSH) 0.9 %
10 SYRINGE (ML) INJECTION AS NEEDED
Status: DISCONTINUED | OUTPATIENT
Start: 2020-06-18 | End: 2020-06-18 | Stop reason: HOSPADM

## 2020-06-18 RX ORDER — CALCIUM CHLORIDE 100 MG/ML
INJECTION INTRAVENOUS; INTRAVENTRICULAR AS NEEDED
Status: DISCONTINUED | OUTPATIENT
Start: 2020-06-18 | End: 2020-06-18 | Stop reason: SURG

## 2020-06-18 RX ORDER — SODIUM CHLORIDE 0.9 % (FLUSH) 0.9 %
3 SYRINGE (ML) INJECTION EVERY 12 HOURS SCHEDULED
Status: DISCONTINUED | OUTPATIENT
Start: 2020-06-18 | End: 2020-06-18 | Stop reason: HOSPADM

## 2020-06-18 RX ORDER — ONDANSETRON 2 MG/ML
4 INJECTION INTRAMUSCULAR; INTRAVENOUS ONCE AS NEEDED
Status: DISCONTINUED | OUTPATIENT
Start: 2020-06-18 | End: 2020-06-18

## 2020-06-18 RX ORDER — IPRATROPIUM BROMIDE AND ALBUTEROL SULFATE 2.5; .5 MG/3ML; MG/3ML
3 SOLUTION RESPIRATORY (INHALATION)
Status: DISCONTINUED | OUTPATIENT
Start: 2020-06-18 | End: 2020-07-01 | Stop reason: HOSPADM

## 2020-06-18 RX ORDER — DOBUTAMINE 12.5 MG/ML
INJECTION, SOLUTION, CONCENTRATE INTRAVENOUS CONTINUOUS PRN
Status: DISCONTINUED | OUTPATIENT
Start: 2020-06-18 | End: 2020-06-18 | Stop reason: SURG

## 2020-06-18 RX ORDER — POTASSIUM CHLORIDE 29.8 MG/ML
20 INJECTION INTRAVENOUS
Status: DISCONTINUED | OUTPATIENT
Start: 2020-06-18 | End: 2020-07-01 | Stop reason: HOSPADM

## 2020-06-18 RX ORDER — PROTAMINE SULFATE 10 MG/ML
INJECTION, SOLUTION INTRAVENOUS AS NEEDED
Status: DISCONTINUED | OUTPATIENT
Start: 2020-06-18 | End: 2020-06-18 | Stop reason: SURG

## 2020-06-18 RX ORDER — BUDESONIDE AND FORMOTEROL FUMARATE DIHYDRATE 80; 4.5 UG/1; UG/1
2 AEROSOL RESPIRATORY (INHALATION)
Status: DISCONTINUED | OUTPATIENT
Start: 2020-06-18 | End: 2020-07-01 | Stop reason: HOSPADM

## 2020-06-18 RX ORDER — AMIODARONE HYDROCHLORIDE 50 MG/ML
INJECTION, SOLUTION INTRAVENOUS AS NEEDED
Status: DISCONTINUED | OUTPATIENT
Start: 2020-06-18 | End: 2020-06-18 | Stop reason: SURG

## 2020-06-18 RX ORDER — NITROGLYCERIN 0.4 MG/1
0.4 TABLET SUBLINGUAL
Status: DISCONTINUED | OUTPATIENT
Start: 2020-06-18 | End: 2020-06-18 | Stop reason: HOSPADM

## 2020-06-18 RX ORDER — AMOXICILLIN 250 MG
2 CAPSULE ORAL 2 TIMES DAILY
Status: DISCONTINUED | OUTPATIENT
Start: 2020-06-18 | End: 2020-07-01 | Stop reason: HOSPADM

## 2020-06-18 RX ORDER — SODIUM CHLORIDE, SODIUM GLUCONATE, SODIUM ACETATE, POTASSIUM CHLORIDE, AND MAGNESIUM CHLORIDE 526; 502; 368; 37; 30 MG/100ML; MG/100ML; MG/100ML; MG/100ML; MG/100ML
INJECTION, SOLUTION INTRAVENOUS CONTINUOUS PRN
Status: DISCONTINUED | OUTPATIENT
Start: 2020-06-18 | End: 2020-06-18 | Stop reason: SURG

## 2020-06-18 RX ORDER — ASCORBIC ACID 500 MG
500 TABLET ORAL 2 TIMES DAILY
Status: DISCONTINUED | OUTPATIENT
Start: 2020-06-18 | End: 2020-07-01 | Stop reason: HOSPADM

## 2020-06-18 RX ORDER — DEXTROSE AND SODIUM CHLORIDE 5; .45 G/100ML; G/100ML
INJECTION, SOLUTION INTRAVENOUS
Status: DISPENSED
Start: 2020-06-18 | End: 2020-06-18

## 2020-06-18 RX ADMIN — MIDAZOLAM HYDROCHLORIDE 2 MG: 2 INJECTION, SOLUTION INTRAMUSCULAR; INTRAVENOUS at 12:35

## 2020-06-18 RX ADMIN — ATORVASTATIN CALCIUM 40 MG: 40 TABLET, FILM COATED ORAL at 20:41

## 2020-06-18 RX ADMIN — FENTANYL CITRATE 50 MCG: 50 INJECTION, SOLUTION INTRAMUSCULAR; INTRAVENOUS at 08:10

## 2020-06-18 RX ADMIN — DEXTROSE AND SODIUM CHLORIDE 30 ML/HR: 5; 450 INJECTION, SOLUTION INTRAVENOUS at 14:30

## 2020-06-18 RX ADMIN — SODIUM CHLORIDE 500 ML: 9 INJECTION, SOLUTION INTRAVENOUS at 15:30

## 2020-06-18 RX ADMIN — VECURONIUM BROMIDE 6 MG: 1 INJECTION, POWDER, LYOPHILIZED, FOR SOLUTION INTRAVENOUS at 09:10

## 2020-06-18 RX ADMIN — VECURONIUM BROMIDE 10 MG: 1 INJECTION, POWDER, LYOPHILIZED, FOR SOLUTION INTRAVENOUS at 07:25

## 2020-06-18 RX ADMIN — MORPHINE SULFATE 2 MG: 2 INJECTION, SOLUTION INTRAMUSCULAR; INTRAVENOUS at 21:17

## 2020-06-18 RX ADMIN — DOBUTAMINE HYDROCHLORIDE 5 MCG/KG/MIN: 100 INJECTION INTRAVENOUS at 20:10

## 2020-06-18 RX ADMIN — SODIUM CHLORIDE, SODIUM GLUCONATE, SODIUM ACETATE, POTASSIUM CHLORIDE, AND MAGNESIUM CHLORIDE: 526; 502; 368; 37; 30 INJECTION, SOLUTION INTRAVENOUS at 12:55

## 2020-06-18 RX ADMIN — SODIUM CHLORIDE: 900 INJECTION, SOLUTION INTRAVENOUS at 07:34

## 2020-06-18 RX ADMIN — FENTANYL CITRATE 50 MCG: 50 INJECTION, SOLUTION INTRAMUSCULAR; INTRAVENOUS at 08:38

## 2020-06-18 RX ADMIN — PHENYLEPHRINE HYDROCHLORIDE 200 MCG: 10 INJECTION INTRAVENOUS at 07:19

## 2020-06-18 RX ADMIN — VECURONIUM BROMIDE 6 MG: 1 INJECTION, POWDER, LYOPHILIZED, FOR SOLUTION INTRAVENOUS at 10:04

## 2020-06-18 RX ADMIN — PROTAMINE SULFATE 350 MG: 10 INJECTION, SOLUTION INTRAVENOUS at 13:12

## 2020-06-18 RX ADMIN — FAMOTIDINE 20 MG: 20 TABLET, FILM COATED ORAL at 20:42

## 2020-06-18 RX ADMIN — Medication 500 MG: at 20:42

## 2020-06-18 RX ADMIN — PHENYLEPHRINE HYDROCHLORIDE 1 MCG/KG/MIN: 10 INJECTION INTRAVENOUS at 12:50

## 2020-06-18 RX ADMIN — SODIUM CHLORIDE, SODIUM GLUCONATE, SODIUM ACETATE, POTASSIUM CHLORIDE, AND MAGNESIUM CHLORIDE: 526; 502; 368; 37; 30 INJECTION, SOLUTION INTRAVENOUS at 07:34

## 2020-06-18 RX ADMIN — MIDAZOLAM HYDROCHLORIDE 2 MG: 2 INJECTION, SOLUTION INTRAMUSCULAR; INTRAVENOUS at 07:07

## 2020-06-18 RX ADMIN — AMIODARONE HYDROCHLORIDE 1 MCG/MIN: 1.8 INJECTION, SOLUTION INTRAVENOUS at 12:35

## 2020-06-18 RX ADMIN — AMIODARONE HYDROCHLORIDE 0.5 MG/MIN: 1.8 INJECTION, SOLUTION INTRAVENOUS at 18:30

## 2020-06-18 RX ADMIN — MIDAZOLAM HYDROCHLORIDE 2 MG: 2 INJECTION, SOLUTION INTRAMUSCULAR; INTRAVENOUS at 13:45

## 2020-06-18 RX ADMIN — FENTANYL CITRATE 50 MCG: 50 INJECTION, SOLUTION INTRAMUSCULAR; INTRAVENOUS at 09:44

## 2020-06-18 RX ADMIN — HEPARIN SODIUM 30000 UNITS: 1000 INJECTION INTRAVENOUS; SUBCUTANEOUS at 09:54

## 2020-06-18 RX ADMIN — CALCIUM CHLORIDE 1 G: 100 INJECTION, SOLUTION INTRAVENOUS at 13:38

## 2020-06-18 RX ADMIN — Medication 2 G: at 16:14

## 2020-06-18 RX ADMIN — FENTANYL CITRATE 100 MCG: 50 INJECTION, SOLUTION INTRAMUSCULAR; INTRAVENOUS at 07:15

## 2020-06-18 RX ADMIN — FENTANYL CITRATE 200 MCG: 50 INJECTION, SOLUTION INTRAMUSCULAR; INTRAVENOUS at 08:08

## 2020-06-18 RX ADMIN — VECURONIUM BROMIDE 6 MG: 1 INJECTION, POWDER, LYOPHILIZED, FOR SOLUTION INTRAVENOUS at 11:07

## 2020-06-18 RX ADMIN — VECURONIUM BROMIDE 6 MG: 1 INJECTION, POWDER, LYOPHILIZED, FOR SOLUTION INTRAVENOUS at 08:22

## 2020-06-18 RX ADMIN — DEXMEDETOMIDINE HYDROCHLORIDE 0.2 MCG/KG/HR: 100 INJECTION, SOLUTION INTRAVENOUS at 12:56

## 2020-06-18 RX ADMIN — PHENYLEPHRINE HYDROCHLORIDE 100 MCG: 10 INJECTION INTRAVENOUS at 08:01

## 2020-06-18 RX ADMIN — SODIUM CHLORIDE 1 UNITS/HR: 9 INJECTION, SOLUTION INTRAVENOUS at 16:08

## 2020-06-18 RX ADMIN — FENTANYL CITRATE 50 MCG: 50 INJECTION, SOLUTION INTRAMUSCULAR; INTRAVENOUS at 09:49

## 2020-06-18 RX ADMIN — FENTANYL CITRATE 50 MCG: 50 INJECTION, SOLUTION INTRAMUSCULAR; INTRAVENOUS at 12:57

## 2020-06-18 RX ADMIN — VECURONIUM BROMIDE 6 MG: 1 INJECTION, POWDER, LYOPHILIZED, FOR SOLUTION INTRAVENOUS at 13:09

## 2020-06-18 RX ADMIN — FENTANYL CITRATE 50 MCG: 50 INJECTION, SOLUTION INTRAMUSCULAR; INTRAVENOUS at 14:00

## 2020-06-18 RX ADMIN — SODIUM CHLORIDE 75 ML/HR: 9 INJECTION, SOLUTION INTRAVENOUS at 05:50

## 2020-06-18 RX ADMIN — Medication 2 G: at 07:24

## 2020-06-18 RX ADMIN — AMIODARONE HYDROCHLORIDE 300 MG: 50 INJECTION, SOLUTION INTRAVENOUS at 12:35

## 2020-06-18 RX ADMIN — Medication 2 G: at 22:35

## 2020-06-18 RX ADMIN — MIDAZOLAM HYDROCHLORIDE 2 MG: 2 INJECTION, SOLUTION INTRAMUSCULAR; INTRAVENOUS at 10:10

## 2020-06-18 RX ADMIN — TRANEXAMIC ACID 1000 MG: 100 INJECTION, SOLUTION INTRAVENOUS at 07:45

## 2020-06-18 RX ADMIN — SODIUM CHLORIDE, PRESERVATIVE FREE 3 ML: 5 INJECTION INTRAVENOUS at 15:00

## 2020-06-18 RX ADMIN — DOBUTAMINE 5 MCG/KG/MIN: 12.5 INJECTION, SOLUTION, CONCENTRATE INTRAVENOUS at 12:35

## 2020-06-18 RX ADMIN — TRANEXAMIC ACID 1344 MG: 100 INJECTION, SOLUTION INTRAVENOUS at 08:02

## 2020-06-18 RX ADMIN — DEXMEDETOMIDINE HYDROCHLORIDE 0.2 MCG/KG/HR: 100 INJECTION, SOLUTION INTRAVENOUS at 18:44

## 2020-06-18 RX ADMIN — HYDROMORPHONE HYDROCHLORIDE 0.5 MG: 1 INJECTION, SOLUTION INTRAMUSCULAR; INTRAVENOUS; SUBCUTANEOUS at 19:16

## 2020-06-18 RX ADMIN — FENTANYL CITRATE 50 MCG: 50 INJECTION, SOLUTION INTRAMUSCULAR; INTRAVENOUS at 09:45

## 2020-06-18 RX ADMIN — SENNOSIDES AND DOCUSATE SODIUM 2 TABLET: 8.6; 5 TABLET ORAL at 20:42

## 2020-06-18 RX ADMIN — PHENYLEPHRINE HYDROCHLORIDE 200 MCG: 10 INJECTION INTRAVENOUS at 07:22

## 2020-06-18 RX ADMIN — IPRATROPIUM BROMIDE AND ALBUTEROL SULFATE 3 ML: 2.5; .5 SOLUTION RESPIRATORY (INHALATION) at 06:26

## 2020-06-18 RX ADMIN — HEPARIN SODIUM 5000 UNITS: 1000 INJECTION INTRAVENOUS; SUBCUTANEOUS at 07:59

## 2020-06-18 RX ADMIN — VECURONIUM BROMIDE 6 MG: 1 INJECTION, POWDER, LYOPHILIZED, FOR SOLUTION INTRAVENOUS at 12:06

## 2020-06-18 RX ADMIN — INSULIN DETEMIR 35 UNITS: 100 INJECTION, SOLUTION SUBCUTANEOUS at 21:10

## 2020-06-18 RX ADMIN — ACETAMINOPHEN 1000 MG: 325 TABLET, FILM COATED ORAL at 21:08

## 2020-06-18 NOTE — ANESTHESIA POSTPROCEDURE EVALUATION
Patient: Jose Barrett    Procedure Summary     Date:  06/18/20 Room / Location:  [facility] OR 04 / [facility] OR    Anesthesia Start:  0708 Anesthesia Stop:  1430    Procedure:  CORONARY ARTERY BYPASS GRAFTING X 3 UTILIZING THE LEFT INTERNAL MAMMARY ARTERY AND ENDOSCOPICALLY HARVESTED VEIN FROM THE LEFT LEG    (CELL SAVER) (N/A Chest) Diagnosis:       Coronary artery disease involving native coronary artery of native heart with unstable angina pectoris (CMS/HCC)      (Coronary artery disease involving native coronary artery of native heart with unstable angina pectoris (CMS/HCC) [I25.110])    Surgeon:  Edi Donis MD Provider:  Stefan Mendez MD    Anesthesia Type:  general ASA Status:  4          Anesthesia Type: general    Vitals  Vitals Value Taken Time   BP     Temp     Pulse 79 6/18/2020  2:33 PM   Resp     SpO2 100 % 6/18/2020  2:33 PM   Vitals shown include unvalidated device data.        Post Anesthesia Care and Evaluation    Patient location during evaluation: ICU  Patient participation: complete - patient cannot participate  Post-procedure mental status: SEDATED.  Pain management: adequate  Airway patency: patent  Anesthetic complications: No anesthetic complications  PONV Status: none  Cardiovascular status: acceptable and hemodynamically stable  Respiratory status: acceptable, ETT and ventilator  Hydration status: acceptable

## 2020-06-18 NOTE — ANESTHESIA PROCEDURE NOTES
Airway  Urgency: elective    Date/Time: 6/18/2020 7:19 AM  Airway not difficult    General Information and Staff    Patient location during procedure: OR  CRNA: Puja Proctor CRNA    Indications and Patient Condition  Indications for airway management: airway protection    Preoxygenated: yes  Mask difficulty assessment: 0 - not attempted    Final Airway Details  Final airway type: endotracheal airway      Successful airway: ETT  Cuffed: yes   Successful intubation technique: video laryngoscopy  Facilitating devices/methods: intubating stylet  Endotracheal tube insertion site: oral  Blade: [place]  Blade size: 4  ETT size (mm): 7.5  Cormack-Lehane Classification: grade IIa - partial view of glottis  Placement verified by: capnometry   Measured from: lips  ETT/EBT  to lips (cm): 20  Number of attempts at approach: 1  Assessment: lips, teeth, and gum same as pre-op and atraumatic intubation

## 2020-06-18 NOTE — ANESTHESIA PROCEDURE NOTES
Central Line    Pre-sedation assessment completed: 6/18/2020 7:06 AM    Patient reassessed immediately prior to procedure    Patient location during procedure: OR  Start time: 6/18/2020 7:26 AM  Stop Time:6/18/2020 7:31 AM  Indications: MD/Surgeon request, vascular access and central pressure monitoring  Staff  Anesthesiologist: Stefan Mendez MD  Preanesthetic Checklist  Completed: patient identified, surgical consent, pre-op evaluation, timeout performed, IV checked, risks and benefits discussed and monitors and equipment checked  Central Line Prep  Sterile Tech:gloves, cap, gown, mask and sterile barriers  Prep: chloraprep  no medical exclusion documented for following all elements of maximal sterile barrier technique  Patient monitoring: blood pressure monitoring, continuous pulse oximetry and EKG  Central Line Procedure  Laterality:right  Location:internal jugular  Catheter Type:Cordis and Gobles-Mac  Catheter Size:9 Fr  Guidance:landmark technique  Assessment  Post procedure:biopatch applied, line sutured and occlusive dressing applied  Assessement:blood return through all ports, free fluid flow and Jerrell Test  Complications:no  Patient Tolerance:patient tolerated the procedure well with no apparent complications

## 2020-06-18 NOTE — ANESTHESIA PROCEDURE NOTES
Arterial Line    Pre-sedation assessment completed: 6/18/2020 7:06 AM    Patient reassessed immediately prior to procedure    Patient location during procedure: OR  Start time: 6/18/2020 7:11 AM  Stop Time:6/18/2020 7:12 AM       Line placed for hemodynamic monitoring, ABGs/Labs/ISTAT and MD/Surgeon request.  Performed By   RYANN: Chapin Valencia SRNA  Preanesthetic Checklist  Completed: patient identified, surgical consent, pre-op evaluation, timeout performed, IV checked, risks and benefits discussed and monitors and equipment checked  Arterial Line Prep   Sterile Tech: cap, gloves and sterile barriers  Prep: ChloraPrep  Patient monitoring: EKG, continuous pulse oximetry and blood pressure monitoring  Arterial Line Procedure   Laterality:left  Location:  radial artery  Catheter size: 18 G   Guidance: landmark technique  Number of attempts: 1  Successful placement: yes  Post Assessment   Dressing Type: wrist guard applied, secured with tape and occlusive dressing applied.   Complications no  Circ/Move/Sens Assessment: normal and unchanged.   Patient Tolerance: patient tolerated the procedure well with no apparent complications  Additional Notes  Pt tolerated procedure well.  No complications noted.  Placed by ROBERT Freed, oversaw by Puja ALONSO CRNA.

## 2020-06-19 ENCOUNTER — READMISSION MANAGEMENT (OUTPATIENT)
Dept: CALL CENTER | Facility: HOSPITAL | Age: 70
End: 2020-06-19

## 2020-06-19 ENCOUNTER — APPOINTMENT (OUTPATIENT)
Dept: GENERAL RADIOLOGY | Facility: HOSPITAL | Age: 70
End: 2020-06-19

## 2020-06-19 LAB
A-A DO2: 111.7 MMHG
A-A DO2: 132.4 MMHG
ALBUMIN SERPL-MCNC: 3.3 G/DL (ref 3.5–5.2)
ANION GAP SERPL CALCULATED.3IONS-SCNC: 8 MMOL/L (ref 5–15)
ARTERIAL PATENCY WRIST A: ABNORMAL
ATMOSPHERIC PRESS: 748 MMHG
BASE EXCESS BLDA CALC-SCNC: -1.4 MMOL/L (ref 0–2)
BASE EXCESS BLDA CALC-SCNC: -2.1 MMOL/L (ref 0–2)
BASE EXCESS BLDA CALC-SCNC: -3.3 MMOL/L (ref 0–2)
BASOPHILS # BLD AUTO: 0.04 10*3/MM3 (ref 0–0.2)
BASOPHILS NFR BLD AUTO: 0.2 % (ref 0–1.5)
BDY SITE: ABNORMAL
BUN BLD-MCNC: 24 MG/DL (ref 8–23)
BUN/CREAT SERPL: 12.8 (ref 7–25)
CA-I BLD-MCNC: 4.64 MG/DL (ref 4.6–5.6)
CA-I BLD-MCNC: 4.65 MG/DL (ref 4.6–5.6)
CALCIUM SPEC-SCNC: 7.9 MG/DL (ref 8.6–10.5)
CHLORIDE SERPL-SCNC: 105 MMOL/L (ref 98–107)
CO2 SERPL-SCNC: 24 MMOL/L (ref 22–29)
COHGB MFR BLD: 0.5 % (ref 0–5)
COHGB MFR BLD: 1 % (ref 0–5)
CREAT BLD-MCNC: 1.88 MG/DL (ref 0.76–1.27)
DEPRECATED RDW RBC AUTO: 45.6 FL (ref 37–54)
EOSINOPHIL # BLD AUTO: 0 10*3/MM3 (ref 0–0.4)
EOSINOPHIL NFR BLD AUTO: 0 % (ref 0.3–6.2)
ERYTHROCYTE [DISTWIDTH] IN BLOOD BY AUTOMATED COUNT: 13 % (ref 12.3–15.4)
GAS FLOW AIRWAY: 6 LPM
GFR SERPL CREATININE-BSD FRML MDRD: 36 ML/MIN/1.73
GLUCOSE BLD-MCNC: 122 MG/DL (ref 65–99)
GLUCOSE BLDA-MCNC: 109 MMOL/L (ref 65–95)
GLUCOSE BLDA-MCNC: 114 MMOL/L (ref 65–95)
GLUCOSE BLDC GLUCOMTR-MCNC: 101 MG/DL (ref 70–130)
GLUCOSE BLDC GLUCOMTR-MCNC: 101 MG/DL (ref 70–130)
GLUCOSE BLDC GLUCOMTR-MCNC: 105 MG/DL (ref 70–130)
GLUCOSE BLDC GLUCOMTR-MCNC: 112 MG/DL (ref 70–130)
GLUCOSE BLDC GLUCOMTR-MCNC: 114 MG/DL (ref 70–130)
GLUCOSE BLDC GLUCOMTR-MCNC: 115 MG/DL (ref 70–130)
GLUCOSE BLDC GLUCOMTR-MCNC: 116 MG/DL (ref 70–130)
GLUCOSE BLDC GLUCOMTR-MCNC: 119 MG/DL (ref 70–130)
GLUCOSE BLDC GLUCOMTR-MCNC: 121 MG/DL (ref 70–130)
GLUCOSE BLDC GLUCOMTR-MCNC: 122 MG/DL (ref 70–130)
GLUCOSE BLDC GLUCOMTR-MCNC: 123 MG/DL (ref 70–130)
GLUCOSE BLDC GLUCOMTR-MCNC: 126 MG/DL (ref 70–130)
GLUCOSE BLDC GLUCOMTR-MCNC: 137 MG/DL (ref 70–130)
GLUCOSE BLDC GLUCOMTR-MCNC: 150 MG/DL (ref 70–130)
GLUCOSE BLDC GLUCOMTR-MCNC: 152 MG/DL (ref 70–130)
GLUCOSE BLDC GLUCOMTR-MCNC: 153 MG/DL (ref 70–130)
GLUCOSE BLDC GLUCOMTR-MCNC: 160 MG/DL (ref 70–130)
GLUCOSE BLDC GLUCOMTR-MCNC: 161 MG/DL (ref 70–130)
GLUCOSE BLDC GLUCOMTR-MCNC: 162 MG/DL (ref 70–130)
GLUCOSE BLDC GLUCOMTR-MCNC: 93 MG/DL (ref 70–130)
GLUCOSE BLDC GLUCOMTR-MCNC: 93 MG/DL (ref 70–130)
HCO3 BLDA-SCNC: 23.1 MMOL/L (ref 20–26)
HCO3 BLDA-SCNC: 24.9 MMOL/L (ref 20–26)
HCO3 BLDA-SCNC: 25 MMOL/L (ref 20–26)
HCT VFR BLD AUTO: 30.7 % (ref 37.5–51)
HCT VFR BLD CALC: 32 % (ref 38–51)
HCT VFR BLD CALC: 32.5 % (ref 38–51)
HGB BLD-MCNC: 10.1 G/DL (ref 13–17.7)
HGB BLDA-MCNC: 10.5 G/DL (ref 14–18)
HGB BLDA-MCNC: 10.6 G/DL (ref 14–18)
HOROWITZ INDEX BLD+IHG-RTO: 35 %
HOROWITZ INDEX BLD+IHG-RTO: 40 %
IMM GRANULOCYTES # BLD AUTO: 0.12 10*3/MM3 (ref 0–0.05)
IMM GRANULOCYTES NFR BLD AUTO: 0.7 % (ref 0–0.5)
INR PPP: 1.38 (ref 0.8–1.2)
LYMPHOCYTES # BLD AUTO: 0.7 10*3/MM3 (ref 0.7–3.1)
LYMPHOCYTES NFR BLD AUTO: 4.2 % (ref 19.6–45.3)
Lab: ABNORMAL
MAGNESIUM SERPL-MCNC: 2.6 MG/DL (ref 1.6–2.4)
MCH RBC QN AUTO: 31.5 PG (ref 26.6–33)
MCHC RBC AUTO-ENTMCNC: 32.9 G/DL (ref 31.5–35.7)
MCV RBC AUTO: 95.6 FL (ref 79–97)
METHGB BLD QL: 0.3 % (ref 0–3)
METHGB BLD QL: 0.9 % (ref 0–3)
MODALITY: ABNORMAL
MONOCYTES # BLD AUTO: 1.45 10*3/MM3 (ref 0.1–0.9)
MONOCYTES NFR BLD AUTO: 8.8 % (ref 5–12)
NEUTROPHILS # BLD AUTO: 14.2 10*3/MM3 (ref 1.7–7)
NEUTROPHILS NFR BLD AUTO: 86.1 % (ref 42.7–76)
NOTE: ABNORMAL
NOTE: ABNORMAL
NRBC BLD AUTO-RTO: 0 /100 WBC (ref 0–0.2)
OXYHGB MFR BLDV: 91.7 % (ref 94–99)
OXYHGB MFR BLDV: 96.4 % (ref 94–99)
PAW @ PEAK INSP FLOW SETTING VENT: 26 CMH2O
PCO2 BLDA: 45.9 MM HG (ref 35–45)
PCO2 BLDA: 48 MM HG (ref 35–45)
PCO2 BLDA: 52.9 MM HG (ref 35–45)
PCO2 TEMP ADJ BLD: ABNORMAL MM[HG]
PCO2 TEMP ADJ BLD: ABNORMAL MM[HG]
PEEP RESPIRATORY: 5 CM[H2O]
PEEP RESPIRATORY: 5 CM[H2O]
PH BLDA: 7.28 PH UNITS (ref 7.35–7.45)
PH BLDA: 7.31 PH UNITS (ref 7.35–7.45)
PH BLDA: 7.32 PH UNITS (ref 7.35–7.45)
PH, TEMP CORRECTED: ABNORMAL
PH, TEMP CORRECTED: ABNORMAL
PHOSPHATE SERPL-MCNC: 4.1 MG/DL (ref 2.5–4.5)
PLATELET # BLD AUTO: 162 10*3/MM3 (ref 140–450)
PMV BLD AUTO: 11 FL (ref 6–12)
PO2 BLDA: 102 MM HG (ref 83–108)
PO2 BLDA: 75.3 MM HG (ref 83–108)
PO2 BLDA: 95.3 MM HG (ref 83–108)
PO2 TEMP ADJ BLD: ABNORMAL MM[HG]
PO2 TEMP ADJ BLD: ABNORMAL MM[HG]
POTASSIUM BLD-SCNC: 4 MMOL/L (ref 3.5–5.2)
POTASSIUM BLDA-SCNC: 3.7 MMOL/L (ref 3.4–4.5)
POTASSIUM BLDA-SCNC: 3.8 MMOL/L (ref 3.4–4.5)
PROTHROMBIN TIME: 16.8 SECONDS (ref 11.1–15.3)
PSV: 10 CMH2O
PSV: 8 CMH2O
RBC # BLD AUTO: 3.21 10*6/MM3 (ref 4.14–5.8)
SAO2 % BLDCOA: 93.4 % (ref 94–99)
SAO2 % BLDCOA: 97.2 % (ref 94–99)
SAO2 % BLDCOA: 97.7 % (ref 94–99)
SET MECH RESP RATE: 10
SODIUM BLD-SCNC: 137 MMOL/L (ref 136–145)
SODIUM BLDA-SCNC: 137 MMOL/L (ref 136–146)
SODIUM BLDA-SCNC: 137 MMOL/L (ref 136–146)
VENTILATOR MODE: ABNORMAL
VT ON VENT VENT: 587 ML
WBC NRBC COR # BLD: 16.51 10*3/MM3 (ref 3.4–10.8)

## 2020-06-19 PROCEDURE — 85025 COMPLETE CBC W/AUTO DIFF WBC: CPT | Performed by: NURSE PRACTITIONER

## 2020-06-19 PROCEDURE — 82805 BLOOD GASES W/O2 SATURATION: CPT

## 2020-06-19 PROCEDURE — 82962 GLUCOSE BLOOD TEST: CPT

## 2020-06-19 PROCEDURE — 82375 ASSAY CARBOXYHB QUANT: CPT

## 2020-06-19 PROCEDURE — 94799 UNLISTED PULMONARY SVC/PX: CPT

## 2020-06-19 PROCEDURE — 71045 X-RAY EXAM CHEST 1 VIEW: CPT

## 2020-06-19 PROCEDURE — 99024 POSTOP FOLLOW-UP VISIT: CPT | Performed by: NURSE PRACTITIONER

## 2020-06-19 PROCEDURE — 25010000002 CEFAZOLIN PER 500 MG: Performed by: NURSE PRACTITIONER

## 2020-06-19 PROCEDURE — 94003 VENT MGMT INPAT SUBQ DAY: CPT

## 2020-06-19 PROCEDURE — 80069 RENAL FUNCTION PANEL: CPT | Performed by: NURSE PRACTITIONER

## 2020-06-19 PROCEDURE — 93010 ELECTROCARDIOGRAM REPORT: CPT | Performed by: INTERNAL MEDICINE

## 2020-06-19 PROCEDURE — 25010000002 AMIODARONE IN DEXTROSE 5% 360-4.14 MG/200ML-% SOLUTION: Performed by: NURSE PRACTITIONER

## 2020-06-19 PROCEDURE — 25010000002 MORPHINE PER 10 MG: Performed by: NURSE PRACTITIONER

## 2020-06-19 PROCEDURE — 94640 AIRWAY INHALATION TREATMENT: CPT

## 2020-06-19 PROCEDURE — 83735 ASSAY OF MAGNESIUM: CPT | Performed by: NURSE PRACTITIONER

## 2020-06-19 PROCEDURE — 93005 ELECTROCARDIOGRAM TRACING: CPT | Performed by: NURSE PRACTITIONER

## 2020-06-19 PROCEDURE — 25010000002 MAGNESIUM SULFATE 2 GM/50ML SOLUTION: Performed by: NURSE PRACTITIONER

## 2020-06-19 PROCEDURE — 82803 BLOOD GASES ANY COMBINATION: CPT

## 2020-06-19 PROCEDURE — 85610 PROTHROMBIN TIME: CPT | Performed by: NURSE PRACTITIONER

## 2020-06-19 PROCEDURE — 25010000002 DOBUTAMINE PER 250 MG: Performed by: NURSE PRACTITIONER

## 2020-06-19 PROCEDURE — 83050 HGB METHEMOGLOBIN QUAN: CPT

## 2020-06-19 RX ORDER — SODIUM CHLORIDE 0.9 % (FLUSH) 0.9 %
20 SYRINGE (ML) INJECTION AS NEEDED
Status: DISCONTINUED | OUTPATIENT
Start: 2020-06-19 | End: 2020-07-01 | Stop reason: HOSPADM

## 2020-06-19 RX ORDER — SODIUM CHLORIDE 9 MG/ML
INJECTION, SOLUTION INTRAVENOUS
Status: COMPLETED
Start: 2020-06-19 | End: 2020-06-19

## 2020-06-19 RX ORDER — AMIODARONE HYDROCHLORIDE 200 MG/1
400 TABLET ORAL EVERY 12 HOURS SCHEDULED
Status: DISCONTINUED | OUTPATIENT
Start: 2020-06-19 | End: 2020-06-26

## 2020-06-19 RX ORDER — SODIUM CHLORIDE 9 MG/ML
INJECTION, SOLUTION INTRAVENOUS
Status: COMPLETED
Start: 2020-06-19 | End: 2020-06-20

## 2020-06-19 RX ORDER — ASPIRIN 81 MG/1
81 TABLET, CHEWABLE ORAL DAILY
Status: DISCONTINUED | OUTPATIENT
Start: 2020-06-19 | End: 2020-07-01 | Stop reason: HOSPADM

## 2020-06-19 RX ORDER — SODIUM CHLORIDE 0.9 % (FLUSH) 0.9 %
10 SYRINGE (ML) INJECTION EVERY 12 HOURS SCHEDULED
Status: DISCONTINUED | OUTPATIENT
Start: 2020-06-20 | End: 2020-07-01 | Stop reason: HOSPADM

## 2020-06-19 RX ORDER — CLOPIDOGREL BISULFATE 75 MG/1
75 TABLET ORAL DAILY
Status: DISCONTINUED | OUTPATIENT
Start: 2020-06-19 | End: 2020-07-01 | Stop reason: HOSPADM

## 2020-06-19 RX ORDER — SODIUM CHLORIDE 0.9 % (FLUSH) 0.9 %
10 SYRINGE (ML) INJECTION AS NEEDED
Status: DISCONTINUED | OUTPATIENT
Start: 2020-06-19 | End: 2020-07-01 | Stop reason: HOSPADM

## 2020-06-19 RX ADMIN — SODIUM CHLORIDE 500 ML: 9 INJECTION, SOLUTION INTRAVENOUS at 03:14

## 2020-06-19 RX ADMIN — Medication 2 G: at 06:30

## 2020-06-19 RX ADMIN — DEXMEDETOMIDINE HYDROCHLORIDE 0.6 MCG/KG/HR: 100 INJECTION, SOLUTION INTRAVENOUS at 06:05

## 2020-06-19 RX ADMIN — OXYCODONE HYDROCHLORIDE 10 MG: 5 TABLET ORAL at 19:13

## 2020-06-19 RX ADMIN — IPRATROPIUM BROMIDE AND ALBUTEROL SULFATE 3 ML: 2.5; .5 SOLUTION RESPIRATORY (INHALATION) at 15:02

## 2020-06-19 RX ADMIN — MORPHINE SULFATE 2 MG: 2 INJECTION, SOLUTION INTRAMUSCULAR; INTRAVENOUS at 10:41

## 2020-06-19 RX ADMIN — ASPIRIN 81 MG 81 MG: 81 TABLET ORAL at 13:55

## 2020-06-19 RX ADMIN — MORPHINE SULFATE 2 MG: 2 INJECTION, SOLUTION INTRAMUSCULAR; INTRAVENOUS at 03:43

## 2020-06-19 RX ADMIN — OXYCODONE HYDROCHLORIDE 5 MG: 5 TABLET ORAL at 15:22

## 2020-06-19 RX ADMIN — GUAIFENESIN 1200 MG: 600 TABLET, EXTENDED RELEASE ORAL at 20:52

## 2020-06-19 RX ADMIN — ACETAMINOPHEN 1000 MG: 325 TABLET, FILM COATED ORAL at 03:14

## 2020-06-19 RX ADMIN — ACETAMINOPHEN 1000 MG: 325 TABLET, FILM COATED ORAL at 15:22

## 2020-06-19 RX ADMIN — CLOPIDOGREL BISULFATE 75 MG: 75 TABLET ORAL at 13:55

## 2020-06-19 RX ADMIN — FAMOTIDINE 20 MG: 10 INJECTION, SOLUTION INTRAVENOUS at 20:52

## 2020-06-19 RX ADMIN — IPRATROPIUM BROMIDE AND ALBUTEROL SULFATE 3 ML: 2.5; .5 SOLUTION RESPIRATORY (INHALATION) at 19:27

## 2020-06-19 RX ADMIN — DEXMEDETOMIDINE HYDROCHLORIDE 0.6 MCG/KG/HR: 100 INJECTION, SOLUTION INTRAVENOUS at 08:59

## 2020-06-19 RX ADMIN — ACETAMINOPHEN 1000 MG: 325 TABLET, FILM COATED ORAL at 09:01

## 2020-06-19 RX ADMIN — SENNOSIDES AND DOCUSATE SODIUM 2 TABLET: 8.6; 5 TABLET ORAL at 08:58

## 2020-06-19 RX ADMIN — DEXMEDETOMIDINE HYDROCHLORIDE 0.3 MCG/KG/HR: 100 INJECTION, SOLUTION INTRAVENOUS at 01:23

## 2020-06-19 RX ADMIN — AMIODARONE HYDROCHLORIDE 400 MG: 200 TABLET ORAL at 13:55

## 2020-06-19 RX ADMIN — MAGNESIUM SULFATE HEPTAHYDRATE 2 G: 40 INJECTION, SOLUTION INTRAVENOUS at 05:15

## 2020-06-19 RX ADMIN — MORPHINE SULFATE 2 MG: 2 INJECTION, SOLUTION INTRAMUSCULAR; INTRAVENOUS at 12:20

## 2020-06-19 RX ADMIN — AMIODARONE HYDROCHLORIDE 400 MG: 200 TABLET ORAL at 20:52

## 2020-06-19 RX ADMIN — BUDESONIDE AND FORMOTEROL FUMARATE DIHYDRATE 2 PUFF: 80; 4.5 AEROSOL RESPIRATORY (INHALATION) at 19:29

## 2020-06-19 RX ADMIN — SENNOSIDES AND DOCUSATE SODIUM 2 TABLET: 8.6; 5 TABLET ORAL at 20:52

## 2020-06-19 RX ADMIN — Medication 500 MG: at 20:52

## 2020-06-19 RX ADMIN — Medication 2 G: at 15:22

## 2020-06-19 RX ADMIN — ATORVASTATIN CALCIUM 40 MG: 40 TABLET, FILM COATED ORAL at 20:52

## 2020-06-19 RX ADMIN — ACETAMINOPHEN 1000 MG: 325 TABLET, FILM COATED ORAL at 21:00

## 2020-06-19 RX ADMIN — Medication 500 MG: at 09:01

## 2020-06-19 RX ADMIN — MORPHINE SULFATE 2 MG: 2 INJECTION, SOLUTION INTRAMUSCULAR; INTRAVENOUS at 13:41

## 2020-06-19 RX ADMIN — MORPHINE SULFATE 2 MG: 2 INJECTION, SOLUTION INTRAMUSCULAR; INTRAVENOUS at 08:01

## 2020-06-19 RX ADMIN — MORPHINE SULFATE 2 MG: 2 INJECTION, SOLUTION INTRAMUSCULAR; INTRAVENOUS at 06:46

## 2020-06-19 RX ADMIN — MUPIROCIN 1 APPLICATION: 20 OINTMENT TOPICAL at 09:02

## 2020-06-19 RX ADMIN — DOBUTAMINE HYDROCHLORIDE 5 MCG/KG/MIN: 100 INJECTION INTRAVENOUS at 04:20

## 2020-06-19 RX ADMIN — MORPHINE SULFATE 2 MG: 2 INJECTION, SOLUTION INTRAMUSCULAR; INTRAVENOUS at 11:21

## 2020-06-19 RX ADMIN — FAMOTIDINE 20 MG: 10 INJECTION, SOLUTION INTRAVENOUS at 08:57

## 2020-06-19 RX ADMIN — OXYCODONE HYDROCHLORIDE 10 MG: 5 TABLET ORAL at 23:19

## 2020-06-19 RX ADMIN — MORPHINE SULFATE 2 MG: 2 INJECTION, SOLUTION INTRAMUSCULAR; INTRAVENOUS at 02:31

## 2020-06-19 RX ADMIN — AMIODARONE HYDROCHLORIDE 0.5 MG/MIN: 1.8 INJECTION, SOLUTION INTRAVENOUS at 04:20

## 2020-06-20 ENCOUNTER — APPOINTMENT (OUTPATIENT)
Dept: GENERAL RADIOLOGY | Facility: HOSPITAL | Age: 70
End: 2020-06-20

## 2020-06-20 LAB
ANION GAP SERPL CALCULATED.3IONS-SCNC: 12 MMOL/L (ref 5–15)
BUN BLD-MCNC: 34 MG/DL (ref 8–23)
BUN/CREAT SERPL: 13.9 (ref 7–25)
CALCIUM SPEC-SCNC: 8.2 MG/DL (ref 8.6–10.5)
CHLORIDE SERPL-SCNC: 100 MMOL/L (ref 98–107)
CO2 SERPL-SCNC: 21 MMOL/L (ref 22–29)
CREAT BLD-MCNC: 2.44 MG/DL (ref 0.76–1.27)
GFR SERPL CREATININE-BSD FRML MDRD: 26 ML/MIN/1.73
GLUCOSE BLD-MCNC: 129 MG/DL (ref 65–99)
GLUCOSE BLDC GLUCOMTR-MCNC: 122 MG/DL (ref 70–130)
GLUCOSE BLDC GLUCOMTR-MCNC: 140 MG/DL (ref 70–130)
MAGNESIUM SERPL-MCNC: 3 MG/DL (ref 1.6–2.4)
POTASSIUM BLD-SCNC: 4.4 MMOL/L (ref 3.5–5.2)
POTASSIUM BLD-SCNC: 4.7 MMOL/L (ref 3.5–5.2)
SODIUM BLD-SCNC: 133 MMOL/L (ref 136–145)
SODIUM UR-SCNC: <20 MMOL/L

## 2020-06-20 PROCEDURE — 93005 ELECTROCARDIOGRAM TRACING: CPT | Performed by: THORACIC SURGERY (CARDIOTHORACIC VASCULAR SURGERY)

## 2020-06-20 PROCEDURE — 84300 ASSAY OF URINE SODIUM: CPT | Performed by: INTERNAL MEDICINE

## 2020-06-20 PROCEDURE — 99024 POSTOP FOLLOW-UP VISIT: CPT | Performed by: NURSE PRACTITIONER

## 2020-06-20 PROCEDURE — 82962 GLUCOSE BLOOD TEST: CPT

## 2020-06-20 PROCEDURE — 93010 ELECTROCARDIOGRAM REPORT: CPT | Performed by: INTERNAL MEDICINE

## 2020-06-20 PROCEDURE — 94799 UNLISTED PULMONARY SVC/PX: CPT

## 2020-06-20 PROCEDURE — 97162 PT EVAL MOD COMPLEX 30 MIN: CPT

## 2020-06-20 PROCEDURE — 80048 BASIC METABOLIC PNL TOTAL CA: CPT | Performed by: NURSE PRACTITIONER

## 2020-06-20 PROCEDURE — 25010000002 AMIODARONE IN DEXTROSE 5% 150-4.21 MG/100ML-% SOLUTION: Performed by: THORACIC SURGERY (CARDIOTHORACIC VASCULAR SURGERY)

## 2020-06-20 PROCEDURE — 97166 OT EVAL MOD COMPLEX 45 MIN: CPT

## 2020-06-20 PROCEDURE — 25010000002 ONDANSETRON PER 1 MG: Performed by: NURSE PRACTITIONER

## 2020-06-20 PROCEDURE — 83735 ASSAY OF MAGNESIUM: CPT | Performed by: THORACIC SURGERY (CARDIOTHORACIC VASCULAR SURGERY)

## 2020-06-20 PROCEDURE — 71045 X-RAY EXAM CHEST 1 VIEW: CPT

## 2020-06-20 PROCEDURE — 25010000002 ALBUMIN HUMAN 25% PER 50 ML: Performed by: INTERNAL MEDICINE

## 2020-06-20 PROCEDURE — 84132 ASSAY OF SERUM POTASSIUM: CPT | Performed by: THORACIC SURGERY (CARDIOTHORACIC VASCULAR SURGERY)

## 2020-06-20 PROCEDURE — P9047 ALBUMIN (HUMAN), 25%, 50ML: HCPCS | Performed by: INTERNAL MEDICINE

## 2020-06-20 RX ORDER — NICOTINE POLACRILEX 4 MG
15 LOZENGE BUCCAL
Status: DISCONTINUED | OUTPATIENT
Start: 2020-06-20 | End: 2020-07-01 | Stop reason: HOSPADM

## 2020-06-20 RX ORDER — ALBUMIN (HUMAN) 12.5 G/50ML
25 SOLUTION INTRAVENOUS 3 TIMES DAILY
Status: DISCONTINUED | OUTPATIENT
Start: 2020-06-20 | End: 2020-06-20

## 2020-06-20 RX ORDER — SODIUM CHLORIDE 9 MG/ML
75 INJECTION, SOLUTION INTRAVENOUS CONTINUOUS
Status: DISCONTINUED | OUTPATIENT
Start: 2020-06-20 | End: 2020-06-21

## 2020-06-20 RX ORDER — ALBUMIN (HUMAN) 12.5 G/50ML
25 SOLUTION INTRAVENOUS 3 TIMES DAILY
Status: COMPLETED | OUTPATIENT
Start: 2020-06-20 | End: 2020-06-21

## 2020-06-20 RX ORDER — DILTIAZEM HCL IN NACL,ISO-OSM 125 MG/125
10 PLASTIC BAG, INJECTION (ML) INTRAVENOUS CONTINUOUS
Status: DISCONTINUED | OUTPATIENT
Start: 2020-06-20 | End: 2020-06-23

## 2020-06-20 RX ORDER — DEXTROSE MONOHYDRATE 25 G/50ML
25 INJECTION, SOLUTION INTRAVENOUS
Status: DISCONTINUED | OUTPATIENT
Start: 2020-06-20 | End: 2020-07-01 | Stop reason: HOSPADM

## 2020-06-20 RX ORDER — LANOLIN ALCOHOL/MO/W.PET/CERES
400 CREAM (GRAM) TOPICAL 2 TIMES DAILY
Status: DISCONTINUED | OUTPATIENT
Start: 2020-06-20 | End: 2020-06-23

## 2020-06-20 RX ADMIN — IPRATROPIUM BROMIDE AND ALBUTEROL SULFATE 3 ML: 2.5; .5 SOLUTION RESPIRATORY (INHALATION) at 10:43

## 2020-06-20 RX ADMIN — AMIODARONE HYDROCHLORIDE 400 MG: 200 TABLET ORAL at 21:06

## 2020-06-20 RX ADMIN — CLOPIDOGREL BISULFATE 75 MG: 75 TABLET ORAL at 08:55

## 2020-06-20 RX ADMIN — IPRATROPIUM BROMIDE AND ALBUTEROL SULFATE 3 ML: 2.5; .5 SOLUTION RESPIRATORY (INHALATION) at 19:34

## 2020-06-20 RX ADMIN — GUAIFENESIN 1200 MG: 600 TABLET, EXTENDED RELEASE ORAL at 21:05

## 2020-06-20 RX ADMIN — ACETAMINOPHEN 1000 MG: 325 TABLET, FILM COATED ORAL at 09:00

## 2020-06-20 RX ADMIN — OXYCODONE HYDROCHLORIDE 10 MG: 5 TABLET ORAL at 08:54

## 2020-06-20 RX ADMIN — Medication 2 G: at 00:29

## 2020-06-20 RX ADMIN — SENNOSIDES AND DOCUSATE SODIUM 2 TABLET: 8.6; 5 TABLET ORAL at 08:54

## 2020-06-20 RX ADMIN — SODIUM CHLORIDE 500 ML: 9 INJECTION, SOLUTION INTRAVENOUS at 18:12

## 2020-06-20 RX ADMIN — ALBUMIN HUMAN 25 G: 0.25 SOLUTION INTRAVENOUS at 15:33

## 2020-06-20 RX ADMIN — IPRATROPIUM BROMIDE AND ALBUTEROL SULFATE 3 ML: 2.5; .5 SOLUTION RESPIRATORY (INHALATION) at 14:22

## 2020-06-20 RX ADMIN — ATORVASTATIN CALCIUM 40 MG: 40 TABLET, FILM COATED ORAL at 21:06

## 2020-06-20 RX ADMIN — Medication 500 MG: at 08:53

## 2020-06-20 RX ADMIN — IPRATROPIUM BROMIDE AND ALBUTEROL SULFATE 3 ML: 2.5; .5 SOLUTION RESPIRATORY (INHALATION) at 06:31

## 2020-06-20 RX ADMIN — AMIODARONE HYDROCHLORIDE 400 MG: 200 TABLET ORAL at 08:54

## 2020-06-20 RX ADMIN — ASPIRIN 81 MG 81 MG: 81 TABLET ORAL at 08:54

## 2020-06-20 RX ADMIN — ACETAMINOPHEN 1000 MG: 325 TABLET, FILM COATED ORAL at 03:25

## 2020-06-20 RX ADMIN — Medication 400 MG: at 11:17

## 2020-06-20 RX ADMIN — ACETAMINOPHEN 1000 MG: 325 TABLET, FILM COATED ORAL at 23:22

## 2020-06-20 RX ADMIN — ONDANSETRON 4 MG: 2 SOLUTION INTRAMUSCULAR; INTRAVENOUS at 05:14

## 2020-06-20 RX ADMIN — SODIUM CHLORIDE 75 ML/HR: 9 INJECTION, SOLUTION INTRAVENOUS at 20:19

## 2020-06-20 RX ADMIN — Medication 400 MG: at 21:06

## 2020-06-20 RX ADMIN — AMIODARONE HYDROCHLORIDE 150 MG: 1.5 INJECTION, SOLUTION INTRAVENOUS at 18:52

## 2020-06-20 RX ADMIN — METOPROLOL TARTRATE 12.5 MG: 25 TABLET, FILM COATED ORAL at 08:53

## 2020-06-20 RX ADMIN — ALBUMIN HUMAN 25 G: 0.25 SOLUTION INTRAVENOUS at 20:18

## 2020-06-20 RX ADMIN — FAMOTIDINE 20 MG: 20 TABLET, FILM COATED ORAL at 21:05

## 2020-06-20 RX ADMIN — SODIUM CHLORIDE, PRESERVATIVE FREE 10 ML: 5 INJECTION INTRAVENOUS at 08:57

## 2020-06-20 RX ADMIN — SENNOSIDES AND DOCUSATE SODIUM 2 TABLET: 8.6; 5 TABLET ORAL at 21:06

## 2020-06-20 RX ADMIN — BUDESONIDE AND FORMOTEROL FUMARATE DIHYDRATE 2 PUFF: 80; 4.5 AEROSOL RESPIRATORY (INHALATION) at 06:33

## 2020-06-20 RX ADMIN — MUPIROCIN 1 APPLICATION: 20 OINTMENT TOPICAL at 08:54

## 2020-06-20 RX ADMIN — GUAIFENESIN 1200 MG: 600 TABLET, EXTENDED RELEASE ORAL at 08:54

## 2020-06-20 RX ADMIN — Medication 500 MG: at 21:05

## 2020-06-20 RX ADMIN — OXYCODONE HYDROCHLORIDE 10 MG: 5 TABLET ORAL at 03:25

## 2020-06-20 RX ADMIN — FAMOTIDINE 20 MG: 20 TABLET, FILM COATED ORAL at 08:54

## 2020-06-20 NOTE — OUTREACH NOTE
AMI Week 2 Survey      Responses   [facility] patient discharged from?  [place]   Does the patient have one of the following disease processes/diagnoses(primary or secondary)?  Acute MI (STEMI,NSTEMI)   Week 2 attempt successful?  No   Revoke  Readmitted          Madyson Austin RN

## 2020-06-21 ENCOUNTER — APPOINTMENT (OUTPATIENT)
Dept: ULTRASOUND IMAGING | Facility: HOSPITAL | Age: 70
End: 2020-06-21

## 2020-06-21 LAB
A-A DO2: 137.7 MMHG
ALBUMIN SERPL-MCNC: 3.8 G/DL (ref 3.5–5.2)
ALBUMIN/GLOB SERPL: 1.4 G/DL
ALP SERPL-CCNC: 50 U/L (ref 39–117)
ALT SERPL W P-5'-P-CCNC: 10 U/L (ref 1–41)
ANION GAP SERPL CALCULATED.3IONS-SCNC: 14 MMOL/L (ref 5–15)
ANION GAP SERPL CALCULATED.3IONS-SCNC: 17 MMOL/L (ref 5–15)
ARTERIAL PATENCY WRIST A: ABNORMAL
ARTERIAL PATENCY WRIST A: ABNORMAL
ARTERIAL PATENCY WRIST A: POSITIVE
ARTERIAL PATENCY WRIST A: POSITIVE
AST SERPL-CCNC: 38 U/L (ref 1–40)
ATMOSPHERIC PRESS: 745 MMHG
ATMOSPHERIC PRESS: 746 MMHG
BASE EXCESS BLDA CALC-SCNC: -2 MMOL/L (ref 0–2)
BASE EXCESS BLDA CALC-SCNC: -2.9 MMOL/L (ref 0–2)
BASE EXCESS BLDA CALC-SCNC: -3.3 MMOL/L (ref 0–2)
BASE EXCESS BLDA CALC-SCNC: -5.8 MMOL/L (ref 0–2)
BDY SITE: ABNORMAL
BILIRUB SERPL-MCNC: 0.3 MG/DL (ref 0.2–1.2)
BUN BLD-MCNC: 56 MG/DL (ref 8–23)
BUN BLD-MCNC: 60 MG/DL (ref 8–23)
BUN/CREAT SERPL: 12 (ref 7–25)
BUN/CREAT SERPL: 12.4 (ref 7–25)
CA-I BLD-MCNC: 4.32 MG/DL (ref 4.6–5.6)
CALCIUM SPEC-SCNC: 8.2 MG/DL (ref 8.6–10.5)
CALCIUM SPEC-SCNC: 8.3 MG/DL (ref 8.6–10.5)
CHLORIDE SERPL-SCNC: 95 MMOL/L (ref 98–107)
CHLORIDE SERPL-SCNC: 95 MMOL/L (ref 98–107)
CO2 SERPL-SCNC: 19 MMOL/L (ref 22–29)
CO2 SERPL-SCNC: 22 MMOL/L (ref 22–29)
COHGB MFR BLD: 0.8 % (ref 0–5)
CREAT BLD-MCNC: 4.67 MG/DL (ref 0.76–1.27)
CREAT BLD-MCNC: 4.83 MG/DL (ref 0.76–1.27)
EPAP: 8
GAS FLOW AIRWAY: 5 LPM
GFR SERPL CREATININE-BSD FRML MDRD: 12 ML/MIN/1.73
GFR SERPL CREATININE-BSD FRML MDRD: 13 ML/MIN/1.73
GFR SERPL CREATININE-BSD FRML MDRD: ABNORMAL ML/MIN/{1.73_M2}
GFR SERPL CREATININE-BSD FRML MDRD: ABNORMAL ML/MIN/{1.73_M2}
GLOBULIN UR ELPH-MCNC: 2.7 GM/DL
GLUCOSE BLD-MCNC: 130 MG/DL (ref 65–99)
GLUCOSE BLD-MCNC: 132 MG/DL (ref 65–99)
GLUCOSE BLDA-MCNC: 110 MMOL/L (ref 65–95)
GLUCOSE BLDC GLUCOMTR-MCNC: 108 MG/DL (ref 70–130)
GLUCOSE BLDC GLUCOMTR-MCNC: 119 MG/DL (ref 70–130)
GLUCOSE BLDC GLUCOMTR-MCNC: 130 MG/DL (ref 70–130)
GLUCOSE BLDC GLUCOMTR-MCNC: 135 MG/DL (ref 70–130)
GLUCOSE BLDC GLUCOMTR-MCNC: 147 MG/DL (ref 70–130)
HBV SURFACE AG SERPL QL IA: NORMAL
HCO3 BLDA-SCNC: 22.5 MMOL/L (ref 20–26)
HCO3 BLDA-SCNC: 23.5 MMOL/L (ref 20–26)
HCO3 BLDA-SCNC: 23.8 MMOL/L (ref 20–26)
HCO3 BLDA-SCNC: 24.7 MMOL/L (ref 20–26)
HCT VFR BLD CALC: 28.4 % (ref 38–51)
HGB BLDA-MCNC: 9.3 G/DL (ref 14–18)
HOROWITZ INDEX BLD+IHG-RTO: 40 %
HOROWITZ INDEX BLD+IHG-RTO: 40 %
HOROWITZ INDEX BLD+IHG-RTO: 70 %
Lab: ABNORMAL
MAGNESIUM SERPL-MCNC: 3.1 MG/DL (ref 1.6–2.4)
METHGB BLD QL: 0.2 % (ref 0–3)
MODALITY: ABNORMAL
NOTE: ABNORMAL
OXYHGB MFR BLDV: 95.6 % (ref 94–99)
PCO2 BLDA: 49.3 MM HG (ref 35–45)
PCO2 BLDA: 50.1 MM HG (ref 35–45)
PCO2 BLDA: 50.4 MM HG (ref 35–45)
PCO2 BLDA: 57.7 MM HG (ref 35–45)
PCO2 TEMP ADJ BLD: ABNORMAL MM[HG]
PH BLDA: 7.2 PH UNITS (ref 7.35–7.45)
PH BLDA: 7.29 PH UNITS (ref 7.35–7.45)
PH BLDA: 7.29 PH UNITS (ref 7.35–7.45)
PH BLDA: 7.3 PH UNITS (ref 7.35–7.45)
PH, TEMP CORRECTED: ABNORMAL
PO2 BLDA: 220 MM HG (ref 83–108)
PO2 BLDA: 65.8 MM HG (ref 83–108)
PO2 BLDA: 87.5 MM HG (ref 83–108)
PO2 BLDA: 93.6 MM HG (ref 83–108)
PO2 TEMP ADJ BLD: ABNORMAL MM[HG]
POTASSIUM BLD-SCNC: 4.9 MMOL/L (ref 3.5–5.2)
POTASSIUM BLD-SCNC: 5 MMOL/L (ref 3.5–5.2)
POTASSIUM BLDA-SCNC: 4.2 MMOL/L (ref 3.4–4.5)
PROT SERPL-MCNC: 6.5 G/DL (ref 6–8.5)
SAO2 % BLDCOA: 100 % (ref 94–99)
SAO2 % BLDCOA: 90.7 % (ref 94–99)
SAO2 % BLDCOA: 96.6 % (ref 94–99)
SAO2 % BLDCOA: 97.3 % (ref 94–99)
SET MECH RESP RATE: 18
SET MECH RESP RATE: 22
SODIUM BLD-SCNC: 131 MMOL/L (ref 136–145)
SODIUM BLD-SCNC: 131 MMOL/L (ref 136–145)
SODIUM BLDA-SCNC: 134 MMOL/L (ref 136–146)
VENTILATOR MODE: ABNORMAL
VT ON VENT VENT: 500 ML
VT ON VENT VENT: 550 ML
VT ON VENT VENT: 600 ML

## 2020-06-21 PROCEDURE — 76937 US GUIDE VASCULAR ACCESS: CPT

## 2020-06-21 PROCEDURE — 94660 CPAP INITIATION&MGMT: CPT

## 2020-06-21 PROCEDURE — 82803 BLOOD GASES ANY COMBINATION: CPT

## 2020-06-21 PROCEDURE — 82375 ASSAY CARBOXYHB QUANT: CPT

## 2020-06-21 PROCEDURE — 82962 GLUCOSE BLOOD TEST: CPT

## 2020-06-21 PROCEDURE — 87340 HEPATITIS B SURFACE AG IA: CPT | Performed by: INTERNAL MEDICINE

## 2020-06-21 PROCEDURE — 83050 HGB METHEMOGLOBIN QUAN: CPT

## 2020-06-21 PROCEDURE — 76775 US EXAM ABDO BACK WALL LIM: CPT

## 2020-06-21 PROCEDURE — 25010000002 ALBUMIN HUMAN 25% PER 50 ML: Performed by: INTERNAL MEDICINE

## 2020-06-21 PROCEDURE — 94760 N-INVAS EAR/PLS OXIMETRY 1: CPT

## 2020-06-21 PROCEDURE — 82805 BLOOD GASES W/O2 SATURATION: CPT

## 2020-06-21 PROCEDURE — 94799 UNLISTED PULMONARY SVC/PX: CPT

## 2020-06-21 PROCEDURE — 83735 ASSAY OF MAGNESIUM: CPT | Performed by: NURSE PRACTITIONER

## 2020-06-21 PROCEDURE — 97530 THERAPEUTIC ACTIVITIES: CPT

## 2020-06-21 PROCEDURE — 99024 POSTOP FOLLOW-UP VISIT: CPT | Performed by: THORACIC SURGERY (CARDIOTHORACIC VASCULAR SURGERY)

## 2020-06-21 PROCEDURE — 80053 COMPREHEN METABOLIC PANEL: CPT | Performed by: NURSE PRACTITIONER

## 2020-06-21 PROCEDURE — 36600 WITHDRAWAL OF ARTERIAL BLOOD: CPT

## 2020-06-21 PROCEDURE — 97535 SELF CARE MNGMENT TRAINING: CPT

## 2020-06-21 PROCEDURE — P9047 ALBUMIN (HUMAN), 25%, 50ML: HCPCS | Performed by: INTERNAL MEDICINE

## 2020-06-21 RX ORDER — PHENYLEPHRINE HCL IN 0.9% NACL 0.5 MG/5ML
.5-3 SYRINGE (ML) INTRAVENOUS
Status: DISCONTINUED | OUTPATIENT
Start: 2020-06-21 | End: 2020-06-23

## 2020-06-21 RX ORDER — SODIUM CHLORIDE 9 MG/ML
INJECTION, SOLUTION INTRAVENOUS
Status: DISPENSED
Start: 2020-06-21 | End: 2020-06-22

## 2020-06-21 RX ORDER — ALBUMIN (HUMAN) 12.5 G/50ML
12.5 SOLUTION INTRAVENOUS AS NEEDED
Status: DISCONTINUED | OUTPATIENT
Start: 2020-06-21 | End: 2020-06-22

## 2020-06-21 RX ADMIN — BUDESONIDE AND FORMOTEROL FUMARATE DIHYDRATE 2 PUFF: 80; 4.5 AEROSOL RESPIRATORY (INHALATION) at 20:22

## 2020-06-21 RX ADMIN — IPRATROPIUM BROMIDE AND ALBUTEROL SULFATE 3 ML: 2.5; .5 SOLUTION RESPIRATORY (INHALATION) at 11:31

## 2020-06-21 RX ADMIN — IPRATROPIUM BROMIDE AND ALBUTEROL SULFATE 3 ML: 2.5; .5 SOLUTION RESPIRATORY (INHALATION) at 06:56

## 2020-06-21 RX ADMIN — ALBUMIN HUMAN 25 G: 0.25 SOLUTION INTRAVENOUS at 08:45

## 2020-06-21 RX ADMIN — FAMOTIDINE 20 MG: 10 INJECTION, SOLUTION INTRAVENOUS at 09:42

## 2020-06-21 RX ADMIN — GUAIFENESIN 1200 MG: 600 TABLET, EXTENDED RELEASE ORAL at 23:05

## 2020-06-21 RX ADMIN — Medication 500 MG: at 23:08

## 2020-06-21 RX ADMIN — SODIUM CHLORIDE, PRESERVATIVE FREE 10 ML: 5 INJECTION INTRAVENOUS at 23:08

## 2020-06-21 RX ADMIN — SENNOSIDES AND DOCUSATE SODIUM 2 TABLET: 8.6; 5 TABLET ORAL at 08:40

## 2020-06-21 RX ADMIN — METOPROLOL TARTRATE 12.5 MG: 25 TABLET, FILM COATED ORAL at 08:41

## 2020-06-21 RX ADMIN — Medication 500 MG: at 08:41

## 2020-06-21 RX ADMIN — BUDESONIDE AND FORMOTEROL FUMARATE DIHYDRATE 2 PUFF: 80; 4.5 AEROSOL RESPIRATORY (INHALATION) at 06:56

## 2020-06-21 RX ADMIN — ACETAMINOPHEN 1000 MG: 325 TABLET, FILM COATED ORAL at 05:45

## 2020-06-21 RX ADMIN — ACETAMINOPHEN 1000 MG: 325 TABLET, FILM COATED ORAL at 17:15

## 2020-06-21 RX ADMIN — ACETAMINOPHEN 1000 MG: 325 TABLET, FILM COATED ORAL at 23:05

## 2020-06-21 RX ADMIN — ALBUMIN HUMAN 25 G: 0.25 SOLUTION INTRAVENOUS at 23:04

## 2020-06-21 RX ADMIN — FAMOTIDINE 20 MG: 20 TABLET, FILM COATED ORAL at 23:06

## 2020-06-21 RX ADMIN — SODIUM CHLORIDE, PRESERVATIVE FREE 10 ML: 5 INJECTION INTRAVENOUS at 08:41

## 2020-06-21 RX ADMIN — Medication 400 MG: at 23:07

## 2020-06-21 RX ADMIN — IPRATROPIUM BROMIDE AND ALBUTEROL SULFATE 3 ML: 2.5; .5 SOLUTION RESPIRATORY (INHALATION) at 16:16

## 2020-06-21 RX ADMIN — ASPIRIN 81 MG 81 MG: 81 TABLET ORAL at 08:41

## 2020-06-21 RX ADMIN — SENNOSIDES AND DOCUSATE SODIUM 2 TABLET: 8.6; 5 TABLET ORAL at 23:06

## 2020-06-21 RX ADMIN — SODIUM CHLORIDE 75 ML/HR: 9 INJECTION, SOLUTION INTRAVENOUS at 09:39

## 2020-06-21 RX ADMIN — CLOPIDOGREL BISULFATE 75 MG: 75 TABLET ORAL at 08:41

## 2020-06-21 RX ADMIN — AMIODARONE HYDROCHLORIDE 400 MG: 200 TABLET ORAL at 23:05

## 2020-06-21 RX ADMIN — MUPIROCIN 1 APPLICATION: 20 OINTMENT TOPICAL at 08:39

## 2020-06-21 RX ADMIN — IPRATROPIUM BROMIDE AND ALBUTEROL SULFATE 3 ML: 2.5; .5 SOLUTION RESPIRATORY (INHALATION) at 20:11

## 2020-06-21 RX ADMIN — ALBUMIN HUMAN 25 G: 0.25 SOLUTION INTRAVENOUS at 17:31

## 2020-06-21 RX ADMIN — Medication 400 MG: at 08:41

## 2020-06-21 RX ADMIN — ATORVASTATIN CALCIUM 40 MG: 40 TABLET, FILM COATED ORAL at 23:04

## 2020-06-21 RX ADMIN — AMIODARONE HYDROCHLORIDE 400 MG: 200 TABLET ORAL at 08:40

## 2020-06-22 ENCOUNTER — APPOINTMENT (OUTPATIENT)
Dept: INTERVENTIONAL RADIOLOGY/VASCULAR | Facility: HOSPITAL | Age: 70
End: 2020-06-22

## 2020-06-22 ENCOUNTER — APPOINTMENT (OUTPATIENT)
Dept: GENERAL RADIOLOGY | Facility: HOSPITAL | Age: 70
End: 2020-06-22

## 2020-06-22 LAB
ALBUMIN SERPL-MCNC: 4 G/DL (ref 3.5–5.2)
ALBUMIN/GLOB SERPL: 1.7 G/DL
ALP SERPL-CCNC: 50 U/L (ref 39–117)
ALT SERPL W P-5'-P-CCNC: 16 U/L (ref 1–41)
ANION GAP SERPL CALCULATED.3IONS-SCNC: 14 MMOL/L (ref 5–15)
ARTERIAL PATENCY WRIST A: ABNORMAL
ARTERIAL PATENCY WRIST A: POSITIVE
ARTERIAL PATENCY WRIST A: POSITIVE
AST SERPL-CCNC: 98 U/L (ref 1–40)
ATMOSPHERIC PRESS: 746 MMHG
BASE EXCESS BLDA CALC-SCNC: -3 MMOL/L (ref 0–2)
BASE EXCESS BLDA CALC-SCNC: -4 MMOL/L (ref 0–2)
BASE EXCESS BLDA CALC-SCNC: -4.4 MMOL/L (ref 0–2)
BASOPHILS # BLD AUTO: 0.02 10*3/MM3 (ref 0–0.2)
BASOPHILS NFR BLD AUTO: 0.2 % (ref 0–1.5)
BDY SITE: ABNORMAL
BH BB BLOOD EXPIRATION DATE: NORMAL
BH BB BLOOD EXPIRATION DATE: NORMAL
BH BB BLOOD TYPE BARCODE: NORMAL
BH BB BLOOD TYPE BARCODE: NORMAL
BH BB DISPENSE STATUS: NORMAL
BH BB DISPENSE STATUS: NORMAL
BH BB PRODUCT CODE: NORMAL
BH BB PRODUCT CODE: NORMAL
BH BB UNIT NUMBER: NORMAL
BH BB UNIT NUMBER: NORMAL
BILIRUB SERPL-MCNC: 0.4 MG/DL (ref 0.2–1.2)
BUN BLD-MCNC: 64 MG/DL (ref 8–23)
BUN/CREAT SERPL: 12.5 (ref 7–25)
CALCIUM SPEC-SCNC: 8.1 MG/DL (ref 8.6–10.5)
CHLORIDE SERPL-SCNC: 94 MMOL/L (ref 98–107)
CO2 SERPL-SCNC: 24 MMOL/L (ref 22–29)
CREAT BLD-MCNC: 5.11 MG/DL (ref 0.76–1.27)
CROSSMATCH INTERPRETATION: NORMAL
CROSSMATCH INTERPRETATION: NORMAL
DEPRECATED RDW RBC AUTO: 49.4 FL (ref 37–54)
EOSINOPHIL # BLD AUTO: 0.02 10*3/MM3 (ref 0–0.4)
EOSINOPHIL NFR BLD AUTO: 0.2 % (ref 0.3–6.2)
ERYTHROCYTE [DISTWIDTH] IN BLOOD BY AUTOMATED COUNT: 13.8 % (ref 12.3–15.4)
GFR SERPL CREATININE-BSD FRML MDRD: 11 ML/MIN/1.73
GFR SERPL CREATININE-BSD FRML MDRD: ABNORMAL ML/MIN/{1.73_M2}
GLOBULIN UR ELPH-MCNC: 2.4 GM/DL
GLUCOSE BLD-MCNC: 118 MG/DL (ref 65–99)
GLUCOSE BLDC GLUCOMTR-MCNC: 108 MG/DL (ref 70–130)
GLUCOSE BLDC GLUCOMTR-MCNC: 110 MG/DL (ref 70–130)
GLUCOSE BLDC GLUCOMTR-MCNC: 79 MG/DL (ref 70–130)
GLUCOSE BLDC GLUCOMTR-MCNC: 88 MG/DL (ref 70–130)
GLUCOSE BLDC GLUCOMTR-MCNC: 99 MG/DL (ref 70–130)
HCO3 BLDA-SCNC: 22.3 MMOL/L (ref 20–26)
HCO3 BLDA-SCNC: 22.8 MMOL/L (ref 20–26)
HCO3 BLDA-SCNC: 23.7 MMOL/L (ref 20–26)
HCT VFR BLD AUTO: 27.4 % (ref 37.5–51)
HGB BLD-MCNC: 8.8 G/DL (ref 13–17.7)
HOROWITZ INDEX BLD+IHG-RTO: 40 %
HOROWITZ INDEX BLD+IHG-RTO: 50 %
IMM GRANULOCYTES # BLD AUTO: 0.06 10*3/MM3 (ref 0–0.05)
IMM GRANULOCYTES NFR BLD AUTO: 0.5 % (ref 0–0.5)
LYMPHOCYTES # BLD AUTO: 0.52 10*3/MM3 (ref 0.7–3.1)
LYMPHOCYTES NFR BLD AUTO: 4.1 % (ref 19.6–45.3)
Lab: ABNORMAL
MCH RBC QN AUTO: 31.2 PG (ref 26.6–33)
MCHC RBC AUTO-ENTMCNC: 32.1 G/DL (ref 31.5–35.7)
MCV RBC AUTO: 97.2 FL (ref 79–97)
MODALITY: ABNORMAL
MONOCYTES # BLD AUTO: 1.2 10*3/MM3 (ref 0.1–0.9)
MONOCYTES NFR BLD AUTO: 9.4 % (ref 5–12)
NEUTROPHILS # BLD AUTO: 10.98 10*3/MM3 (ref 1.7–7)
NEUTROPHILS NFR BLD AUTO: 85.6 % (ref 42.7–76)
NRBC BLD AUTO-RTO: 0.2 /100 WBC (ref 0–0.2)
PCO2 BLDA: 47.2 MM HG (ref 35–45)
PCO2 BLDA: 49.1 MM HG (ref 35–45)
PCO2 BLDA: 49.5 MM HG (ref 35–45)
PEEP RESPIRATORY: 5 CM[H2O]
PH BLDA: 7.27 PH UNITS (ref 7.35–7.45)
PH BLDA: 7.28 PH UNITS (ref 7.35–7.45)
PH BLDA: 7.29 PH UNITS (ref 7.35–7.45)
PLATELET # BLD AUTO: 225 10*3/MM3 (ref 140–450)
PMV BLD AUTO: 10.7 FL (ref 6–12)
PO2 BLDA: 111 MM HG (ref 83–108)
PO2 BLDA: 70 MM HG (ref 83–108)
PO2 BLDA: 92.6 MM HG (ref 83–108)
POTASSIUM BLD-SCNC: 4.3 MMOL/L (ref 3.5–5.2)
PROT SERPL-MCNC: 6.4 G/DL (ref 6–8.5)
RBC # BLD AUTO: 2.82 10*6/MM3 (ref 4.14–5.8)
SAO2 % BLDCOA: 93.1 % (ref 94–99)
SAO2 % BLDCOA: 96.9 % (ref 94–99)
SAO2 % BLDCOA: 98.3 % (ref 94–99)
SET MECH RESP RATE: 14
SET MECH RESP RATE: 16
SODIUM BLD-SCNC: 132 MMOL/L (ref 136–145)
UNIT  ABO: NORMAL
UNIT  ABO: NORMAL
UNIT  RH: NORMAL
UNIT  RH: NORMAL
VENTILATOR MODE: ABNORMAL
VENTILATOR MODE: ABNORMAL
VENTILATOR MODE: AC
VT ON VENT VENT: 5 ML
VT ON VENT VENT: 600 ML
VT ON VENT VENT: 600 ML
WBC NRBC COR # BLD: 12.8 10*3/MM3 (ref 3.4–10.8)

## 2020-06-22 PROCEDURE — 94799 UNLISTED PULMONARY SVC/PX: CPT

## 2020-06-22 PROCEDURE — 71045 X-RAY EXAM CHEST 1 VIEW: CPT

## 2020-06-22 PROCEDURE — C1752 CATH,HEMODIALYSIS,SHORT-TERM: HCPCS

## 2020-06-22 PROCEDURE — 82803 BLOOD GASES ANY COMBINATION: CPT

## 2020-06-22 PROCEDURE — 63710000001 INSULIN DETEMIR PER 5 UNITS: Performed by: NURSE PRACTITIONER

## 2020-06-22 PROCEDURE — 36556 INSERT NON-TUNNEL CV CATH: CPT | Performed by: THORACIC SURGERY (CARDIOTHORACIC VASCULAR SURGERY)

## 2020-06-22 PROCEDURE — 82962 GLUCOSE BLOOD TEST: CPT

## 2020-06-22 PROCEDURE — 85025 COMPLETE CBC W/AUTO DIFF WBC: CPT | Performed by: INTERNAL MEDICINE

## 2020-06-22 PROCEDURE — 5A1D70Z PERFORMANCE OF URINARY FILTRATION, INTERMITTENT, LESS THAN 6 HOURS PER DAY: ICD-10-PCS | Performed by: INTERNAL MEDICINE

## 2020-06-22 PROCEDURE — 99024 POSTOP FOLLOW-UP VISIT: CPT | Performed by: NURSE PRACTITIONER

## 2020-06-22 PROCEDURE — 80053 COMPREHEN METABOLIC PANEL: CPT | Performed by: INTERNAL MEDICINE

## 2020-06-22 PROCEDURE — 36600 WITHDRAWAL OF ARTERIAL BLOOD: CPT

## 2020-06-22 RX ORDER — HALOPERIDOL 2 MG/ML
0.6 SOLUTION ORAL EVERY 6 HOURS PRN
Status: DISCONTINUED | OUTPATIENT
Start: 2020-06-22 | End: 2020-07-01 | Stop reason: HOSPADM

## 2020-06-22 RX ORDER — ALBUMIN (HUMAN) 12.5 G/50ML
12.5 SOLUTION INTRAVENOUS AS NEEDED
Status: ACTIVE | OUTPATIENT
Start: 2020-06-22 | End: 2020-06-22

## 2020-06-22 RX ADMIN — ACETAMINOPHEN 1000 MG: 325 TABLET, FILM COATED ORAL at 16:33

## 2020-06-22 RX ADMIN — ASPIRIN 81 MG 81 MG: 81 TABLET ORAL at 16:29

## 2020-06-22 RX ADMIN — IPRATROPIUM BROMIDE AND ALBUTEROL SULFATE 3 ML: 2.5; .5 SOLUTION RESPIRATORY (INHALATION) at 11:07

## 2020-06-22 RX ADMIN — ACETAMINOPHEN 1000 MG: 325 TABLET, FILM COATED ORAL at 03:04

## 2020-06-22 RX ADMIN — Medication 400 MG: at 20:20

## 2020-06-22 RX ADMIN — GUAIFENESIN 1200 MG: 600 TABLET, EXTENDED RELEASE ORAL at 16:35

## 2020-06-22 RX ADMIN — ATORVASTATIN CALCIUM 40 MG: 40 TABLET, FILM COATED ORAL at 20:20

## 2020-06-22 RX ADMIN — SENNOSIDES AND DOCUSATE SODIUM 2 TABLET: 8.6; 5 TABLET ORAL at 16:33

## 2020-06-22 RX ADMIN — FAMOTIDINE 20 MG: 20 TABLET, FILM COATED ORAL at 16:29

## 2020-06-22 RX ADMIN — SODIUM CHLORIDE, PRESERVATIVE FREE 10 ML: 5 INJECTION INTRAVENOUS at 16:40

## 2020-06-22 RX ADMIN — IPRATROPIUM BROMIDE AND ALBUTEROL SULFATE 3 ML: 2.5; .5 SOLUTION RESPIRATORY (INHALATION) at 07:20

## 2020-06-22 RX ADMIN — SODIUM CHLORIDE, PRESERVATIVE FREE 10 ML: 5 INJECTION INTRAVENOUS at 21:10

## 2020-06-22 RX ADMIN — ACETAMINOPHEN 1000 MG: 325 TABLET, FILM COATED ORAL at 21:05

## 2020-06-22 RX ADMIN — Medication 400 MG: at 16:31

## 2020-06-22 RX ADMIN — BUDESONIDE AND FORMOTEROL FUMARATE DIHYDRATE 2 PUFF: 80; 4.5 AEROSOL RESPIRATORY (INHALATION) at 07:26

## 2020-06-22 RX ADMIN — SENNOSIDES AND DOCUSATE SODIUM 2 TABLET: 8.6; 5 TABLET ORAL at 20:19

## 2020-06-22 RX ADMIN — INSULIN DETEMIR 30 UNITS: 100 INJECTION, SOLUTION SUBCUTANEOUS at 21:04

## 2020-06-22 RX ADMIN — SODIUM BICARBONATE 50 MEQ: 84 INJECTION INTRAVENOUS at 02:46

## 2020-06-22 RX ADMIN — FAMOTIDINE 20 MG: 20 TABLET, FILM COATED ORAL at 20:19

## 2020-06-22 RX ADMIN — METOPROLOL TARTRATE 12.5 MG: 25 TABLET, FILM COATED ORAL at 16:30

## 2020-06-22 RX ADMIN — METOPROLOL TARTRATE 12.5 MG: 25 TABLET, FILM COATED ORAL at 20:20

## 2020-06-22 RX ADMIN — SODIUM CHLORIDE, PRESERVATIVE FREE 10 ML: 5 INJECTION INTRAVENOUS at 16:43

## 2020-06-22 RX ADMIN — CLOPIDOGREL BISULFATE 75 MG: 75 TABLET ORAL at 16:30

## 2020-06-22 RX ADMIN — IPRATROPIUM BROMIDE AND ALBUTEROL SULFATE 3 ML: 2.5; .5 SOLUTION RESPIRATORY (INHALATION) at 17:28

## 2020-06-22 RX ADMIN — SODIUM CHLORIDE, PRESERVATIVE FREE 10 ML: 5 INJECTION INTRAVENOUS at 16:39

## 2020-06-22 RX ADMIN — AMIODARONE HYDROCHLORIDE 400 MG: 200 TABLET ORAL at 20:20

## 2020-06-22 RX ADMIN — AMIODARONE HYDROCHLORIDE 400 MG: 200 TABLET ORAL at 16:34

## 2020-06-22 RX ADMIN — Medication 500 MG: at 16:32

## 2020-06-22 RX ADMIN — GUAIFENESIN 1200 MG: 600 TABLET, EXTENDED RELEASE ORAL at 20:19

## 2020-06-22 RX ADMIN — Medication 500 MG: at 20:19

## 2020-06-23 LAB
ALBUMIN SERPL-MCNC: 3.9 G/DL (ref 3.5–5.2)
ALBUMIN/GLOB SERPL: 1.4 G/DL
ALP SERPL-CCNC: 58 U/L (ref 39–117)
ALT SERPL W P-5'-P-CCNC: 14 U/L (ref 1–41)
ANION GAP SERPL CALCULATED.3IONS-SCNC: 20 MMOL/L (ref 5–15)
AST SERPL-CCNC: 84 U/L (ref 1–40)
BILIRUB SERPL-MCNC: 0.4 MG/DL (ref 0.2–1.2)
BUN BLD-MCNC: 65 MG/DL (ref 8–23)
BUN/CREAT SERPL: 13.1 (ref 7–25)
CALCIUM SPEC-SCNC: 8.3 MG/DL (ref 8.6–10.5)
CHLORIDE SERPL-SCNC: 94 MMOL/L (ref 98–107)
CO2 SERPL-SCNC: 20 MMOL/L (ref 22–29)
CREAT BLD-MCNC: 4.98 MG/DL (ref 0.76–1.27)
DEPRECATED RDW RBC AUTO: 49.9 FL (ref 37–54)
ERYTHROCYTE [DISTWIDTH] IN BLOOD BY AUTOMATED COUNT: 13.9 % (ref 12.3–15.4)
GFR SERPL CREATININE-BSD FRML MDRD: 12 ML/MIN/1.73
GFR SERPL CREATININE-BSD FRML MDRD: ABNORMAL ML/MIN/{1.73_M2}
GLOBULIN UR ELPH-MCNC: 2.8 GM/DL
GLUCOSE BLD-MCNC: 112 MG/DL (ref 65–99)
GLUCOSE BLDC GLUCOMTR-MCNC: 108 MG/DL (ref 70–130)
GLUCOSE BLDC GLUCOMTR-MCNC: 110 MG/DL (ref 70–130)
GLUCOSE BLDC GLUCOMTR-MCNC: 123 MG/DL (ref 70–130)
GLUCOSE BLDC GLUCOMTR-MCNC: 124 MG/DL (ref 70–130)
HCT VFR BLD AUTO: 31.5 % (ref 37.5–51)
HGB BLD-MCNC: 10.2 G/DL (ref 13–17.7)
MCH RBC QN AUTO: 31.4 PG (ref 26.6–33)
MCHC RBC AUTO-ENTMCNC: 32.4 G/DL (ref 31.5–35.7)
MCV RBC AUTO: 96.9 FL (ref 79–97)
PLATELET # BLD AUTO: 313 10*3/MM3 (ref 140–450)
PMV BLD AUTO: 10.7 FL (ref 6–12)
POTASSIUM BLD-SCNC: 4.2 MMOL/L (ref 3.5–5.2)
PROT SERPL-MCNC: 6.7 G/DL (ref 6–8.5)
RBC # BLD AUTO: 3.25 10*6/MM3 (ref 4.14–5.8)
SODIUM BLD-SCNC: 134 MMOL/L (ref 136–145)
WBC NRBC COR # BLD: 14.3 10*3/MM3 (ref 3.4–10.8)

## 2020-06-23 PROCEDURE — 99024 POSTOP FOLLOW-UP VISIT: CPT | Performed by: NURSE PRACTITIONER

## 2020-06-23 PROCEDURE — 94760 N-INVAS EAR/PLS OXIMETRY 1: CPT

## 2020-06-23 PROCEDURE — 85027 COMPLETE CBC AUTOMATED: CPT | Performed by: NURSE PRACTITIONER

## 2020-06-23 PROCEDURE — 94799 UNLISTED PULMONARY SVC/PX: CPT

## 2020-06-23 PROCEDURE — 82962 GLUCOSE BLOOD TEST: CPT

## 2020-06-23 PROCEDURE — 63710000001 INSULIN DETEMIR PER 5 UNITS: Performed by: NURSE PRACTITIONER

## 2020-06-23 PROCEDURE — 80053 COMPREHEN METABOLIC PANEL: CPT | Performed by: NURSE PRACTITIONER

## 2020-06-23 PROCEDURE — 94660 CPAP INITIATION&MGMT: CPT

## 2020-06-23 RX ADMIN — SODIUM CHLORIDE, PRESERVATIVE FREE 10 ML: 5 INJECTION INTRAVENOUS at 08:49

## 2020-06-23 RX ADMIN — FAMOTIDINE 20 MG: 20 TABLET, FILM COATED ORAL at 21:23

## 2020-06-23 RX ADMIN — ACETAMINOPHEN 1000 MG: 325 TABLET, FILM COATED ORAL at 03:57

## 2020-06-23 RX ADMIN — SODIUM CHLORIDE, PRESERVATIVE FREE 10 ML: 5 INJECTION INTRAVENOUS at 21:43

## 2020-06-23 RX ADMIN — ACETAMINOPHEN 1000 MG: 325 TABLET, FILM COATED ORAL at 15:26

## 2020-06-23 RX ADMIN — SODIUM BICARBONATE 50 MEQ: 84 INJECTION, SOLUTION INTRAVENOUS at 18:50

## 2020-06-23 RX ADMIN — ACETAMINOPHEN 1000 MG: 325 TABLET, FILM COATED ORAL at 09:34

## 2020-06-23 RX ADMIN — Medication 500 MG: at 21:24

## 2020-06-23 RX ADMIN — ATORVASTATIN CALCIUM 40 MG: 40 TABLET, FILM COATED ORAL at 21:24

## 2020-06-23 RX ADMIN — POLYETHYLENE GLYCOL 3350 17 G: 17 POWDER, FOR SOLUTION ORAL at 10:17

## 2020-06-23 RX ADMIN — MUPIROCIN 1 APPLICATION: 20 OINTMENT TOPICAL at 09:34

## 2020-06-23 RX ADMIN — GUAIFENESIN 1200 MG: 600 TABLET, EXTENDED RELEASE ORAL at 21:23

## 2020-06-23 RX ADMIN — IPRATROPIUM BROMIDE AND ALBUTEROL SULFATE 3 ML: 2.5; .5 SOLUTION RESPIRATORY (INHALATION) at 06:54

## 2020-06-23 RX ADMIN — AMIODARONE HYDROCHLORIDE 400 MG: 200 TABLET ORAL at 08:45

## 2020-06-23 RX ADMIN — SODIUM CHLORIDE, PRESERVATIVE FREE 10 ML: 5 INJECTION INTRAVENOUS at 08:46

## 2020-06-23 RX ADMIN — SENNOSIDES AND DOCUSATE SODIUM 2 TABLET: 8.6; 5 TABLET ORAL at 08:46

## 2020-06-23 RX ADMIN — BUDESONIDE AND FORMOTEROL FUMARATE DIHYDRATE 2 PUFF: 80; 4.5 AEROSOL RESPIRATORY (INHALATION) at 20:37

## 2020-06-23 RX ADMIN — ACETAMINOPHEN 1000 MG: 325 TABLET, FILM COATED ORAL at 21:24

## 2020-06-23 RX ADMIN — SODIUM BICARBONATE 50 MEQ: 84 INJECTION, SOLUTION INTRAVENOUS at 11:52

## 2020-06-23 RX ADMIN — IPRATROPIUM BROMIDE AND ALBUTEROL SULFATE 3 ML: 2.5; .5 SOLUTION RESPIRATORY (INHALATION) at 11:20

## 2020-06-23 RX ADMIN — IPRATROPIUM BROMIDE AND ALBUTEROL SULFATE 3 ML: 2.5; .5 SOLUTION RESPIRATORY (INHALATION) at 14:51

## 2020-06-23 RX ADMIN — FAMOTIDINE 20 MG: 20 TABLET, FILM COATED ORAL at 08:43

## 2020-06-23 RX ADMIN — ASPIRIN 81 MG 81 MG: 81 TABLET ORAL at 08:42

## 2020-06-23 RX ADMIN — Medication 400 MG: at 08:44

## 2020-06-23 RX ADMIN — Medication 500 MG: at 08:42

## 2020-06-23 RX ADMIN — CLOPIDOGREL BISULFATE 75 MG: 75 TABLET ORAL at 08:44

## 2020-06-23 RX ADMIN — GUAIFENESIN 1200 MG: 600 TABLET, EXTENDED RELEASE ORAL at 08:46

## 2020-06-23 RX ADMIN — METOPROLOL TARTRATE 12.5 MG: 25 TABLET, FILM COATED ORAL at 08:43

## 2020-06-23 RX ADMIN — IPRATROPIUM BROMIDE AND ALBUTEROL SULFATE 3 ML: 2.5; .5 SOLUTION RESPIRATORY (INHALATION) at 20:36

## 2020-06-23 RX ADMIN — METOPROLOL TARTRATE 12.5 MG: 25 TABLET, FILM COATED ORAL at 21:24

## 2020-06-23 RX ADMIN — AMIODARONE HYDROCHLORIDE 400 MG: 200 TABLET ORAL at 21:23

## 2020-06-23 RX ADMIN — SODIUM BICARBONATE 50 MEQ: 84 INJECTION, SOLUTION INTRAVENOUS at 15:26

## 2020-06-23 RX ADMIN — BUDESONIDE AND FORMOTEROL FUMARATE DIHYDRATE 2 PUFF: 80; 4.5 AEROSOL RESPIRATORY (INHALATION) at 06:54

## 2020-06-23 RX ADMIN — INSULIN DETEMIR 30 UNITS: 100 INJECTION, SOLUTION SUBCUTANEOUS at 21:33

## 2020-06-24 ENCOUNTER — APPOINTMENT (OUTPATIENT)
Dept: GENERAL RADIOLOGY | Facility: HOSPITAL | Age: 70
End: 2020-06-24

## 2020-06-24 LAB
ALBUMIN SERPL-MCNC: 3.4 G/DL (ref 3.5–5.2)
ALBUMIN/GLOB SERPL: 1.2 G/DL
ALP SERPL-CCNC: 59 U/L (ref 39–117)
ALT SERPL W P-5'-P-CCNC: 9 U/L (ref 1–41)
ANION GAP SERPL CALCULATED.3IONS-SCNC: 17 MMOL/L (ref 5–15)
AST SERPL-CCNC: 61 U/L (ref 1–40)
BILIRUB SERPL-MCNC: 0.4 MG/DL (ref 0.2–1.2)
BUN BLD-MCNC: 87 MG/DL (ref 8–23)
BUN/CREAT SERPL: 13.8 (ref 7–25)
CALCIUM SPEC-SCNC: 8.3 MG/DL (ref 8.6–10.5)
CHLORIDE SERPL-SCNC: 92 MMOL/L (ref 98–107)
CO2 SERPL-SCNC: 25 MMOL/L (ref 22–29)
CREAT BLD-MCNC: 6.32 MG/DL (ref 0.76–1.27)
DEPRECATED RDW RBC AUTO: 48.1 FL (ref 37–54)
ERYTHROCYTE [DISTWIDTH] IN BLOOD BY AUTOMATED COUNT: 13.8 % (ref 12.3–15.4)
GFR SERPL CREATININE-BSD FRML MDRD: 9 ML/MIN/1.73
GFR SERPL CREATININE-BSD FRML MDRD: ABNORMAL ML/MIN/{1.73_M2}
GLOBULIN UR ELPH-MCNC: 2.9 GM/DL
GLUCOSE BLD-MCNC: 89 MG/DL (ref 65–99)
GLUCOSE BLDC GLUCOMTR-MCNC: 85 MG/DL (ref 70–130)
GLUCOSE BLDC GLUCOMTR-MCNC: 91 MG/DL (ref 70–130)
GLUCOSE BLDC GLUCOMTR-MCNC: 98 MG/DL (ref 70–130)
HCT VFR BLD AUTO: 31.8 % (ref 37.5–51)
HGB BLD-MCNC: 10.6 G/DL (ref 13–17.7)
MCH RBC QN AUTO: 31.6 PG (ref 26.6–33)
MCHC RBC AUTO-ENTMCNC: 33.3 G/DL (ref 31.5–35.7)
MCV RBC AUTO: 94.9 FL (ref 79–97)
PLATELET # BLD AUTO: 363 10*3/MM3 (ref 140–450)
PMV BLD AUTO: 10.3 FL (ref 6–12)
POTASSIUM BLD-SCNC: 4.1 MMOL/L (ref 3.5–5.2)
PROT SERPL-MCNC: 6.3 G/DL (ref 6–8.5)
RBC # BLD AUTO: 3.35 10*6/MM3 (ref 4.14–5.8)
SODIUM BLD-SCNC: 134 MMOL/L (ref 136–145)
WBC NRBC COR # BLD: 14.65 10*3/MM3 (ref 3.4–10.8)

## 2020-06-24 PROCEDURE — 80053 COMPREHEN METABOLIC PANEL: CPT | Performed by: NURSE PRACTITIONER

## 2020-06-24 PROCEDURE — 82962 GLUCOSE BLOOD TEST: CPT

## 2020-06-24 PROCEDURE — 94760 N-INVAS EAR/PLS OXIMETRY 1: CPT

## 2020-06-24 PROCEDURE — 94799 UNLISTED PULMONARY SVC/PX: CPT

## 2020-06-24 PROCEDURE — 71045 X-RAY EXAM CHEST 1 VIEW: CPT

## 2020-06-24 PROCEDURE — 85027 COMPLETE CBC AUTOMATED: CPT | Performed by: NURSE PRACTITIONER

## 2020-06-24 PROCEDURE — 25010000002 HEPARIN (PORCINE) PER 1000 UNITS: Performed by: INTERNAL MEDICINE

## 2020-06-24 PROCEDURE — 99024 POSTOP FOLLOW-UP VISIT: CPT | Performed by: NURSE PRACTITIONER

## 2020-06-24 PROCEDURE — 97164 PT RE-EVAL EST PLAN CARE: CPT

## 2020-06-24 PROCEDURE — 97168 OT RE-EVAL EST PLAN CARE: CPT

## 2020-06-24 PROCEDURE — 63710000001 INSULIN DETEMIR PER 5 UNITS: Performed by: NURSE PRACTITIONER

## 2020-06-24 RX ORDER — HEPARIN SODIUM 1000 [USP'U]/ML
2000 INJECTION, SOLUTION INTRAVENOUS; SUBCUTANEOUS AS NEEDED
Status: DISCONTINUED | OUTPATIENT
Start: 2020-06-24 | End: 2020-07-01

## 2020-06-24 RX ORDER — ALBUMIN (HUMAN) 12.5 G/50ML
12.5 SOLUTION INTRAVENOUS AS NEEDED
Status: DISCONTINUED | OUTPATIENT
Start: 2020-06-24 | End: 2020-06-24

## 2020-06-24 RX ADMIN — HEPARIN SODIUM 4000 UNITS: 1000 INJECTION INTRAVENOUS; SUBCUTANEOUS at 10:54

## 2020-06-24 RX ADMIN — AMIODARONE HYDROCHLORIDE 400 MG: 200 TABLET ORAL at 20:01

## 2020-06-24 RX ADMIN — IPRATROPIUM BROMIDE AND ALBUTEROL SULFATE 3 ML: 2.5; .5 SOLUTION RESPIRATORY (INHALATION) at 21:02

## 2020-06-24 RX ADMIN — METOPROLOL TARTRATE 12.5 MG: 25 TABLET, FILM COATED ORAL at 20:03

## 2020-06-24 RX ADMIN — SODIUM CHLORIDE, PRESERVATIVE FREE 10 ML: 5 INJECTION INTRAVENOUS at 10:27

## 2020-06-24 RX ADMIN — SODIUM CHLORIDE, PRESERVATIVE FREE 10 ML: 5 INJECTION INTRAVENOUS at 20:05

## 2020-06-24 RX ADMIN — FAMOTIDINE 20 MG: 20 TABLET, FILM COATED ORAL at 09:03

## 2020-06-24 RX ADMIN — MUPIROCIN 1 APPLICATION: 20 OINTMENT TOPICAL at 09:02

## 2020-06-24 RX ADMIN — GUAIFENESIN 1200 MG: 600 TABLET, EXTENDED RELEASE ORAL at 09:02

## 2020-06-24 RX ADMIN — AMIODARONE HYDROCHLORIDE 400 MG: 200 TABLET ORAL at 09:02

## 2020-06-24 RX ADMIN — BUDESONIDE AND FORMOTEROL FUMARATE DIHYDRATE 2 PUFF: 80; 4.5 AEROSOL RESPIRATORY (INHALATION) at 22:31

## 2020-06-24 RX ADMIN — IPRATROPIUM BROMIDE AND ALBUTEROL SULFATE 3 ML: 2.5; .5 SOLUTION RESPIRATORY (INHALATION) at 07:09

## 2020-06-24 RX ADMIN — INSULIN DETEMIR 30 UNITS: 100 INJECTION, SOLUTION SUBCUTANEOUS at 20:01

## 2020-06-24 RX ADMIN — ACETAMINOPHEN 1000 MG: 325 TABLET, FILM COATED ORAL at 04:48

## 2020-06-24 RX ADMIN — SODIUM CHLORIDE, PRESERVATIVE FREE 10 ML: 5 INJECTION INTRAVENOUS at 10:26

## 2020-06-24 RX ADMIN — ASPIRIN 81 MG 81 MG: 81 TABLET ORAL at 09:03

## 2020-06-24 RX ADMIN — IPRATROPIUM BROMIDE AND ALBUTEROL SULFATE 3 ML: 2.5; .5 SOLUTION RESPIRATORY (INHALATION) at 11:13

## 2020-06-24 RX ADMIN — IPRATROPIUM BROMIDE AND ALBUTEROL SULFATE 3 ML: 2.5; .5 SOLUTION RESPIRATORY (INHALATION) at 15:02

## 2020-06-24 RX ADMIN — ACETAMINOPHEN 1000 MG: 325 TABLET, FILM COATED ORAL at 11:20

## 2020-06-24 RX ADMIN — GUAIFENESIN 1200 MG: 600 TABLET, EXTENDED RELEASE ORAL at 20:01

## 2020-06-24 RX ADMIN — BUDESONIDE AND FORMOTEROL FUMARATE DIHYDRATE 2 PUFF: 80; 4.5 AEROSOL RESPIRATORY (INHALATION) at 07:13

## 2020-06-24 RX ADMIN — ACETAMINOPHEN 1000 MG: 325 TABLET, FILM COATED ORAL at 22:47

## 2020-06-24 RX ADMIN — CLOPIDOGREL BISULFATE 75 MG: 75 TABLET ORAL at 09:03

## 2020-06-24 RX ADMIN — ATORVASTATIN CALCIUM 40 MG: 40 TABLET, FILM COATED ORAL at 20:01

## 2020-06-24 RX ADMIN — Medication 500 MG: at 09:02

## 2020-06-24 RX ADMIN — METOPROLOL TARTRATE 12.5 MG: 25 TABLET, FILM COATED ORAL at 09:02

## 2020-06-24 RX ADMIN — FAMOTIDINE 20 MG: 20 TABLET, FILM COATED ORAL at 20:03

## 2020-06-24 RX ADMIN — ACETAMINOPHEN 1000 MG: 325 TABLET, FILM COATED ORAL at 17:31

## 2020-06-24 RX ADMIN — Medication 500 MG: at 20:01

## 2020-06-25 LAB
ALBUMIN SERPL-MCNC: 3.6 G/DL (ref 3.5–5.2)
ALBUMIN/GLOB SERPL: 1.3 G/DL
ALP SERPL-CCNC: 75 U/L (ref 39–117)
ALT SERPL W P-5'-P-CCNC: 9 U/L (ref 1–41)
ANION GAP SERPL CALCULATED.3IONS-SCNC: 16 MMOL/L (ref 5–15)
AST SERPL-CCNC: 61 U/L (ref 1–40)
BILIRUB SERPL-MCNC: 0.4 MG/DL (ref 0.2–1.2)
BUN BLD-MCNC: 71 MG/DL (ref 8–23)
BUN/CREAT SERPL: 12 (ref 7–25)
CALCIUM SPEC-SCNC: 8.3 MG/DL (ref 8.6–10.5)
CHLORIDE SERPL-SCNC: 89 MMOL/L (ref 98–107)
CO2 SERPL-SCNC: 28 MMOL/L (ref 22–29)
CREAT BLD-MCNC: 5.92 MG/DL (ref 0.76–1.27)
DEPRECATED RDW RBC AUTO: 46.4 FL (ref 37–54)
ERYTHROCYTE [DISTWIDTH] IN BLOOD BY AUTOMATED COUNT: 13.6 % (ref 12.3–15.4)
GFR SERPL CREATININE-BSD FRML MDRD: 10 ML/MIN/1.73
GFR SERPL CREATININE-BSD FRML MDRD: ABNORMAL ML/MIN/{1.73_M2}
GLOBULIN UR ELPH-MCNC: 2.8 GM/DL
GLUCOSE BLD-MCNC: 81 MG/DL (ref 65–99)
GLUCOSE BLDC GLUCOMTR-MCNC: 104 MG/DL (ref 70–130)
GLUCOSE BLDC GLUCOMTR-MCNC: 135 MG/DL (ref 70–130)
GLUCOSE BLDC GLUCOMTR-MCNC: 96 MG/DL (ref 70–130)
HCT VFR BLD AUTO: 32.1 % (ref 37.5–51)
HGB BLD-MCNC: 10.7 G/DL (ref 13–17.7)
MCH RBC QN AUTO: 31.1 PG (ref 26.6–33)
MCHC RBC AUTO-ENTMCNC: 33.3 G/DL (ref 31.5–35.7)
MCV RBC AUTO: 93.3 FL (ref 79–97)
PLATELET # BLD AUTO: 350 10*3/MM3 (ref 140–450)
PMV BLD AUTO: 10.1 FL (ref 6–12)
POTASSIUM BLD-SCNC: 3.9 MMOL/L (ref 3.5–5.2)
PROT SERPL-MCNC: 6.4 G/DL (ref 6–8.5)
RBC # BLD AUTO: 3.44 10*6/MM3 (ref 4.14–5.8)
SODIUM BLD-SCNC: 133 MMOL/L (ref 136–145)
WBC NRBC COR # BLD: 14.45 10*3/MM3 (ref 3.4–10.8)

## 2020-06-25 PROCEDURE — 63710000001 INSULIN DETEMIR PER 5 UNITS: Performed by: NURSE PRACTITIONER

## 2020-06-25 PROCEDURE — 80053 COMPREHEN METABOLIC PANEL: CPT | Performed by: NURSE PRACTITIONER

## 2020-06-25 PROCEDURE — 94799 UNLISTED PULMONARY SVC/PX: CPT

## 2020-06-25 PROCEDURE — 82962 GLUCOSE BLOOD TEST: CPT

## 2020-06-25 PROCEDURE — 97530 THERAPEUTIC ACTIVITIES: CPT

## 2020-06-25 PROCEDURE — 85027 COMPLETE CBC AUTOMATED: CPT | Performed by: NURSE PRACTITIONER

## 2020-06-25 PROCEDURE — 97535 SELF CARE MNGMENT TRAINING: CPT

## 2020-06-25 PROCEDURE — 94760 N-INVAS EAR/PLS OXIMETRY 1: CPT

## 2020-06-25 PROCEDURE — 63710000001 INSULIN ASPART PER 5 UNITS: Performed by: NURSE PRACTITIONER

## 2020-06-25 RX ADMIN — GUAIFENESIN 1200 MG: 600 TABLET, EXTENDED RELEASE ORAL at 21:32

## 2020-06-25 RX ADMIN — IPRATROPIUM BROMIDE AND ALBUTEROL SULFATE 3 ML: 2.5; .5 SOLUTION RESPIRATORY (INHALATION) at 15:18

## 2020-06-25 RX ADMIN — INSULIN DETEMIR 30 UNITS: 100 INJECTION, SOLUTION SUBCUTANEOUS at 21:33

## 2020-06-25 RX ADMIN — CLOPIDOGREL BISULFATE 75 MG: 75 TABLET ORAL at 09:03

## 2020-06-25 RX ADMIN — FAMOTIDINE 20 MG: 20 TABLET, FILM COATED ORAL at 09:03

## 2020-06-25 RX ADMIN — ACETAMINOPHEN 1000 MG: 325 TABLET, FILM COATED ORAL at 21:32

## 2020-06-25 RX ADMIN — FAMOTIDINE 20 MG: 20 TABLET, FILM COATED ORAL at 21:32

## 2020-06-25 RX ADMIN — SODIUM CHLORIDE, PRESERVATIVE FREE 10 ML: 5 INJECTION INTRAVENOUS at 09:04

## 2020-06-25 RX ADMIN — IPRATROPIUM BROMIDE AND ALBUTEROL SULFATE 3 ML: 2.5; .5 SOLUTION RESPIRATORY (INHALATION) at 19:38

## 2020-06-25 RX ADMIN — Medication 500 MG: at 09:01

## 2020-06-25 RX ADMIN — METOPROLOL TARTRATE 12.5 MG: 25 TABLET, FILM COATED ORAL at 21:47

## 2020-06-25 RX ADMIN — ACETAMINOPHEN 1000 MG: 325 TABLET, FILM COATED ORAL at 18:23

## 2020-06-25 RX ADMIN — AMIODARONE HYDROCHLORIDE 400 MG: 200 TABLET ORAL at 21:32

## 2020-06-25 RX ADMIN — BUDESONIDE AND FORMOTEROL FUMARATE DIHYDRATE 2 PUFF: 80; 4.5 AEROSOL RESPIRATORY (INHALATION) at 19:38

## 2020-06-25 RX ADMIN — GUAIFENESIN 1200 MG: 600 TABLET, EXTENDED RELEASE ORAL at 09:00

## 2020-06-25 RX ADMIN — ACETAMINOPHEN 1000 MG: 325 TABLET, FILM COATED ORAL at 09:01

## 2020-06-25 RX ADMIN — SENNOSIDES AND DOCUSATE SODIUM 2 TABLET: 8.6; 5 TABLET ORAL at 21:32

## 2020-06-25 RX ADMIN — AMIODARONE HYDROCHLORIDE 400 MG: 200 TABLET ORAL at 09:01

## 2020-06-25 RX ADMIN — SODIUM CHLORIDE, PRESERVATIVE FREE 10 ML: 5 INJECTION INTRAVENOUS at 10:02

## 2020-06-25 RX ADMIN — METOPROLOL TARTRATE 12.5 MG: 25 TABLET, FILM COATED ORAL at 09:00

## 2020-06-25 RX ADMIN — Medication 500 MG: at 21:32

## 2020-06-25 RX ADMIN — ATORVASTATIN CALCIUM 40 MG: 40 TABLET, FILM COATED ORAL at 21:33

## 2020-06-25 RX ADMIN — INSULIN ASPART 3 UNITS: 100 INJECTION, SOLUTION INTRAVENOUS; SUBCUTANEOUS at 12:18

## 2020-06-25 RX ADMIN — BUDESONIDE AND FORMOTEROL FUMARATE DIHYDRATE 2 PUFF: 80; 4.5 AEROSOL RESPIRATORY (INHALATION) at 10:34

## 2020-06-25 RX ADMIN — IPRATROPIUM BROMIDE AND ALBUTEROL SULFATE 3 ML: 2.5; .5 SOLUTION RESPIRATORY (INHALATION) at 10:33

## 2020-06-25 RX ADMIN — ASPIRIN 81 MG 81 MG: 81 TABLET ORAL at 09:02

## 2020-06-26 LAB
ANION GAP SERPL CALCULATED.3IONS-SCNC: 18 MMOL/L (ref 5–15)
ARTERIAL PATENCY WRIST A: ABNORMAL
ATMOSPHERIC PRESS: 750 MMHG
BASE EXCESS BLDA CALC-SCNC: -3.2 MMOL/L (ref 0–2)
BDY SITE: ABNORMAL
BUN BLD-MCNC: 89 MG/DL (ref 8–23)
BUN/CREAT SERPL: 12.8 (ref 7–25)
CALCIUM SPEC-SCNC: 8.2 MG/DL (ref 8.6–10.5)
CHLORIDE SERPL-SCNC: 87 MMOL/L (ref 98–107)
CO2 SERPL-SCNC: 23 MMOL/L (ref 22–29)
CREAT BLD-MCNC: 6.93 MG/DL (ref 0.76–1.27)
DEPRECATED RDW RBC AUTO: 45.8 FL (ref 37–54)
ERYTHROCYTE [DISTWIDTH] IN BLOOD BY AUTOMATED COUNT: 13.4 % (ref 12.3–15.4)
GAS FLOW AIRWAY: 15 LPM
GFR SERPL CREATININE-BSD FRML MDRD: 8 ML/MIN/1.73
GFR SERPL CREATININE-BSD FRML MDRD: ABNORMAL ML/MIN/{1.73_M2}
GLUCOSE BLD-MCNC: 161 MG/DL (ref 65–99)
GLUCOSE BLDC GLUCOMTR-MCNC: 155 MG/DL (ref 70–130)
GLUCOSE BLDC GLUCOMTR-MCNC: 156 MG/DL (ref 70–130)
GLUCOSE BLDC GLUCOMTR-MCNC: 185 MG/DL (ref 70–130)
GLUCOSE BLDC GLUCOMTR-MCNC: 99 MG/DL (ref 70–130)
HCO3 BLDA-SCNC: 24.1 MMOL/L (ref 20–26)
HCT VFR BLD AUTO: 31.1 % (ref 37.5–51)
HGB BLD-MCNC: 10.4 G/DL (ref 13–17.7)
Lab: ABNORMAL
MCH RBC QN AUTO: 31.2 PG (ref 26.6–33)
MCHC RBC AUTO-ENTMCNC: 33.4 G/DL (ref 31.5–35.7)
MCV RBC AUTO: 93.4 FL (ref 79–97)
MODALITY: ABNORMAL
NT-PROBNP SERPL-MCNC: ABNORMAL PG/ML (ref 5–900)
PCO2 BLDA: 53.2 MM HG (ref 35–45)
PH BLDA: 7.26 PH UNITS (ref 7.35–7.45)
PLATELET # BLD AUTO: 357 10*3/MM3 (ref 140–450)
PMV BLD AUTO: 9.7 FL (ref 6–12)
PO2 BLDA: 226 MM HG (ref 83–108)
POTASSIUM BLD-SCNC: 3.8 MMOL/L (ref 3.5–5.2)
RBC # BLD AUTO: 3.33 10*6/MM3 (ref 4.14–5.8)
SAO2 % BLDCOA: 99.7 % (ref 94–99)
SODIUM BLD-SCNC: 128 MMOL/L (ref 136–145)
VENTILATOR MODE: ABNORMAL
WBC NRBC COR # BLD: 15.66 10*3/MM3 (ref 3.4–10.8)

## 2020-06-26 PROCEDURE — 85027 COMPLETE CBC AUTOMATED: CPT | Performed by: INTERNAL MEDICINE

## 2020-06-26 PROCEDURE — 63710000001 INSULIN DETEMIR PER 5 UNITS: Performed by: NURSE PRACTITIONER

## 2020-06-26 PROCEDURE — 99024 POSTOP FOLLOW-UP VISIT: CPT | Performed by: NURSE PRACTITIONER

## 2020-06-26 PROCEDURE — 25010000002 ONDANSETRON PER 1 MG: Performed by: NURSE PRACTITIONER

## 2020-06-26 PROCEDURE — 82962 GLUCOSE BLOOD TEST: CPT

## 2020-06-26 PROCEDURE — 82803 BLOOD GASES ANY COMBINATION: CPT

## 2020-06-26 PROCEDURE — 97530 THERAPEUTIC ACTIVITIES: CPT

## 2020-06-26 PROCEDURE — 97535 SELF CARE MNGMENT TRAINING: CPT

## 2020-06-26 PROCEDURE — 80048 BASIC METABOLIC PNL TOTAL CA: CPT | Performed by: NURSE PRACTITIONER

## 2020-06-26 PROCEDURE — 94799 UNLISTED PULMONARY SVC/PX: CPT

## 2020-06-26 PROCEDURE — 25010000002 HEPARIN (PORCINE) PER 1000 UNITS: Performed by: NURSE PRACTITIONER

## 2020-06-26 PROCEDURE — 83880 ASSAY OF NATRIURETIC PEPTIDE: CPT | Performed by: INTERNAL MEDICINE

## 2020-06-26 PROCEDURE — 63710000001 INSULIN ASPART PER 5 UNITS: Performed by: NURSE PRACTITIONER

## 2020-06-26 RX ORDER — POLYETHYLENE GLYCOL 3350 17 G/17G
17 POWDER, FOR SOLUTION ORAL DAILY
Status: DISCONTINUED | OUTPATIENT
Start: 2020-06-26 | End: 2020-07-01 | Stop reason: HOSPADM

## 2020-06-26 RX ORDER — ALBUMIN (HUMAN) 12.5 G/50ML
12.5 SOLUTION INTRAVENOUS AS NEEDED
Status: ACTIVE | OUTPATIENT
Start: 2020-06-26 | End: 2020-06-26

## 2020-06-26 RX ORDER — HEPARIN SODIUM 1000 [USP'U]/ML
2000 INJECTION, SOLUTION INTRAVENOUS; SUBCUTANEOUS AS NEEDED
Status: DISCONTINUED | OUTPATIENT
Start: 2020-06-26 | End: 2020-07-01

## 2020-06-26 RX ORDER — AMIODARONE HYDROCHLORIDE 200 MG/1
400 TABLET ORAL
Status: DISCONTINUED | OUTPATIENT
Start: 2020-06-27 | End: 2020-07-01 | Stop reason: HOSPADM

## 2020-06-26 RX ADMIN — SODIUM CHLORIDE, PRESERVATIVE FREE 10 ML: 5 INJECTION INTRAVENOUS at 20:59

## 2020-06-26 RX ADMIN — AMIODARONE HYDROCHLORIDE 400 MG: 200 TABLET ORAL at 17:31

## 2020-06-26 RX ADMIN — ONDANSETRON 4 MG: 2 SOLUTION INTRAMUSCULAR; INTRAVENOUS at 17:34

## 2020-06-26 RX ADMIN — GUAIFENESIN 1200 MG: 600 TABLET, EXTENDED RELEASE ORAL at 20:56

## 2020-06-26 RX ADMIN — METOPROLOL TARTRATE 12.5 MG: 25 TABLET, FILM COATED ORAL at 14:09

## 2020-06-26 RX ADMIN — FAMOTIDINE 20 MG: 20 TABLET, FILM COATED ORAL at 14:09

## 2020-06-26 RX ADMIN — SODIUM CHLORIDE, PRESERVATIVE FREE 10 ML: 5 INJECTION INTRAVENOUS at 15:22

## 2020-06-26 RX ADMIN — ASPIRIN 81 MG 81 MG: 81 TABLET ORAL at 14:10

## 2020-06-26 RX ADMIN — Medication 500 MG: at 14:10

## 2020-06-26 RX ADMIN — CLOPIDOGREL BISULFATE 75 MG: 75 TABLET ORAL at 14:10

## 2020-06-26 RX ADMIN — INSULIN ASPART 3 UNITS: 100 INJECTION, SOLUTION INTRAVENOUS; SUBCUTANEOUS at 17:32

## 2020-06-26 RX ADMIN — SENNOSIDES AND DOCUSATE SODIUM 2 TABLET: 8.6; 5 TABLET ORAL at 20:55

## 2020-06-26 RX ADMIN — IPRATROPIUM BROMIDE AND ALBUTEROL SULFATE 3 ML: 2.5; .5 SOLUTION RESPIRATORY (INHALATION) at 19:10

## 2020-06-26 RX ADMIN — HEPARIN SODIUM 2000 UNITS: 1000 INJECTION INTRAVENOUS; SUBCUTANEOUS at 12:15

## 2020-06-26 RX ADMIN — GUAIFENESIN 1200 MG: 600 TABLET, EXTENDED RELEASE ORAL at 14:09

## 2020-06-26 RX ADMIN — IPRATROPIUM BROMIDE AND ALBUTEROL SULFATE 3 ML: 2.5; .5 SOLUTION RESPIRATORY (INHALATION) at 15:25

## 2020-06-26 RX ADMIN — INSULIN DETEMIR 30 UNITS: 100 INJECTION, SOLUTION SUBCUTANEOUS at 21:51

## 2020-06-26 RX ADMIN — FAMOTIDINE 20 MG: 20 TABLET, FILM COATED ORAL at 20:56

## 2020-06-26 RX ADMIN — Medication 500 MG: at 20:54

## 2020-06-26 RX ADMIN — ATORVASTATIN CALCIUM 40 MG: 40 TABLET, FILM COATED ORAL at 20:56

## 2020-06-26 RX ADMIN — ACETAMINOPHEN 1000 MG: 325 TABLET, FILM COATED ORAL at 14:10

## 2020-06-26 RX ADMIN — SENNOSIDES AND DOCUSATE SODIUM 2 TABLET: 8.6; 5 TABLET ORAL at 14:09

## 2020-06-26 RX ADMIN — ACETAMINOPHEN 1000 MG: 325 TABLET, FILM COATED ORAL at 21:51

## 2020-06-26 RX ADMIN — BUDESONIDE AND FORMOTEROL FUMARATE DIHYDRATE 2 PUFF: 80; 4.5 AEROSOL RESPIRATORY (INHALATION) at 19:13

## 2020-06-27 ENCOUNTER — APPOINTMENT (OUTPATIENT)
Dept: GENERAL RADIOLOGY | Facility: HOSPITAL | Age: 70
End: 2020-06-27

## 2020-06-27 LAB
ANION GAP SERPL CALCULATED.3IONS-SCNC: 15 MMOL/L (ref 5–15)
BUN BLD-MCNC: 60 MG/DL (ref 8–23)
BUN/CREAT SERPL: 10.6 (ref 7–25)
CALCIUM SPEC-SCNC: 8 MG/DL (ref 8.6–10.5)
CHLORIDE SERPL-SCNC: 93 MMOL/L (ref 98–107)
CO2 SERPL-SCNC: 23 MMOL/L (ref 22–29)
CREAT BLD-MCNC: 5.65 MG/DL (ref 0.76–1.27)
GFR SERPL CREATININE-BSD FRML MDRD: 10 ML/MIN/1.73
GFR SERPL CREATININE-BSD FRML MDRD: ABNORMAL ML/MIN/{1.73_M2}
GLUCOSE BLD-MCNC: 135 MG/DL (ref 65–99)
GLUCOSE BLDC GLUCOMTR-MCNC: 115 MG/DL (ref 70–130)
GLUCOSE BLDC GLUCOMTR-MCNC: 133 MG/DL (ref 70–130)
GLUCOSE BLDC GLUCOMTR-MCNC: 162 MG/DL (ref 70–130)
GLUCOSE BLDC GLUCOMTR-MCNC: 190 MG/DL (ref 70–130)
GLUCOSE BLDC GLUCOMTR-MCNC: 95 MG/DL (ref 70–130)
POTASSIUM BLD-SCNC: 3.8 MMOL/L (ref 3.5–5.2)
SODIUM BLD-SCNC: 131 MMOL/L (ref 136–145)

## 2020-06-27 PROCEDURE — 94799 UNLISTED PULMONARY SVC/PX: CPT

## 2020-06-27 PROCEDURE — 63710000001 INSULIN DETEMIR PER 5 UNITS: Performed by: NURSE PRACTITIONER

## 2020-06-27 PROCEDURE — 97535 SELF CARE MNGMENT TRAINING: CPT

## 2020-06-27 PROCEDURE — 99024 POSTOP FOLLOW-UP VISIT: CPT | Performed by: NURSE PRACTITIONER

## 2020-06-27 PROCEDURE — 80048 BASIC METABOLIC PNL TOTAL CA: CPT | Performed by: NURSE PRACTITIONER

## 2020-06-27 PROCEDURE — 82962 GLUCOSE BLOOD TEST: CPT

## 2020-06-27 PROCEDURE — 71045 X-RAY EXAM CHEST 1 VIEW: CPT

## 2020-06-27 PROCEDURE — 97530 THERAPEUTIC ACTIVITIES: CPT

## 2020-06-27 PROCEDURE — 97116 GAIT TRAINING THERAPY: CPT

## 2020-06-27 RX ADMIN — IPRATROPIUM BROMIDE AND ALBUTEROL SULFATE 3 ML: 2.5; .5 SOLUTION RESPIRATORY (INHALATION) at 07:06

## 2020-06-27 RX ADMIN — IPRATROPIUM BROMIDE AND ALBUTEROL SULFATE 3 ML: 2.5; .5 SOLUTION RESPIRATORY (INHALATION) at 11:10

## 2020-06-27 RX ADMIN — Medication 500 MG: at 09:45

## 2020-06-27 RX ADMIN — INSULIN DETEMIR 30 UNITS: 100 INJECTION, SOLUTION SUBCUTANEOUS at 20:29

## 2020-06-27 RX ADMIN — ACETAMINOPHEN 1000 MG: 325 TABLET, FILM COATED ORAL at 09:45

## 2020-06-27 RX ADMIN — ATORVASTATIN CALCIUM 40 MG: 40 TABLET, FILM COATED ORAL at 20:29

## 2020-06-27 RX ADMIN — AMIODARONE HYDROCHLORIDE 400 MG: 200 TABLET ORAL at 09:44

## 2020-06-27 RX ADMIN — CLOPIDOGREL BISULFATE 75 MG: 75 TABLET ORAL at 09:45

## 2020-06-27 RX ADMIN — GUAIFENESIN 1200 MG: 600 TABLET, EXTENDED RELEASE ORAL at 20:29

## 2020-06-27 RX ADMIN — ACETAMINOPHEN 1000 MG: 325 TABLET, FILM COATED ORAL at 04:11

## 2020-06-27 RX ADMIN — FAMOTIDINE 20 MG: 20 TABLET, FILM COATED ORAL at 20:29

## 2020-06-27 RX ADMIN — FAMOTIDINE 20 MG: 20 TABLET, FILM COATED ORAL at 09:45

## 2020-06-27 RX ADMIN — IPRATROPIUM BROMIDE AND ALBUTEROL SULFATE 3 ML: 2.5; .5 SOLUTION RESPIRATORY (INHALATION) at 19:45

## 2020-06-27 RX ADMIN — BUDESONIDE AND FORMOTEROL FUMARATE DIHYDRATE 2 PUFF: 80; 4.5 AEROSOL RESPIRATORY (INHALATION) at 07:16

## 2020-06-27 RX ADMIN — IPRATROPIUM BROMIDE AND ALBUTEROL SULFATE 3 ML: 2.5; .5 SOLUTION RESPIRATORY (INHALATION) at 14:45

## 2020-06-27 RX ADMIN — SODIUM CHLORIDE, PRESERVATIVE FREE 10 ML: 5 INJECTION INTRAVENOUS at 20:31

## 2020-06-27 RX ADMIN — GUAIFENESIN 1200 MG: 600 TABLET, EXTENDED RELEASE ORAL at 09:44

## 2020-06-27 RX ADMIN — ASPIRIN 81 MG 81 MG: 81 TABLET ORAL at 09:45

## 2020-06-27 RX ADMIN — SODIUM CHLORIDE, PRESERVATIVE FREE 10 ML: 5 INJECTION INTRAVENOUS at 09:46

## 2020-06-27 RX ADMIN — ACETAMINOPHEN 1000 MG: 325 TABLET, FILM COATED ORAL at 20:29

## 2020-06-27 RX ADMIN — SODIUM CHLORIDE, PRESERVATIVE FREE 10 ML: 5 INJECTION INTRAVENOUS at 20:30

## 2020-06-27 RX ADMIN — BUDESONIDE AND FORMOTEROL FUMARATE DIHYDRATE 2 PUFF: 80; 4.5 AEROSOL RESPIRATORY (INHALATION) at 19:48

## 2020-06-27 RX ADMIN — Medication 500 MG: at 20:30

## 2020-06-28 LAB
ANION GAP SERPL CALCULATED.3IONS-SCNC: 15 MMOL/L (ref 5–15)
BUN BLD-MCNC: 79 MG/DL (ref 8–23)
BUN/CREAT SERPL: 11.6 (ref 7–25)
CALCIUM SPEC-SCNC: 8.1 MG/DL (ref 8.6–10.5)
CHLORIDE SERPL-SCNC: 91 MMOL/L (ref 98–107)
CO2 SERPL-SCNC: 24 MMOL/L (ref 22–29)
CREAT BLD-MCNC: 6.83 MG/DL (ref 0.76–1.27)
GFR SERPL CREATININE-BSD FRML MDRD: 8 ML/MIN/1.73
GFR SERPL CREATININE-BSD FRML MDRD: ABNORMAL ML/MIN/{1.73_M2}
GLUCOSE BLD-MCNC: 118 MG/DL (ref 65–99)
GLUCOSE BLDC GLUCOMTR-MCNC: 113 MG/DL (ref 70–130)
GLUCOSE BLDC GLUCOMTR-MCNC: 121 MG/DL (ref 70–130)
GLUCOSE BLDC GLUCOMTR-MCNC: 131 MG/DL (ref 70–130)
GLUCOSE BLDC GLUCOMTR-MCNC: 142 MG/DL (ref 70–130)
GLUCOSE BLDC GLUCOMTR-MCNC: 147 MG/DL (ref 70–130)
GLUCOSE BLDC GLUCOMTR-MCNC: 153 MG/DL (ref 70–130)
GLUCOSE BLDC GLUCOMTR-MCNC: 198 MG/DL (ref 70–130)
POTASSIUM BLD-SCNC: 4 MMOL/L (ref 3.5–5.2)
SODIUM BLD-SCNC: 130 MMOL/L (ref 136–145)
WHOLE BLOOD HOLD SPECIMEN: NORMAL

## 2020-06-28 PROCEDURE — 97535 SELF CARE MNGMENT TRAINING: CPT

## 2020-06-28 PROCEDURE — 63710000001 INSULIN DETEMIR PER 5 UNITS: Performed by: NURSE PRACTITIONER

## 2020-06-28 PROCEDURE — 99024 POSTOP FOLLOW-UP VISIT: CPT | Performed by: NURSE PRACTITIONER

## 2020-06-28 PROCEDURE — 97110 THERAPEUTIC EXERCISES: CPT

## 2020-06-28 PROCEDURE — 94799 UNLISTED PULMONARY SVC/PX: CPT

## 2020-06-28 PROCEDURE — 82962 GLUCOSE BLOOD TEST: CPT

## 2020-06-28 PROCEDURE — 80048 BASIC METABOLIC PNL TOTAL CA: CPT | Performed by: NURSE PRACTITIONER

## 2020-06-28 PROCEDURE — 63710000001 INSULIN ASPART PER 5 UNITS: Performed by: NURSE PRACTITIONER

## 2020-06-28 PROCEDURE — 97530 THERAPEUTIC ACTIVITIES: CPT

## 2020-06-28 PROCEDURE — 97116 GAIT TRAINING THERAPY: CPT

## 2020-06-28 RX ADMIN — IPRATROPIUM BROMIDE AND ALBUTEROL SULFATE 3 ML: 2.5; .5 SOLUTION RESPIRATORY (INHALATION) at 14:48

## 2020-06-28 RX ADMIN — ACETAMINOPHEN 1000 MG: 325 TABLET, FILM COATED ORAL at 09:07

## 2020-06-28 RX ADMIN — ACETAMINOPHEN 1000 MG: 325 TABLET, FILM COATED ORAL at 17:07

## 2020-06-28 RX ADMIN — SODIUM CHLORIDE, PRESERVATIVE FREE 10 ML: 5 INJECTION INTRAVENOUS at 08:54

## 2020-06-28 RX ADMIN — AMIODARONE HYDROCHLORIDE 400 MG: 200 TABLET ORAL at 08:54

## 2020-06-28 RX ADMIN — Medication 500 MG: at 21:23

## 2020-06-28 RX ADMIN — FAMOTIDINE 20 MG: 20 TABLET, FILM COATED ORAL at 21:23

## 2020-06-28 RX ADMIN — ACETAMINOPHEN 1000 MG: 325 TABLET, FILM COATED ORAL at 04:34

## 2020-06-28 RX ADMIN — BUDESONIDE AND FORMOTEROL FUMARATE DIHYDRATE 2 PUFF: 80; 4.5 AEROSOL RESPIRATORY (INHALATION) at 20:11

## 2020-06-28 RX ADMIN — GUAIFENESIN 1200 MG: 600 TABLET, EXTENDED RELEASE ORAL at 08:54

## 2020-06-28 RX ADMIN — INSULIN DETEMIR 30 UNITS: 100 INJECTION, SOLUTION SUBCUTANEOUS at 21:23

## 2020-06-28 RX ADMIN — CLOPIDOGREL BISULFATE 75 MG: 75 TABLET ORAL at 08:54

## 2020-06-28 RX ADMIN — ACETAMINOPHEN 1000 MG: 325 TABLET, FILM COATED ORAL at 21:22

## 2020-06-28 RX ADMIN — FAMOTIDINE 20 MG: 20 TABLET, FILM COATED ORAL at 08:54

## 2020-06-28 RX ADMIN — IPRATROPIUM BROMIDE AND ALBUTEROL SULFATE 3 ML: 2.5; .5 SOLUTION RESPIRATORY (INHALATION) at 20:10

## 2020-06-28 RX ADMIN — ATORVASTATIN CALCIUM 40 MG: 40 TABLET, FILM COATED ORAL at 21:23

## 2020-06-28 RX ADMIN — IPRATROPIUM BROMIDE AND ALBUTEROL SULFATE 3 ML: 2.5; .5 SOLUTION RESPIRATORY (INHALATION) at 10:18

## 2020-06-28 RX ADMIN — SENNOSIDES AND DOCUSATE SODIUM 2 TABLET: 8.6; 5 TABLET ORAL at 21:22

## 2020-06-28 RX ADMIN — BUDESONIDE AND FORMOTEROL FUMARATE DIHYDRATE 2 PUFF: 80; 4.5 AEROSOL RESPIRATORY (INHALATION) at 06:45

## 2020-06-28 RX ADMIN — GUAIFENESIN 1200 MG: 600 TABLET, EXTENDED RELEASE ORAL at 21:22

## 2020-06-28 RX ADMIN — Medication 500 MG: at 08:54

## 2020-06-28 RX ADMIN — ASPIRIN 81 MG 81 MG: 81 TABLET ORAL at 08:54

## 2020-06-28 RX ADMIN — SENNOSIDES AND DOCUSATE SODIUM 2 TABLET: 8.6; 5 TABLET ORAL at 08:54

## 2020-06-28 RX ADMIN — INSULIN ASPART 3 UNITS: 100 INJECTION, SOLUTION INTRAVENOUS; SUBCUTANEOUS at 12:00

## 2020-06-28 RX ADMIN — IPRATROPIUM BROMIDE AND ALBUTEROL SULFATE 3 ML: 2.5; .5 SOLUTION RESPIRATORY (INHALATION) at 06:45

## 2020-06-28 RX ADMIN — POLYETHYLENE GLYCOL 3350 17 G: 17 POWDER, FOR SOLUTION ORAL at 08:54

## 2020-06-29 PROBLEM — N17.9 AKI (ACUTE KIDNEY INJURY): Status: ACTIVE | Noted: 2020-06-10

## 2020-06-29 LAB
ANION GAP SERPL CALCULATED.3IONS-SCNC: 16 MMOL/L (ref 5–15)
BUN BLD-MCNC: 94 MG/DL (ref 8–23)
BUN/CREAT SERPL: 12.7 (ref 7–25)
CALCIUM SPEC-SCNC: 8.5 MG/DL (ref 8.6–10.5)
CHLORIDE SERPL-SCNC: 94 MMOL/L (ref 98–107)
CO2 SERPL-SCNC: 24 MMOL/L (ref 22–29)
CREAT BLD-MCNC: 7.43 MG/DL (ref 0.76–1.27)
GFR SERPL CREATININE-BSD FRML MDRD: 7 ML/MIN/1.73
GFR SERPL CREATININE-BSD FRML MDRD: ABNORMAL ML/MIN/{1.73_M2}
GLUCOSE BLD-MCNC: 119 MG/DL (ref 65–99)
GLUCOSE BLDC GLUCOMTR-MCNC: 119 MG/DL (ref 70–130)
GLUCOSE BLDC GLUCOMTR-MCNC: 135 MG/DL (ref 70–130)
POTASSIUM BLD-SCNC: 4.7 MMOL/L (ref 3.5–5.2)
SARS-COV-2 N GENE RESP QL NAA+PROBE: NOT DETECTED
SODIUM BLD-SCNC: 134 MMOL/L (ref 136–145)

## 2020-06-29 PROCEDURE — 80048 BASIC METABOLIC PNL TOTAL CA: CPT | Performed by: NURSE PRACTITIONER

## 2020-06-29 PROCEDURE — C9803 HOPD COVID-19 SPEC COLLECT: HCPCS | Performed by: NURSE PRACTITIONER

## 2020-06-29 PROCEDURE — 25010000002 HEPARIN (PORCINE) PER 1000 UNITS: Performed by: INTERNAL MEDICINE

## 2020-06-29 PROCEDURE — 94799 UNLISTED PULMONARY SVC/PX: CPT

## 2020-06-29 PROCEDURE — 97530 THERAPEUTIC ACTIVITIES: CPT

## 2020-06-29 PROCEDURE — 99024 POSTOP FOLLOW-UP VISIT: CPT | Performed by: NURSE PRACTITIONER

## 2020-06-29 PROCEDURE — 87635 SARS-COV-2 COVID-19 AMP PRB: CPT | Performed by: NURSE PRACTITIONER

## 2020-06-29 PROCEDURE — 63710000001 INSULIN DETEMIR PER 5 UNITS: Performed by: NURSE PRACTITIONER

## 2020-06-29 PROCEDURE — 82962 GLUCOSE BLOOD TEST: CPT

## 2020-06-29 RX ORDER — ALBUMIN (HUMAN) 12.5 G/50ML
25 SOLUTION INTRAVENOUS AS NEEDED
Status: ACTIVE | OUTPATIENT
Start: 2020-06-29 | End: 2020-06-30

## 2020-06-29 RX ORDER — HEPARIN SODIUM 1000 [USP'U]/ML
4000 INJECTION, SOLUTION INTRAVENOUS; SUBCUTANEOUS AS NEEDED
Status: DISCONTINUED | OUTPATIENT
Start: 2020-06-29 | End: 2020-07-01

## 2020-06-29 RX ADMIN — SODIUM CHLORIDE, PRESERVATIVE FREE 10 ML: 5 INJECTION INTRAVENOUS at 21:15

## 2020-06-29 RX ADMIN — CLOPIDOGREL BISULFATE 75 MG: 75 TABLET ORAL at 14:01

## 2020-06-29 RX ADMIN — SODIUM CHLORIDE, PRESERVATIVE FREE 10 ML: 5 INJECTION INTRAVENOUS at 14:05

## 2020-06-29 RX ADMIN — GUAIFENESIN 1200 MG: 600 TABLET, EXTENDED RELEASE ORAL at 21:12

## 2020-06-29 RX ADMIN — SENNOSIDES AND DOCUSATE SODIUM 2 TABLET: 8.6; 5 TABLET ORAL at 14:02

## 2020-06-29 RX ADMIN — IPRATROPIUM BROMIDE AND ALBUTEROL SULFATE 3 ML: 2.5; .5 SOLUTION RESPIRATORY (INHALATION) at 14:16

## 2020-06-29 RX ADMIN — ASPIRIN 81 MG 81 MG: 81 TABLET ORAL at 14:01

## 2020-06-29 RX ADMIN — ACETAMINOPHEN 1000 MG: 325 TABLET, FILM COATED ORAL at 21:12

## 2020-06-29 RX ADMIN — Medication 500 MG: at 14:01

## 2020-06-29 RX ADMIN — IPRATROPIUM BROMIDE AND ALBUTEROL SULFATE 3 ML: 2.5; .5 SOLUTION RESPIRATORY (INHALATION) at 07:19

## 2020-06-29 RX ADMIN — SODIUM CHLORIDE, PRESERVATIVE FREE 10 ML: 5 INJECTION INTRAVENOUS at 21:12

## 2020-06-29 RX ADMIN — FAMOTIDINE 20 MG: 20 TABLET, FILM COATED ORAL at 21:12

## 2020-06-29 RX ADMIN — ACETAMINOPHEN 1000 MG: 325 TABLET, FILM COATED ORAL at 14:01

## 2020-06-29 RX ADMIN — FAMOTIDINE 20 MG: 20 TABLET, FILM COATED ORAL at 14:02

## 2020-06-29 RX ADMIN — HEPARIN SODIUM 4000 UNITS: 1000 INJECTION INTRAVENOUS; SUBCUTANEOUS at 12:19

## 2020-06-29 RX ADMIN — GUAIFENESIN 1200 MG: 600 TABLET, EXTENDED RELEASE ORAL at 14:01

## 2020-06-29 RX ADMIN — INSULIN DETEMIR 30 UNITS: 100 INJECTION, SOLUTION SUBCUTANEOUS at 21:13

## 2020-06-29 RX ADMIN — IPRATROPIUM BROMIDE AND ALBUTEROL SULFATE 3 ML: 2.5; .5 SOLUTION RESPIRATORY (INHALATION) at 20:18

## 2020-06-29 RX ADMIN — BUDESONIDE AND FORMOTEROL FUMARATE DIHYDRATE 2 PUFF: 80; 4.5 AEROSOL RESPIRATORY (INHALATION) at 20:18

## 2020-06-29 RX ADMIN — Medication 500 MG: at 21:12

## 2020-06-29 RX ADMIN — AMIODARONE HYDROCHLORIDE 400 MG: 200 TABLET ORAL at 14:01

## 2020-06-29 RX ADMIN — ATORVASTATIN CALCIUM 40 MG: 40 TABLET, FILM COATED ORAL at 21:12

## 2020-06-29 RX ADMIN — BUDESONIDE AND FORMOTEROL FUMARATE DIHYDRATE 2 PUFF: 80; 4.5 AEROSOL RESPIRATORY (INHALATION) at 07:19

## 2020-06-30 ENCOUNTER — APPOINTMENT (OUTPATIENT)
Dept: ULTRASOUND IMAGING | Facility: HOSPITAL | Age: 70
End: 2020-06-30

## 2020-06-30 ENCOUNTER — APPOINTMENT (OUTPATIENT)
Dept: GENERAL RADIOLOGY | Facility: HOSPITAL | Age: 70
End: 2020-06-30

## 2020-06-30 LAB
ANION GAP SERPL CALCULATED.3IONS-SCNC: 12 MMOL/L (ref 5–15)
BUN SERPL-MCNC: 61 MG/DL (ref 8–23)
BUN/CREAT SERPL: 11 (ref 7–25)
CALCIUM SPEC-SCNC: 8.3 MG/DL (ref 8.6–10.5)
CHLORIDE SERPL-SCNC: 97 MMOL/L (ref 98–107)
CO2 SERPL-SCNC: 26 MMOL/L (ref 22–29)
CREAT SERPL-MCNC: 5.54 MG/DL (ref 0.76–1.27)
GFR SERPL CREATININE-BSD FRML MDRD: 10 ML/MIN/1.73
GFR SERPL CREATININE-BSD FRML MDRD: ABNORMAL ML/MIN/{1.73_M2}
GLUCOSE BLDC GLUCOMTR-MCNC: 103 MG/DL (ref 70–130)
GLUCOSE BLDC GLUCOMTR-MCNC: 108 MG/DL (ref 70–130)
GLUCOSE BLDC GLUCOMTR-MCNC: 143 MG/DL (ref 70–130)
GLUCOSE BLDC GLUCOMTR-MCNC: 173 MG/DL (ref 70–130)
GLUCOSE SERPL-MCNC: 83 MG/DL (ref 65–99)
POTASSIUM SERPL-SCNC: 4 MMOL/L (ref 3.5–5.2)
SODIUM SERPL-SCNC: 135 MMOL/L (ref 136–145)

## 2020-06-30 PROCEDURE — C1894 INTRO/SHEATH, NON-LASER: HCPCS | Performed by: THORACIC SURGERY (CARDIOTHORACIC VASCULAR SURGERY)

## 2020-06-30 PROCEDURE — 25010000002 HEPARIN (PORCINE) PER 1000 UNITS: Performed by: INTERNAL MEDICINE

## 2020-06-30 PROCEDURE — 94799 UNLISTED PULMONARY SVC/PX: CPT

## 2020-06-30 PROCEDURE — 77001 FLUOROGUIDE FOR VEIN DEVICE: CPT | Performed by: THORACIC SURGERY (CARDIOTHORACIC VASCULAR SURGERY)

## 2020-06-30 PROCEDURE — 82962 GLUCOSE BLOOD TEST: CPT

## 2020-06-30 PROCEDURE — 25010000002 FENTANYL CITRATE (PF) 100 MCG/2ML SOLUTION: Performed by: THORACIC SURGERY (CARDIOTHORACIC VASCULAR SURGERY)

## 2020-06-30 PROCEDURE — 94760 N-INVAS EAR/PLS OXIMETRY 1: CPT

## 2020-06-30 PROCEDURE — 25010000002 MIDAZOLAM PER 1 MG: Performed by: THORACIC SURGERY (CARDIOTHORACIC VASCULAR SURGERY)

## 2020-06-30 PROCEDURE — 0JH63XZ INSERTION OF TUNNELED VASCULAR ACCESS DEVICE INTO CHEST SUBCUTANEOUS TISSUE AND FASCIA, PERCUTANEOUS APPROACH: ICD-10-PCS | Performed by: THORACIC SURGERY (CARDIOTHORACIC VASCULAR SURGERY)

## 2020-06-30 PROCEDURE — 63710000001 INSULIN DETEMIR PER 5 UNITS: Performed by: NURSE PRACTITIONER

## 2020-06-30 PROCEDURE — 76937 US GUIDE VASCULAR ACCESS: CPT

## 2020-06-30 PROCEDURE — C1750 CATH, HEMODIALYSIS,LONG-TERM: HCPCS | Performed by: THORACIC SURGERY (CARDIOTHORACIC VASCULAR SURGERY)

## 2020-06-30 PROCEDURE — 80048 BASIC METABOLIC PNL TOTAL CA: CPT | Performed by: INTERNAL MEDICINE

## 2020-06-30 PROCEDURE — 02H633Z INSERTION OF INFUSION DEVICE INTO RIGHT ATRIUM, PERCUTANEOUS APPROACH: ICD-10-PCS | Performed by: THORACIC SURGERY (CARDIOTHORACIC VASCULAR SURGERY)

## 2020-06-30 PROCEDURE — 97530 THERAPEUTIC ACTIVITIES: CPT

## 2020-06-30 PROCEDURE — 36558 INSERT TUNNELED CV CATH: CPT | Performed by: THORACIC SURGERY (CARDIOTHORACIC VASCULAR SURGERY)

## 2020-06-30 PROCEDURE — 97116 GAIT TRAINING THERAPY: CPT

## 2020-06-30 RX ORDER — LIDOCAINE HYDROCHLORIDE AND EPINEPHRINE 10; 10 MG/ML; UG/ML
INJECTION, SOLUTION INFILTRATION; PERINEURAL
Status: DISPENSED
Start: 2020-06-30 | End: 2020-07-01

## 2020-06-30 RX ORDER — MIDAZOLAM HYDROCHLORIDE 1 MG/ML
INJECTION INTRAMUSCULAR; INTRAVENOUS AS NEEDED
Status: DISCONTINUED | OUTPATIENT
Start: 2020-06-30 | End: 2020-06-30 | Stop reason: HOSPADM

## 2020-06-30 RX ORDER — MIDAZOLAM HYDROCHLORIDE 1 MG/ML
INJECTION INTRAMUSCULAR; INTRAVENOUS
Status: DISPENSED
Start: 2020-06-30 | End: 2020-07-01

## 2020-06-30 RX ORDER — LIDOCAINE HYDROCHLORIDE AND EPINEPHRINE 10; 10 MG/ML; UG/ML
INJECTION, SOLUTION INFILTRATION; PERINEURAL AS NEEDED
Status: DISCONTINUED | OUTPATIENT
Start: 2020-06-30 | End: 2020-06-30 | Stop reason: HOSPADM

## 2020-06-30 RX ORDER — FENTANYL CITRATE 50 UG/ML
INJECTION, SOLUTION INTRAMUSCULAR; INTRAVENOUS
Status: DISPENSED
Start: 2020-06-30 | End: 2020-07-01

## 2020-06-30 RX ORDER — FENTANYL CITRATE 50 UG/ML
INJECTION, SOLUTION INTRAMUSCULAR; INTRAVENOUS AS NEEDED
Status: DISCONTINUED | OUTPATIENT
Start: 2020-06-30 | End: 2020-06-30 | Stop reason: HOSPADM

## 2020-06-30 RX ORDER — HEPARIN SODIUM 1000 [USP'U]/ML
INJECTION, SOLUTION INTRAVENOUS; SUBCUTANEOUS
Status: DISPENSED
Start: 2020-06-30 | End: 2020-07-01

## 2020-06-30 RX ADMIN — ACETAMINOPHEN 1000 MG: 325 TABLET, FILM COATED ORAL at 07:51

## 2020-06-30 RX ADMIN — AMIODARONE HYDROCHLORIDE 400 MG: 200 TABLET ORAL at 07:51

## 2020-06-30 RX ADMIN — SODIUM CHLORIDE, PRESERVATIVE FREE 10 ML: 5 INJECTION INTRAVENOUS at 20:50

## 2020-06-30 RX ADMIN — GUAIFENESIN 1200 MG: 600 TABLET, EXTENDED RELEASE ORAL at 07:51

## 2020-06-30 RX ADMIN — SODIUM CHLORIDE, PRESERVATIVE FREE 10 ML: 5 INJECTION INTRAVENOUS at 07:50

## 2020-06-30 RX ADMIN — FAMOTIDINE 20 MG: 20 TABLET, FILM COATED ORAL at 20:50

## 2020-06-30 RX ADMIN — IPRATROPIUM BROMIDE AND ALBUTEROL SULFATE 3 ML: 2.5; .5 SOLUTION RESPIRATORY (INHALATION) at 14:04

## 2020-06-30 RX ADMIN — ATORVASTATIN CALCIUM 40 MG: 40 TABLET, FILM COATED ORAL at 20:51

## 2020-06-30 RX ADMIN — Medication 500 MG: at 20:51

## 2020-06-30 RX ADMIN — BUDESONIDE AND FORMOTEROL FUMARATE DIHYDRATE 2 PUFF: 80; 4.5 AEROSOL RESPIRATORY (INHALATION) at 06:28

## 2020-06-30 RX ADMIN — Medication 500 MG: at 07:51

## 2020-06-30 RX ADMIN — SENNOSIDES AND DOCUSATE SODIUM 2 TABLET: 8.6; 5 TABLET ORAL at 20:50

## 2020-06-30 RX ADMIN — IPRATROPIUM BROMIDE AND ALBUTEROL SULFATE 3 ML: 2.5; .5 SOLUTION RESPIRATORY (INHALATION) at 06:23

## 2020-06-30 RX ADMIN — INSULIN DETEMIR 30 UNITS: 100 INJECTION, SOLUTION SUBCUTANEOUS at 20:51

## 2020-06-30 RX ADMIN — IPRATROPIUM BROMIDE AND ALBUTEROL SULFATE 3 ML: 2.5; .5 SOLUTION RESPIRATORY (INHALATION) at 09:59

## 2020-06-30 RX ADMIN — ASPIRIN 81 MG 81 MG: 81 TABLET ORAL at 07:50

## 2020-06-30 RX ADMIN — ACETAMINOPHEN 1000 MG: 325 TABLET, FILM COATED ORAL at 20:51

## 2020-06-30 RX ADMIN — CLOPIDOGREL BISULFATE 75 MG: 75 TABLET ORAL at 07:50

## 2020-06-30 RX ADMIN — IPRATROPIUM BROMIDE AND ALBUTEROL SULFATE 3 ML: 2.5; .5 SOLUTION RESPIRATORY (INHALATION) at 19:26

## 2020-06-30 RX ADMIN — BUDESONIDE AND FORMOTEROL FUMARATE DIHYDRATE 2 PUFF: 80; 4.5 AEROSOL RESPIRATORY (INHALATION) at 19:27

## 2020-06-30 RX ADMIN — FAMOTIDINE 20 MG: 20 TABLET, FILM COATED ORAL at 07:51

## 2020-06-30 RX ADMIN — GUAIFENESIN 1200 MG: 600 TABLET, EXTENDED RELEASE ORAL at 20:51

## 2020-07-01 VITALS
WEIGHT: 234.4 LBS | SYSTOLIC BLOOD PRESSURE: 122 MMHG | HEIGHT: 71 IN | BODY MASS INDEX: 32.81 KG/M2 | OXYGEN SATURATION: 96 % | HEART RATE: 88 BPM | RESPIRATION RATE: 18 BRPM | TEMPERATURE: 96.8 F | DIASTOLIC BLOOD PRESSURE: 71 MMHG

## 2020-07-01 LAB
ALBUMIN SERPL-MCNC: 3.5 G/DL (ref 3.5–5.2)
ANION GAP SERPL CALCULATED.3IONS-SCNC: 12 MMOL/L (ref 5–15)
BASOPHILS # BLD AUTO: 0.05 10*3/MM3 (ref 0–0.2)
BASOPHILS NFR BLD AUTO: 0.3 % (ref 0–1.5)
BUN SERPL-MCNC: 68 MG/DL (ref 8–23)
BUN/CREAT SERPL: 11.6 (ref 7–25)
CALCIUM SPEC-SCNC: 8.7 MG/DL (ref 8.6–10.5)
CHLORIDE SERPL-SCNC: 95 MMOL/L (ref 98–107)
CO2 SERPL-SCNC: 27 MMOL/L (ref 22–29)
CREAT SERPL-MCNC: 5.85 MG/DL (ref 0.76–1.27)
DEPRECATED RDW RBC AUTO: 49 FL (ref 37–54)
EOSINOPHIL # BLD AUTO: 0.26 10*3/MM3 (ref 0–0.4)
EOSINOPHIL NFR BLD AUTO: 1.4 % (ref 0.3–6.2)
ERYTHROCYTE [DISTWIDTH] IN BLOOD BY AUTOMATED COUNT: 14 % (ref 12.3–15.4)
GFR SERPL CREATININE-BSD FRML MDRD: 10 ML/MIN/1.73
GFR SERPL CREATININE-BSD FRML MDRD: ABNORMAL ML/MIN/{1.73_M2}
GLUCOSE BLDC GLUCOMTR-MCNC: 103 MG/DL (ref 70–130)
GLUCOSE BLDC GLUCOMTR-MCNC: 104 MG/DL (ref 70–130)
GLUCOSE BLDC GLUCOMTR-MCNC: 108 MG/DL (ref 70–130)
GLUCOSE SERPL-MCNC: 79 MG/DL (ref 65–99)
HCT VFR BLD AUTO: 26.3 % (ref 37.5–51)
HGB BLD-MCNC: 8.6 G/DL (ref 13–17.7)
IMM GRANULOCYTES # BLD AUTO: 0.82 10*3/MM3 (ref 0–0.05)
IMM GRANULOCYTES NFR BLD AUTO: 4.4 % (ref 0–0.5)
LYMPHOCYTES # BLD AUTO: 1.01 10*3/MM3 (ref 0.7–3.1)
LYMPHOCYTES NFR BLD AUTO: 5.4 % (ref 19.6–45.3)
MCH RBC QN AUTO: 31.5 PG (ref 26.6–33)
MCHC RBC AUTO-ENTMCNC: 32.7 G/DL (ref 31.5–35.7)
MCV RBC AUTO: 96.3 FL (ref 79–97)
MONOCYTES # BLD AUTO: 1.63 10*3/MM3 (ref 0.1–0.9)
MONOCYTES NFR BLD AUTO: 8.7 % (ref 5–12)
NEUTROPHILS NFR BLD AUTO: 14.89 10*3/MM3 (ref 1.7–7)
NEUTROPHILS NFR BLD AUTO: 79.8 % (ref 42.7–76)
NRBC BLD AUTO-RTO: 0 /100 WBC (ref 0–0.2)
PHOSPHATE SERPL-MCNC: 4.7 MG/DL (ref 2.5–4.5)
PLATELET # BLD AUTO: 304 10*3/MM3 (ref 140–450)
PMV BLD AUTO: 9.5 FL (ref 6–12)
POTASSIUM SERPL-SCNC: 3.8 MMOL/L (ref 3.5–5.2)
RBC # BLD AUTO: 2.73 10*6/MM3 (ref 4.14–5.8)
SODIUM SERPL-SCNC: 134 MMOL/L (ref 136–145)
WBC # BLD AUTO: 18.66 10*3/MM3 (ref 3.4–10.8)

## 2020-07-01 PROCEDURE — 82962 GLUCOSE BLOOD TEST: CPT

## 2020-07-01 PROCEDURE — 85025 COMPLETE CBC W/AUTO DIFF WBC: CPT | Performed by: INTERNAL MEDICINE

## 2020-07-01 PROCEDURE — 25010000002 HEPARIN (PORCINE) PER 1000 UNITS: Performed by: INTERNAL MEDICINE

## 2020-07-01 PROCEDURE — 80069 RENAL FUNCTION PANEL: CPT | Performed by: INTERNAL MEDICINE

## 2020-07-01 PROCEDURE — 25010000002 EPOETIN ALFA-EPBX 10000 UNIT/ML SOLUTION: Performed by: INTERNAL MEDICINE

## 2020-07-01 PROCEDURE — 99024 POSTOP FOLLOW-UP VISIT: CPT | Performed by: NURSE PRACTITIONER

## 2020-07-01 PROCEDURE — 97110 THERAPEUTIC EXERCISES: CPT

## 2020-07-01 PROCEDURE — 97116 GAIT TRAINING THERAPY: CPT

## 2020-07-01 RX ORDER — FAMOTIDINE 20 MG/1
20 TABLET, FILM COATED ORAL EVERY 12 HOURS SCHEDULED
Qty: 30 TABLET | Refills: 3 | Status: SHIPPED | OUTPATIENT
Start: 2020-07-01 | End: 2020-08-28

## 2020-07-01 RX ORDER — AMIODARONE HYDROCHLORIDE 200 MG/1
TABLET ORAL
Qty: 28 TABLET | Refills: 0 | Status: SHIPPED | OUTPATIENT
Start: 2020-07-01 | End: 2020-08-07

## 2020-07-01 RX ORDER — CEPHALEXIN 250 MG/1
250 CAPSULE ORAL EVERY 12 HOURS SCHEDULED
Status: DISCONTINUED | OUTPATIENT
Start: 2020-07-01 | End: 2020-07-01 | Stop reason: HOSPADM

## 2020-07-01 RX ORDER — OXYCODONE HYDROCHLORIDE AND ACETAMINOPHEN 5; 325 MG/1; MG/1
2 TABLET ORAL EVERY 4 HOURS PRN
Qty: 36 TABLET | Refills: 0 | Status: SHIPPED | OUTPATIENT
Start: 2020-07-01 | End: 2020-07-17

## 2020-07-01 RX ORDER — AMOXICILLIN 250 MG
2 CAPSULE ORAL 2 TIMES DAILY
Start: 2020-07-01 | End: 2020-08-07

## 2020-07-01 RX ORDER — CLOPIDOGREL BISULFATE 75 MG/1
75 TABLET ORAL DAILY
Qty: 30 TABLET | Refills: 11 | Status: SHIPPED | OUTPATIENT
Start: 2020-07-02 | End: 2021-06-29

## 2020-07-01 RX ORDER — HEPARIN SODIUM 1000 [USP'U]/ML
2000 INJECTION, SOLUTION INTRAVENOUS; SUBCUTANEOUS AS NEEDED
Status: DISCONTINUED | OUTPATIENT
Start: 2020-07-01 | End: 2020-07-01 | Stop reason: HOSPADM

## 2020-07-01 RX ORDER — ALBUMIN (HUMAN) 12.5 G/50ML
12.5 SOLUTION INTRAVENOUS AS NEEDED
Status: DISCONTINUED | OUTPATIENT
Start: 2020-07-01 | End: 2020-07-01 | Stop reason: HOSPADM

## 2020-07-01 RX ORDER — CEPHALEXIN 250 MG/1
250 CAPSULE ORAL EVERY 12 HOURS SCHEDULED
Qty: 20 CAPSULE | Refills: 0 | Status: SHIPPED | OUTPATIENT
Start: 2020-07-01 | End: 2020-07-11

## 2020-07-01 RX ORDER — POLYETHYLENE GLYCOL 3350 17 G/17G
17 POWDER, FOR SOLUTION ORAL DAILY PRN
Qty: 7 EACH | Refills: 0 | Status: SHIPPED | OUTPATIENT
Start: 2020-07-01 | End: 2020-08-28 | Stop reason: ALTCHOICE

## 2020-07-01 RX ORDER — ASCORBIC ACID 500 MG
500 TABLET ORAL 2 TIMES DAILY
Start: 2020-07-01

## 2020-07-01 RX ADMIN — ACETAMINOPHEN 1000 MG: 325 TABLET, FILM COATED ORAL at 04:57

## 2020-07-01 RX ADMIN — ASPIRIN 81 MG 81 MG: 81 TABLET ORAL at 13:00

## 2020-07-01 RX ADMIN — ACETAMINOPHEN 1000 MG: 325 TABLET, FILM COATED ORAL at 13:00

## 2020-07-01 RX ADMIN — Medication 500 MG: at 13:00

## 2020-07-01 RX ADMIN — AMIODARONE HYDROCHLORIDE 400 MG: 200 TABLET ORAL at 13:01

## 2020-07-01 RX ADMIN — GUAIFENESIN 1200 MG: 600 TABLET, EXTENDED RELEASE ORAL at 13:00

## 2020-07-01 RX ADMIN — FAMOTIDINE 20 MG: 20 TABLET, FILM COATED ORAL at 13:00

## 2020-07-01 RX ADMIN — EPOETIN ALFA-EPBX 10000 UNITS: 10000 INJECTION, SOLUTION INTRAVENOUS; SUBCUTANEOUS at 11:38

## 2020-07-01 RX ADMIN — CLOPIDOGREL BISULFATE 75 MG: 75 TABLET ORAL at 13:01

## 2020-07-01 RX ADMIN — HEPARIN SODIUM 2000 UNITS: 1000 INJECTION INTRAVENOUS; SUBCUTANEOUS at 10:30

## 2020-07-01 NOTE — DISCHARGE PLACEMENT REQUEST
"Patricia Barrett (69 y.o. Male)     Date of Birth Social Security Number Address Home Phone MRN    [id number]  [id number] [facility] [place] [address]  [id number]    Sikhism Marital Status          Judaism        Admission Date Admission Type Admitting Provider Attending Provider Department, Room/Bed    6/18/20 Elective Edi Donis MD [place], Thomas Mark, MD [address], [facility]/1    Discharge Date Discharge Disposition Discharge Destination         Home or Self Care              Attending Provider:  Edi Donis MD    Allergies:  No Known Allergies    Isolation:  None   Infection:  None   Code Status:  CPR    Ht:  179.1 cm (70.51\")   Wt:  106 kg (234 lb 6.4 oz)    Admission Cmt:  None   Principal Problem:  Coronary artery disease involving native coronary artery of native heart with unstable angina pectoris (CMS/[facility]) [I25.110]                 Active Insurance as of 6/18/2020     Primary Coverage     Payor Plan Insurance Group Employer/Plan Group    MEDICARE MEDICARE A & B      Payor Plan Address Payor Plan Phone Number Payor Plan Fax Number Effective Dates    [address] [phone]  8/1/2015 - None Entered    [place] [address]       Subscriber Name Subscriber Birth Date Member ID       PATRICIA BARRETT 1950 [id number]                 Emergency Contacts      (Rel.) Home Phone Work Phone Mobile Phone    MARICRUZ BARRETT (Spouse) [phone] -- --            Emergency Contact Information     Name Relation Home Work Mobile    MARICRUZ BARRETT Spouse [phone]            Insurance Information                MEDICARE/MEDICARE A & B Phone: [phone]    Subscriber: Patricia Barrett Subscriber#: [id number]    Group#:  Precert#:              History & Physical      Edi Donis MD at 06/18/20 0644        H&P reviewed.  The patient was examined and there are no changes to the H&P   Electronically signed by Gagandeep, " Edi Farris MD at 06/18/20 0644   Source Note     Attestation signed by Edi Donis MD at 06/10/20 9319    I have reviewed this documentation and agree.    I have personally performed face to face diagnostic evaluation of this patient including portions of history and physical exam.      Findings:  LM 3v CAD. DWIGHT CKD, COPD.  Smoker. RRR chest clear.      Detailed discussion with Jose Barrtet regarding situation options and plans.  Severe symptomatic CAD.  Coronary Artery Bypass Grafting is advisable.      Risks including but not limited to Mortality 5-8%, Stroke 2-3%, bleeding (return to surgery), transfusion, infection, pulmonary (prolonged mechanical ventilation, tracheostomy), renal dysfunction (dialysis).  Benefits:  Relief of symptoms, reduction in cardiac events and hospitalization, and improved survival.  Options:  Medical therapy, multivessel PCI discussed. Understands and wishes to proceed.    No angina ambulating in halls.  Pt wants to go home to take care of some things prior to surgery.  Prefers surgery next week. Also benefit in allowing few days for renal recovery.    Coronary Artery Bypass Grafting,  ON, transexamic acid, Ancef, radial arterial line, pulmonary artery catheter.  GEN.  SDS.  6/18/2020      Current management discussed in detail with patient.          This document has been electronically signed by Edi Donis MD on Pau 10, 2020 14:40  .  .                        CVTS CONSULTATION          Patient Care Team:  Provider, No Known as PCP - General  Requesting Provider: Nicola     Chief complaint: CAD      SUBJECTIVE       History of Present Illness:  69 y.o. male with HTN(stable, increased risk stroke, rupture), Hyperlipidemia(stable, increased risk cardiovascular events), Obesity(uncontrolled, increased risk cardiovascular events), Smoker(uncontrolled, increased risk cardiovascular events), COPD(stable, increased risk pulmonary complications) and Chronic Kidney  Disease(DWIGHT post cath, increased risk renal failure)  Black lung disease.  smokes .25 PPD.  Chest pain with exertion after dealing with his tractor getting stuck in a ditch and walking up hill. He reports the pain has not recurred. He presented for inpatient evaluation with positive troponin. Cardiac cath demonstrated severe multivessel disease and depressed ejection fraction 35%.   No TIA stroke amaurosis.  No MI claudication. No other associated signs, symptoms or modifying factors. Dr. Donis consulted for surgery consideration.     The following portions of the patient's history were reviewed and updated as appropriate: allergies, current medications, past family history, past medical history, past social history, past surgical history and problem list.  Recent images independently reviewed.  Available laboratory values reviewed.    Review of Systems   Constitutional: Negative for activity change, diaphoresis and fatigue.   Eyes: Negative for visual disturbance.   Respiratory: Positive for chest tightness, shortness of breath and wheezing.    Cardiovascular: Positive for chest pain. Negative for palpitations and leg swelling.   Gastrointestinal: Negative for abdominal distention and abdominal pain.   Genitourinary: Negative for difficulty urinating.   Musculoskeletal: Positive for arthralgias.   Skin: Negative.    Neurological: Negative for dizziness, seizures, syncope, speech difficulty and numbness.   Hematological: Does not bruise/bleed easily.   Psychiatric/Behavioral: Negative for agitation. The patient is not nervous/anxious.    All other systems reviewed and are negative.       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HCC)    • COPD (chronic obstructive pulmonary disease) (CMS/HCC)    • Hypertension      History reviewed. No pertinent surgical history.  Family History   Problem Relation Age of Onset   • Heart disease Mother         MI   • No Known Problems Father      Social  "History     Tobacco Use   • Smoking status: Current Every Day Smoker     Packs/day: 0.25     Types: Cigarettes   • Smokeless tobacco: Never Used   Substance Use Topics   • Alcohol use: Never     Frequency: Never   • Drug use: Never     Medications Prior to Admission   Medication Sig Dispense Refill Last Dose   • albuterol sulfate  (90 Base) MCG/ACT inhaler Inhale 2 puffs Every 4 (Four) Hours As Needed for Wheezing.   6/5/2020 at Unknown time   • aspirin 81 MG chewable tablet Chew 81 mg Daily.   6/5/2020 at Unknown time   • Fluticasone Furoate-Vilanterol (Breo Ellipta) 100-25 MCG/INH inhaler Inhale 1 puff Daily.   6/4/2020 at Unknown time   • losartan-hydrochlorothiazide (HYZAAR) 100-12.5 MG per tablet Take 1 tablet by mouth Daily.   6/5/2020 at Unknown time       aspirin 81 mg Oral Daily   atorvastatin 40 mg Oral Nightly   budesonide-formoterol 2 puff Inhalation BID - RT   metoprolol tartrate 25 mg Oral Q12H   ranolazine 1,000 mg Oral Q12H   sodium chloride 10 mL Intravenous Q12H   sodium chloride 3 mL Intravenous Q12H     Allergies:  Patient has no known allergies.    OBJECTIVE        Vital Signs  Temp:  [97.3 °F (36.3 °C)-98.6 °F (37 °C)] 97.9 °F (36.6 °C)  Heart Rate:  [60-79] 66  Resp:  [18-20] 19  BP: (116-153)/(67-88) 150/82    Flowsheet Rows      First Filed Value   Admission Height  185.4 cm (73\") Documented at 06/05/2020 1732   Admission Weight  117 kg (257 lb 9.6 oz) Documented at 06/05/2020 1732        185.4 cm (73\")    Physical Exam   Constitutional: He is oriented to person, place, and time. He appears well-nourished.   HENT:   Head: Atraumatic.   Eyes: EOM are normal.   Neck: Neck supple. Carotid bruit is not present.   Cardiovascular: Normal rate, regular rhythm and normal heart sounds.   Pulses:       Dorsalis pedis pulses are 2+ on the right side, and 2+ on the left side.        Posterior tibial pulses are 2+ on the right side, and 2+ on the left side.   Pulmonary/Chest: Effort normal and " breath sounds normal.   Abdominal: Soft. Bowel sounds are normal.   Musculoskeletal: Normal range of motion. He exhibits no edema.   Neurological: He is alert and oriented to person, place, and time.   Skin: Skin is warm and dry. Capillary refill takes less than 2 seconds.   Psychiatric: He has a normal mood and affect. Thought content normal.   Vitals reviewed.      Results Review:   Lab Results   Component Value Date    WBC 10.26 06/08/2020    HGB 14.1 06/08/2020    HCT 42.8 06/08/2020    MCV 97.3 (H) 06/08/2020     06/08/2020     Lab Results   Component Value Date    GLUCOSE 101 (H) 06/08/2020    BUN 27 (H) 06/08/2020    CREATININE 1.91 (H) 06/08/2020    EGFRIFNONA 35 (L) 06/08/2020    BCR 14.1 06/08/2020    K 3.8 06/08/2020    CO2 30.0 (H) 06/08/2020    CALCIUM 8.2 (L) 06/08/2020    ALBUMIN 4.10 06/08/2020    AST 19 06/08/2020    ALT 17 06/08/2020     Lab Results   Component Value Date    HGBA1C 6.60 (H) 06/06/2020     Results for orders placed during the hospital encounter of 06/05/20   Transthoracic Echo Complete With Contrast if Necessary Per Protocol    Narrative · The left ventricular cavity is mild-to-moderately dilated.  · The following left ventricular wall segments are hypokinetic: mid   inferolateral and mid inferoseptal.  · Mild mitral valve regurgitation is present  · Estimated EF appears to be in the range of 31 - 35%. Normal left   ventricular wall thickness noted. The following left ventricular wall   segments are hypokinetic: mid inferolateral and mid inferoseptal. The left   ventricular cavity is mild-to-moderately dilated.        [facility]  CARDIAC CATHETERIZATION NOTE     Date of procedure: 6/8/2020     Referring physician-hospitalist service     Procedures performed:      1.  Selective coronary angiography  2.  Left heart catheterization  3.  Left ventriculogram     Indication: Non-STEMI  Site: Right femoral  Catheters: 5FR JL4, 5FR JR4, 5 Jamaican pigtail  catheter     Procedural details:  The patient was brought to the cath lab and prepped and draped in the usual fashion. Modified Seldinger technique was used to place a 5 Fr sheath in the Common femoral artery. Catheter exchanges were made over a guide-wire through the sheath under fluoroscopic guidance. That was exchanged for a 5 Fr JL4 catheter that was engaged in the ostium of the Left Main coronary artery for left system angiography. That was exchanged for a 5 Fr JR4 catheter which was engaged in the ostium of the Right coronary artery for right system angiography.  6 Malaysian sheath was placed in the right femoral vein after it was cannulated.  CPK was elevated.  The catheter and sheath were then removed and arterial hemostasis was obtained . There were no obvious complications      Findings:     Blood Loss: <50cc     Specimens: None     Complications: None     Hemodynamics:  Ao 174/70   ,   /12     LVEDP-31     Left ventriculography: LV ANGIO NOT DONE  Selective coronary angiography:   Dominance: RCA     Left Main coronary artery: DISTAL 50%     Left anterior descending artery:OSTIAL 90%, Mid  75 long lesion     Left circumflex:  Ostial Cx looks involve as part of distal LM,Ostial LAD plaque complex.  Mid Cx has Aneurysmal dilatation with what appears to be a plaque between the 2 areas of approximately 75-85%.The vessel then bifurcates to 2 marginals.     Right coronary artery: long plaque complex startingproximal portion of mid  vessel that begins at 99% and then is 60-70%.  Distal RCA is 75% before PDA and PLM bifurcation. PLM has 80% mid lesion.     Conclusion/Plan: This patient who presents with a  multivessel vessel disease.  There appears to be a involvement of the distal left main ostial LAD and this plaque appears to also involve the ostial circumflex.  Disease in the mid circumflex there is also right coronary is diffusely disease and he has left ventricular dysfunction by echo with a EF about 30  to 35%.     He is not PCI candidate as they do not think we can safely stent the ostial LAD without involving the left main and the ostial circumflex.  Also I do not believe we could do complete revascularization and the risk of his kidney failure would be increased.  We will have cardiovascular surgery reviewed his films but obviously  He is a higher risk candidate but this would give him more complete revascularization.  I spoken to Dr. Donis.           ASSESSMENT/PLAN         NSTEMI (non-ST elevated myocardial infarction) (CMS/[facility])    Precordial pain    Essential hypertension    COPD (chronic obstructive pulmonary disease) (CMS/[facility])    Coronary artery disease involving native coronary artery of native heart with unstable angina pectoris (CMS/[facility])      Assessment & Plan    Detailed discussion with Jose Barrett regarding situation options and plans.  Severe symptomatic CAD not amendable for PCI consideration.  Coronary Artery Bypass Grafting considered pending Myocardial viability study.  Will proceed with preoperative testing as inpatient carotid duplex, MRSA screen, vein mapping, PFT. Dr. Donis to review and plan accordingly.     Risks including but not limited to Mortality 2-3%, Stroke 1-2%, bleeding (return to surgery), transfusion, infection, pulmonary (prolonged mechanical ventilation, tracheostomy), renal dysfunction (dialysis).  Benefits:  Relief of symptoms, reduction in cardiac events and hospitalization, and improved survival.  Options:  Medical therapy, multivessel PCI discussed. Preoperative teaching completed and questions answered. .Understands and wishes to proceed.      Thank you for the opportunity to participate in this patient's care.       I discussed the patients findings and my recommendations with the patient.               This document has been electronically signed by ZHANNA José on June 8, 2020 14:22        Electronically signed by Edi Donis MD at  06/10/20 1444             Codi Holloway, APRN at 06/08/20 1422     Attestation signed by Edi Donis MD at 06/10/20 1447    I have reviewed this documentation and agree.    I have personally performed face to face diagnostic evaluation of this patient including portions of history and physical exam.      Findings:  LM 3v CAD. DWIGHT CKD, COPD.  Smoker. RRR chest clear.      Detailed discussion with Jose Barrett regarding situation options and plans.  Severe symptomatic CAD.  Coronary Artery Bypass Grafting is advisable.      Risks including but not limited to Mortality 5-8%, Stroke 2-3%, bleeding (return to surgery), transfusion, infection, pulmonary (prolonged mechanical ventilation, tracheostomy), renal dysfunction (dialysis).  Benefits:  Relief of symptoms, reduction in cardiac events and hospitalization, and improved survival.  Options:  Medical therapy, multivessel PCI discussed. Understands and wishes to proceed.    No angina ambulating in halls.  Pt wants to go home to take care of some things prior to surgery.  Prefers surgery next week. Also benefit in allowing few days for renal recovery.    Coronary Artery Bypass Grafting,  ON, transexamic acid, Ancef, radial arterial line, pulmonary artery catheter.  GEN.  SDS.  6/18/2020      Current management discussed in detail with patient.          This document has been electronically signed by dEi Donis MD on Pau 10, 2020 14:40  .  .                        CVTS CONSULTATION          Patient Care Team:  Provider, No Known as PCP - General  Requesting Provider: Nicola     Chief complaint: CAD      SUBJECTIVE       History of Present Illness:  69 y.o. male with HTN(stable, increased risk stroke, rupture), Hyperlipidemia(stable, increased risk cardiovascular events), Obesity(uncontrolled, increased risk cardiovascular events), Smoker(uncontrolled, increased risk cardiovascular events), COPD(stable, increased risk pulmonary  complications) and Chronic Kidney Disease(DWIGHT post cath, increased risk renal failure)  Black lung disease.  smokes .25 PPD.  Chest pain with exertion after dealing with his tractor getting stuck in a ditch and walking up hill. He reports the pain has not recurred. He presented for inpatient evaluation with positive troponin. Cardiac cath demonstrated severe multivessel disease and depressed ejection fraction 35%.   No TIA stroke amaurosis.  No MI claudication. No other associated signs, symptoms or modifying factors. Dr. Donis consulted for surgery consideration.     The following portions of the patient's history were reviewed and updated as appropriate: allergies, current medications, past family history, past medical history, past social history, past surgical history and problem list.  Recent images independently reviewed.  Available laboratory values reviewed.    Review of Systems   Constitutional: Negative for activity change, diaphoresis and fatigue.   Eyes: Negative for visual disturbance.   Respiratory: Positive for chest tightness, shortness of breath and wheezing.    Cardiovascular: Positive for chest pain. Negative for palpitations and leg swelling.   Gastrointestinal: Negative for abdominal distention and abdominal pain.   Genitourinary: Negative for difficulty urinating.   Musculoskeletal: Positive for arthralgias.   Skin: Negative.    Neurological: Negative for dizziness, seizures, syncope, speech difficulty and numbness.   Hematological: Does not bruise/bleed easily.   Psychiatric/Behavioral: Negative for agitation. The patient is not nervous/anxious.    All other systems reviewed and are negative.       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HCC)    • COPD (chronic obstructive pulmonary disease) (CMS/HCC)    • Hypertension      History reviewed. No pertinent surgical history.  Family History   Problem Relation Age of Onset   • Heart disease Mother         MI   • No  "Known Problems Father      Social History     Tobacco Use   • Smoking status: Current Every Day Smoker     Packs/day: 0.25     Types: Cigarettes   • Smokeless tobacco: Never Used   Substance Use Topics   • Alcohol use: Never     Frequency: Never   • Drug use: Never     Medications Prior to Admission   Medication Sig Dispense Refill Last Dose   • albuterol sulfate  (90 Base) MCG/ACT inhaler Inhale 2 puffs Every 4 (Four) Hours As Needed for Wheezing.   6/5/2020 at Unknown time   • aspirin 81 MG chewable tablet Chew 81 mg Daily.   6/5/2020 at Unknown time   • Fluticasone Furoate-Vilanterol (Breo Ellipta) 100-25 MCG/INH inhaler Inhale 1 puff Daily.   6/4/2020 at Unknown time   • losartan-hydrochlorothiazide (HYZAAR) 100-12.5 MG per tablet Take 1 tablet by mouth Daily.   6/5/2020 at Unknown time       aspirin 81 mg Oral Daily   atorvastatin 40 mg Oral Nightly   budesonide-formoterol 2 puff Inhalation BID - RT   metoprolol tartrate 25 mg Oral Q12H   ranolazine 1,000 mg Oral Q12H   sodium chloride 10 mL Intravenous Q12H   sodium chloride 3 mL Intravenous Q12H     Allergies:  Patient has no known allergies.    OBJECTIVE        Vital Signs  Temp:  [97.3 °F (36.3 °C)-98.6 °F (37 °C)] 97.9 °F (36.6 °C)  Heart Rate:  [60-79] 66  Resp:  [18-20] 19  BP: (116-153)/(67-88) 150/82    Flowsheet Rows      First Filed Value   Admission Height  185.4 cm (73\") Documented at 06/05/2020 1732   Admission Weight  117 kg (257 lb 9.6 oz) Documented at 06/05/2020 1732        185.4 cm (73\")    Physical Exam   Constitutional: He is oriented to person, place, and time. He appears well-nourished.   HENT:   Head: Atraumatic.   Eyes: EOM are normal.   Neck: Neck supple. Carotid bruit is not present.   Cardiovascular: Normal rate, regular rhythm and normal heart sounds.   Pulses:       Dorsalis pedis pulses are 2+ on the right side, and 2+ on the left side.        Posterior tibial pulses are 2+ on the right side, and 2+ on the left side.   "   Pulmonary/Chest: Effort normal and breath sounds normal.   Abdominal: Soft. Bowel sounds are normal.   Musculoskeletal: Normal range of motion. He exhibits no edema.   Neurological: He is alert and oriented to person, place, and time.   Skin: Skin is warm and dry. Capillary refill takes less than 2 seconds.   Psychiatric: He has a normal mood and affect. Thought content normal.   Vitals reviewed.      Results Review:   Lab Results   Component Value Date    WBC 10.26 06/08/2020    HGB 14.1 06/08/2020    HCT 42.8 06/08/2020    MCV 97.3 (H) 06/08/2020     06/08/2020     Lab Results   Component Value Date    GLUCOSE 101 (H) 06/08/2020    BUN 27 (H) 06/08/2020    CREATININE 1.91 (H) 06/08/2020    EGFRIFNONA 35 (L) 06/08/2020    BCR 14.1 06/08/2020    K 3.8 06/08/2020    CO2 30.0 (H) 06/08/2020    CALCIUM 8.2 (L) 06/08/2020    ALBUMIN 4.10 06/08/2020    AST 19 06/08/2020    ALT 17 06/08/2020     Lab Results   Component Value Date    HGBA1C 6.60 (H) 06/06/2020     Results for orders placed during the hospital encounter of 06/05/20   Transthoracic Echo Complete With Contrast if Necessary Per Protocol    Narrative · The left ventricular cavity is mild-to-moderately dilated.  · The following left ventricular wall segments are hypokinetic: mid   inferolateral and mid inferoseptal.  · Mild mitral valve regurgitation is present  · Estimated EF appears to be in the range of 31 - 35%. Normal left   ventricular wall thickness noted. The following left ventricular wall   segments are hypokinetic: mid inferolateral and mid inferoseptal. The left   ventricular cavity is mild-to-moderately dilated.        [facility]  CARDIAC CATHETERIZATION NOTE     Date of procedure: 6/8/2020     Referring physician-hospitalist service     Procedures performed:      1.  Selective coronary angiography  2.  Left heart catheterization  3.  Left ventriculogram     Indication: Non-STEMI  Site: Right femoral  Catheters:  5FR JL4, 5FR JR4, 5 Wallisian pigtail catheter     Procedural details:  The patient was brought to the cath lab and prepped and draped in the usual fashion. Modified Seldinger technique was used to place a 5 Fr sheath in the Common femoral artery. Catheter exchanges were made over a guide-wire through the sheath under fluoroscopic guidance. That was exchanged for a 5 Fr JL4 catheter that was engaged in the ostium of the Left Main coronary artery for left system angiography. That was exchanged for a 5 Fr JR4 catheter which was engaged in the ostium of the Right coronary artery for right system angiography.  6 Wallisian sheath was placed in the right femoral vein after it was cannulated.  CPK was elevated.  The catheter and sheath were then removed and arterial hemostasis was obtained . There were no obvious complications      Findings:     Blood Loss: <50cc     Specimens: None     Complications: None     Hemodynamics:  Ao 174/70   ,   /12     LVEDP-31     Left ventriculography: LV ANGIO NOT DONE  Selective coronary angiography:   Dominance: RCA     Left Main coronary artery: DISTAL 50%     Left anterior descending artery:OSTIAL 90%, Mid  75 long lesion     Left circumflex:  Ostial Cx looks involve as part of distal LM,Ostial LAD plaque complex.  Mid Cx has Aneurysmal dilatation with what appears to be a plaque between the 2 areas of approximately 75-85%.The vessel then bifurcates to 2 marginals.     Right coronary artery: long plaque complex startingproximal portion of mid  vessel that begins at 99% and then is 60-70%.  Distal RCA is 75% before PDA and PLM bifurcation. PLM has 80% mid lesion.     Conclusion/Plan: This patient who presents with a  multivessel vessel disease.  There appears to be a involvement of the distal left main ostial LAD and this plaque appears to also involve the ostial circumflex.  Disease in the mid circumflex there is also right coronary is diffusely disease and he has left ventricular  dysfunction by echo with a EF about 30 to 35%.     He is not PCI candidate as they do not think we can safely stent the ostial LAD without involving the left main and the ostial circumflex.  Also I do not believe we could do complete revascularization and the risk of his kidney failure would be increased.  We will have cardiovascular surgery reviewed his films but obviously  He is a higher risk candidate but this would give him more complete revascularization.  I spoken to Dr. Donis.           ASSESSMENT/PLAN         NSTEMI (non-ST elevated myocardial infarction) (CMS/[facility])    Precordial pain    Essential hypertension    COPD (chronic obstructive pulmonary disease) (CMS/[facility])    Coronary artery disease involving native coronary artery of native heart with unstable angina pectoris (CMS/[facility])      Assessment & Plan    Detailed discussion with Jose Barrett regarding situation options and plans.  Severe symptomatic CAD not amendable for PCI consideration.  Coronary Artery Bypass Grafting considered pending Myocardial viability study.  Will proceed with preoperative testing as inpatient carotid duplex, MRSA screen, vein mapping, PFT. Dr. Donis to review and plan accordingly.     Risks including but not limited to Mortality 2-3%, Stroke 1-2%, bleeding (return to surgery), transfusion, infection, pulmonary (prolonged mechanical ventilation, tracheostomy), renal dysfunction (dialysis).  Benefits:  Relief of symptoms, reduction in cardiac events and hospitalization, and improved survival.  Options:  Medical therapy, multivessel PCI discussed. Preoperative teaching completed and questions answered. .Understands and wishes to proceed.      Thank you for the opportunity to participate in this patient's care.       I discussed the patients findings and my recommendations with the patient.               This document has been electronically signed by ZHANNA José on June 8, 2020 14:22        Electronically  signed by Edi Donis MD at 06/10/20 1444          Physician Progress Notes (last 24 hours) (Notes from 06/30/20 1345 through 07/01/20 1345)      Wade Allen APRN at 07/01/20 1117            Cardiothoracic - Vascular Surgery Daily Note        LOS: 13 days   Patient Care Team:  Provider, No Known as PCP - General     POD13  CORONARY ARTERY BYPASS GRAFTING X 3   ENDOSCOPIC vein HARVEST   LEFT INTERNAL MAMMARY ARTERY TO LEFT ANTERIOR DESCENDING CORONARY ARTERY  SAPHENOUS VEIN GRAFT FROM ASCENDING AORTA TO SECOND MARGINAL BRANCH  SAPHENOUS VEIN GRAFT FROM ASCENDING AORTA TO POSTERIOR DESCENDING CORONARY ARTERY   Negative pressure wound therapy (prevena)  Place intraaortic balloon pump    Chief Complaint: Post surgical pain      Subjective     The following portions of the patient's history were reviewed and updated as appropriate: allergies, current medications, past family history, past medical history, past social history, past surgical history and problem list.     Subjective:  Symptoms:  He reports chest pain.  No shortness of breath.    Diet:  Poor intake.    Activity level: Impaired due to weakness.    Pain:  He complains of pain that is moderate.  He reports pain is improving.  Pain is well controlled and requiring pain medication.          Objective     Vital Signs  Temp:  [96.6 °F (35.9 °C)-97.7 °F (36.5 °C)] 96.6 °F (35.9 °C)  Heart Rate:  [74-81] 80  Resp:  [16-20] 20  BP: (121-138)/(58-68) 135/61  Body mass index is 33.15 kg/m².    Intake/Output Summary (Last 24 hours) at 7/1/2020 1117  Last data filed at 7/1/2020 0700  Gross per 24 hour   Intake 480 ml   Output 750 ml   Net -270 ml     No intake/output data recorded.    Wt Readings from Last 3 Encounters:   07/01/20 106 kg (234 lb 6.4 oz)   06/11/20 112 kg (246 lb 3.2 oz)         Physical Exam   Objective:  General Appearance:  Comfortable, in no acute distress, well-appearing and not in pain.    Vital signs: (most recent): Blood pressure  "135/61, pulse 80, temperature 96.6 °F (35.9 °C), temperature source Axillary, resp. rate 20, height 179.1 cm (70.51\"), weight 106 kg (234 lb 6.4 oz), SpO2 95 %.  Vital signs are normal.  (NSR 80  BIPAP).    Output: Producing urine and producing stool.    HEENT: Normal HEENT exam.    Lungs:  Normal effort and normal respiratory rate.  No rales or decreased breath sounds.  (CTx3 removed 6/23  Room air)  Heart: Normal rate.  Regular rhythm.  (AV wires IT)  Chest: Chest wall tenderness present.    Abdomen: Abdomen is soft and distended.  Bowel sounds are normal.   There is no abdominal tenderness.     Extremities: Decreased range of motion.  There is dependent edema.    Pulses: Distal pulses are intact.    Neurological: Patient is alert.  GCS score is 15.  (AOx3 today).    Pupils:  Pupils are equal, round, and reactive to light.    Skin:  Warm and dry.  (Midsternal incision, slight opening at base of incision minimal serous drainage  EVH CDI  CV site stable no hematoma)              Results Review:    Lab Results   Component Value Date    WBC 18.66 (H) 07/01/2020    HGB 8.6 (L) 07/01/2020    HCT 26.3 (L) 07/01/2020    MCV 96.3 07/01/2020     07/01/2020     Lab Results   Component Value Date    GLUCOSE 79 07/01/2020    BUN 68 (H) 07/01/2020    CREATININE 5.85 (H) 07/01/2020    EGFRIFNONA 10 (L) 07/01/2020    EGFRIFAFRI  07/01/2020      Comment:      <15 Indicative of kidney failure.    BCR 11.6 07/01/2020    K 3.8 07/01/2020    CO2 27.0 07/01/2020    CALCIUM 8.7 07/01/2020    ALBUMIN 3.50 07/01/2020    AST 61 (H) 06/25/2020    ALT 9 06/25/2020       Calcium Calcium   Date/Time Value Ref Range Status   07/01/2020 0606 8.7 8.6 - 10.5 mg/dL Final   06/30/2020 1104 8.3 (L) 8.6 - 10.5 mg/dL Final   06/29/2020 0559 8.5 (L) 8.6 - 10.5 mg/dL Final      Magnesium No results found for: MG     Imaging Results (Last 24 Hours)     Procedure Component Value Units Date/Time    XR Chest Post CVA Port [[id number]] Collected:  " 06/30/20 1559     Updated:  06/30/20 1625    Narrative:         PROCEDURE: Single chest view AP    REASON FOR EXAM:tunnel cath , Z74.09 Other reduced mobility  Z74.09 Other reduced mobility Z78.9 Other specified health status  N17.9 Acute kidney failure, unspecified    FINDINGS: Comparison exam dated June 27, 2020. Right jugular  central venous catheter with tip overlying the SVC right atrial  orifice. Postsurgical changes with median sternotomy.  Cardiomegaly. A few scattered calcified lung parenchymal  granulomas consistent with old granulomatous disease. Left upper  lobe 2 small linear opacities. Lungs are otherwise clear.Pleural  spaces are normal. No acute osseous abnormality.      Impression:       1.  Right jugular central venous catheter with tip overlying the  SVC right atrial orifice.   2.  Cardiomegaly.  3.  Evidence of old granulomatous disease.  4.  Left upper lobe 2 small linear opacities suspicious for  discoid atelectasis.    Electronically signed by:  Richard Winkler MD  6/30/2020 4:23 PM CDT  Workstation: [id number]    US Guided Vascular Access [[id number]] Resulted:  06/30/20 1548     Updated:  06/30/20 1548    Narrative:       This procedure was auto-finalized with no dictation required.                                  acetaminophen 1,000 mg Oral Q6H   amiodarone 400 mg Oral Q24H   aspirin 81 mg Oral Daily   atorvastatin 40 mg Oral Nightly   budesonide-formoterol 2 puff Inhalation BID - RT   clopidogrel 75 mg Oral Daily   epoetin paul/paul-epbx 10,000 Units Intravenous Once per day on Mon Wed Fri   famotidine 20 mg Oral Q12H   guaiFENesin 1,200 mg Oral Q12H   insulin aspart 0-14 Units Subcutaneous TID AC   insulin detemir 30 Units Subcutaneous Nightly   ipratropium-albuterol 3 mL Nebulization 4x Daily - RT   mupirocin 1 application Each Nare Daily   polyethylene glycol 17 g Oral Daily   senna-docusate sodium 2 tablet Oral BID   sodium chloride 10 mL Intravenous Q12H   sodium chloride 10 mL Intravenous  Q12H   vitamin C 500 mg Oral BID        Lab Results   Component Value Date    GLUCOSE 79 2020    CALCIUM 8.7 2020     (L) 2020    K 3.8 2020    CO2 27.0 2020    CL 95 (L) 2020    BUN 68 (H) 2020    CREATININE 5.85 (H) 2020    EGFRIFAFRI  2020      Comment:      <15 Indicative of kidney failure.    EGFRIFNONA 10 (L) 2020    BCR 11.6 2020    ANIONGAP 12.0 2020       ASSESSMENT/PLAN     Satisfactory Post op  Progressing well, participating with physical therapy, doing well. Plata out, voiding  independently.    DWIGHT  S/P tunneled catheter placement .  Will require outpatient dialysis.  Currently set up MWF at [facility] at noon.    ASCVD  DAPT, hold ACE r/t renal impairment, continue statin.  Amiodarone taper    Acute blood loss anemia  H/H remains stable post operatively.      Post operative pain  Controlled acetaminophen, immediate release oxycodone, minimal requirement for narcotics     Post operative hyperglycemia  SSI/Levemir controlled    Rehab  up in chair for meals, PT/OT, ambulate TID     Respiratory  Nebs, room air, symbicort, aggressive pulmonary toileting     Disposition  Stable for D/C home with HH transition visit and outpatient dialysis scheduled MWF            This document has been electronically signed by Laurent Allen AGACNP-BC @  On 2020 11:17      Electronically signed by Wade Allen APRN at 20 1121     Russ Amaya MD at 20 1048          [facility]  [address] [place] [address]. [address]  T - [phone]  F - [phone]     Progress Note          PATIENT  DEMOGRAPHICS   PATIENT NAME: Jose Barrett                      PHYSICIAN: Russ Amaya MD  : 1950  MRN: [id number]   LOS: 13 days    Patient Care Team:  Provider, No Known as PCP - General  Subjective   SUBJECTIVE   UO upto 1.3L. No soa       Objective   OBJECTIVE   Vital Signs  Temp:  [96.6 °F  "(35.9 °C)-97.7 °F (36.5 °C)] 96.6 °F (35.9 °C)  Heart Rate:  [74-81] 80  Resp:  [16-20] 20  BP: (121-138)/(58-68) 135/61    Flowsheet Rows      First Filed Value   Admission Height  179.1 cm (70.5\") Documented at 06/18/2020 0538   Admission Weight  111 kg (245 lb 2.4 oz) Documented at 06/18/2020 0538           I/O last 3 completed shifts:  In: 720 [P.O.:720]  Out: 1325 [Urine:1325]    PHYSICAL EXAM    Physical Exam   Constitutional: He appears well-developed.   HENT:   Head: Normocephalic.   Eyes: Pupils are equal, round, and reactive to light.   Cardiovascular: Normal rate, regular rhythm and normal heart sounds.   Pulmonary/Chest: Effort normal and breath sounds normal.   Abdominal: Soft. Bowel sounds are normal.   Musculoskeletal: He exhibits edema.   Neurological: He is alert. He exhibits normal muscle tone.       RESULTS   Results Review:    Results from last 7 days   Lab Units 07/01/20  0606 06/30/20  1104 06/29/20  0559  06/25/20  0547   SODIUM mmol/L 134* 135* 134*   < > 133*   POTASSIUM mmol/L 3.8 4.0 4.7   < > 3.9   CHLORIDE mmol/L 95* 97* 94*   < > 89*   CO2 mmol/L 27.0 26.0 24.0   < > 28.0   BUN mg/dL 68* 61* 94*   < > 71*   CREATININE mg/dL 5.85* 5.54* 7.43*   < > 5.92*   CALCIUM mg/dL 8.7 8.3* 8.5*   < > 8.3*   BILIRUBIN mg/dL  --   --   --   --  0.4   ALK PHOS U/L  --   --   --   --  75   ALT (SGPT) U/L  --   --   --   --  9   AST (SGOT) U/L  --   --   --   --  61*   GLUCOSE mg/dL 79 83 119*   < > 81    < > = values in this interval not displayed.       Estimated Creatinine Clearance: 14.6 mL/min (A) (by C-G formula based on SCr of 5.85 mg/dL (H)).    Results from last 7 days   Lab Units 07/01/20  0606   PHOSPHORUS mg/dL 4.7*             Results from last 7 days   Lab Units 07/01/20  0606 06/26/20  0717 06/25/20  0547   WBC 10*3/mm3 18.66* 15.66* 14.45*   HEMOGLOBIN g/dL 8.6* 10.4* 10.7*   PLATELETS 10*3/mm3 304 357 350               Imaging Results (Last 24 Hours)     Procedure Component Value Units " Date/Time    XR Chest Post CVA Port [[id number]] Collected:  06/30/20 1559     Updated:  06/30/20 1625    Narrative:         PROCEDURE: Single chest view AP    REASON FOR EXAM:tunnel cath , Z74.09 Other reduced mobility  Z74.09 Other reduced mobility Z78.9 Other specified health status  N17.9 Acute kidney failure, unspecified    FINDINGS: Comparison exam dated June 27, 2020. Right jugular  central venous catheter with tip overlying the SVC right atrial  orifice. Postsurgical changes with median sternotomy.  Cardiomegaly. A few scattered calcified lung parenchymal  granulomas consistent with old granulomatous disease. Left upper  lobe 2 small linear opacities. Lungs are otherwise clear.Pleural  spaces are normal. No acute osseous abnormality.      Impression:       1.  Right jugular central venous catheter with tip overlying the  SVC right atrial orifice.   2.  Cardiomegaly.  3.  Evidence of old granulomatous disease.  4.  Left upper lobe 2 small linear opacities suspicious for  discoid atelectasis.    Electronically signed by:  Richard Winkler MD  6/30/2020 4:23 PM CDT  Workstation: [id number]    US Guided Vascular Access [[id number]] Resulted:  06/30/20 1548     Updated:  06/30/20 1548    Narrative:       This procedure was auto-finalized with no dictation required.           MEDICATIONS      acetaminophen 1,000 mg Oral Q6H   amiodarone 400 mg Oral Q24H   aspirin 81 mg Oral Daily   atorvastatin 40 mg Oral Nightly   budesonide-formoterol 2 puff Inhalation BID - RT   clopidogrel 75 mg Oral Daily   famotidine 20 mg Oral Q12H   guaiFENesin 1,200 mg Oral Q12H   insulin aspart 0-14 Units Subcutaneous TID AC   insulin detemir 30 Units Subcutaneous Nightly   ipratropium-albuterol 3 mL Nebulization 4x Daily - RT   mupirocin 1 application Each Nare Daily   polyethylene glycol 17 g Oral Daily   senna-docusate sodium 2 tablet Oral BID   sodium chloride 10 mL Intravenous Q12H   sodium chloride 10 mL Intravenous Q12H   vitamin C 500 mg  "Oral BID          Assessment/Plan   ASSESSMENT / PLAN      Coronary artery disease involving native coronary artery of native heart with unstable angina pectoris (CMS/HCC)    Atherosclerotic heart disease of native coronary artery with unstable angina pectoris (CMS/HCC)    DWIGHT (acute kidney injury) (CMS/HCC)    1.  DWIGHT on CKD3- baseline creatinine is close to 1.5.  His ultrasound in the past admission was negative for any hydronephrosis.  He has a left renal simple cyst.  He also has BPH.  His urine analysis was negative for blood and albuminuria.     Concern for ATN/fluid overload. No urinary retention or hydro on u/s. Hd started . Hd now MWF. Out pt hd is arranged for [facility] at noon. S/p tunnel cath. Good UO and likely heading for recovery. Check bmp in a week. Check 24 hr cr cl next week for recovery     2.  CAD- with recent non-ST elevation MI with multivessel disease on coronary angiogram.  He is now status post coronary artery bypass grafting x3.  His last EF is 35%.  He is currently on aspirin Plavix and metoprolol.  Patient is also on atorvastatin .      3.  HTN     4.  HLD- last LDL is 162.  HDL was low at 28.     5.  [employer] workers pneumoconiosis/COPD    6.  Confusion / delerium- improved    dispo discharge today           This document has been electronically signed by Russ Amaya MD on 2020 10:48           Electronically signed by Russ Amaya MD at 20 1049       [address] [address]  Dept. Phone:  [phone]  Dept. Fax:   Date Ordered: 2020         Patient:  Jose Barrett MRN:  [id number]   [id number] [facility] [address] :  1950  SSN:    Phone:  Sex:  M     Weight: 106 kg (234 lb 6.4 oz)         Ht Readings from Last 1 Encounters:   20 179.1 cm (70.51\")         Steve               (Order ID: [id number])    Diagnosis:  Surgical aftercare, circulatory system (Z48.812 [ICD-10-CM] " V58.73 [ICD-9-CM])   Quantity:  1     Equipment:  Walker Folding with Wheels  Length of Need (99 Months = Lifetime): 99 Months = Lifetime        Authorizing Provider's Phone: [phone]   Authorizing Provider:Wade Allen APRN  Authorizing Provider's NPI: [id number]  Order Entered By: Wade Allen APRN 7/1/2020  1:25 PM     Electronically signed by: Wade Allen APRN 7/1/2020  1:25 PM

## 2020-07-01 NOTE — DISCHARGE SUMMARY
CVTS DISCHARGE SUMMARY  Date of Admission: 6/18/2020  5:13 AM  Date of Discharge:  7/1/2020    Admission Diagnosis:   CAD with stable angina pectoris  Discharge Diagnosis:   Post-Op Diagnosis Codes:     * DWIGHT (acute kidney injury) (CMS/[facility]) [N17.9]   CAD with stable angina pectoris    Consults:   Consults     Date and Time Order Name Status Description    6/20/2020 0931 Inpatient Nephrology Consult Completed     6/8/2020 0908 Inpatient Cardiothoracic Surgery Consult Completed     6/6/2020 0841 Inpatient Nephrology Consult Completed           Procedures Performed  Procedure(s):  Tunnelled Dialysis Catheter Placement    CORONARY ARTERY BYPASS GRAFTING X 3   ENDOSCOPIC vein HARVEST   LEFT INTERNAL MAMMARY ARTERY TO LEFT ANTERIOR DESCENDING CORONARY ARTERY  SAPHENOUS VEIN GRAFT FROM ASCENDING AORTA TO SECOND MARGINAL BRANCH  SAPHENOUS VEIN GRAFT FROM ASCENDING AORTA TO POSTERIOR DESCENDING CORONARY ARTERY   Negative pressure wound therapy (prevena)  Place intraaortic balloon pump  Vascular ultrasound guidance       Discharge Medications     Discharge Medications      New Medications      Instructions Start Date   amiodarone 400 MG tablet  Commonly known as:  PACERONE   Take 400mg daily for seven days then 200mg daily for 14 days      ascorbic acid 500 MG tablet  Commonly known as:  VITAMIN C   500 mg, Oral, 2 Times Daily      cephalexin 250 MG capsule  Commonly known as:  KEFLEX   250 mg, Oral, Every 12 Hours Scheduled      clopidogrel 75 MG tablet  Commonly known as:  PLAVIX   75 mg, Oral, Daily   Start Date:  July 2, 2020     famotidine 20 MG tablet  Commonly known as:  PEPCID   20 mg, Oral, Every 12 Hours Scheduled      oxyCODONE-acetaminophen 5-325 MG per tablet  Commonly known as:  PERCOCET   2 tablets, Oral, Every 4 Hours PRN      polyethylene glycol 17 g pack packet  Commonly known as:  MIRALAX   17 g, Oral, Daily PRN      sennosides-docusate 8.6-50 MG per tablet  Commonly known as:  PERICOLACE   2 tablets, Oral,  2 Times Daily         Continue These Medications      Instructions Start Date   albuterol sulfate  (90 Base) MCG/ACT inhaler  Commonly known as:  PROVENTIL HFA;VENTOLIN HFA;PROAIR HFA   2 puffs, Inhalation, Every 4 Hours PRN      aspirin 81 MG chewable tablet   81 mg, Oral, Daily      atorvastatin 40 MG tablet  Commonly known as:  LIPITOR   40 mg, Oral, Nightly      Breo Ellipta 100-25 MCG/INH inhaler  Generic drug:  Fluticasone Furoate-Vilanterol   1 puff, Inhalation, Daily - RT         Stop These Medications    isosorbide mononitrate 60 MG 24 hr tablet  Commonly known as:  IMDUR     losartan 25 MG tablet  Commonly known as:  COZAAR     metoprolol tartrate 25 MG tablet  Commonly known as:  LOPRESSOR     ranolazine 1000 MG 12 hr tablet  Commonly known as:  RANEXA          Wade Allen Reviewed risks, benefits, and habit forming potential and weaning from narcotic medication. Patient understands and wishes to receive prescription.  Prescription written for percocet for post-surgical pain after Peña placed on file.    Discharge Diet:   Diet Instructions     Diet: Regular, Consistent Carbohydrate, Cardiac      Discharge Diet:   Regular  Consistent Carbohydrate  Cardiac             Discharge Disposition: Home in stable condition with follow up appointments with CVTS Nurse Practitioner 1 week, Dr. Donsi 2 weeks, Cardiology/PCP as scheduled. Nephrology outpatient dialysis MWF, as scheduled    History of Present Illness/Hospital Course:   69 y.o. male with HTN(stable, increased risk stroke, rupture), Hyperlipidemia(stable, increased risk cardiovascular events), Obesity(uncontrolled, increased risk cardiovascular events), Smoker(uncontrolled, increased risk cardiovascular events), COPD(stable, increased risk pulmonary complications) and Chronic Kidney Disease(DWIGHT post cath, increased risk renal failure)  Black lung disease.  smokes .25 PPD.  Chest pain with exertion after dealing with his tractor getting stuck  in a ditch and walking up hill. He reports the pain has not recurred. He presented for inpatient evaluation with positive troponin. Cardiac cath demonstrated severe multivessel disease and depressed ejection fraction 35%.   No TIA stroke amaurosis.  No MI claudication. No other associated signs, symptoms or modifying factors. Dr. Donis consulted for surgery consideration.    After adequate preop studies completed he was scheduled electively. Day of surgery he was admitted through \[facility] [place] [facility]\"", taken to the operating suite placed under general anesthesia underwent said procedure without complications or difficulty.  Weaned easily from cardiopulmonary bypass with the assistance in drips of long, precedex, insulin and transferred to CCU in critical condition, intra aortic balloon pump was placed intra-operatively and weaned on POD1.  They began weaning mechanical ventilation after IABP removal with successful extubation and placed on oxygen per bipap.  A total of 0 in blood products were given during their hospital stay.  POD2 they were out of bed to the chair and tolerated breakfast with stable glucose between the hours of 18-24 postoperatively.  Insulin drip was discontinued and they were continued on sliding scale insulin coverage as well as Lantus nightly.  Hemodynamics and neuro status remained stable for transfer to 3W telemetry.  POD3 patient developed hypercapnia and worsening superimposed DWIGHT on his CKD3 and was subsequently transferred to the ICU for BIPAP therapy and close monitoring.  Patient continued BIPAP therapy, he also developed ICU related delirium which made BIPAP compliance difficult.  His renal function continued to worsen and due to worsening BUN and fluid volume overload and temporary dialysis catheter was placed on POD5 and he underwent dialysis in the ICU.  Over the next few days and volume removal with dialysis his respiratory status and mentation significantly improved and he was weaned to his  home dose nasal cannula.  He was then transferred back to [facility] for observation of renal recovery and progression of activity with PT/OT.  Plata catheter was removed, he continues to void independently and has spontaneous return of bowel function, however his creatnine remained persistently elevated.  In anticipation for the need of outpatient dialysis a tunneled catheter was placed on 6/30. They continued with PT/OT participation to satisfactory levels with activities of daily living. Chest tubes were without air leak and removed with satisfactory post removal CXR viewed on 6/23.  Mild incisional dihiscence was noted with mild serous drainage on POD13, patient was started on keflex empirically with home health follow up.  Permissive HTN allowed for renal recovery, ACE contraindicated due to his renal dysfunction. Hemodynamics remained stable, they remained afebrile postoperatively, and in regular sinus rhythm for satisfactory discharge home in stable condition on POD 13.      Vital Signs  Temp:  [96.6 °F (35.9 °C)-97.7 °F (36.5 °C)] 96.6 °F (35.9 °C)  Heart Rate:  [74-81] 80  Resp:  [16-20] 20  BP: (121-138)/(58-68) 135/61      06/29/20  0511 06/30/20  0455 07/01/20  0439   Weight: 108 kg (238 lb 3.2 oz) 108 kg (237 lb 9.6 oz) 106 kg (234 lb 6.4 oz)       Pertinent Test Results:  Lab Results   Component Value Date    WBC 18.66 (H) 07/01/2020    HGB 8.6 (L) 07/01/2020    HCT 26.3 (L) 07/01/2020    MCV 96.3 07/01/2020     07/01/2020     Lab Results   Component Value Date    GLUCOSE 79 07/01/2020    BUN 68 (H) 07/01/2020    CREATININE 5.85 (H) 07/01/2020    EGFRIFNONA 10 (L) 07/01/2020    EGFRIFAFRI  07/01/2020      Comment:      <15 Indicative of kidney failure.    BCR 11.6 07/01/2020    K 3.8 07/01/2020    CO2 27.0 07/01/2020    CALCIUM 8.7 07/01/2020    ALBUMIN 3.50 07/01/2020    AST 61 (H) 06/25/2020    ALT 9 06/25/2020       Physical Exam   Objective:  General Appearance:  Comfortable, in no acute  "distress, well-appearing and not in pain.    Vital signs: (most recent): Blood pressure 135/61, pulse 80, temperature 96.6 °F (35.9 °C), temperature source Axillary, resp. rate 20, height 179.1 cm (70.51\"), weight 106 kg (234 lb 6.4 oz), SpO2 95 %.  Vital signs are normal.  (NSR 80  BIPAP).    Output: Producing urine and producing stool.    HEENT: Normal HEENT exam.    Lungs:  Normal effort and normal respiratory rate.  No rales or decreased breath sounds.  (CTx3 removed 6/23  Room air)  Heart: Normal rate.  Regular rhythm.  (AV wires clipped)  Chest: Chest wall tenderness present.    Abdomen: Abdomen is soft and distended.  Bowel sounds are normal.   There is no abdominal tenderness.     Extremities: Decreased range of motion.  There is dependent edema.    Pulses: Distal pulses are intact.    Neurological: Patient is alert.  GCS score is 15.  (AOx3 today).    Pupils:  Pupils are equal, round, and reactive to light.    Skin:  Warm and dry.  (Midsternal incision, slight opening at base of incision minimal serous drainage  EVH CDI  CV site stable no hematoma)    Additional Instructions for the Follow-ups that You Need to Schedule     Call MD With Problems / Concerns   As directed      Instructions: Call office for uncontrolled pain, shortness of breath, fever/chills, chest pain, surgical incision drainage or redness at site.      Office [phone] business hours  Hospital [phone] after hours    Order Comments:  Instructions: Call office for uncontrolled pain, shortness of breath, fever/chills, chest pain, surgical incision drainage or redness at site.  Office [phone] business hours Hospital [phone] after hours          Discharge Follow-up with PCP   As directed       Currently Documented PCP:    Provider, No Known    PCP Phone Number:    None     Follow Up Details:  Follow up with primary care as scheduled         Discharge Follow-up with Specified Provider: Yani CHAO; 1 Week   As directed      " To:  Yani Allen APRN    Follow Up:  1 Week    Follow Up Details:  Next Wednesday at 10:00         Discharge Follow-up with Specified Provider: Nephrology Dr. Amaya as scheduled   As directed      To:  Nephrology Dr. Amaya as scheduled         Referral to Home Health   As directed      Face to Face Visit Date:  7/1/2020    Follow-up provider for Plan of Care?:  I will be treating the patient on an ongoing basis.  Please send me the Plan of Care for signature.    Follow-up provider:  AB FORD [[id number]]    Reason/Clinical Findings:  PO CABG    Describe mobility limitations that make leaving home difficult:  restrictions    Nursing/Therapeutic Services Requested:  Other (transition visit)    Frequency:  1 Week 1               Discharge Instructions: Discharge instructions include no heavy lifting anything greater than 10lbs for approximately 12 weeks.  No sex or driving for 3-6 weeks. Printed information given to the patient with advancement of activities weekly.  Risks and benefits of narcotic medications and weaning postoperatively have been discussed. Clean operative site with antibacterial soap/water, pat dry. Keep open to air unless draining, then may apply dry dressing.  No ointments or creams unless prescribed by provider. Signs and symptoms of infection including drainage from operative site, redness, swelling, with associated fever and/or chills notify [facility] V[facility] center immediately for wound check.  Patient verbalizes understanding of discharge instructions, all questions are answered, follow up appointments have been made, they are discharged home in stable condition.     Follow-up Appointments  Laurent Allen APRGAVIN  July 8th 10:00 AM    Test Results Pending at Discharge           This document has been electronically signed by Laurent Allen AGACNP-BC @  On July 1, 2020 13:26      Time: Discharge 45 min

## 2020-07-01 NOTE — PROGRESS NOTES
"[facility]  [address] [place] [address]. [address]  T - [phone]  F - [phone]     Progress Note          PATIENT  DEMOGRAPHICS   PATIENT NAME: Jose Barrett                      PHYSICIAN: Russ Amaya MD  : 1950  MRN: [id number]   LOS: 13 days    Patient Care Team:  Provider, No Known as PCP - General  Subjective   SUBJECTIVE   UO upto 1.3L. No soa       Objective   OBJECTIVE   Vital Signs  Temp:  [96.6 °F (35.9 °C)-97.7 °F (36.5 °C)] 96.6 °F (35.9 °C)  Heart Rate:  [74-81] 80  Resp:  [16-20] 20  BP: (121-138)/(58-68) 135/61    Flowsheet Rows      First Filed Value   Admission Height  179.1 cm (70.5\") Documented at 2020 0538   Admission Weight  111 kg (245 lb 2.4 oz) Documented at 2020 0538           I/O last 3 completed shifts:  In: 720 [P.O.:720]  Out: 1325 [Urine:1325]    PHYSICAL EXAM    Physical Exam   Constitutional: He appears well-developed.   HENT:   Head: Normocephalic.   Eyes: Pupils are equal, round, and reactive to light.   Cardiovascular: Normal rate, regular rhythm and normal heart sounds.   Pulmonary/Chest: Effort normal and breath sounds normal.   Abdominal: Soft. Bowel sounds are normal.   Musculoskeletal: He exhibits edema.   Neurological: He is alert. He exhibits normal muscle tone.       RESULTS   Results Review:    Results from last 7 days   Lab Units 20  0606 20  1104 20  0559  20  0547   SODIUM mmol/L 134* 135* 134*   < > 133*   POTASSIUM mmol/L 3.8 4.0 4.7   < > 3.9   CHLORIDE mmol/L 95* 97* 94*   < > 89*   CO2 mmol/L 27.0 26.0 24.0   < > 28.0   BUN mg/dL 68* 61* 94*   < > 71*   CREATININE mg/dL 5.85* 5.54* 7.43*   < > 5.92*   CALCIUM mg/dL 8.7 8.3* 8.5*   < > 8.3*   BILIRUBIN mg/dL  --   --   --   --  0.4   ALK PHOS U/L  --   --   --   --  75   ALT (SGPT) U/L  --   --   --   --  9   AST (SGOT) U/L  --   --   --   --  61*   GLUCOSE mg/dL 79 83 119*   < > 81    < > = values in this interval not displayed. "       Estimated Creatinine Clearance: 14.6 mL/min (A) (by C-G formula based on SCr of 5.85 mg/dL (H)).    Results from last 7 days   Lab Units 07/01/20  0606   PHOSPHORUS mg/dL 4.7*             Results from last 7 days   Lab Units 07/01/20  0606 06/26/20  0717 06/25/20  0547   WBC 10*3/mm3 18.66* 15.66* 14.45*   HEMOGLOBIN g/dL 8.6* 10.4* 10.7*   PLATELETS 10*3/mm3 304 357 350               Imaging Results (Last 24 Hours)     Procedure Component Value Units Date/Time    XR Chest Post CVA Port [[id number]] Collected:  06/30/20 1559     Updated:  06/30/20 1625    Narrative:         PROCEDURE: Single chest view AP    REASON FOR EXAM:tunnel cath , Z74.09 Other reduced mobility  Z74.09 Other reduced mobility Z78.9 Other specified health status  N17.9 Acute kidney failure, unspecified    FINDINGS: Comparison exam dated June 27, 2020. Right jugular  central venous catheter with tip overlying the SVC right atrial  orifice. Postsurgical changes with median sternotomy.  Cardiomegaly. A few scattered calcified lung parenchymal  granulomas consistent with old granulomatous disease. Left upper  lobe 2 small linear opacities. Lungs are otherwise clear.Pleural  spaces are normal. No acute osseous abnormality.      Impression:       1.  Right jugular central venous catheter with tip overlying the  SVC right atrial orifice.   2.  Cardiomegaly.  3.  Evidence of old granulomatous disease.  4.  Left upper lobe 2 small linear opacities suspicious for  discoid atelectasis.    Electronically signed by:  Richard Winkler MD  6/30/2020 4:23 PM CDT  Workstation: [id number]    US Guided Vascular Access [[id number]] Resulted:  06/30/20 1548     Updated:  06/30/20 1548    Narrative:       This procedure was auto-finalized with no dictation required.           MEDICATIONS      acetaminophen 1,000 mg Oral Q6H   amiodarone 400 mg Oral Q24H   aspirin 81 mg Oral Daily   atorvastatin 40 mg Oral Nightly   budesonide-formoterol 2 puff Inhalation BID - RT    clopidogrel 75 mg Oral Daily   famotidine 20 mg Oral Q12H   guaiFENesin 1,200 mg Oral Q12H   insulin aspart 0-14 Units Subcutaneous TID AC   insulin detemir 30 Units Subcutaneous Nightly   ipratropium-albuterol 3 mL Nebulization 4x Daily - RT   mupirocin 1 application Each Nare Daily   polyethylene glycol 17 g Oral Daily   senna-docusate sodium 2 tablet Oral BID   sodium chloride 10 mL Intravenous Q12H   sodium chloride 10 mL Intravenous Q12H   vitamin C 500 mg Oral BID          Assessment/Plan   ASSESSMENT / PLAN      Coronary artery disease involving native coronary artery of native heart with unstable angina pectoris (CMS/HCC)    Atherosclerotic heart disease of native coronary artery with unstable angina pectoris (CMS/HCC)    DWIGHT (acute kidney injury) (CMS/[facility])    1.  DWIGHT on CKD3- baseline creatinine is close to 1.5.  His ultrasound in the past admission was negative for any hydronephrosis.  He has a left renal simple cyst.  He also has BPH.  His urine analysis was negative for blood and albuminuria.     Concern for ATN/fluid overload. No urinary retention or hydro on u/s. Hd started 6/21. Hd now MWF. Out pt hd is arranged for mwf at noon. S/p tunnel cath. Good UO and likely heading for recovery. Check bmp in a week. Check 24 hr cr cl next week for recovery     2.  CAD- with recent non-ST elevation MI with multivessel disease on coronary angiogram.  He is now status post coronary artery bypass grafting x3.  His last EF is 35%.  He is currently on aspirin Plavix and metoprolol.  Patient is also on atorvastatin .      3.  HTN     4.  HLD- last LDL is 162.  HDL was low at 28.     5.  [employer] pneumoconiosis/COPD    6.  Confusion / delerium- improved    dispo discharge today           This document has been electronically signed by Russ Amaya MD on July 1, 2020 10:48

## 2020-07-01 NOTE — SIGNIFICANT NOTE
07/01/20 0942   Rehab Treatment   Discipline physical therapy assistant   Reason Treatment Not Performed unavailable for treatment  (pt off the floor @ HD @ present time)

## 2020-07-01 NOTE — SIGNIFICANT NOTE
07/01/20 1127   Rehab Treatment   Discipline occupational therapy assistant   Reason Treatment Not Performed unavailable for treatment   Pt in dialysis at this time

## 2020-07-01 NOTE — PLAN OF CARE
Problem: Patient Care Overview  Goal: Plan of Care Review  7/1/2020 1607 by Cinthia Potter, PTA  Outcome: Ongoing (interventions implemented as appropriate)  Flowsheets (Taken 7/1/2020 1420)  Plan of Care Reviewed With: spouse; patient  Outcome Summary: pt sup>sit with min assist, sit>sup with SBA, sit<>stand with SBA, pt ambulated 100` with RW & CGA. pt would benefit from 24/7 care & continued PT services @ D/C

## 2020-07-01 NOTE — THERAPY TREATMENT NOTE
Acute Care - Physical Therapy Treatment Note  [facility]     Patient Name: Jose Barrett  : 1950  MRN: [id number]  Today's Date: 2020  Onset of Illness/Injury or Date of Surgery: 20     Referring Physician: Wade Allen    Admit Date: 2020    Visit Dx:    ICD-10-CM ICD-9-CM   1. Impaired functional mobility, balance, gait, and endurance Z74.09 V49.89   2. Impaired mobility and ADLs Z74.09 V49.89    Z78.9    3. DWIGHT (acute kidney injury) (CMS/[facility]) N17.9 584.9   4. Surgical aftercare, circulatory system Z48.812 V58.73     Patient Active Problem List   Diagnosis   • Precordial pain   • NSTEMI (non-ST elevated myocardial infarction) (CMS/[facility])   • Essential hypertension   • COPD (chronic obstructive pulmonary disease) (CMS/[facility])   • Coronary artery disease involving native coronary artery of native heart with unstable angina pectoris (CMS/[facility])   • Atherosclerotic heart disease of native coronary artery with unstable angina pectoris (CMS/[facility])   • DWIGHT (acute kidney injury) (CMS/[facility])       Therapy Treatment    Rehabilitation Treatment Summary     Row Name 20 1420             Treatment Time/Intention    Discipline  physical therapy assistant  -TA      Document Type  therapy note (daily note)  -TA      Subjective Information  complains of;pain  -TA      Mode of Treatment  physical therapy  -TA      Therapy Frequency (PT Clinical Impression)  daily  -TA      Therapy Frequency (OT Eval)  daily  -TA      Patient Effort  adequate  -TA      Existing Precautions/Restrictions  sternal;fall  -TA      Recorded by [TA] Cinthia Potter, PTA 20 1606      Row Name 20 1420             Vital Signs    Pre Systolic BP Rehab  115  -TA      Pre Treatment Diastolic BP  53  -TA      Post Systolic BP Rehab  127  -TA      Post Treatment Diastolic BP  66  -TA      Pretreatment Heart Rate (beats/min)  86  -TA      Intratreatment Heart Rate (beats/min)  89  -TA      Posttreatment Heart Rate (beats/min)   90  -TA      Pre SpO2 (%)  94  -TA      O2 Delivery Pre Treatment  supplemental O2  -TA      Intra SpO2 (%)  95  -TA      O2 Delivery Intra Treatment  supplemental O2  -TA      Post SpO2 (%)  95  -TA      O2 Delivery Post Treatment  supplemental O2  -TA      Pre Patient Position  Supine  -TA      Post Patient Position  Supine  -TA      Recorded by [TA] Cinthia Potter, [facility] 20 1606      Row Name 20 1420             Cognitive Assessment/Intervention- PT/OT    Affect/Mental Status (Cognitive)  WFL  -TA      Orientation Status (Cognition)  oriented to;person;place   -TA      Follows Commands (Cognition)  follows one step commands;25-49% accuracy;50-74% accuracy;verbal cues/prompting required;repetition of directions required;physical/tactile prompts required;delayed response/completion;increased processing time needed;initiation impaired  -TA      Cognitive Function (Cognitive)  memory deficit  -TA      Memory Deficit (Cognitive)  moderate deficit  -TA      Personal Safety Interventions  fall prevention program maintained;muscle strengthening facilitated;nonskid shoes/slippers when out of bed;supervised activity  -TA      Recorded by [TA] Cinthia Potter, [facility] 20 1606      Row Name 20 1420             Safety Issues, Functional Mobility    Impairments Affecting Function (Mobility)  balance;cognition;endurance/activity tolerance;coordination;motor control;motor planning;grasp;pain;postural/trunk control;shortness of breath;strength  -TA      Recorded by [TA] Cinthia Potter, [facility] 20 1606      Row Name 20 1420             Bed Mobility Assessment/Treatment    Bed Mobility Assessment/Treatment  supine-sit;sit-supine  -TA      Supine-Sit [place] (Bed Mobility)  minimum assist (75% patient effort);1 person assist  -TA      Sit-Supine [place] (Bed Mobility)  supervision  -TA      Bed Mobility, Safety Issues  decreased use of arms for pushing/pulling;decreased use of legs for  bridging/pushing  -TA      Assistive Device (Bed Mobility)  bed rails;head of bed elevated  -TA      Recorded by [TA] Cinthia Potter, PTA 07/01/20 1606      Row Name 07/01/20 1420             Transfer Assessment/Treatment    Transfer Assessment/Treatment  stand-sit transfer;sit-stand transfer  -TA      Comment (Transfers)  required v.c.s for use of CH  -TA      Recorded by [TA] Cinthia Potter, PTA 07/01/20 1606      Row Name 07/01/20 1420             Sit-Stand Transfer    Sit-Stand [place] (Transfers)  supervision;verbal cues  -TA      Assistive Device (Sit-Stand Transfers)  walker, front-wheeled  -TA      Recorded by [TA] Cinthia Potter, PTA 07/01/20 1606      Row Name 07/01/20 1420             Stand-Sit Transfer    Stand-Sit [place] (Transfers)  supervision;verbal cues  -TA      Assistive Device (Stand-Sit Transfers)  walker, front-wheeled  -TA      Recorded by [TA] Cinthia Potter, PTA 07/01/20 1606      Row Name 07/01/20 1420             Gait/Stairs Assessment/Training    Gait/Stairs Assessment/Training  gait/ambulation independence;distance ambulated;gait pattern  -TA      [place] Level (Gait)  contact guard  -TA      Assistive Device (Gait)  walker, front-wheeled  -TA      Distance in Feet (Gait)  100`  -TA      Bilateral Gait Deviations  forward flexed posture  -TA      Recorded by [TA] Cinthia Potter, PTA 07/01/20 1606      Row Name 07/01/20 1420             Lower Extremity Seated Therapeutic Exercise    Performed, Seated Lower Extremity (Therapeutic Exercise)  hip abduction/adduction;hip flexion/extension;ankle dorsiflexion/plantarflexion;LAQ (long arc quad), knee extension  -TA      Exercise Type, Seated Lower Extremity (Therapeutic Exercise)  AROM (active range of motion)  -TA      Expected Outcomes, Seated Lower Extremity (Therapeutic Exercise)  improve functional tolerance, self-care activity;improve motor control;improve functional stability;strengthen normal movement patterns  -TA       Sets/Reps Detail, Seated Lower Extremity (Therapeutic Exercise)  10-15  -TA      Recorded by [TA] Cinthia Potter, PTA 07/01/20 1606      Row Name 07/01/20 1420             Positioning and Restraints    Pre-Treatment Position  in bed  -TA      Post Treatment Position  bed  -TA      In Bed  supine;call light within reach;exit alarm on;with family/caregiver  -TA      Recorded by [TA] Cinthia Potter, PTA 07/01/20 1606      Row Name 07/01/20 1420             Pain Scale: Numbers Pre/Post-Treatment    Pain Scale: Numbers, Pretreatment  5/10  -TA      Pain Scale: Numbers, Post-Treatment  0/10 - no pain  -TA      Pain Location - Orientation  incisional  -TA      Recorded by [TA] Cinthia Potter, PTA 07/01/20 1606      Row Name 07/01/20 1420             Hearing Assessment    Hearing Status  hearing impairment, bilaterally  -TA      Recorded by [TA] Cinthia Potter, PTA 07/01/20 1606      Row Name 07/01/20 1420             Vision Assessment/Intervention    Visual Impairment/Limitations  corrective lenses for reading  -TA      Recorded by [TA] Cinthia Potter, PTA 07/01/20 1606      Row Name                Wound 06/18/20 sternal Incision    Wound - Properties Group Date first assessed: 06/18/20 [LL] Present on Hospital Admission: N [LL] Location: sternal [LL] Primary Wound Type: Incision [LL] Recorded by:  [LL] Franco Stiles RN 06/18/20 0836    Row Name                Wound 06/18/20 Left anterior;lower;proximal leg Incision    Wound - Properties Group Date first assessed: 06/18/20 [LL] Side: Left [LL] Orientation: anterior;lower;proximal [LL] Location: leg [LL] Primary Wound Type: Incision [LL] Recorded by:  [LL] Franco Stiles RN 06/18/20 0838    Row Name 07/01/20 1420             Coping    Observed Emotional State  calm;cooperative  -TA      Verbalized Emotional State  acceptance  -TA      Recorded by [TA] Cinthia Potter, PTA 07/01/20 1606      Row Name 07/01/20 1420             Outcome Summary/Treatment Plan (OT)     Anticipated Discharge Disposition (OT)  anticipate therapy at next level of care  -TA      Recorded by [TA] Cinthia Potter, PTA 07/01/20 1606      Row Name 07/01/20 1420             Outcome Summary/Treatment Plan (PT)    Daily Summary of Progress (PT)  progress towards functional goals is fair  -TA      Anticipated Equipment Needs at Discharge (PT)  front wheeled walker  -TA      Anticipated Discharge Disposition (PT)  transitional care;inpatient rehabilitation facility;home with 24/7 care;home with home health  -TA      Recorded by [TA] Cinthia Potter, PTA 07/01/20 1606        User Key  (r) = Recorded By, (t) = Taken By, (c) = Cosigned By    Initials Name Effective Dates Discipline    TA Cinthia Potter, PTA 03/07/18 -  PT    LL Franco Stiles RN 02/19/20 -  Nurse          Wound 06/18/20 sternal Incision (Active)   Dressing Appearance dry;intact 6/30/2020  7:15 PM   Closure [facility] 6/30/2020  7:15 PM       Wound 06/18/20 Left anterior;lower;proximal leg Incision (Active)   Dressing Appearance open to air 6/30/2020  7:15 PM   Closure Liquid skin adhesive 6/30/2020  7:15 PM   Periwound ecchymotic 6/30/2020  7:15 PM       Rehab Goal Summary     Row Name 07/01/20 1420             Bed Mobility Goal 1 (PT)    Activity/Assistive Device (Bed Mobility Goal 1, PT)  bed mobility activities, all  -TA      [place] Level/Cues Needed (Bed Mobility Goal 1, PT)  conditional independence  -TA      Time Frame (Bed Mobility Goal 1, PT)  5 days  -TA      Barriers (Bed Mobility Goal 1, PT)  pain/cognition  -TA      Progress/Outcomes (Bed Mobility Goal 1, PT)  goal not met  -TA         Transfer Goal 1 (PT)    Activity/Assistive Device (Transfer Goal 1, PT)  bed-to-chair/chair-to-bed  -TA      [place] Level/Cues Needed (Transfer Goal 1, PT)  contact guard assist;supervision required  -TA      Time Frame (Transfer Goal 1, PT)  1 week;by discharge  -TA      Progress/Outcome (Transfer Goal 1, PT)  goal not met  -TA         Gait  Training Goal 1 (PT)    Activity/Assistive Device (Gait Training Goal 1, PT)  gait (walking locomotion);assistive device use;decrease fall risk  -TA      [place] Level (Gait Training Goal 1, PT)  contact guard assist;standby assist  -TA      Distance (Gait Goal 1, PT)  [id number] or more w/VSS  -TA      Time Frame (Gait Training Goal 1, PT)  5 days  -TA      Progress/Outcome (Gait Training Goal 1, PT)  goal not met;goal revised this date  -TA         Patient Education Goal (PT)    Activity (Patient Education Goal, PT)  indep in heart hugger use for  mobility  -TA      [place]/Cues/Accuracy (Memory Goal 2, PT)  demonstrates adequately  -TA      Time Frame (Patient Education Goal, PT)  by discharge  -TA      Barriers (Patient Education Goal, PT)  cognitive  -TA      Progress/Outcome (Patient Education Goal, PT)  goal not met  -TA        User Key  (r) = Recorded By, (t) = Taken By, (c) = Cosigned By    Initials Name Provider Type Discipline    Cinthia Tracey, PTA Physical Therapy Assistant PT          Physical Therapy Education                 Title: PT OT SLP Therapies (In Progress)     Topic: Physical Therapy (Done)     Point: Mobility training (Done)     Description:   Instruct learner(s) on safety and technique for assisting patient out of bed, chair or wheelchair.  Instruct in the proper use of assistive devices, such as walker, crutches, cane or brace.              Patient Friendly Description:   It's important to get you on your feet again, but we need to do so in a way that is safe for you. Falling has serious consequences, and your personal safety is the most important thing of all.        When it's time to get out of bed, one of us or a family member will sit next to you on the bed to give you support.     If your doctor or nurse tells you to use a walker, crutches, a cane, or a brace, be sure you use it every time you get out of bed, even if you think you don't need it.    Learning Progress  Summary           Patient Acceptance, E,TB, VU,NR by LN at 6/29/2020 1618    Comment:  use of hh with t/f's    Acceptance, E,TB, VU,NR by LN at 6/28/2020 1630    Comment:  reviewed and copies given of hep and cardiac/sternal precautions    Acceptance, E,TB, VU,NR by LN at 6/27/2020 1628    Acceptance, D,E, NR by GB at 6/26/2020 1524    Comment:  mobility    Acceptance, D, NR by GB at 6/20/2020 1322    Comment:  POC; use of heart hugger, sit to stand w/out using UEs and use of heart hugger instead                   Point: Home exercise program (Done)     Description:   Instruct learner(s) on appropriate technique for monitoring, assisting and/or progressing patient with therapeutic exercises and activities.              Learning Progress Summary           Patient Acceptance, E,TB, VU,NR by LN at 6/28/2020 1630    Comment:  reviewed and copies given of hep and cardiac/sternal precautions    Acceptance, D,E, NR by GB at 6/26/2020 1524    Comment:  mobility    Acceptance, D, NR by GB at 6/20/2020 1322    Comment:  POC; use of heart hugger, sit to stand w/out using UEs and use of heart hugger instead                   Point: Body mechanics (Done)     Description:   Instruct learner(s) on proper positioning and spine alignment for patient and/or caregiver during mobility tasks and/or exercises.              Learning Progress Summary           Patient Acceptance, E,TB, VU,NR by LN at 6/28/2020 1630    Comment:  reviewed and copies given of hep and cardiac/sternal precautions    Acceptance, D,E, NR by GB at 6/26/2020 1524    Comment:  mobility    Acceptance, D, NR by GB at 6/20/2020 1322    Comment:  POC; use of heart hugger, sit to stand w/out using UEs and use of heart hugger instead                   Point: Precautions (Done)     Description:   Instruct learner(s) on prescribed precautions during mobility and gait tasks              Learning Progress Summary           Patient Acceptance, E,TB, VU,NR by LN at 6/29/2020  1618    Comment:  use of hh with t/f's    Acceptance, E,TB, VU,NR by LN at 6/28/2020 1630    Comment:  reviewed and copies given of hep and cardiac/sternal precautions    Acceptance, E,TB, VU,NR by LN at 6/27/2020 1628    Acceptance, D,E, NR by GB at 6/26/2020 1524    Comment:  mobility    Acceptance, D, NR by GB at 6/20/2020 1322    Comment:  POC; use of heart hugger, sit to stand w/out using UEs and use of heart hugger instead                               User Key     Initials Effective Dates Name Provider Type Discipline     04/03/18 -  Yola Hebert, PT Physical Therapist PT    PHILIP 03/07/18 -  Lise Angeles, PTA Physical Therapy Assistant PT                PT Recommendation and Plan  Anticipated Discharge Disposition (PT): transitional care, inpatient rehabilitation facility, home with 24/7 care, home with home health  Therapy Frequency (PT Clinical Impression): daily  Outcome Summary/Treatment Plan (PT)  Daily Summary of Progress (PT): progress towards functional goals is fair  Anticipated Equipment Needs at Discharge (PT): front wheeled walker  Anticipated Discharge Disposition (PT): transitional care, inpatient rehabilitation facility, home with 24/7 care, home with home health  Plan of Care Reviewed With: spouse, patient  Outcome Summary: pt sup>sit with min assist, sit>sup with SBA, sit<>stand with SBA, pt ambulated 100` with RW & CGA. pt would benefit from 24/7 care & continued PT services @ D/C  Outcome Measures     Row Name 07/01/20 1600 06/30/20 1320 06/29/20 1555       How much help from another person do you currently need...    Turning from your back to your side while in flat bed without using bedrails?  3  -TA  3  -JW  3  -LN    Moving from lying on back to sitting on the side of a flat bed without bedrails?  3  -TA  3  -JW  3  -LN    Moving to and from a bed to a chair (including a wheelchair)?  3  -TA  3  -JW  3  -LN    Standing up from a chair using your arms (e.g., wheelchair,  bedside chair)?  3  -TA  3  -JW  3  -LN    Climbing 3-5 steps with a railing?  2  -TA  2  -JW  2  -LN    To walk in hospital room?  3  -TA  3  -JW  3  -LN    AM-PAC 6 Clicks Score (PT)  17  -TA  17  -JW  17  -LN       Functional Assessment    Outcome Measure Options  AM-PAC 6 Clicks Basic Mobility (PT)  -TA  AM-PAC 6 Clicks Basic Mobility (PT)  -JW  AM-PAC 6 Clicks Basic Mobility (PT)  -LN      User Key  (r) = Recorded By, (t) = Taken By, (c) = Cosigned By    Initials Name Provider Type    JW Pilar Waldrop, PTA Physical Therapy Assistant    TA Cinthia Potter, PTA Physical Therapy Assistant    LN Lise Angeles PTA Physical Therapy Assistant         Time Calculation:   PT Charges     Row Name 07/01/20 1609             Time Calculation    Start Time  1420  -TA      Stop Time  1449  -TA      Time Calculation (min)  29 min  -TA      PT Received On  07/01/20  -TA         Time Calculation- PT    Total Timed Code Minutes- PT  29 minute(s)  -TA        User Key  (r) = Recorded By, (t) = Taken By, (c) = Cosigned By    Initials Name Provider Type    Cinthia Tracey, ANNA Physical Therapy Assistant        Therapy Charges for Today     Code Description Service Date Service Provider Modifiers Qty    94572702802 HC GAIT TRAINING EA 15 MIN 7/1/2020 Cinthia Potter, PTA GP 1    37341921823 HC PT THER PROC EA 15 MIN 7/1/2020 Cinthia Potter PTA GP 1          PT G-Codes  Outcome Measure Options: AM-PAC 6 Clicks Basic Mobility (PT)  AM-PAC 6 Clicks Score (PT): 17  AM-PAC 6 Clicks Score (OT): 12    Cinthia Potter PTA  7/1/2020

## 2020-07-01 NOTE — PLAN OF CARE
Problem: Patient Care Overview  Goal: Plan of Care Review  Outcome: Ongoing (interventions implemented as appropriate)  Flowsheets  Taken 7/1/2020 0501  Progress: improving  Outcome Summary: Continues to ambulate well. Moderate BM this shift. VSS. Tolerating tunnel cath, femoral site WDL. Dialysis today and then possible D/C home. Wife curious if staff can place pt. in shower before discharge.  Taken 6/30/2020 1915  Plan of Care Reviewed With: patient

## 2020-07-01 NOTE — PROGRESS NOTES
"  Cardiothoracic - Vascular Surgery Daily Note        LOS: 13 days   Patient Care Team:  Provider, No Known as PCP - General     POD13  CORONARY ARTERY BYPASS GRAFTING X 3   ENDOSCOPIC vein HARVEST   LEFT INTERNAL MAMMARY ARTERY TO LEFT ANTERIOR DESCENDING CORONARY ARTERY  SAPHENOUS VEIN GRAFT FROM ASCENDING AORTA TO SECOND MARGINAL BRANCH  SAPHENOUS VEIN GRAFT FROM ASCENDING AORTA TO POSTERIOR DESCENDING CORONARY ARTERY   Negative pressure wound therapy (prevena)  Place intraaortic balloon pump    Chief Complaint: Post surgical pain      Subjective     The following portions of the patient's history were reviewed and updated as appropriate: allergies, current medications, past family history, past medical history, past social history, past surgical history and problem list.     Subjective:  Symptoms:  He reports chest pain.  No shortness of breath.    Diet:  Poor intake.    Activity level: Impaired due to weakness.    Pain:  He complains of pain that is moderate.  He reports pain is improving.  Pain is well controlled and requiring pain medication.          Objective     Vital Signs  Temp:  [96.6 °F (35.9 °C)-97.7 °F (36.5 °C)] 96.6 °F (35.9 °C)  Heart Rate:  [74-81] 80  Resp:  [16-20] 20  BP: (121-138)/(58-68) 135/61  Body mass index is 33.15 kg/m².    Intake/Output Summary (Last 24 hours) at 7/1/2020 1117  Last data filed at 7/1/2020 0700  Gross per 24 hour   Intake 480 ml   Output 750 ml   Net -270 ml     No intake/output data recorded.    Wt Readings from Last 3 Encounters:   07/01/20 106 kg (234 lb 6.4 oz)   06/11/20 112 kg (246 lb 3.2 oz)         Physical Exam   Objective:  General Appearance:  Comfortable, in no acute distress, well-appearing and not in pain.    Vital signs: (most recent): Blood pressure 135/61, pulse 80, temperature 96.6 °F (35.9 °C), temperature source Axillary, resp. rate 20, height 179.1 cm (70.51\"), weight 106 kg (234 lb 6.4 oz), SpO2 95 %.  Vital signs are normal.  (NSR 80  BIPAP).    "   Output: Producing urine and producing stool.    HEENT: Normal HEENT exam.    Lungs:  Normal effort and normal respiratory rate.  No rales or decreased breath sounds.  (CTx3 removed 6/23  Room air)  Heart: Normal rate.  Regular rhythm.  (AV wires IT)  Chest: Chest wall tenderness present.    Abdomen: Abdomen is soft and distended.  Bowel sounds are normal.   There is no abdominal tenderness.     Extremities: Decreased range of motion.  There is dependent edema.    Pulses: Distal pulses are intact.    Neurological: Patient is alert.  GCS score is 15.  (AOx3 today).    Pupils:  Pupils are equal, round, and reactive to light.    Skin:  Warm and dry.  (Midsternal incision, slight opening at base of incision minimal serous drainage  EVH CDI  CV site stable no hematoma)              Results Review:    Lab Results   Component Value Date    WBC 18.66 (H) 07/01/2020    HGB 8.6 (L) 07/01/2020    HCT 26.3 (L) 07/01/2020    MCV 96.3 07/01/2020     07/01/2020     Lab Results   Component Value Date    GLUCOSE 79 07/01/2020    BUN 68 (H) 07/01/2020    CREATININE 5.85 (H) 07/01/2020    EGFRIFNONA 10 (L) 07/01/2020    EGFRIFAFRI  07/01/2020      Comment:      <15 Indicative of kidney failure.    BCR 11.6 07/01/2020    K 3.8 07/01/2020    CO2 27.0 07/01/2020    CALCIUM 8.7 07/01/2020    ALBUMIN 3.50 07/01/2020    AST 61 (H) 06/25/2020    ALT 9 06/25/2020       Calcium Calcium   Date/Time Value Ref Range Status   07/01/2020 0606 8.7 8.6 - 10.5 mg/dL Final   06/30/2020 1104 8.3 (L) 8.6 - 10.5 mg/dL Final   06/29/2020 0559 8.5 (L) 8.6 - 10.5 mg/dL Final      Magnesium No results found for: MG     Imaging Results (Last 24 Hours)     Procedure Component Value Units Date/Time    XR Chest Post CVA Port [[id number]] Collected:  06/30/20 1559     Updated:  06/30/20 2951    Narrative:         PROCEDURE: Single chest view AP    REASON FOR EXAM:tunnel cath , Z74.09 Other reduced mobility  Z74.09 Other reduced mobility Z78.9 Other  specified health status  N17.9 Acute kidney failure, unspecified    FINDINGS: Comparison exam dated June 27, 2020. Right jugular  central venous catheter with tip overlying the SVC right atrial  orifice. Postsurgical changes with median sternotomy.  Cardiomegaly. A few scattered calcified lung parenchymal  granulomas consistent with old granulomatous disease. Left upper  lobe 2 small linear opacities. Lungs are otherwise clear.Pleural  spaces are normal. No acute osseous abnormality.      Impression:       1.  Right jugular central venous catheter with tip overlying the  SVC right atrial orifice.   2.  Cardiomegaly.  3.  Evidence of old granulomatous disease.  4.  Left upper lobe 2 small linear opacities suspicious for  discoid atelectasis.    Electronically signed by:  Richard Winkler MD  6/30/2020 4:23 PM CDT  Workstation: [id number]    US Guided Vascular Access [[id number]] Resulted:  06/30/20 1548     Updated:  06/30/20 1548    Narrative:       This procedure was auto-finalized with no dictation required.                                  acetaminophen 1,000 mg Oral Q6H   amiodarone 400 mg Oral Q24H   aspirin 81 mg Oral Daily   atorvastatin 40 mg Oral Nightly   budesonide-formoterol 2 puff Inhalation BID - RT   clopidogrel 75 mg Oral Daily   epoetin paul/paul-epbx 10,000 Units Intravenous Once per day on Mon Wed Fri   famotidine 20 mg Oral Q12H   guaiFENesin 1,200 mg Oral Q12H   insulin aspart 0-14 Units Subcutaneous TID AC   insulin detemir 30 Units Subcutaneous Nightly   ipratropium-albuterol 3 mL Nebulization 4x Daily - RT   mupirocin 1 application Each Nare Daily   polyethylene glycol 17 g Oral Daily   senna-docusate sodium 2 tablet Oral BID   sodium chloride 10 mL Intravenous Q12H   sodium chloride 10 mL Intravenous Q12H   vitamin C 500 mg Oral BID        Lab Results   Component Value Date    GLUCOSE 79 07/01/2020    CALCIUM 8.7 07/01/2020     (L) 07/01/2020    K 3.8 07/01/2020    CO2 27.0 07/01/2020    CL 95  (L) 07/01/2020    BUN 68 (H) 07/01/2020    CREATININE 5.85 (H) 07/01/2020    EGFRIFAFRI  07/01/2020      Comment:      <15 Indicative of kidney failure.    EGFRIFNONA 10 (L) 07/01/2020    BCR 11.6 07/01/2020    ANIONGAP 12.0 07/01/2020       ASSESSMENT/PLAN     Satisfactory Post op  Progressing well, participating with physical therapy, doing well. Plata out, voiding  independently.    DWIGHT  S/P tunneled catheter placement 6/30.  Will require outpatient dialysis.  Currently set up MWF at [facility] at noon.    ASCVD  DAPT, hold ACE r/t renal impairment, continue statin.  Amiodarone taper    Acute blood loss anemia  H/H remains stable post operatively.      Post operative pain  Controlled acetaminophen, immediate release oxycodone, minimal requirement for narcotics     Post operative hyperglycemia  SSI/Levemir controlled    Rehab  up in chair for meals, PT/OT, ambulate TID     Respiratory  Nebs, room air, symbicort, aggressive pulmonary toileting     Disposition  Stable for D/C home with HH transition visit and outpatient dialysis scheduled MWF            This document has been electronically signed by IDRIS Lorenzo-BC @  On July 1, 2020 11:17

## 2020-07-02 ENCOUNTER — READMISSION MANAGEMENT (OUTPATIENT)
Dept: CALL CENTER | Facility: HOSPITAL | Age: 70
End: 2020-07-02

## 2020-07-02 NOTE — OUTREACH NOTE
Prep Survey      Responses   [facility] facility patient discharged from?  [place]   Is LACE score < 7 ?  No   Eligibility  Readm Mgmt   Discharge diagnosis  CAD with stable angina pectoris   Does the patient have one of the following disease processes/diagnoses(primary or secondary)?  Cardiothoracic surgery   Does the patient have Home health ordered?  Yes   What is the Home health agency?   [facility]    Comments regarding appointments  RW per [facility]    Medication alerts for this patient  Plavix, Amiodarone   Prep survey completed?  Yes          Celina Lira RN

## 2020-07-03 ENCOUNTER — READMISSION MANAGEMENT (OUTPATIENT)
Dept: CALL CENTER | Facility: HOSPITAL | Age: 70
End: 2020-07-03

## 2020-07-03 NOTE — OUTREACH NOTE
CT Surgery Week 1 Survey      Responses   [facility] patient discharged from?  [place]   Does the patient have one of the following disease processes/diagnoses(primary or secondary)?  Cardiothoracic surgery   Is there a successful TCM telephone encounter documented?  No   Week 1 attempt successful?  No   Unsuccessful attempts  Attempt 1            Myrna Marrufo RN

## 2020-07-07 ENCOUNTER — READMISSION MANAGEMENT (OUTPATIENT)
Dept: CALL CENTER | Facility: HOSPITAL | Age: 70
End: 2020-07-07

## 2020-07-07 NOTE — OUTREACH NOTE
CT Surgery Week 1 Survey      Responses   [facility] patient discharged from?  [place]   Does the patient have one of the following disease processes/diagnoses(primary or secondary)?  Cardiothoracic surgery   Is there a successful TCM telephone encounter documented?  No   Week 1 attempt successful?  No   Unsuccessful attempts  Attempt 2            Codie Garrett RN

## 2020-07-08 ENCOUNTER — OFFICE VISIT (OUTPATIENT)
Dept: CARDIAC SURGERY | Facility: CLINIC | Age: 70
End: 2020-07-08

## 2020-07-08 VITALS
BODY MASS INDEX: 30.88 KG/M2 | SYSTOLIC BLOOD PRESSURE: 123 MMHG | OXYGEN SATURATION: 96 % | HEIGHT: 73 IN | WEIGHT: 233 LBS | DIASTOLIC BLOOD PRESSURE: 70 MMHG | HEART RATE: 89 BPM | TEMPERATURE: 98.2 F

## 2020-07-08 DIAGNOSIS — N17.9 AKI (ACUTE KIDNEY INJURY) (HCC): ICD-10-CM

## 2020-07-08 DIAGNOSIS — Z48.812 SURGICAL AFTERCARE, CIRCULATORY SYSTEM: Primary | ICD-10-CM

## 2020-07-08 DIAGNOSIS — I25.110 CORONARY ARTERY DISEASE INVOLVING NATIVE CORONARY ARTERY OF NATIVE HEART WITH UNSTABLE ANGINA PECTORIS (HCC): ICD-10-CM

## 2020-07-08 PROCEDURE — 99024 POSTOP FOLLOW-UP VISIT: CPT | Performed by: NURSE PRACTITIONER

## 2020-07-08 NOTE — PATIENT INSTRUCTIONS
"Post-Operative Activities after heart surgery are as follows.  No driving until 2-3 weeks post-op, may ambulate as much as desired with periods of rest during the day.  Recreational walks outdoors are helpful for mood if weather permits.  No lifting anything over 10lbs until 6 weeks post op as this may strain the breast bone.  You should not lift anything exceeding 20lbs until 12 weeks post op.  Continue to shower daily, poor soap/rinse/and pat dry incision sites, do not scrub.  You can start household chores araound 2-3 weeks post op however, no vacuuming shoveling, gardening, mopping, or laundry until 12 weeks post op.  Use common sense in choosing activities, activities should not cause strain.  Alternate periods of activity with periods of rest.  Please refer to your \"Moving Right Along Book\" for specific details.  If leg swelling occurs elevate the extremity in a reclined position and use support hose as needed.      Chest film, labs, ekg next week.  Arrive 20 minutes early for appointment.    "

## 2020-07-09 ENCOUNTER — READMISSION MANAGEMENT (OUTPATIENT)
Dept: CALL CENTER | Facility: HOSPITAL | Age: 70
End: 2020-07-09

## 2020-07-09 NOTE — OUTREACH NOTE
CT Surgery Week 1 Survey      Responses   [facility] patient discharged from?  [place]   Does the patient have one of the following disease processes/diagnoses(primary or secondary)?  Cardiothoracic surgery   Is there a successful TCM telephone encounter documented?  No   Week 1 attempt successful?  No   Unsuccessful attempts  Attempt 3            Codie Quintanilla RN

## 2020-07-13 PROBLEM — Z48.812 SURGICAL AFTERCARE, CIRCULATORY SYSTEM: Status: ACTIVE | Noted: 2020-07-13

## 2020-07-13 NOTE — PROGRESS NOTES
CVTS Office Progress Note     7/13/2020    Jose Barrett  1950    Chief Complaint:    Chief Complaint   Patient presents with   • Follow-up     1 wk post op       HPI:      PCP:  Provider, No Known  Cardiology:  Dr. Petersen  Nephrology:  Dr. Amaya [facility] [place]     69 y.o. male with HTN(stable, increased risk stroke, rupture), Hyperlipidemia(stable, increased risk cardiovascular events), Obesity(uncontrolled, increased risk cardiovascular events), Smoker(uncontrolled, increased risk cardiovascular events), COPD(stable, increased risk pulmonary complications) and Chronic Kidney Disease(DWIGHT post cath, increased risk renal failure)  Black lung disease.  smokes .25 PPD.  Chest pain with exertion after dealing with his tractor getting stuck in a ditch and walking up hill. He reports the pain has not recurred. He presented for inpatient evaluation with positive troponin. Cardiac cath demonstrated severe multivessel disease and depressed ejection fraction 35%.  Underwent CABGx3 with IABP post operatively.  Complicated post operative course with superimposed DWIGHT on CKD3 requiring intermediate term dialysis.  Tunnel catheter was placed day before discharge.  Reports today in post operative follow up after hospital discharge.  Doing well, reports making much more urine, nephrology following labs as outpatient for evidence of renal recovery.  Mild distal midsternal incision dehiscence, covered with keflex at time of discharge. Permissive HTN with current renal function.    6/2020 Carotid Duplex: LEIGHA 0-49% mPSV 114c/s Ratio 1.6, LICA 50-69% mPSV 127c/s Ratio 2.1, Antegrade vertebrals  6/2020 TTE: LV moderate dilation, mild MR, EF 35%, LVDD 65mm, LVDS 57mm  6/2020 LHC: LM 50%, Ostial LAD 90%, CX 85%, RCA 99% mid, PLM 80%    6/2020 CABGx3 LIMA-LAD, SVG-OM2, SVG-PDA, IABP insertion with perclose    The following portions of the patient's history were reviewed and updated as appropriate: allergies, current medications,  past family history, past medical history, past social history, past surgical history and problem list.  Recent images independently reviewed.  Available laboratory values reviewed.    PMH:  Past Medical History:   Diagnosis Date   • Arthritis    • Black lung disease (CMS/[facility])    • COPD (chronic obstructive pulmonary disease) (CMS/[facility])    • Heart bloc    • Hypertension      Past Surgical History:   Procedure Laterality Date   • CARDIAC CATHETERIZATION N/A 6/8/2020    Procedure: Left Heart Cath;  Surgeon: April Petersen MD;  Location: [facility] CATH INVASIVE LOCATION;  Service: Cardiology;  Laterality: N/A;   • CORONARY ARTERY BYPASS GRAFT N/A 6/18/2020    Procedure: CORONARY ARTERY BYPASS GRAFTING X 3 UTILIZING THE LEFT INTERNAL MAMMARY ARTERY AND ENDOSCOPICALLY HARVESTED VEIN FROM THE LEFT LEG    (CELL SAVER);  Surgeon: Edi Donis MD;  Location: [facility] OR;  Service: Cardiothoracic;  Laterality: N/A;  Leg Incision: 0800  Chest Incision: 0807  Vein out: 0919  Vein prepped: 0954  On bypass: 1019  Off bypass 1246     • TONSILLECTOMY       Family History   Problem Relation Age of Onset   • Heart disease Mother         MI   • No Known Problems Father      Social History     Tobacco Use   • Smoking status: Former Smoker     Packs/day: 0.25     Types: Cigarettes   • Smokeless tobacco: Never Used   Substance Use Topics   • Alcohol use: Never     Frequency: Never   • Drug use: Never       ALLERGIES:  No Known Allergies      MEDICATIONS:    Current Outpatient Medications:   •  albuterol sulfate  (90 Base) MCG/ACT inhaler, Inhale 2 puffs Every 4 (Four) Hours As Needed for Wheezing., Disp: , Rfl:   •  amiodarone (PACERONE) 200 MG tablet, Take 2 tablets (400 mg) by mouth daily for 7 days, then 1 tablet (200 mg) daily for 14 days., Disp: 28 tablet, Rfl: 0  •  aspirin 81 MG chewable tablet, Chew 81 mg Daily., Disp: , Rfl:   •  atorvastatin (LIPITOR) 40 MG tablet, Take 1 tablet by mouth Every Night., Disp: 30  tablet, Rfl: 0  •  clopidogrel (PLAVIX) 75 MG tablet, Take 1 tablet by mouth Daily., Disp: 30 tablet, Rfl: 11  •  famotidine (PEPCID) 20 MG tablet, Take 1 tablet by mouth Every 12 (Twelve) Hours., Disp: 30 tablet, Rfl: 3  •  Fluticasone Furoate-Vilanterol (Breo Ellipta) 100-25 MCG/INH inhaler, Inhale 1 puff Daily., Disp: , Rfl:   •  oxyCODONE-acetaminophen (PERCOCET) 5-325 MG per tablet, Take 2 tablets by mouth Every 4 (Four) Hours As Needed for post-surgical pain., Disp: 36 tablet, Rfl: 0  •  polyethylene glycol (MIRALAX) 17 g pack packet, Dissolve 1 packet (17 g) in 4 to 8 ounces of liquid and drink by mouth Daily As Needed for constipation., Disp: 7 each, Rfl: 0  •  sennosides-docusate (PERICOLACE) 8.6-50 MG per tablet, Take 2 tablets by mouth 2 (Two) Times a Day., Disp: , Rfl:   •  vitamin C (VITAMIN C) 500 MG tablet, Take 1 tablet by mouth 2 (Two) Times a Day., Disp: , Rfl:       Review of Systems   Constitution: Positive for malaise/fatigue. Negative for decreased appetite, fever, weight gain and weight loss.   HENT: Negative for hearing loss, hoarse voice and sore throat.    Eyes: Negative for blurred vision, visual disturbance and visual halos.   Cardiovascular: Positive for chest pain (Sternal Discomfort). Negative for dyspnea on exertion, leg swelling and palpitations.   Respiratory: Negative for cough, shortness of breath and sputum production.    Endocrine: Positive for cold intolerance. Negative for polyuria.   Hematologic/Lymphatic: Negative for bleeding problem. Bruises/bleeds easily.        Does not report S/S of adverse bleeding event including nose bleeds, hematuria, melena, gingival bleeding, or hematemesis.   Skin: Negative for color change, nail changes, poor wound healing and suspicious lesions.   Musculoskeletal: Positive for back pain. Negative for joint pain and myalgias.   Gastrointestinal: Negative for abdominal pain, constipation, diarrhea, hematemesis, melena, nausea and vomiting.  "  Genitourinary: Negative for flank pain, nocturia and urgency.   Neurological: Negative for brief paralysis, focal weakness, light-headedness, loss of balance, numbness, paresthesias and weakness.        No amaurosis fugax, TIA, or CVA.     Psychiatric/Behavioral: Negative for altered mental status, depression, suicidal ideas and thoughts of violence.   Allergic/Immunologic: Negative for hives and persistent infections.         Vitals:    07/08/20 1011   BP: 123/70   BP Location: Left arm   Patient Position: Sitting   Cuff Size: Adult   Pulse: 89   Temp: 98.2 °F (36.8 °C)   TempSrc: Temporal   SpO2: 96%   Weight: 106 kg (233 lb)   Height: 185.4 cm (73\")     Physical Exam   Constitutional: He is oriented to person, place, and time. He appears well-developed and well-nourished.   HENT:   Head: Normocephalic and atraumatic.   Mouth/Throat: Oropharynx is clear and moist.   TC   Eyes: Pupils are equal, round, and reactive to light. Conjunctivae and EOM are normal.   Neck: Normal range of motion. Neck supple.   Cardiovascular: Normal rate and intact distal pulses.   Pulmonary/Chest: Effort normal and breath sounds normal. No respiratory distress.   Abdominal: Soft. Bowel sounds are normal. He exhibits distension (chronic).   Musculoskeletal: Normal range of motion. He exhibits tenderness (chest). He exhibits no edema.   Neurological: He is alert and oriented to person, place, and time. No cranial nerve deficit.   Skin: Skin is warm and dry. Capillary refill takes 2 to 3 seconds.   midsternal incision distal, healing, no drainage  EVH site CDI   Psychiatric: He has a normal mood and affect. Judgment normal.   Nursing note and vitals reviewed.      Assessment & Plan     Independent Review of Studies    1. Surgical aftercare, circulatory system  Progressing well post operatively.   Increase activity, cardiac rehab  Lifting restrictions <10lbs 12 weeks  No driving    Return one week with the following studies, arrive 20min " early for appointment    Distal incision healing, complete keflex empirically     - CBC (No Diff); Future  - Comprehensive Metabolic Panel; Future  - XR Chest 2 View; Future  - ECG 12 Lead; Future  - Ambulatory Referral to Cardiac Rehab    2. Coronary artery disease involving native coronary artery of native heart with unstable angina pectoris (CMS/[facility])  ASA, Statin, Plavix.  BB/ACE on hold related to renal function and to allow permissive HTN.     Amiodarone taper     - Ambulatory Referral to Cardiac Rehab    3. DWIGHT (acute kidney injury) (CMS/[facility])  Nephrology following labs, continue dialysis as scheduled via TC  Observing for renal recovery.  CMP next week in office.   Permissive HTN    Detailed discussion regarding risks, benefits, and treatment plan. Images independently reviewed. Patient understands, agrees, and wishes to proceed with plan.       This document has been electronically signed by FRANCOIS Lorenzo @  On July 13, 2020 14:23      EMR Dragon/Transcription disclaimer:   Much of this encounter note is an electronic transcription/translation of spoken language to printed text. The electronic translation of spoken language may permit erroneous, or at times, nonsensical words or phrases to be inadvertently transcribed; Although I have reviewed the note for such errors, some may still exist.

## 2020-07-15 ENCOUNTER — READMISSION MANAGEMENT (OUTPATIENT)
Dept: CALL CENTER | Facility: HOSPITAL | Age: 70
End: 2020-07-15

## 2020-07-15 NOTE — OUTREACH NOTE
CT Surgery Week 2 Survey      Responses   [facility] patient discharged from?  [place]   Does the patient have one of the following disease processes/diagnoses(primary or secondary)?  Cardiothoracic surgery   Week 2 attempt successful?  No   Unsuccessful attempts  Attempt 1          Eli Cesar RN

## 2020-07-17 ENCOUNTER — OFFICE VISIT (OUTPATIENT)
Dept: CARDIAC SURGERY | Facility: CLINIC | Age: 70
End: 2020-07-17

## 2020-07-17 ENCOUNTER — HOSPITAL ENCOUNTER (OUTPATIENT)
Dept: GENERAL RADIOLOGY | Facility: HOSPITAL | Age: 70
Discharge: HOME | End: 2020-07-17
Admitting: NURSE PRACTITIONER

## 2020-07-17 ENCOUNTER — LAB (OUTPATIENT)
Dept: LAB | Facility: HOSPITAL | Age: 70
End: 2020-07-17

## 2020-07-17 VITALS
OXYGEN SATURATION: 98 % | HEART RATE: 80 BPM | WEIGHT: 226 LBS | BODY MASS INDEX: 29.95 KG/M2 | SYSTOLIC BLOOD PRESSURE: 120 MMHG | DIASTOLIC BLOOD PRESSURE: 68 MMHG | HEIGHT: 73 IN | TEMPERATURE: 98.3 F

## 2020-07-17 DIAGNOSIS — Z48.812 SURGICAL AFTERCARE, CIRCULATORY SYSTEM: ICD-10-CM

## 2020-07-17 DIAGNOSIS — I10 ESSENTIAL HYPERTENSION: ICD-10-CM

## 2020-07-17 DIAGNOSIS — I25.110 CORONARY ARTERY DISEASE INVOLVING NATIVE CORONARY ARTERY OF NATIVE HEART WITH UNSTABLE ANGINA PECTORIS (HCC): ICD-10-CM

## 2020-07-17 DIAGNOSIS — J44.9 CHRONIC OBSTRUCTIVE PULMONARY DISEASE, UNSPECIFIED COPD TYPE (HCC): ICD-10-CM

## 2020-07-17 DIAGNOSIS — Z48.812 SURGICAL AFTERCARE, CIRCULATORY SYSTEM: Primary | ICD-10-CM

## 2020-07-17 DIAGNOSIS — N17.9 AKI (ACUTE KIDNEY INJURY) (HCC): ICD-10-CM

## 2020-07-17 LAB
ALBUMIN SERPL-MCNC: 4 G/DL (ref 3.5–5.2)
ALBUMIN/GLOB SERPL: 1.5 G/DL
ALP SERPL-CCNC: 89 U/L (ref 39–117)
ALT SERPL W P-5'-P-CCNC: 28 U/L (ref 1–41)
ANION GAP SERPL CALCULATED.3IONS-SCNC: 10 MMOL/L (ref 5–15)
AST SERPL-CCNC: 20 U/L (ref 1–40)
BILIRUB SERPL-MCNC: 0.4 MG/DL (ref 0–1.2)
BUN SERPL-MCNC: 16 MG/DL (ref 8–23)
BUN/CREAT SERPL: 6.6 (ref 7–25)
CALCIUM SPEC-SCNC: 9 MG/DL (ref 8.6–10.5)
CHLORIDE SERPL-SCNC: 97 MMOL/L (ref 98–107)
CO2 SERPL-SCNC: 32 MMOL/L (ref 22–29)
CREAT SERPL-MCNC: 2.42 MG/DL (ref 0.76–1.27)
DEPRECATED RDW RBC AUTO: 48.4 FL (ref 37–54)
ERYTHROCYTE [DISTWIDTH] IN BLOOD BY AUTOMATED COUNT: 13.6 % (ref 12.3–15.4)
GFR SERPL CREATININE-BSD FRML MDRD: 27 ML/MIN/1.73
GLOBULIN UR ELPH-MCNC: 2.7 GM/DL
GLUCOSE SERPL-MCNC: 105 MG/DL (ref 65–99)
HCT VFR BLD AUTO: 29.4 % (ref 37.5–51)
HGB BLD-MCNC: 9.4 G/DL (ref 13–17.7)
MCH RBC QN AUTO: 31.4 PG (ref 26.6–33)
MCHC RBC AUTO-ENTMCNC: 32 G/DL (ref 31.5–35.7)
MCV RBC AUTO: 98.3 FL (ref 79–97)
PLATELET # BLD AUTO: 242 10*3/MM3 (ref 140–450)
PMV BLD AUTO: 9.5 FL (ref 6–12)
POTASSIUM SERPL-SCNC: 3.7 MMOL/L (ref 3.5–5.2)
PROT SERPL-MCNC: 6.7 G/DL (ref 6–8.5)
RBC # BLD AUTO: 2.99 10*6/MM3 (ref 4.14–5.8)
SODIUM SERPL-SCNC: 139 MMOL/L (ref 136–145)
WBC # BLD AUTO: 6.05 10*3/MM3 (ref 3.4–10.8)

## 2020-07-17 PROCEDURE — 80053 COMPREHEN METABOLIC PANEL: CPT

## 2020-07-17 PROCEDURE — 71046 X-RAY EXAM CHEST 2 VIEWS: CPT

## 2020-07-17 PROCEDURE — 99024 POSTOP FOLLOW-UP VISIT: CPT | Performed by: NURSE PRACTITIONER

## 2020-07-17 PROCEDURE — 93010 ELECTROCARDIOGRAM REPORT: CPT | Performed by: INTERNAL MEDICINE

## 2020-07-17 PROCEDURE — 93005 ELECTROCARDIOGRAM TRACING: CPT | Performed by: NURSE PRACTITIONER

## 2020-07-17 PROCEDURE — 36415 COLL VENOUS BLD VENIPUNCTURE: CPT

## 2020-07-17 PROCEDURE — 85027 COMPLETE CBC AUTOMATED: CPT

## 2020-07-17 NOTE — PATIENT INSTRUCTIONS
Satisfactory Post op CABG: Progressing well  Lifting Restrictions < 10 lbs : 12 weeks   Follow up 6 weeks:   OK to drive short distances  Cardiac rehab evaluation    Medical Management: ASA,PLAVIX,STATIN, Hold BETA, ACE  Follow up Cardiology as scheduled.    Labs improved  Continue Dialysis as scheduled  Follow up Dr. Amaya    Signs and symptoms of infection including drainage from operative site, redness, swelling, with associated fever and/or chills notify Heart and Vascular center immediately for wound check.  Clean operative site with antibacterial soap/water, pat dry. Keep open to air unless draining, then may apply dry dressing.  No ointments or creams unless prescribed by provider.

## 2020-07-21 ENCOUNTER — DOCUMENTATION (OUTPATIENT)
Dept: CARDIAC REHAB | Facility: HOSPITAL | Age: 70
End: 2020-07-21

## 2020-07-21 ENCOUNTER — READMISSION MANAGEMENT (OUTPATIENT)
Dept: CALL CENTER | Facility: HOSPITAL | Age: 70
End: 2020-07-21

## 2020-07-21 NOTE — OUTREACH NOTE
CT Surgery Week 2 Survey      Responses   [facility] patient discharged from?  [place]   Does the patient have one of the following disease processes/diagnoses(primary or secondary)?  Cardiothoracic surgery   Week 2 attempt successful?  Yes   Call start time  1429   Call end time  1433   General alerts for this patient  Heart Cath    Discharge diagnosis  CAD with stable angina pectoris   Person spoke with today (if not patient) and relationship  pt and wife, [place]shine [place] reviewed with patient/caregiver?  Yes   Is the patient having any side effects they believe may be caused by any medication additions or changes?  No   Does the patient have all medications related to this admission filled (includes all antibiotics, pain medications, cardiac medications, etc.)  Yes   Is the patient taking all medications as directed (includes completed medication regime)?  Yes   Does the patient have a primary care provider?   Yes   Does the patient have an appointment scheduled with their C/T surgeon?  Yes   Comments regarding PCP  Spouse states she is working on getting patient a PCP.    Has the patient kept scheduled appointments due by today?  N/A   What is the Home health agency?   no longer needed   Psychosocial issues?  No   Comments  Spouse reports patient is doing well is has started driving.    Did the patient receive a copy of their discharge instructions?  Yes   Nursing interventions  Reviewed instructions with patient   What is the patient's perception of their health status since discharge?  Improving   Week 2 call completed?  Yes   Wrap up additional comments  Spouse states patient is doing well at this time and denies any needs.           Kaia Ocampo RN

## 2020-07-21 NOTE — PROGRESS NOTES
CVTS Office Progress Note     7/21/2020    Jose Barrett  1950    Chief Complaint:    Chief Complaint   Patient presents with   • Follow-up   • Coronary Artery Disease       HPI:      PCP:  Provider, No Known  Cardiology:  Dr. Petersen  Nephrology:  Dr. Amaya [facility]     69 y.o. male with HTN(stable, increased risk stroke, rupture), Hyperlipidemia(stable, increased risk cardiovascular events), Obesity(uncontrolled, increased risk cardiovascular events), Smoker(uncontrolled, increased risk cardiovascular events), COPD(stable, increased risk pulmonary complications) and Chronic Kidney Disease(DWIGHT post cath, increased risk renal failure)  Black lung disease.  smokes .25 PPD.  Chest pain with exertion after dealing with his tractor getting stuck in a ditch and walking up hill. He reports the pain has not recurred. He presented for inpatient evaluation with positive troponin. Cardiac cath demonstrated severe multivessel disease and depressed ejection fraction 35%.  Underwent CABGx3 with IABP post operatively.  Complicated post operative course with superimposed DWIGHT on CKD3 requiring intermediate term dialysis.  Tunnel catheter was placed day before discharge.  Reports today in post operative follow up after hospital discharge.  Doing well, reports making much more urine, nephrology following labs as outpatient for evidence of renal recovery.  Mild distal midsternal incision dehiscence, covered with keflex at time of discharge. Permissive HTN with current renal function.    6/2020 Carotid Duplex: LEIGHA 0-49% mPSV 114c/s Ratio 1.6, LICA 50-69% mPSV 127c/s Ratio 2.1, Antegrade vertebrals  6/2020 TTE: LV moderate dilation, mild MR, EF 35%, LVDD 65mm, LVDS 57mm  6/2020 LHC: LM 50%, Ostial LAD 90%, CX 85%, RCA 99% mid, PLM 80%    6/2020 CABGx3 LIMA-LAD, SVG-OM2, SVG-PDA, IABP insertion with perclose    6/3/2020: Tunnel Cath Placement- RIGHT    The following portions of the patient's history were reviewed and  updated as appropriate: allergies, current medications, past family history, past medical history, past social history, past surgical history and problem list.  Recent images independently reviewed.  Available laboratory values reviewed.    PMH:  Past Medical History:   Diagnosis Date   • Arthritis    • Black lung disease (CMS/[facility])    • COPD (chronic obstructive pulmonary disease) (CMS/[facility])    • Heart bloc    • Hypertension      Past Surgical History:   Procedure Laterality Date   • CARDIAC CATHETERIZATION N/A 6/8/2020    Procedure: Left Heart Cath;  Surgeon: April Petersen MD;  Location: [facility] CATH INVASIVE LOCATION;  Service: Cardiology;  Laterality: N/A;   • CORONARY ARTERY BYPASS GRAFT N/A 6/18/2020    Procedure: CORONARY ARTERY BYPASS GRAFTING X 3 UTILIZING THE LEFT INTERNAL MAMMARY ARTERY AND ENDOSCOPICALLY HARVESTED VEIN FROM THE LEFT LEG    (CELL SAVER);  Surgeon: Edi Donis MD;  Location: [facility] OR;  Service: Cardiothoracic;  Laterality: N/A;  Leg Incision: 0800  Chest Incision: 0807  Vein out: 0919  Vein prepped: 0954  On bypass: 1019  Off bypass 1246     • TONSILLECTOMY       Family History   Problem Relation Age of Onset   • Heart disease Mother         MI   • No Known Problems Father      Social History     Tobacco Use   • Smoking status: Former Smoker     Packs/day: 0.25     Types: Cigarettes   • Smokeless tobacco: Never Used   Substance Use Topics   • Alcohol use: Never     Frequency: Never   • Drug use: Never       ALLERGIES:  No Known Allergies      MEDICATIONS:    Current Outpatient Medications:   •  albuterol sulfate  (90 Base) MCG/ACT inhaler, Inhale 2 puffs Every 4 (Four) Hours As Needed for Wheezing., Disp: , Rfl:   •  amiodarone (PACERONE) 200 MG tablet, Take 2 tablets (400 mg) by mouth daily for 7 days, then 1 tablet (200 mg) daily for 14 days., Disp: 28 tablet, Rfl: 0  •  aspirin 81 MG chewable tablet, Chew 81 mg Daily., Disp: , Rfl:   •  atorvastatin (LIPITOR) 40  MG tablet, Take 1 tablet by mouth Every Night., Disp: 30 tablet, Rfl: 0  •  clopidogrel (PLAVIX) 75 MG tablet, Take 1 tablet by mouth Daily., Disp: 30 tablet, Rfl: 11  •  famotidine (PEPCID) 20 MG tablet, Take 1 tablet by mouth Every 12 (Twelve) Hours., Disp: 30 tablet, Rfl: 3  •  Fluticasone Furoate-Vilanterol (Breo Ellipta) 100-25 MCG/INH inhaler, Inhale 1 puff Daily., Disp: , Rfl:   •  polyethylene glycol (MIRALAX) 17 g pack packet, Dissolve 1 packet (17 g) in 4 to 8 ounces of liquid and drink by mouth Daily As Needed for constipation., Disp: 7 each, Rfl: 0  •  sennosides-docusate (PERICOLACE) 8.6-50 MG per tablet, Take 2 tablets by mouth 2 (Two) Times a Day., Disp: , Rfl:   •  vitamin C (VITAMIN C) 500 MG tablet, Take 1 tablet by mouth 2 (Two) Times a Day., Disp: , Rfl:       Review of Systems   Constitution: Positive for malaise/fatigue. Negative for decreased appetite, fever, weight gain and weight loss.   HENT: Negative for hearing loss, hoarse voice and sore throat.    Eyes: Negative for blurred vision, visual disturbance and visual halos.   Cardiovascular: Positive for chest pain (Sternal Discomfort). Negative for dyspnea on exertion, leg swelling and palpitations.   Respiratory: Negative for cough, shortness of breath and sputum production.    Endocrine: Positive for cold intolerance. Negative for polyuria.   Hematologic/Lymphatic: Negative for bleeding problem. Bruises/bleeds easily.        Does not report S/S of adverse bleeding event including nose bleeds, hematuria, melena, gingival bleeding, or hematemesis.   Skin: Negative for color change, nail changes, poor wound healing and suspicious lesions.   Musculoskeletal: Positive for back pain. Negative for joint pain and myalgias.   Gastrointestinal: Negative for abdominal pain, constipation, diarrhea, hematemesis, melena, nausea and vomiting.   Genitourinary: Negative for flank pain, nocturia and urgency.   Neurological: Negative for brief paralysis,  "focal weakness, light-headedness, loss of balance, numbness, paresthesias and weakness.        No amaurosis fugax, TIA, or CVA.     Psychiatric/Behavioral: Negative for altered mental status, depression, suicidal ideas and thoughts of violence.   Allergic/Immunologic: Negative for hives and persistent infections.         Vitals:    07/17/20 0940   BP: 120/68   BP Location: Right arm   Patient Position: Sitting   Cuff Size: Adult   Pulse: 80   Temp: 98.3 °F (36.8 °C)   TempSrc: Temporal   SpO2: 98%   Weight: 103 kg (226 lb)   Height: 185.4 cm (73\")     Physical Exam   Constitutional: He is oriented to person, place, and time. He appears well-developed and well-nourished.   HENT:   Head: Normocephalic and atraumatic.   Mouth/Throat: Oropharynx is clear and moist.   TC   Eyes: Pupils are equal, round, and reactive to light. Conjunctivae and EOM are normal.   Neck: Normal range of motion. Neck supple.   Cardiovascular: Normal rate and intact distal pulses.   Pulmonary/Chest: Effort normal and breath sounds normal. No respiratory distress.   Abdominal: Soft. Bowel sounds are normal. He exhibits distension (chronic).   Musculoskeletal: Normal range of motion. He exhibits tenderness (chest). He exhibits no edema.   Neurological: He is alert and oriented to person, place, and time. No cranial nerve deficit.   Skin: Skin is warm and dry. Capillary refill takes 2 to 3 seconds.   midsternal incision distal, healing, no drainage  EVH site CDI   Psychiatric: He has a normal mood and affect. Judgment normal.   Nursing note and vitals reviewed.    Lab Results   Component Value Date    WBC 6.05 07/17/2020    HGB 9.4 (L) 07/17/2020    HCT 29.4 (L) 07/17/2020    MCV 98.3 (H) 07/17/2020     07/17/2020     Lab Results   Component Value Date    GLUCOSE 105 (H) 07/17/2020    BUN 16 07/17/2020    CREATININE 2.42 (H) 07/17/2020    EGFRIFNONA 27 (L) 07/17/2020    EGFRIFAFRI  07/01/2020      Comment:      <15 Indicative of kidney " failure.    BCR 6.6 (L) 07/17/2020    K 3.7 07/17/2020    CO2 32.0 (H) 07/17/2020    CALCIUM 9.0 07/17/2020    ALBUMIN 4.00 07/17/2020    AST 20 07/17/2020    ALT 28 07/17/2020         Assessment & Plan     Independent Review of Studies  Detailed discussion regarding risks, benefits, and treatment plan. Images independently reviewed. Patient understands, agrees, and wishes to proceed with plan.       1. Surgical aftercare, circulatory system  Satisfactory Post op CABG: Progressing well  Lifting Restrictions < 10 lbs : 12 weeks   Follow up 6 weeks:   OK to drive short distances  Cardiac rehab evaluation  - ECG 12 Lead    2. Coronary artery disease involving native coronary artery of native heart with unstable angina pectoris (CMS/HCC)  Medical Management: ASA,PLAVIX,STATIN, Hold BETA, ACE  Follow up Cardiology as scheduled.    3. Essential hypertension  Controlled.     4. Chronic obstructive pulmonary disease, unspecified COPD type (CMS/HCC)  Controlled.     5. DWIGHT (acute kidney injury) (CMS/HCC)  Dialysis per TC- T,,Sa  Follow up Dr. Amaya as scheduled.             This document has been electronically signed by ZHANNA José on July 21, 2020 10:19

## 2020-07-28 ENCOUNTER — READMISSION MANAGEMENT (OUTPATIENT)
Dept: CALL CENTER | Facility: HOSPITAL | Age: 70
End: 2020-07-28

## 2020-07-28 NOTE — OUTREACH NOTE
CT Surgery Week 3 Survey      Responses   [facility] patient discharged from?  [place]   Does the patient have one of the following disease processes/diagnoses(primary or secondary)?  Cardiothoracic surgery   Week 3 attempt successful?  Yes   Call start time  1930   Call end time  1932   General alerts for this patient  Heart Cath    Discharge diagnosis  CAD with stable angina pectoris   Meds reviewed with patient/caregiver?  Yes   Is the patient having any side effects they believe may be caused by any medication additions or changes?  No   Does the patient have all medications related to this admission filled (includes all antibiotics, pain medications, cardiac medications, etc.)  Yes   Is the patient taking all medications as directed (includes completed medication regime)?  Yes   Does the patient have a primary care provider?   Yes   Does the patient have an appointment scheduled with their C/T surgeon?  Yes   Has the patient kept scheduled appointments due by today?  N/A   What is the Home health agency?   no longer needed   Psychosocial issues?  No   Did the patient receive a copy of their discharge instructions?  Yes   Nursing interventions  Reviewed instructions with patient   What is the patient's perception of their health status since discharge?  Improving   Nursing interventions  Nurse provided patient education   Is the patient/caregiver able to teach back normal signs of recovery?  Nausea and lack of appetite   Is the patient /caregiver able to teach back basic post-op care?  Continue use of incentive spirometry at least 1 week post discharge, Practice 'cough and deep breath', Lifting as instructed by MD in discharge instructions   Is the patient/caregiver able to teach back signs and symptoms of incisional infection?  Increased redness, swelling or pain at the incisonal site, Increased drainage or bleeding, Incisional warmth, Pus or odor from incision, Fever   Is the patient/caregiver able  to teach back steps to recovery at home?  Eat a well-balance diet, Weigh daily, Rest and rebuild strength, gradually increase activity   Is the patient /caregiver able to teach back the importance of cardiac rehab?  Yes   Is the patient/caregiver able to teach back the hierarchy of who to call/visit for symptoms/problems? PCP, Specialist, Home health nurse, Urgent Care, ED, 911  Yes   Week 3 call completed?  Yes          Valerie Victoria, RN

## 2020-08-03 ENCOUNTER — TRANSCRIBE ORDERS (OUTPATIENT)
Dept: LAB | Facility: HOSPITAL | Age: 70
End: 2020-08-03

## 2020-08-03 DIAGNOSIS — Z99.2 DEPENDENCE ON RENAL DIALYSIS (HCC): ICD-10-CM

## 2020-08-03 DIAGNOSIS — N18.6 END STAGE RENAL DISEASE (HCC): ICD-10-CM

## 2020-08-03 DIAGNOSIS — I21.4 ACUTE NON-ST-SEGMENT ELEVATION MYOCARDIAL INFARCTION (HCC): ICD-10-CM

## 2020-08-03 DIAGNOSIS — N17.9 ACUTE NONTRAUMATIC KIDNEY INJURY (HCC): Primary | ICD-10-CM

## 2020-08-03 DIAGNOSIS — I25.10 CORONARY ARTERIOSCLEROSIS: ICD-10-CM

## 2020-08-05 ENCOUNTER — READMISSION MANAGEMENT (OUTPATIENT)
Dept: CALL CENTER | Facility: HOSPITAL | Age: 70
End: 2020-08-05

## 2020-08-05 NOTE — OUTREACH NOTE
CT Surgery Week 4 Survey      Responses   [facility] patient discharged from?  [place]   Does the patient have one of the following disease processes/diagnoses(primary or secondary)?  Cardiothoracic surgery   Week 4 attempt successful?  Yes   Call start time  1106   Call end time  1111   Discharge diagnosis  CAD with stable angina pectoris   Meds reviewed with patient/caregiver?  Yes   Is the patient taking all medications as directed (includes completed medication regime)?  Yes   Has the patient kept scheduled appointments due by today?  Yes   Comments  PCP appt on 8/7/20,  cardiology appt on 8/28/20   Is the patient still receiving Home Health Services?  No   Home health comments  Pt didn't want home health   What is the patient's perception of their health status since discharge?  Improving   Is the patient/caregiver able to teach back steps to recovery at home?  Rest and rebuild strength, gradually increase activity   If the patient is a current smoker, are they able to teach back resources for cessation?  -- [He's still not smoking. Not smoked since hospital admission on 6/18/20. ]   Week 4 Call Completed?  Yes   Would the patient like one additional call?  No   Graduated  Yes   Did the patient feel the follow up calls were helpful during their recovery period?  Yes   Was the number of calls appropriate?  Yes          Eli Cesar RN

## 2020-08-07 ENCOUNTER — TRANSCRIBE ORDERS (OUTPATIENT)
Dept: LAB | Facility: HOSPITAL | Age: 70
End: 2020-08-07

## 2020-08-07 ENCOUNTER — LAB (OUTPATIENT)
Dept: LAB | Facility: HOSPITAL | Age: 70
End: 2020-08-07

## 2020-08-07 ENCOUNTER — OFFICE VISIT (OUTPATIENT)
Dept: FAMILY MEDICINE CLINIC | Facility: CLINIC | Age: 70
End: 2020-08-07

## 2020-08-07 VITALS
TEMPERATURE: 97.9 F | WEIGHT: 228.2 LBS | OXYGEN SATURATION: 98 % | SYSTOLIC BLOOD PRESSURE: 120 MMHG | HEART RATE: 82 BPM | HEIGHT: 73 IN | DIASTOLIC BLOOD PRESSURE: 78 MMHG | BODY MASS INDEX: 30.24 KG/M2

## 2020-08-07 DIAGNOSIS — Z99.2 DEPENDENCE ON RENAL DIALYSIS (HCC): ICD-10-CM

## 2020-08-07 DIAGNOSIS — I25.10 CORONARY ARTERIOSCLEROSIS: ICD-10-CM

## 2020-08-07 DIAGNOSIS — N17.9 ACUTE NONTRAUMATIC KIDNEY INJURY (HCC): ICD-10-CM

## 2020-08-07 DIAGNOSIS — Z87.891 HISTORY OF TOBACCO ABUSE: ICD-10-CM

## 2020-08-07 DIAGNOSIS — N18.30 STAGE 3 CHRONIC KIDNEY DISEASE (HCC): ICD-10-CM

## 2020-08-07 DIAGNOSIS — Z76.89 ENCOUNTER TO ESTABLISH CARE WITH NEW DOCTOR: Primary | ICD-10-CM

## 2020-08-07 DIAGNOSIS — J44.9 CHRONIC OBSTRUCTIVE PULMONARY DISEASE, UNSPECIFIED COPD TYPE (HCC): ICD-10-CM

## 2020-08-07 DIAGNOSIS — N18.6 END STAGE RENAL DISEASE (HCC): ICD-10-CM

## 2020-08-07 DIAGNOSIS — I21.4 ACUTE NON-ST-SEGMENT ELEVATION MYOCARDIAL INFARCTION (HCC): ICD-10-CM

## 2020-08-07 DIAGNOSIS — N17.9 AKI (ACUTE KIDNEY INJURY) (HCC): Primary | ICD-10-CM

## 2020-08-07 DIAGNOSIS — I21.4 ACUTE NON-ST-ELEVATION MYOCARDIAL INFARCTION (HCC): ICD-10-CM

## 2020-08-07 DIAGNOSIS — I25.110 CORONARY ARTERY DISEASE INVOLVING NATIVE CORONARY ARTERY OF NATIVE HEART WITH UNSTABLE ANGINA PECTORIS (HCC): ICD-10-CM

## 2020-08-07 DIAGNOSIS — I10 ESSENTIAL HYPERTENSION: ICD-10-CM

## 2020-08-07 DIAGNOSIS — Z11.59 NEED FOR HEPATITIS C SCREENING TEST: ICD-10-CM

## 2020-08-07 PROBLEM — J44.89 COPD (CHRONIC OBSTRUCTIVE PULMONARY DISEASE) WITH CHRONIC BRONCHITIS: Status: ACTIVE | Noted: 2019-05-29

## 2020-08-07 PROBLEM — J60 CWP (COALWORKERS PNEUMOCONIOSIS) (HCC): Status: ACTIVE | Noted: 2019-05-29

## 2020-08-07 PROBLEM — F17.200 NICOTINE DEPENDENCE: Status: ACTIVE | Noted: 2019-05-29

## 2020-08-07 LAB
ALBUMIN SERPL-MCNC: 3.6 G/DL (ref 3.5–5.2)
ANION GAP SERPL CALCULATED.3IONS-SCNC: 8.4 MMOL/L (ref 5–15)
BUN SERPL-MCNC: 21 MG/DL (ref 8–23)
BUN/CREAT SERPL: 10.5 (ref 7–25)
CALCIUM SPEC-SCNC: 9.5 MG/DL (ref 8.6–10.5)
CHLORIDE SERPL-SCNC: 100 MMOL/L (ref 98–107)
CO2 SERPL-SCNC: 28.6 MMOL/L (ref 22–29)
CREAT SERPL-MCNC: 2 MG/DL (ref 0.76–1.27)
GFR SERPL CREATININE-BSD FRML MDRD: 33 ML/MIN/1.73
GLUCOSE SERPL-MCNC: 98 MG/DL (ref 65–99)
HCT VFR BLD AUTO: 35.5 % (ref 37.5–51)
HCV AB SER DONR QL: NORMAL
HGB BLD-MCNC: 11.8 G/DL (ref 13–17.7)
PHOSPHATE SERPL-MCNC: 3.7 MG/DL (ref 2.5–4.5)
POTASSIUM SERPL-SCNC: 4.8 MMOL/L (ref 3.5–5.2)
SODIUM SERPL-SCNC: 137 MMOL/L (ref 136–145)

## 2020-08-07 PROCEDURE — 99203 OFFICE O/P NEW LOW 30 MIN: CPT | Performed by: FAMILY MEDICINE

## 2020-08-07 PROCEDURE — 85014 HEMATOCRIT: CPT

## 2020-08-07 PROCEDURE — 85018 HEMOGLOBIN: CPT

## 2020-08-07 PROCEDURE — 36415 COLL VENOUS BLD VENIPUNCTURE: CPT

## 2020-08-07 PROCEDURE — 86803 HEPATITIS C AB TEST: CPT

## 2020-08-07 PROCEDURE — 80069 RENAL FUNCTION PANEL: CPT

## 2020-08-07 RX ORDER — ATORVASTATIN CALCIUM 40 MG/1
40 TABLET, FILM COATED ORAL DAILY
Qty: 90 TABLET | Refills: 3 | Status: SHIPPED | OUTPATIENT
Start: 2020-08-07 | End: 2020-08-28

## 2020-08-07 RX ORDER — ATORVASTATIN CALCIUM 40 MG/1
40 TABLET, FILM COATED ORAL DAILY
COMMUNITY
End: 2020-08-07 | Stop reason: SDUPTHER

## 2020-08-24 ENCOUNTER — OFFICE VISIT (OUTPATIENT)
Dept: CARDIAC SURGERY | Facility: CLINIC | Age: 70
End: 2020-08-24

## 2020-08-24 VITALS — HEART RATE: 91 BPM | SYSTOLIC BLOOD PRESSURE: 141 MMHG | DIASTOLIC BLOOD PRESSURE: 84 MMHG | OXYGEN SATURATION: 98 %

## 2020-08-24 DIAGNOSIS — I25.10 CORONARY ARTERY DISEASE INVOLVING NATIVE CORONARY ARTERY OF NATIVE HEART WITHOUT ANGINA PECTORIS: ICD-10-CM

## 2020-08-24 DIAGNOSIS — I65.23 CAROTID STENOSIS, ASYMPTOMATIC, BILATERAL: ICD-10-CM

## 2020-08-24 DIAGNOSIS — I10 ESSENTIAL HYPERTENSION: ICD-10-CM

## 2020-08-24 DIAGNOSIS — N17.9 AKI (ACUTE KIDNEY INJURY) (HCC): Primary | ICD-10-CM

## 2020-08-24 DIAGNOSIS — E66.09 CLASS 1 OBESITY DUE TO EXCESS CALORIES WITH SERIOUS COMORBIDITY AND BODY MASS INDEX (BMI) OF 30.0 TO 30.9 IN ADULT: ICD-10-CM

## 2020-08-24 DIAGNOSIS — J44.9 COPD (CHRONIC OBSTRUCTIVE PULMONARY DISEASE) WITH CHRONIC BRONCHITIS (HCC): ICD-10-CM

## 2020-08-24 PROCEDURE — 36589 REMOVAL TUNNELED CV CATH: CPT | Performed by: THORACIC SURGERY (CARDIOTHORACIC VASCULAR SURGERY)

## 2020-08-24 PROCEDURE — 99024 POSTOP FOLLOW-UP VISIT: CPT | Performed by: THORACIC SURGERY (CARDIOTHORACIC VASCULAR SURGERY)

## 2020-08-25 NOTE — PROGRESS NOTES
Chief Complaint   Patient presents with   • Establish Care   • Med Refill       Subjective:  Jose Barrett is a 70 y.o. male who presents today to establish care and get a refill of his cholesterol medication.  Previously been diagnosed with arthritis of multiple joints, black lung, COPD, coronary artery disease status post triple bypass in June 2020, hyperlipidemia, hypertension, previous MI, chronic kidney disease with recent DWIGHT requiring dialysis, and chronic hearing loss.  Patient initially presented to the emergency department on 6/5/2020 with 1 week history of retrosternal chest pain and increased shortness of breath.  He admits that about a week prior to this he was pushing his lawnmower uphill when he began to notice more chest discomfort and shortness of breath.  Reports he initially did not think much of this, but his symptoms persisted and worsened he eventually went to the emergency room around 1 week later.  At that time his troponin was elevated.  EKG showed no signs of ST elevation.  He was diagnosed with an STEMI and went for heart cath.  This showed multivessel disease.  He was admitted and work-up was began.  Nephrology was also consulted due to chronic kidney disease.  After full work-up, patient was found to be stable and discharged home with scheduled for elective coronary artery bypass later in June 2020.  Patient was later admitted on 6/18/2020 for bypass.  Bypass was successful and patient was sent to critical care unit for postoperative care where patient did well and was extubated.  Patient subsequently transferred to telemetry and a few days after his surgery he developed signs of acute kidney injury and confusion with hypercapnia.  He was subsequently transferred back to ICU and put on BiPAP.  He also began dialysis.  After a few days of dialysis and BiPAP patient's mental status improved and his renal function had improved somewhat.  Dialysis catheter was placed due to ongoing need  for dialysis in outpatient setting.  Patient continue improving was discharged.  Patient reports that since discharge he has been doing fairly well.  He has been following with nephrology and cardiothoracic surgery regularly.  He has also been following with cardiology.  He denies any chest pain or shortness of breath since discharge.  No edema or swelling.  No irregular heartbeats or palpitations.  He has been taking all medications as prescribed per his report.  He does need a refill of his statin today and is requesting colchicine form.  Does follow-up with cardiothoracic surgery again soon.  At this time he has no major concerns or questions.  His COPD is stable.  Denies increased cough or shortness of breath.  Denies increased sputum production.  Arthritis in his back and legs is stable.  Currently not take anything regularly for pain or discomfort.  His blood pressure is well controlled.  He is currently not taking anything for hypertension.  Denies any headaches, vision change, chest pain, palpitations, edema, shortness of breath, dizziness or lightheadedness, weakness, or other symptoms at this time.  Overall he reports he is doing well and has no further concerns or questions.      The following portions of the patient's history were reviewed and updated as appropriate: allergies, current medications, past family history, past medical history, past social history, past surgical history and problem list.      Past Medical History:   Diagnosis Date   • Arthritis     back, legs, hips.    • Black lung (CMS/HCC)    • Black lung disease (CMS/HCC)    • COPD (chronic obstructive pulmonary disease) (CMS/HCC)    • Coronary artery disease    • Heart bloc    • HL (hearing loss)    • Hyperlipidemia    • Hypertension    • Low back pain    • Myocardial infarction (CMS/[facility])          Current Outpatient Medications:   •  albuterol sulfate  (90 Base) MCG/ACT inhaler, Inhale 2 puffs Every 4 (Four) Hours As Needed for  "Wheezing., Disp: , Rfl:   •  aspirin 81 MG chewable tablet, Chew 81 mg Daily., Disp: , Rfl:   •  atorvastatin (LIPITOR) 40 MG tablet, Take 1 tablet by mouth Daily., Disp: 90 tablet, Rfl: 3  •  clopidogrel (PLAVIX) 75 MG tablet, Take 1 tablet by mouth Daily., Disp: 30 tablet, Rfl: 11  •  famotidine (PEPCID) 20 MG tablet, Take 1 tablet by mouth Every 12 (Twelve) Hours., Disp: 30 tablet, Rfl: 3  •  Fluticasone Furoate-Vilanterol (Breo Ellipta) 100-25 MCG/INH inhaler, Inhale 1 puff Daily., Disp: , Rfl:   •  polyethylene glycol (MIRALAX) 17 g pack packet, Dissolve 1 packet (17 g) in 4 to 8 ounces of liquid and drink by mouth Daily As Needed for constipation., Disp: 7 each, Rfl: 0  •  vitamin C (VITAMIN C) 500 MG tablet, Take 1 tablet by mouth 2 (Two) Times a Day., Disp: , Rfl:     Review of Systems    Review of Systems   Constitutional: Negative for activity change, fatigue and fever.   HENT: Negative for hearing loss and sore throat.    Eyes: Negative for visual disturbance.   Respiratory: Negative for cough and shortness of breath.    Cardiovascular: Negative for chest pain, palpitations and leg swelling.   Gastrointestinal: Negative for abdominal pain, blood in stool, constipation, diarrhea, nausea and vomiting.   Genitourinary: Negative for dysuria, frequency, hematuria and urgency.   Musculoskeletal: Negative for arthralgias and myalgias.   Skin: Negative for rash.   Neurological: Negative for dizziness, numbness and headaches.   Psychiatric/Behavioral: Negative for agitation and suicidal ideas. The patient is not nervous/anxious.        Objective  Vitals:    08/07/20 1320   BP: 120/78   BP Location: Left arm   Patient Position: Sitting   Pulse: 82   Temp: 97.9 °F (36.6 °C)   SpO2: 98%   Weight: 104 kg (228 lb 3.2 oz)   Height: 185.4 cm (73\")   PainSc: 0-No pain       Physical Exam   Constitutional: He is oriented to person, place, and time. Vital signs are normal. He appears well-developed and well-nourished. He is " active.  Non-toxic appearance. He does not have a sickly appearance. He does not appear ill. No distress. He is not intubated.   HENT:   Head: Normocephalic and atraumatic.   Right Ear: Hearing and external ear normal.   Left Ear: Hearing and external ear normal.   Eyes: Pupils are equal, round, and reactive to light. Conjunctivae and EOM are normal. Right eye exhibits no discharge. Left eye exhibits no discharge. No scleral icterus.   Neck: Trachea normal, normal range of motion, full passive range of motion without pain and phonation normal. Neck supple. No tracheal tenderness present. No tracheal deviation present. No thyroid mass and no thyromegaly present.   Cardiovascular: Normal rate, regular rhythm, S1 normal, S2 normal and normal heart sounds.  No extrasystoles are present. PMI is not displaced. Exam reveals no gallop, no S3, no S4, no distant heart sounds and no friction rub.   No murmur heard.   No systolic murmur is present.   No diastolic murmur is present.  Pulmonary/Chest: Effort normal and breath sounds normal. No accessory muscle usage or stridor. No apnea, no tachypnea and no bradypnea. He is not intubated. No respiratory distress. He has no decreased breath sounds. He has no wheezes. He has no rhonchi. He has no rales.   Abdominal: Soft. Normal appearance and bowel sounds are normal. He exhibits no shifting dullness, no distension, no pulsatile liver, no fluid wave, no abdominal bruit, no ascites, no pulsatile midline mass and no mass. There is no hepatosplenomegaly. There is no tenderness. There is no rigidity, no rebound, no guarding, no CVA tenderness, no tenderness at McBurney's point and negative Norwood's sign.   Musculoskeletal: He exhibits no edema.   Lymphadenopathy:     He has no cervical adenopathy.   Neurological: He is alert and oriented to person, place, and time. He has normal strength. He is not disoriented. He displays no atrophy and no tremor. No cranial nerve deficit or sensory  deficit. He exhibits normal muscle tone.   Skin: Skin is warm and dry. No rash noted. He is not diaphoretic. No erythema.   Psychiatric: He has a normal mood and affect. His speech is normal and behavior is normal. Judgment and thought content normal.   Nursing note and vitals reviewed.        Jose was seen today for establish care and med refill.    Diagnoses and all orders for this visit:    Encounter to establish care with new doctor  Complete medical, surgical, social, and family history reviewed.  Previous medical records reviewed.    Coronary artery disease involving native coronary artery of native heart with unstable angina pectoris (CMS/HCC)  Patient recently underwent bypass.  Reports he has been doing well since this time.  Denies symptoms of angina at this time.  Is following closely with cardiothoracic surgery and cardiology.  Taking all medications as prescribed.  Instructed patient to seek immediate medical attention for any new or worsening symptoms.    Essential hypertension  Blood pressure stable.  Continue current treatment plan.  Will follow closely and adjust as needed.  DASH diet recommended.    Chronic obstructive pulmonary disease, unspecified COPD type (CMS/HCC)  Stable.  Continue current care.  Will follow closely and adjust as needed.    Stage 3 chronic kidney disease (CMS/HCC)  Continue follow-up with nephrology as instructed.  Avoid nephrotoxic agents.    History of tobacco abuse  Counseled patient on continued need to refrain from smoking.  Reports he has not smoked since June of this year and is doing well with this at this time.    Need for hepatitis C screening test  -     Hepatitis C antibody; Future    Other orders  -     atorvastatin (LIPITOR) 40 MG tablet; Take 1 tablet by mouth Daily.    I spent 30 minutes in direct face to face contact with patient.  Greater than 50% of this time was spent counseling patient and discussing plan of care.    PHQ-2/PHQ-9 Depression Screening  8/7/2020   Little interest or pleasure in doing things 1   Feeling down, depressed, or hopeless 2   Trouble falling or staying asleep, or sleeping too much 3   Feeling tired or having little energy 0   Poor appetite or overeating 0   Feeling bad about yourself - or that you are a failure or have let yourself or your family down 1   Trouble concentrating on things, such as reading the newspaper or watching television 0   Moving or speaking so slowly that other people could have noticed. Or the opposite - being so fidgety or restless that you have been moving around a lot more than usual 0   Thoughts that you would be better off dead, or of hurting yourself in some way 0   Total Score 7   If you checked off any problems, how difficult have these problems made it for you to do your work, take care of things at home, or get along with other people? Not difficult at all           This document has been electronically signed by Leo Gomez DO on August 25, 2020 10:57

## 2020-08-28 ENCOUNTER — OFFICE VISIT (OUTPATIENT)
Dept: CARDIAC SURGERY | Facility: CLINIC | Age: 70
End: 2020-08-28

## 2020-08-28 ENCOUNTER — OFFICE VISIT (OUTPATIENT)
Dept: CARDIOLOGY | Facility: CLINIC | Age: 70
End: 2020-08-28

## 2020-08-28 VITALS
BODY MASS INDEX: 30.88 KG/M2 | HEIGHT: 73 IN | WEIGHT: 233 LBS | HEART RATE: 68 BPM | SYSTOLIC BLOOD PRESSURE: 144 MMHG | DIASTOLIC BLOOD PRESSURE: 80 MMHG | OXYGEN SATURATION: 98 %

## 2020-08-28 VITALS
WEIGHT: 233 LBS | SYSTOLIC BLOOD PRESSURE: 144 MMHG | DIASTOLIC BLOOD PRESSURE: 80 MMHG | OXYGEN SATURATION: 98 % | TEMPERATURE: 98.1 F | HEIGHT: 73 IN | HEART RATE: 68 BPM | BODY MASS INDEX: 30.88 KG/M2

## 2020-08-28 DIAGNOSIS — Z48.812 SURGICAL AFTERCARE, CIRCULATORY SYSTEM: Primary | ICD-10-CM

## 2020-08-28 DIAGNOSIS — E78.5 DYSLIPIDEMIA: ICD-10-CM

## 2020-08-28 DIAGNOSIS — I25.110 CORONARY ARTERY DISEASE INVOLVING NATIVE CORONARY ARTERY OF NATIVE HEART WITH UNSTABLE ANGINA PECTORIS (HCC): ICD-10-CM

## 2020-08-28 DIAGNOSIS — I21.4 NSTEMI (NON-ST ELEVATED MYOCARDIAL INFARCTION) (HCC): Primary | ICD-10-CM

## 2020-08-28 DIAGNOSIS — I65.23 CAROTID STENOSIS, ASYMPTOMATIC, BILATERAL: ICD-10-CM

## 2020-08-28 DIAGNOSIS — I10 ESSENTIAL HYPERTENSION: ICD-10-CM

## 2020-08-28 PROCEDURE — 93000 ELECTROCARDIOGRAM COMPLETE: CPT | Performed by: INTERNAL MEDICINE

## 2020-08-28 PROCEDURE — 99024 POSTOP FOLLOW-UP VISIT: CPT | Performed by: NURSE PRACTITIONER

## 2020-08-28 PROCEDURE — 99214 OFFICE O/P EST MOD 30 MIN: CPT | Performed by: INTERNAL MEDICINE

## 2020-08-28 RX ORDER — METOPROLOL SUCCINATE 25 MG/1
12.5 TABLET, EXTENDED RELEASE ORAL DAILY
Qty: 90 TABLET | Refills: 3 | Status: SHIPPED | OUTPATIENT
Start: 2020-08-28 | End: 2020-11-03

## 2020-08-28 RX ORDER — CALCIUM POLYCARBOPHIL 625 MG 625 MG/1
1250 TABLET ORAL DAILY
COMMUNITY
End: 2020-09-14

## 2020-08-28 RX ORDER — ATORVASTATIN CALCIUM 80 MG/1
80 TABLET, FILM COATED ORAL DAILY
Qty: 30 TABLET | Refills: 11 | Status: SHIPPED | OUTPATIENT
Start: 2020-08-28 | End: 2021-11-03

## 2020-08-28 NOTE — PROGRESS NOTES
[facility]  OFFICE NOTE    Cardiovascular Medicine  Flaca Godinez M.D., RPVI         No referring provider defined for this encounter.    Thank you for asking me to see Jose Barrett for CAD.    History of Present Illness  This is a 70 y.o. male with:    1.  Coronary artery disease status post CABG with LIMA to LAD, SVG to OM 2, SVG to PDA June 2020  2.  Hypertension  3.  COPD   4.  hyperlipidemia  5.  CKD  6.  Ischemic cardiomyopathy    Jose Barrett is a 70 y.o. male who presents for consultation today.  Patient did have a prolonged stay after his CABG because of renal failure and ICU delirium.  Patient had to have intermittent dialysis.  Patient also had mild wound dehiscence.   Patient is here for establishment of care.    Patient has done very well since his CABG.  He has recovered well.  He is back to his baseline.  Denying any chest pain.  He is been pretty active and walking around without any limitations from chest pain.  Occasionally will have some shortness of breath when climbing flights of stairs when he is underlying COPD and usually that improves with his inhalers.      Review of Systems - ROS  Constitution: Negative for weakness, weight gain and weight loss.   HENT: Negative for congestion.    Eyes: Negative for blurred vision.   Cardiovascular: As mentioned above  Respiratory: Negative for cough and hemoptysis.    Endocrine: Negative for polydipsia and polyuria.   Hematologic/Lymphatic: Negative for bleeding problem. Does not bruise/bleed easily.   Skin: Negative for flushing.   Musculoskeletal: Negative for neck pain and stiffness.   Gastrointestinal: Negative for abdominal pain, diarrhea, jaundice, melena, nausea and vomiting.   Genitourinary: Negative for dysuria and hematuria.   Neurological: Negative for dizziness, focal weakness and numbness.   Psychiatric/Behavioral: Negative for altered mental status and depression.          All other systems were  "reviewed and were negative.    family history includes Heart disease in his mother; No Known Problems in his father.     reports that he quit smoking about 2 months ago. His smoking use included cigarettes. He has a 25.00 pack-year smoking history. He has never used smokeless tobacco. He reports that he does not drink alcohol or use drugs.    No Known Allergies      Current Outpatient Medications:   •  albuterol sulfate  (90 Base) MCG/ACT inhaler, Inhale 2 puffs Every 4 (Four) Hours As Needed for Wheezing., Disp: , Rfl:   •  aspirin 81 MG chewable tablet, Chew 81 mg Daily., Disp: , Rfl:   •  atorvastatin (LIPITOR) 40 MG tablet, Take 1 tablet by mouth Daily., Disp: 90 tablet, Rfl: 3  •  clopidogrel (PLAVIX) 75 MG tablet, Take 1 tablet by mouth Daily., Disp: 30 tablet, Rfl: 11  •  Fluticasone Furoate-Vilanterol (Breo Ellipta) 100-25 MCG/INH inhaler, Inhale 1 puff Daily., Disp: , Rfl:   •  polycarbophil (calcium polycarbophil) 625 MG tablet tablet, Take 1,250 mg by mouth Daily., Disp: , Rfl:   •  polyethylene glycol (MIRALAX) 17 g pack packet, Dissolve 1 packet (17 g) in 4 to 8 ounces of liquid and drink by mouth Daily As Needed for constipation., Disp: 7 each, Rfl: 0  •  vitamin C (VITAMIN C) 500 MG tablet, Take 1 tablet by mouth 2 (Two) Times a Day., Disp: , Rfl:     Physical Exam:  Vitals:    08/28/20 1054   BP: 144/80   BP Location: Left arm   Patient Position: Sitting   Cuff Size: Adult   Pulse: 68   SpO2: 98%   Weight: 106 kg (233 lb)   Height: 185.4 cm (73\")     Current Pain Level: none  Pulse Ox: Normal  on room air  General: alert, appears stated age and cooperative     Body Habitus: well-nourished    HEENT: Head: Normocephalic, no lesions, without obvious abnormality. No arcus senilis, xanthelasma or xanthomas.    Neuro: alert, oriented x3  Pulses: 2+ and symmetric  JVP: Volume/Pulsation: Normal.  Normal waveforms.   Appropriate inspiratory decrease.  No Kussmaul's. No Sarah's.   Carotid Exam: no " bruit normal pulsation bilaterally   Carotid Volume: normal.     Respirations: no increased work of breathing   Chest:  Normal    Pulmonary:Normal   Precordium: Normal impulses. P2 is not palpable.  RV Heave: absent  LV Heave: absent  Midway:  normal size and placement  Palpable S4: absent.  Heart rate: normal    Heart Rhythm: regular     Heart Sounds: S1: normal  S2: normal  S3: absent   S4: absent  Opening Snap: absent    Pericardial Rub:  Absent: .    Abdomen:   Appearance: normal .  Palpation: Soft, non-tender to palpation, bowel sounds positive in all four quadrants; no guarding or rebound tenderness  Extremity: no edema.   LE Skin: no rashes  LE Hair:  normal  LE Pulses: well perfused with normal pulses in the distal extremities  Pallor on elevation: Absent. Rubor on dependency: None      DATA REVIEWED:     EKG. I personally reviewed and interpreted the EKG.  July 8 normal sinus rhythm,  ST and T wave consider inferior lateral ischemia.  Complete left bundle branch block.    ECG/EMG Results (all)     None        ---------------------------------------------------  TTE/KIMBERLYN:  Results for orders placed during the hospital encounter of 06/05/20   Transthoracic Echo Complete With Contrast if Necessary Per Protocol    Narrative · The left ventricular cavity is mild-to-moderately dilated.  · The following left ventricular wall segments are hypokinetic: mid   inferolateral and mid inferoseptal.  · Mild mitral valve regurgitation is present  · Estimated EF appears to be in the range of 31 - 35%. Normal left   ventricular wall thickness noted. The following left ventricular wall   segments are hypokinetic: mid inferolateral and mid inferoseptal. The left   ventricular cavity is mild-to-moderately dilated.            --------------------------------------------------------------------------------------------------  LABS:     The CVD Risk score (Clay et al., 2008) failed to calculate for the following reasons:    The  patient has a prior MI, stroke, CHF, or peripheral vascular disease diagnosis         Lab Results   Component Value Date    GLUCOSE 98 08/07/2020    BUN 21 08/07/2020    CREATININE 2.00 (H) 08/07/2020    EGFRIFNONA 33 (L) 08/07/2020    EGFRIFAFRI  07/01/2020      Comment:      <15 Indicative of kidney failure.    BCR 10.5 08/07/2020    K 4.8 08/07/2020    CO2 28.6 08/07/2020    CALCIUM 9.5 08/07/2020    ALBUMIN 3.60 08/07/2020    AST 20 07/17/2020    ALT 28 07/17/2020     Lab Results   Component Value Date    WBC 6.05 07/17/2020    HGB 11.8 (L) 08/07/2020    HCT 35.5 (L) 08/07/2020    MCV 98.3 (H) 07/17/2020     07/17/2020     Lab Results   Component Value Date    CHOL 198 06/09/2020    TRIG 102 06/09/2020    HDL 26 (L) 06/09/2020     (H) 06/09/2020     Lab Results   Component Value Date    TSH 1.680 06/09/2020     Lab Results   Component Value Date    CKTOTAL 98 06/05/2020    CKMB 22.49 (H) 06/06/2020    TROPONINT 0.628 (C) 06/06/2020     Lab Results   Component Value Date    HGBA1C 6.60 (H) 06/06/2020     No results found for: DDIMER  Lab Results   Component Value Date    ALT 28 07/17/2020     Lab Results   Component Value Date    HGBA1C 6.60 (H) 06/06/2020     Lab Results   Component Value Date    CREATININE 2.00 (H) 08/07/2020     No results found for: IRON, TIBC, FERRITIN  Lab Results   Component Value Date    INR 1.38 (H) 06/19/2020    INR 1.55 (H) 06/18/2020    INR 1.06 06/07/2020    PROTIME 16.8 (H) 06/19/2020    PROTIME 18.4 (H) 06/18/2020    PROTIME 13.6 06/07/2020       Assessment/Plan       1.  Coronary artery disease:  Now status post CABG x3 as above in June.  Complicated postop course.  Continue aspirin and Plavix    2.  Ischemic cardiomyopathy:  Echo at the time of NSTEMI showed an EF of 31-35%.  Consider starting him on a low-dose beta-blocker Toprol-XL and will consider adding lisinopril at next visit, will carefully watching his creatinine in setting of CKD  Appears euvolemic  If EF  continues to stay low despite revascularization optimal medical therapy will consider referral for ICD for primary prevention.    4.  Hyperlipidemia:  Will uptitrate Lipitor to 80 mg.  Target LDL of less than 70.      Prevention:  Patient's Body mass index is 30.74 kg/m². BMI is above normal parameters. Recommendations include: exercise counseling, none (medical contraindication) and nutrition counseling.      Jose Barrett  reports that he quit smoking about 2 months ago. His smoking use included cigarettes. He has a 25.00 pack-year smoking history. He has never used smokeless tobacco..        AAA Screening:   Will need screening        This document has been electronically signed by Flaca Godinez MD on August 28, 2020 11:20

## 2020-08-28 NOTE — PROGRESS NOTES
CVTS Office Progress Note     Jose Barrett  1950    Chief Complaint:    Chief Complaint   Patient presents with   • Follow-up     6 wk post       HPI:      PCP:  Leo Gomez DO  Cardiology:  Dr. Petersen  Nephrology:  Dr. Amaya [facility]     69 y.o. male with HTN(stable, increased risk stroke, rupture), Hyperlipidemia(stable, increased risk cardiovascular events), Obesity(uncontrolled, increased risk cardiovascular events), Smoker(uncontrolled, increased risk cardiovascular events), COPD(stable, increased risk pulmonary complications) and Chronic Kidney Disease(DWIGHT post cath, increased risk renal failure)  Black lung disease.  smokes .25 PPD.  Chest pain with exertion after dealing with his tractor getting stuck in a ditch and walking up hill. He reports the pain has not recurred. He presented for inpatient evaluation with positive troponin. Cardiac cath demonstrated severe multivessel disease and depressed ejection fraction 35%.  Underwent CABGx3 with IABP post operatively.  Complicated post operative course with superimposed DWIGHT on CKD3 requiring intermediate term dialysis, renal recovery. Progressing well. Returns in follow up.    6/2020 Carotid Duplex: LEIGHA 0-49% mPSV 114c/s Ratio 1.6, LICA 50-69% mPSV 127c/s Ratio 2.1, Antegrade vertebrals  6/2020 TTE: LV moderate dilation, mild MR, EF 35%, LVDD 65mm, LVDS 57mm  6/2020 LHC: LM 50%, Ostial LAD 90%, CX 85%, RCA 99% mid, PLM 80%    6/18/2020 CABGx3 LIMA-LAD, SVG-OM2, SVG-PDA, IABP insertion with perclose    6/30/2020: Tunnel Cath Placement- RIGHT  8/24/2020: Tunnel Cath Removal    The following portions of the patient's history were reviewed and updated as appropriate: allergies, current medications, past family history, past medical history, past social history, past surgical history and problem list.  Recent images independently reviewed.  Available laboratory values reviewed.    PMH:  Past Medical History:   Diagnosis Date   • Arthritis      back, legs, hips.    • Black lung (CMS/HCC)    • Black lung disease (CMS/HCC)    • COPD (chronic obstructive pulmonary disease) (CMS/HCC)    • Coronary artery disease    • Heart bloc    • HL (hearing loss)    • Hyperlipidemia    • Hypertension    • Low back pain    • Myocardial infarction (CMS/HCC)      Past Surgical History:   Procedure Laterality Date   • CARDIAC CATHETERIZATION N/A 2020    Procedure: Left Heart Cath;  Surgeon: April Petersen MD;  Location: [facility] CATH INVASIVE LOCATION;  Service: Cardiology;  Laterality: N/A;   • CORONARY ARTERY BYPASS GRAFT N/A 2020    Procedure: CORONARY ARTERY BYPASS GRAFTING X 3 UTILIZING THE LEFT INTERNAL MAMMARY ARTERY AND ENDOSCOPICALLY HARVESTED VEIN FROM THE LEFT LEG    (CELL SAVER);  Surgeon: Edi Donis MD;  Location: [facility] OR;  Service: Cardiothoracic;  Laterality: N/A;  Leg Incision: 0800  Chest Incision: 0807  Vein out: 0919  Vein prepped: 0954  On bypass: 1019  Off bypass 1246     • TONSILLECTOMY       Family History   Problem Relation Age of Onset   • Heart disease Mother         MI   • No Known Problems Father      Social History     Tobacco Use   • Smoking status: Former Smoker     Packs/day: 0.50     Years: 50.00     Pack years: 25.00     Types: Cigarettes     Last attempt to quit: 2020     Years since quittin.2   • Smokeless tobacco: Never Used   Substance Use Topics   • Alcohol use: Never     Frequency: Never   • Drug use: Never       ALLERGIES:  No Known Allergies      MEDICATIONS:    Current Outpatient Medications:   •  albuterol sulfate  (90 Base) MCG/ACT inhaler, Inhale 2 puffs Every 4 (Four) Hours As Needed for Wheezing., Disp: , Rfl:   •  aspirin 81 MG chewable tablet, Chew 81 mg Daily., Disp: , Rfl:   •  clopidogrel (PLAVIX) 75 MG tablet, Take 1 tablet by mouth Daily., Disp: 30 tablet, Rfl: 11  •  Fluticasone Furoate-Vilanterol (Breo Ellipta) 100-25 MCG/INH inhaler, Inhale 1 puff Daily., Disp: , Rfl:   •   vitamin C (VITAMIN C) 500 MG tablet, Take 1 tablet by mouth 2 (Two) Times a Day., Disp: , Rfl:   •  atorvastatin (LIPITOR) 80 MG tablet, Take 1 tablet by mouth Daily., Disp: 30 tablet, Rfl: 11  •  metoprolol succinate XL (TOPROL-XL) 25 MG 24 hr tablet, Take 0.5 tablets by mouth Daily., Disp: 90 tablet, Rfl: 3  •  polycarbophil (calcium polycarbophil) 625 MG tablet tablet, Take 1,250 mg by mouth Daily., Disp: , Rfl:       Review of Systems   Constitution: Positive for malaise/fatigue (improved). Negative for decreased appetite, fever, weight gain and weight loss.   HENT: Negative for hearing loss, hoarse voice and sore throat.    Eyes: Negative for blurred vision, visual disturbance and visual halos.   Cardiovascular: Positive for chest pain (occasional). Negative for dyspnea on exertion, leg swelling and palpitations.   Respiratory: Negative for cough, shortness of breath and sputum production.    Endocrine: Positive for cold intolerance. Negative for polyuria.   Hematologic/Lymphatic: Negative for bleeding problem. Bruises/bleeds easily.   Skin: Negative for color change, nail changes, poor wound healing and suspicious lesions.   Musculoskeletal: Positive for back pain. Negative for joint pain and myalgias.   Gastrointestinal: Negative for abdominal pain, constipation, diarrhea, hematemesis, melena, nausea and vomiting.   Genitourinary: Negative for flank pain, nocturia and urgency.   Neurological: Negative for brief paralysis, focal weakness, light-headedness, loss of balance, numbness, paresthesias and weakness.            Psychiatric/Behavioral: Negative for altered mental status, depression, suicidal ideas and thoughts of violence.   Allergic/Immunologic: Negative for hives and persistent infections.         Vitals:    08/28/20 1051   BP: 144/80   BP Location: Left arm   Patient Position: Sitting   Cuff Size: Adult   Pulse: 68   Temp: 98.1 °F (36.7 °C)   TempSrc: Temporal   SpO2: 98%   Weight: 106 kg (233 lb)  "  Height: 185.4 cm (73\")     Physical Exam   Constitutional: He is oriented to person, place, and time. He appears well-developed and well-nourished.   HENT:   Head: Normocephalic and atraumatic.   Mouth/Throat: Oropharynx is clear and moist.   Eyes: Pupils are equal, round, and reactive to light. Conjunctivae and EOM are normal.   Neck: Normal range of motion. Neck supple.   Cardiovascular: Normal rate and intact distal pulses.   Sternum Stable   Pulmonary/Chest: Effort normal and breath sounds normal. No respiratory distress.   Abdominal: Soft. Bowel sounds are normal. He exhibits distension (chronic).   Musculoskeletal: Normal range of motion. He exhibits no edema or tenderness.   Gait normal   Neurological: He is alert and oriented to person, place, and time. No cranial nerve deficit.   Skin: Skin is warm and dry. Capillary refill takes 2 to 3 seconds.   Psychiatric: He has a normal mood and affect. Judgment normal.   Nursing note and vitals reviewed.    Lab Results   Component Value Date    WBC 6.05 07/17/2020    HGB 11.8 (L) 08/07/2020    HCT 35.5 (L) 08/07/2020    MCV 98.3 (H) 07/17/2020     07/17/2020     Lab Results   Component Value Date    GLUCOSE 98 08/07/2020    BUN 21 08/07/2020    CREATININE 2.00 (H) 08/07/2020    EGFRIFNONA 33 (L) 08/07/2020    EGFRIFAFRI  07/01/2020      Comment:      <15 Indicative of kidney failure.    BCR 10.5 08/07/2020    K 4.8 08/07/2020    CO2 28.6 08/07/2020    CALCIUM 9.5 08/07/2020    ALBUMIN 3.60 08/07/2020    AST 20 07/17/2020    ALT 28 07/17/2020         Assessment & Plan     Independent Review of Studies  Detailed discussion regarding risks, benefits, and treatment plan. Images independently reviewed. Patient understands, agrees, and wishes to proceed with plan.       1. Surgical aftercare, circulatory system  Satisfactory Post op CABG: Progressing well  Lifting Restrictions < 15 lbs : 12 weeks   Successful Renal Recovery  Follow up 6 weeks    2. Coronary artery " disease involving native coronary artery of native heart with unstable angina pectoris (CMS/[facility])  Medical Management: ASA,PLAVIX,STATIN, BETA  Follow up Cardiology as scheduled.    3. Essential hypertension  Controlled.     4. Dyslipidemia  Lipid-lowering therapy has been proven beneficial in patients with cardio-vascular disease. Current guidelines recommend statin treatment for all patients with PAD,CAD and carotid stenosis. Statins are beneficial in preventing cardiovascular events, increasing functional capacity and lower the risk of adverse limb loss in PAD. Statins decrease the progression of plaque formation and may improve peripheral vessel lining, and aid in reversing atherosclerosis.    5. Carotid stenosis, asymptomatic, bilateral  Moderate. Asymptomatic  If you should experience any neurological symptoms including but not limited to visual or speech disturbances confusion, seizures, or weakness of limbs of one side of your body notify Heart and Vascular center immediately for evaluation or if after hours present to the nearest Emergency Department.    Will need Duplex 6 months.     Interview of patient is negative for inpatient hospital admission at this facility or others within 30 days of surgery date                This document has been electronically signed by ZHANNA José on August 28, 2020 15:00

## 2020-08-28 NOTE — PATIENT INSTRUCTIONS
Satisfactory Post op CABG: Progressing well  Lifting Restrictions < 15 lbs : 12 weeks   Follow up 6 weeks    Medical Management: ASA,PLAVIX,STATIN, BETA  Follow up Cardiology as scheduled.

## 2020-09-14 ENCOUNTER — TELEPHONE (OUTPATIENT)
Dept: FAMILY MEDICINE CLINIC | Facility: CLINIC | Age: 70
End: 2020-09-14

## 2020-09-14 ENCOUNTER — OFFICE VISIT (OUTPATIENT)
Dept: FAMILY MEDICINE CLINIC | Facility: CLINIC | Age: 70
End: 2020-09-14

## 2020-09-14 VITALS
HEART RATE: 70 BPM | BODY MASS INDEX: 30.96 KG/M2 | HEIGHT: 73 IN | WEIGHT: 233.6 LBS | TEMPERATURE: 97.5 F | RESPIRATION RATE: 20 BRPM | DIASTOLIC BLOOD PRESSURE: 70 MMHG | SYSTOLIC BLOOD PRESSURE: 130 MMHG | OXYGEN SATURATION: 98 %

## 2020-09-14 DIAGNOSIS — I25.110 CORONARY ARTERY DISEASE INVOLVING NATIVE CORONARY ARTERY OF NATIVE HEART WITH UNSTABLE ANGINA PECTORIS (HCC): ICD-10-CM

## 2020-09-14 DIAGNOSIS — E78.5 HYPERLIPIDEMIA, UNSPECIFIED HYPERLIPIDEMIA TYPE: ICD-10-CM

## 2020-09-14 DIAGNOSIS — E66.09 CLASS 1 OBESITY DUE TO EXCESS CALORIES WITH SERIOUS COMORBIDITY AND BODY MASS INDEX (BMI) OF 30.0 TO 30.9 IN ADULT: ICD-10-CM

## 2020-09-14 DIAGNOSIS — J44.9 CHRONIC OBSTRUCTIVE PULMONARY DISEASE, UNSPECIFIED COPD TYPE (HCC): ICD-10-CM

## 2020-09-14 DIAGNOSIS — I10 ESSENTIAL HYPERTENSION: Primary | ICD-10-CM

## 2020-09-14 PROCEDURE — 99214 OFFICE O/P EST MOD 30 MIN: CPT | Performed by: FAMILY MEDICINE

## 2020-09-14 RX ORDER — CALCIUM POLYCARBOPHIL 625 MG
TABLET ORAL DAILY
COMMUNITY
End: 2021-07-07

## 2020-09-14 NOTE — PATIENT INSTRUCTIONS
Diabetes Mellitus and Nutrition, Adult  When you have diabetes (diabetes mellitus), it is very important to have healthy eating habits because your blood sugar (glucose) levels are greatly affected by what you eat and drink. Eating healthy foods in the appropriate amounts, at about the same times every day, can help you:  · Control your blood glucose.  · Lower your risk of heart disease.  · Improve your blood pressure.  · Reach or maintain a healthy weight.  Every person with diabetes is different, and each person has different needs for a meal plan. Your health care provider may recommend that you work with a diet and nutrition specialist (dietitian) to make a meal plan that is best for you. Your meal plan may vary depending on factors such as:  · The calories you need.  · The medicines you take.  · Your weight.  · Your blood glucose, blood pressure, and cholesterol levels.  · Your activity level.  · Other health conditions you have, such as heart or kidney disease.  How do carbohydrates affect me?  Carbohydrates, also called carbs, affect your blood glucose level more than any other type of food. Eating carbs naturally raises the amount of glucose in your blood. Carb counting is a method for keeping track of how many carbs you eat. Counting carbs is important to keep your blood glucose at a healthy level, especially if you use insulin or take certain oral diabetes medicines.  It is important to know how many carbs you can safely have in each meal. This is different for every person. Your dietitian can help you calculate how many carbs you should have at each meal and for each snack.  Foods that contain carbs include:  · Bread, cereal, rice, pasta, and crackers.  · Potatoes and corn.  · Peas, beans, and lentils.  · Milk and yogurt.  · Fruit and juice.  · Desserts, such as cakes, cookies, ice cream, and candy.  How does alcohol affect me?  Alcohol can cause a sudden decrease in blood glucose (hypoglycemia),  "especially if you use insulin or take certain oral diabetes medicines. Hypoglycemia can be a life-threatening condition. Symptoms of hypoglycemia (sleepiness, dizziness, and confusion) are similar to symptoms of having too much alcohol.  If your health care provider says that alcohol is safe for you, follow these guidelines:  · Limit alcohol intake to no more than 1 drink per day for nonpregnant women and 2 drinks per day for men. One drink equals 12 oz of beer, 5 oz of wine, or 1½ oz of hard liquor.  · Do not drink on an empty stomach.  · Keep yourself hydrated with water, diet soda, or unsweetened iced tea.  · Keep in mind that regular soda, juice, and other mixers may contain a lot of sugar and must be counted as carbs.  What are tips for following this plan?    Reading food labels  · Start by checking the serving size on the \"Nutrition Facts\" label of packaged foods and drinks. The amount of calories, carbs, fats, and other nutrients listed on the label is based on one serving of the item. Many items contain more than one serving per package.  · Check the total grams (g) of carbs in one serving. You can calculate the number of servings of carbs in one serving by dividing the total carbs by 15. For example, if a food has 30 g of total carbs, it would be equal to 2 servings of carbs.  · Check the number of grams (g) of saturated and trans fats in one serving. Choose foods that have low or no amount of these fats.  · Check the number of milligrams (mg) of salt (sodium) in one serving. Most people should limit total sodium intake to less than 2,300 mg per day.  · Always check the nutrition information of foods labeled as \"low-fat\" or \"nonfat\". These foods may be higher in added sugar or refined carbs and should be avoided.  · Talk to your dietitian to identify your daily goals for nutrients listed on the label.  Shopping  · Avoid buying canned, premade, or processed foods. These foods tend to be high in fat, sodium, " and added sugar.  · Shop around the outside edge of the grocery store. This includes fresh fruits and vegetables, bulk grains, fresh meats, and fresh dairy.  Cooking  · Use low-heat cooking methods, such as baking, instead of high-heat cooking methods like deep frying.  · Cook using healthy oils, such as olive, canola, or sunflower oil.  · Avoid cooking with butter, cream, or high-fat meats.  Meal planning  · Eat meals and snacks regularly, preferably at the same times every day. Avoid going long periods of time without eating.  · Eat foods high in fiber, such as fresh fruits, vegetables, beans, and whole grains. Talk to your dietitian about how many servings of carbs you can eat at each meal.  · Eat 4-6 ounces (oz) of lean protein each day, such as lean meat, chicken, fish, eggs, or tofu. One oz of lean protein is equal to:  ? 1 oz of meat, chicken, or fish.  ? 1 egg.  ? ¼ cup of tofu.  · Eat some foods each day that contain healthy fats, such as avocado, nuts, seeds, and fish.  Lifestyle  · Check your blood glucose regularly.  · Exercise regularly as told by your health care provider. This may include:  ? 150 minutes of moderate-intensity or vigorous-intensity exercise each week. This could be brisk walking, biking, or water aerobics.  ? Stretching and doing strength exercises, such as yoga or weightlifting, at least 2 times a week.  · Take medicines as told by your health care provider.  · Do not use any products that contain nicotine or tobacco, such as cigarettes and e-cigarettes. If you need help quitting, ask your health care provider.  · Work with a counselor or diabetes educator to identify strategies to manage stress and any emotional and social challenges.  Questions to ask a health care provider  · Do I need to meet with a diabetes educator?  · Do I need to meet with a dietitian?  · What number can I call if I have questions?  · When are the best times to check my blood glucose?  Where to find more  information:  · American Diabetes Association: diabetes.org  · Academy of Nutrition and Dietetics: www.eatright.org  · National Aurora of Diabetes and Digestive and Kidney Diseases (NIH): www.niddk.nih.gov  Summary  · A healthy meal plan will help you control your blood glucose and maintain a healthy lifestyle.  · Working with a diet and nutrition specialist (dietitian) can help you make a meal plan that is best for you.  · Keep in mind that carbohydrates (carbs) and alcohol have immediate effects on your blood glucose levels. It is important to count carbs and to use alcohol carefully.  This information is not intended to replace advice given to you by your health care provider. Make sure you discuss any questions you have with your health care provider.  Document Released: 09/14/2006 Document Revised: 11/30/2018 Document Reviewed: 01/22/2018  [employer] Patient Education © 2020 [employer]      Exercising to Lose Weight  Exercise is structured, repetitive physical activity to improve fitness and health. Getting regular exercise is important for everyone. It is especially important if you are overweight. Being overweight increases your risk of heart disease, stroke, diabetes, high blood pressure, and several types of cancer. Reducing your calorie intake and exercising can help you lose weight.  Exercise is usually categorized as moderate or vigorous intensity. To lose weight, most people need to do a certain amount of moderate-intensity or vigorous-intensity exercise each week.  Moderate-intensity exercise    Moderate-intensity exercise is any activity that gets you moving enough to burn at least three times more energy (calories) than if you were sitting.  Examples of moderate exercise include:  · Walking a mile in 15 minutes.  · Doing light yard work.  · Biking at an easy pace.  Most people should get at least 150 minutes (2 hours and 30 minutes) a week of moderate-intensity exercise to maintain their body  weight.  Vigorous-intensity exercise  Vigorous-intensity exercise is any activity that gets you moving enough to burn at least six times more calories than if you were sitting. When you exercise at this intensity, you should be working hard enough that you are not able to carry on a conversation.  Examples of vigorous exercise include:  · Running.  · Playing a team sport, such as football, basketball, and soccer.  · Jumping rope.  Most people should get at least 75 minutes (1 hour and 15 minutes) a week of vigorous-intensity exercise to maintain their body weight.  How can exercise affect me?  When you exercise enough to burn more calories than you eat, you lose weight. Exercise also reduces body fat and builds muscle. The more muscle you have, the more calories you burn. Exercise also:  · Improves mood.  · Reduces stress and tension.  · Improves your overall fitness, flexibility, and endurance.  · Increases bone strength.  The amount of exercise you need to lose weight depends on:  · Your age.  · The type of exercise.  · Any health conditions you have.  · Your overall physical ability.  Talk to your health care provider about how much exercise you need and what types of activities are safe for you.  What actions can I take to lose weight?  Nutrition    · Make changes to your diet as told by your health care provider or diet and nutrition specialist (dietitian). This may include:  ? Eating fewer calories.  ? Eating more protein.  ? Eating less unhealthy fats.  ? Eating a diet that includes fresh fruits and vegetables, whole grains, low-fat dairy products, and lean protein.  ? Avoiding foods with added fat, salt, and sugar.  · Drink plenty of water while you exercise to prevent dehydration or heat stroke.  Activity  · Choose an activity that you enjoy and set realistic goals. Your health care provider can help you make an exercise plan that works for you.  · Exercise at a moderate or vigorous intensity most days of  the week.  ? The intensity of exercise may vary from person to person. You can tell how intense a workout is for you by paying attention to your breathing and heartbeat. Most people will notice their breathing and heartbeat get faster with more intense exercise.  · Do resistance training twice each week, such as:  ? Push-ups.  ? Sit-ups.  ? Lifting weights.  ? Using resistance bands.  · Getting short amounts of exercise can be just as helpful as long structured periods of exercise. If you have trouble finding time to exercise, try to include exercise in your daily routine.  ? Get up, stretch, and walk around every 30 minutes throughout the day.  ? Go for a walk during your lunch break.  ? Park your car farther away from your destination.  ? If you take public transportation, get off one stop early and walk the rest of the way.  ? Make phone calls while standing up and walking around.  ? Take the stairs instead of elevators or escalators.  · Wear comfortable clothes and shoes with good support.  · Do not exercise so much that you hurt yourself, feel dizzy, or get very short of breath.  Where to find more information  · [place] Department of Health and Human Services: www.hhs.gov  · Centers for Disease Control and Prevention (CDC): www.cdc.gov  Contact a health care provider:  · Before starting a new exercise program.  · If you have questions or concerns about your weight.  · If you have a medical problem that keeps you from exercising.  Get help right away if you have any of the following while exercising:  · Injury.  · Dizziness.  · Difficulty breathing or shortness of breath that does not go away when you stop exercising.  · Chest pain.  · Rapid heartbeat.  Summary  · Being overweight increases your risk of heart disease, stroke, diabetes, high blood pressure, and several types of cancer.  · Losing weight happens when you burn more calories than you eat.  · Reducing the amount of calories you eat in addition to  getting regular moderate or vigorous exercise each week helps you lose weight.  This information is not intended to replace advice given to you by your health care provider. Make sure you discuss any questions you have with your health care provider.  Document Released: 01/20/2012 Document Revised: 12/31/2018 Document Reviewed: 12/31/2018  [employer] Patient Education © 2020 [employer]

## 2020-09-14 NOTE — TELEPHONE ENCOUNTER
Patient spouse Tara called and wanted to advise PCP to sign off to release patient from the oxygen. Pt spouse advised that the hospitalist was Chapin Khan is the doctor who advised that he should begin.       Please contact patient and advise @ [phone]

## 2020-09-16 NOTE — TELEPHONE ENCOUNTER
PATIENTS WIFE REACHED OUT TO DR. [facility]ITAL[facility] THE PHYSICIAN THAT ORDERED THE OXYGEN IN THE BEGINNING AND HE ADVISED THAT THE PCP OR CARDIOLOGIST CAN RELEASE HIM FROM USE OF THE OXYGEN AND IS REQUESTING A RELEASE BE SENT TO [facility] SO THAT SUPPLIES CAN BE PICKED UP. SHE ADVISED THAT SHE CAN NOT GET IN CONTACT WITH HIS CARDIOLOGIST.     PLEASE CONTACT WITH UPDATE.

## 2020-09-18 ENCOUNTER — TELEPHONE (OUTPATIENT)
Dept: FAMILY MEDICINE CLINIC | Facility: CLINIC | Age: 70
End: 2020-09-18

## 2020-09-18 ENCOUNTER — DOCUMENTATION (OUTPATIENT)
Dept: FAMILY MEDICINE CLINIC | Facility: CLINIC | Age: 70
End: 2020-09-18

## 2020-09-18 NOTE — PROGRESS NOTES
PATIENTS WIFE MARICRUZ STATES SHE ASKED FOR HER  TO BE RELEASED FROM OXYGEN A WEEK AGO  AND DR ESQUIVEL WAS HESITANT BECAUSE HE DID NOT KNOW WHAT DR HAD ORDERED IT      SHE STATES THAT THE DR IS ADRIÁN BLACKBURN FROM THE HOSPITAL  AND HE HAS TOLD HER THAT HE WOULD HAVE TO BE RELEASED FROM HIS PCP OR CARDIOLOGIST AND THE CARDIOLOGIST IS [place]   HE DOES NOT USE THE OXYGEN AND NEEDS THE RELEASE SENT TO [facility]     Dr Esquivel faxed

## 2020-09-22 NOTE — PROGRESS NOTES
Chief Complaint   Patient presents with   • Coronary Artery Disease     follow up   • Hypertension       Subjective:  Jose Barrett is a 70 y.o. male who presents today to f/u on his chronic conditions.      Patient reports his coronary artery disease is stable at this time.  Condition is chronic. Denies any chest pain or palpitations.  Denies shortness of breath or dyspnea.  Denies edema or swelling.  He reports he is taking his medications as prescribed.  His current taking aspirin 81 mg daily, Lipitor 80 mg daily, Plavix 75 mg daily, and metoprolol XL 25 mg daily.  Does follow-up with cardiology regularly.    Patient's blood pressure is stable at this time.  This is a chronic condition.  Reports he is taking all medications as prescribed.  Does not follow special diet or exercise routine.  Denies any headaches, vision changes, chest pain, palpitations, edema, shortness of breath, dizziness or lightness, weakness, decreased urine output, or other symptoms at this time.    Patient reports his COPD is stable.  Denies increase shortness of breath, cough, or sputum production.  Is taking all medications as prescribed.  Is tolerating medications well with no adverse events or side effects.    Patient's hyperlipidemia has been stable.  He is taking his statin nightly.  Does not follow special diet or exercise routine.      The following portions of the patient's history were reviewed and updated as appropriate: allergies, current medications, past family history, past medical history, past social history, past surgical history and problem list.      Past Medical History:   Diagnosis Date   • Arthritis     back, legs, hips.    • Black lung (CMS/HCC)    • Black lung disease (CMS/HCC)    • COPD (chronic obstructive pulmonary disease) (CMS/HCC)    • Coronary artery disease    • Heart bloc    • HL (hearing loss)    • Hyperlipidemia    • Hypertension    • Low back pain    • Myocardial infarction (CMS/HCC)          Current  "Outpatient Medications:   •  albuterol sulfate  (90 Base) MCG/ACT inhaler, Inhale 2 puffs Every 4 (Four) Hours As Needed for Wheezing., Disp: , Rfl:   •  aspirin 81 MG chewable tablet, Chew 81 mg Daily., Disp: , Rfl:   •  atorvastatin (LIPITOR) 80 MG tablet, Take 1 tablet by mouth Daily., Disp: 30 tablet, Rfl: 11  •  clopidogrel (PLAVIX) 75 MG tablet, Take 1 tablet by mouth Daily., Disp: 30 tablet, Rfl: 11  •  Fluticasone Furoate-Vilanterol (Breo Ellipta) 100-25 MCG/INH inhaler, Inhale 1 puff Daily., Disp: , Rfl:   •  metoprolol succinate XL (TOPROL-XL) 25 MG 24 hr tablet, Take 0.5 tablets by mouth Daily., Disp: 90 tablet, Rfl: 3  •  polycarbophil (calcium polycarbophil) 625 MG tablet tablet, Take  by mouth Daily., Disp: , Rfl:   •  Polyethylene Glycol 3350 (MIRALAX PO), Take  by mouth., Disp: , Rfl:   •  vitamin C (VITAMIN C) 500 MG tablet, Take 1 tablet by mouth 2 (Two) Times a Day., Disp: , Rfl:     Review of Systems    Review of Systems   Constitutional: Negative for activity change, fatigue and fever.   HENT: Negative for hearing loss and sore throat.    Eyes: Negative for visual disturbance.   Respiratory: Negative for cough and shortness of breath.    Cardiovascular: Negative for chest pain, palpitations and leg swelling.   Gastrointestinal: Negative for abdominal pain, blood in stool, constipation, diarrhea, nausea and vomiting.   Genitourinary: Negative for dysuria, frequency, hematuria and urgency.   Musculoskeletal: Negative for arthralgias and myalgias.   Skin: Negative for rash.   Neurological: Negative for dizziness, numbness and headaches.   Psychiatric/Behavioral: Negative for agitation and suicidal ideas. The patient is not nervous/anxious.        Objective  Vitals:    09/14/20 1335   BP: 130/70   BP Location: Left arm   Patient Position: Sitting   Cuff Size: Adult   Pulse: 70   Resp: 20   Temp: 97.5 °F (36.4 °C)   SpO2: 98%   Weight: 106 kg (233 lb 9.6 oz)   Height: 185.4 cm (73\")   PainSc: " 0-No pain   Body mass index is 30.82 kg/m².      Physical Exam  Vitals signs and nursing note reviewed.   Constitutional:       General: He is not in acute distress.     Appearance: Normal appearance. He is well-developed. He is obese. He is not ill-appearing, toxic-appearing or diaphoretic.      Interventions: He is not intubated.  HENT:      Head: Normocephalic and atraumatic.      Right Ear: Hearing and external ear normal.      Left Ear: Hearing and external ear normal.   Eyes:      General: No scleral icterus.        Right eye: No discharge.         Left eye: No discharge.      Conjunctiva/sclera: Conjunctivae normal.      Pupils: Pupils are equal, round, and reactive to light.   Neck:      Musculoskeletal: Full passive range of motion without pain, normal range of motion and neck supple.      Thyroid: No thyroid mass or thyromegaly.      Trachea: Trachea and phonation normal. No tracheal tenderness or tracheal deviation.   Cardiovascular:      Rate and Rhythm: Normal rate and regular rhythm.  No extrasystoles are present.     Chest Wall: PMI is not displaced.      Heart sounds: Normal heart sounds, S1 normal and S2 normal. Heart sounds not distant. No murmur. No systolic murmur. No diastolic murmur. No friction rub. No gallop. No S3 or S4 sounds.    Pulmonary:      Effort: Pulmonary effort is normal. No tachypnea, bradypnea, accessory muscle usage or respiratory distress. He is not intubated.      Breath sounds: Normal breath sounds. No stridor. No decreased breath sounds, wheezing, rhonchi or rales.   Abdominal:      General: Bowel sounds are normal. There is no distension or abdominal bruit.      Palpations: Abdomen is soft. Abdomen is not rigid. There is no shifting dullness, fluid wave, mass or pulsatile mass.      Tenderness: There is no abdominal tenderness. There is no guarding or rebound. Negative signs include Norwood's sign and McBurney's sign.   Lymphadenopathy:      Cervical: No cervical adenopathy.    Skin:     General: Skin is warm and dry.      Findings: No erythema or rash.   Neurological:      Mental Status: He is alert and oriented to person, place, and time. He is not disoriented.      Cranial Nerves: No cranial nerve deficit.      Sensory: No sensory deficit.      Motor: No tremor, atrophy or abnormal muscle tone.   Psychiatric:         Speech: Speech normal.         Behavior: Behavior normal.         Thought Content: Thought content normal.         Judgment: Judgment normal.           Jose was seen today for coronary artery disease and hypertension.    Diagnoses and all orders for this visit:    Essential hypertension  Blood pressure stable.  Continue current medications.  DASH diet and exercise recommended.  Will follow.    Coronary artery disease involving native coronary artery of native heart with unstable angina pectoris (CMS/HCC)  Stable.  Continue on medications.  Continue to follow with cardiology as instructed.  Seek immediate medical attention for any new or worsening symptoms.    Chronic obstructive pulmonary disease, unspecified COPD type (CMS/HCC)  Stable.  Continue current medications.  Continue to work on smoking cessation.  Will follow.    Hyperlipidemia, unspecified hyperlipidemia type  Continue statin.  Heart healthy low-cholesterol diet and exercise recommended.  Will follow.    Class 1 obesity due to excess calories with serious comorbidity and body mass index (BMI) of 30.0 to 30.9 in adult  Patient's Body mass index is 30.82 kg/m². BMI is above normal parameters. Recommendations include: exercise counseling and nutrition counseling.            I spent 25 minutes in direct face to face contact with patient.  Greater than 50% of this time was spent counseling patient and discussing plan of care.    PHQ-2/PHQ-9 Depression Screening 8/7/2020   Little interest or pleasure in doing things 1   Feeling down, depressed, or hopeless 2   Trouble falling or staying asleep, or sleeping too  much 3   Feeling tired or having little energy 0   Poor appetite or overeating 0   Feeling bad about yourself - or that you are a failure or have let yourself or your family down 1   Trouble concentrating on things, such as reading the newspaper or watching television 0   Moving or speaking so slowly that other people could have noticed. Or the opposite - being so fidgety or restless that you have been moving around a lot more than usual 0   Thoughts that you would be better off dead, or of hurting yourself in some way 0   Total Score 7   If you checked off any problems, how difficult have these problems made it for you to do your work, take care of things at home, or get along with other people? Not difficult at all           This document has been electronically signed by Leo Gomez DO on September 25, 2020 12:57 CDT

## 2020-09-27 PROBLEM — E66.09 CLASS 1 OBESITY DUE TO EXCESS CALORIES WITH SERIOUS COMORBIDITY AND BODY MASS INDEX (BMI) OF 30.0 TO 30.9 IN ADULT: Status: ACTIVE | Noted: 2020-09-27

## 2020-09-27 PROBLEM — I25.10 CORONARY ARTERY DISEASE INVOLVING NATIVE CORONARY ARTERY OF NATIVE HEART WITHOUT ANGINA PECTORIS: Status: ACTIVE | Noted: 2020-06-18

## 2020-09-27 NOTE — PROGRESS NOTES
8/24/2020    Jose Barrett  1950    Chief Complaint:  No chief complaint on file.      HPI:      PCP:  Leo Gomez DO  Cardiology:  Dr. Petersen  Nephrology:  Dr. Amaya [facility]     69 y.o. male with HTN(stable, increased risk stroke, rupture), Hyperlipidemia(stable, increased risk cardiovascular events), Obesity(uncontrolled, increased risk cardiovascular events), Smoker(uncontrolled, increased risk cardiovascular events), COPD(stable, increased risk pulmonary complications) and Chronic Kidney Disease(DWIGHT post cath, increased risk renal failure)  Black lung disease.  smokes .25 PPD.  Chest pain with exertion after dealing with his tractor getting stuck in a ditch and walking up hill. He reports the pain has not recurred. He presented for inpatient evaluation with positive troponin. Cardiac cath demonstrated severe multivessel disease and depressed ejection fraction 35%.  Underwent CABGx3 with IABP post operatively.  Complicated post operative course with superimposed DWIGHT on CKD3 requiring intermediate term dialysis.  Tunnel catheter was placed day before discharge.  Reports today in post operative follow up after hospital discharge.  Doing well, reports making much more urine, nephrology following labs as outpatient for evidence of renal recovery.  Mild distal midsternal incision dehiscence, covered with keflex at time of discharge. Permissive HTN with current renal function.    6/2020 Carotid Duplex: LEIGHA 0-49% mPSV 114c/s Ratio 1.6, LICA 50-69% mPSV 127c/s Ratio 2.1, Antegrade vertebrals  6/2020 TTE: LV moderate dilation, mild MR, EF 35%, LVDD 65mm, LVDS 57mm  6/2020 LHC: LM 50%, Ostial LAD 90%, CX 85%, RCA 99% mid, PLM 80%    6/2020 CABGx3 LIMA-LAD, SVG-OM2, SVG-PDA, IABP insertion with perclose    6/2020: Tunnel Cath Placement- RIGHT    The following portions of the patient's history were reviewed and updated as appropriate: allergies, current medications, past family history, past medical  history, past social history, past surgical history and problem list.  Recent images independently reviewed.  Available laboratory values reviewed.    PMH:  Past Medical History:   Diagnosis Date   • Arthritis     back, legs, hips.    • Black lung (CMS/HCC)    • Black lung disease (CMS/HCC)    • COPD (chronic obstructive pulmonary disease) (CMS/HCC)    • Coronary artery disease    • Heart bloc    • HL (hearing loss)    • Hyperlipidemia    • Hypertension    • Low back pain    • Myocardial infarction (CMS/HCC)      Past Surgical History:   Procedure Laterality Date   • CARDIAC CATHETERIZATION N/A 2020    Procedure: Left Heart Cath;  Surgeon: April Petersen MD;  Location: [facility] CATH INVASIVE LOCATION;  Service: Cardiology;  Laterality: N/A;   • CORONARY ARTERY BYPASS GRAFT N/A 2020    Procedure: CORONARY ARTERY BYPASS GRAFTING X 3 UTILIZING THE LEFT INTERNAL MAMMARY ARTERY AND ENDOSCOPICALLY HARVESTED VEIN FROM THE LEFT LEG    (CELL SAVER);  Surgeon: Edi Donis MD;  Location: [facility] OR;  Service: Cardiothoracic;  Laterality: N/A;  Leg Incision: 0800  Chest Incision: 0807  Vein out: 0919  Vein prepped: 0954  On bypass: 1019  Off bypass 1246     • TONSILLECTOMY       Family History   Problem Relation Age of Onset   • Heart disease Mother         MI   • No Known Problems Father      Social History     Tobacco Use   • Smoking status: Former Smoker     Packs/day: 0.50     Years: 50.00     Pack years: 25.00     Types: Cigarettes     Quit date: 2020     Years since quittin.3   • Smokeless tobacco: Never Used   Substance Use Topics   • Alcohol use: Never     Frequency: Never   • Drug use: Never       ALLERGIES:  No Known Allergies      MEDICATIONS:    Current Outpatient Medications:   •  albuterol sulfate  (90 Base) MCG/ACT inhaler, Inhale 2 puffs Every 4 (Four) Hours As Needed for Wheezing., Disp: , Rfl:   •  aspirin 81 MG chewable tablet, Chew 81 mg Daily., Disp: , Rfl:   •   atorvastatin (LIPITOR) 80 MG tablet, Take 1 tablet by mouth Daily., Disp: 30 tablet, Rfl: 11  •  clopidogrel (PLAVIX) 75 MG tablet, Take 1 tablet by mouth Daily., Disp: 30 tablet, Rfl: 11  •  Fluticasone Furoate-Vilanterol (Breo Ellipta) 100-25 MCG/INH inhaler, Inhale 1 puff Daily., Disp: , Rfl:   •  metoprolol succinate XL (TOPROL-XL) 25 MG 24 hr tablet, Take 0.5 tablets by mouth Daily., Disp: 90 tablet, Rfl: 3  •  polycarbophil (calcium polycarbophil) 625 MG tablet tablet, Take  by mouth Daily., Disp: , Rfl:   •  Polyethylene Glycol 3350 (MIRALAX PO), Take  by mouth., Disp: , Rfl:   •  vitamin C (VITAMIN C) 500 MG tablet, Take 1 tablet by mouth 2 (Two) Times a Day., Disp: , Rfl:     Review of Systems   Review of Systems   Constitution: Positive for malaise/fatigue. Negative for weight loss.   Cardiovascular: Negative for chest pain, claudication and dyspnea on exertion.   Respiratory: Negative for cough and shortness of breath.    Skin: Negative for color change and poor wound healing.   Neurological: Negative for dizziness, numbness and weakness.       Physical Exam   Vitals:    08/24/20 1455   BP: 141/84   BP Location: Right arm   Patient Position: Sitting   Pulse: 91   SpO2: 98%     Physical Exam  Constitutional:       General: He is not in acute distress.     Appearance: He is not ill-appearing.   HENT:      Right Ear: Hearing normal.      Left Ear: Hearing normal.      Nose: No nasal deformity.      Mouth/Throat:      Dentition: Normal dentition. Does not have dentures.   Cardiovascular:      Rate and Rhythm: Normal rate and regular rhythm.      Pulses:           Carotid pulses are 2+ on the right side and 2+ on the left side.       Radial pulses are 2+ on the right side and 2+ on the left side.        Posterior tibial pulses are 2+ on the right side and 2+ on the left side.      Heart sounds: No murmur.      Comments: RIGHT IJ tunnel cath  Pulmonary:      Effort: Pulmonary effort is normal.      Breath  sounds: Normal breath sounds.   Abdominal:      General: There is no distension.      Palpations: Abdomen is soft. There is no mass.      Tenderness: There is no abdominal tenderness.   Musculoskeletal:         General: No deformity.      Comments: Gait normal.    Skin:     General: Skin is warm and dry.      Coloration: Skin is not pale.      Findings: No erythema.      Comments: No venous staining   Neurological:      Mental Status: He is alert and oriented to person, place, and time.   Psychiatric:         Speech: Speech normal.         Behavior: Behavior is cooperative.         Thought Content: Thought content normal.         Judgment: Judgment normal.         BUN   Date Value Ref Range Status   08/07/2020 21 8 - 23 mg/dL Final     Creatinine   Date Value Ref Range Status   08/07/2020 2.00 (H) 0.76 - 1.27 mg/dL Final     eGFR Non  Amer   Date Value Ref Range Status   08/07/2020 33 (L) >60 mL/min/1.73 Final     eGFR   Amer   Date Value Ref Range Status   07/01/2020   Final     Comment:     <15 Indicative of kidney failure.       ASSESSMENT:  Diagnoses and all orders for this visit:    DWIGHT (acute kidney injury) (CMS/[facility])    Carotid stenosis, asymptomatic, bilateral    Essential hypertension    COPD (chronic obstructive pulmonary disease) with chronic bronchitis (CMS/[facility])    Coronary artery disease involving native coronary artery of native heart without angina pectoris    Class 1 obesity due to excess calories with serious comorbidity and body mass index (BMI) of 30.0 to 30.9 in adult    PLAN:  Detailed discussion with Jose Barrett regarding situation and options.  no longer requires dialysis.  ready for tunnel cath removal.      Risks:  bleeding, catheter breakage which may require retrieval.  Benefits:  catheter removal, decrease incidence infection, blood clots.  Options:  none.  Understands and wishes to proceed.    Remove tunnel cath today.    Return after above studies complete  Obesity  Class  1. Increased risk cardiovascular events, sleep and breathing disorders, joint issues, type 2 diabetes mellitus. Options for weight management, heart healthy diet, exercise programs, and associated health risks of obesity discussed.  Recommended regular physical activity, progressive walking program.  Continue current medications as directed.    Thank you for the opportunity to participate in this patient's care.    Copy to primary care provider.    EMR Dragon/Transcription disclaimer:   Much of this encounter note is an electronic transcription/translation of spoken language to printed text. The electronic translation of spoken language may permit erroneous, or at times, nonsensical words or phrases to be inadvertently transcribed; Although I have reviewed the note for such errors, some may still exist.           OPERATIVE NOTE  Jose Barrett  1950      PREOP DIAGNOSES:  ESRD    POSTOP DIAGNOSES:  ESRD    PROCEDURE:  Removal tunnelled central venous catheter with cuff    SURGEON: CAITLIN Donis MD FACS RPVI    ASSISTANT: Kamini Roldan CFA    ANESTHESIA: local 1% lidocaine    ESTIMATED BLOOD LOSS: minimal    SPECIMEN:  None    COMPLICATIONS: None    DESCRIPTION OF OPERATION: taken to procedure room, placed supine position, prepped draped sterile fashion.  1% lidocaine local anesthesia around cuff.      Exit site opened with 11 blade.  Cuff dissected free.  Catheter removed intact.  Manual pressure held at vein insertion site with good hemostasis.  Pressure dressing applied.  Tolerated procedure well, home.            This document has been electronically signed by Edi Donis MD on September 27, 2020 16:30 CDT

## 2020-10-19 ENCOUNTER — OFFICE VISIT (OUTPATIENT)
Dept: CARDIAC SURGERY | Facility: CLINIC | Age: 70
End: 2020-10-19

## 2020-10-19 ENCOUNTER — LAB (OUTPATIENT)
Dept: LAB | Facility: HOSPITAL | Age: 70
End: 2020-10-19

## 2020-10-19 VITALS
HEART RATE: 76 BPM | DIASTOLIC BLOOD PRESSURE: 92 MMHG | WEIGHT: 237 LBS | HEIGHT: 73 IN | OXYGEN SATURATION: 98 % | BODY MASS INDEX: 31.41 KG/M2 | SYSTOLIC BLOOD PRESSURE: 182 MMHG

## 2020-10-19 DIAGNOSIS — E66.09 CLASS 1 OBESITY DUE TO EXCESS CALORIES WITH SERIOUS COMORBIDITY AND BODY MASS INDEX (BMI) OF 30.0 TO 30.9 IN ADULT: ICD-10-CM

## 2020-10-19 DIAGNOSIS — J44.9 COPD (CHRONIC OBSTRUCTIVE PULMONARY DISEASE) WITH CHRONIC BRONCHITIS (HCC): ICD-10-CM

## 2020-10-19 DIAGNOSIS — I65.23 CAROTID STENOSIS, ASYMPTOMATIC, BILATERAL: Primary | ICD-10-CM

## 2020-10-19 DIAGNOSIS — I25.10 CORONARY ARTERY DISEASE INVOLVING NATIVE CORONARY ARTERY OF NATIVE HEART WITHOUT ANGINA PECTORIS: ICD-10-CM

## 2020-10-19 DIAGNOSIS — E78.5 DYSLIPIDEMIA: ICD-10-CM

## 2020-10-19 DIAGNOSIS — Z48.812 SURGICAL AFTERCARE, CIRCULATORY SYSTEM: ICD-10-CM

## 2020-10-19 DIAGNOSIS — I10 ESSENTIAL HYPERTENSION: ICD-10-CM

## 2020-10-19 PROBLEM — F17.200 NICOTINE DEPENDENCE: Status: RESOLVED | Noted: 2019-05-29 | Resolved: 2020-10-19

## 2020-10-19 PROBLEM — N17.9 AKI (ACUTE KIDNEY INJURY) (HCC): Status: RESOLVED | Noted: 2020-06-10 | Resolved: 2020-10-19

## 2020-10-19 LAB
ALBUMIN SERPL-MCNC: 4.2 G/DL (ref 3.5–5.2)
ALBUMIN/GLOB SERPL: 1.5 G/DL
ALP SERPL-CCNC: 79 U/L (ref 39–117)
ALT SERPL W P-5'-P-CCNC: 12 U/L (ref 1–41)
ANION GAP SERPL CALCULATED.3IONS-SCNC: 8 MMOL/L (ref 5–15)
AST SERPL-CCNC: 14 U/L (ref 1–40)
BILIRUB SERPL-MCNC: 0.3 MG/DL (ref 0–1.2)
BUN SERPL-MCNC: 20 MG/DL (ref 8–23)
BUN/CREAT SERPL: 11.3 (ref 7–25)
CALCIUM SPEC-SCNC: 9 MG/DL (ref 8.6–10.5)
CHLORIDE SERPL-SCNC: 101 MMOL/L (ref 98–107)
CO2 SERPL-SCNC: 33 MMOL/L (ref 22–29)
CREAT SERPL-MCNC: 1.77 MG/DL (ref 0.76–1.27)
GFR SERPL CREATININE-BSD FRML MDRD: 38 ML/MIN/1.73
GLOBULIN UR ELPH-MCNC: 2.8 GM/DL
GLUCOSE SERPL-MCNC: 103 MG/DL (ref 65–99)
POTASSIUM SERPL-SCNC: 4.3 MMOL/L (ref 3.5–5.2)
PROT SERPL-MCNC: 7 G/DL (ref 6–8.5)
SODIUM SERPL-SCNC: 142 MMOL/L (ref 136–145)

## 2020-10-19 PROCEDURE — 80053 COMPREHEN METABOLIC PANEL: CPT | Performed by: NURSE PRACTITIONER

## 2020-10-19 PROCEDURE — 36415 COLL VENOUS BLD VENIPUNCTURE: CPT | Performed by: NURSE PRACTITIONER

## 2020-10-19 PROCEDURE — 99024 POSTOP FOLLOW-UP VISIT: CPT | Performed by: NURSE PRACTITIONER

## 2020-10-19 NOTE — PROGRESS NOTES
CVTS Office Progress Note     Jose Barrett  1950    Chief Complaint:    Chief Complaint   Patient presents with   • Coronary Artery Disease       HPI:      PCP:  Leo Gomez DO  Cardiology:  Dr. Petersen  Nephrology:  Dr. Amaya [facility]     69 y.o. male with HTN(stable, increased risk stroke, rupture), Hyperlipidemia(stable, increased risk cardiovascular events), Obesity(uncontrolled, increased risk cardiovascular events), Smoker(uncontrolled, increased risk cardiovascular events), COPD(stable, increased risk pulmonary complications) and Chronic Kidney Disease(DWIGHT post cath, increased risk renal failure)  Black lung disease.  smokes .25 PPD.  Chest pain with exertion after dealing with his tractor getting stuck in a ditch and walking up hill. He reports the pain has not recurred. He presented for inpatient evaluation with positive troponin. Cardiac cath demonstrated severe multivessel disease and depressed ejection fraction 35%.  Underwent CABGx3 with IABP post operatively.  Complicated post operative course with superimposed DWIGHT on CKD3 requiring intermediate term dialysis, renal recovery. Progressing well. Returns in follow up.    6/2020 Carotid Duplex: LEIGHA 0-49% mPSV 114c/s Ratio 1.6, LICA 50-69% mPSV 127c/s Ratio 2.1, Antegrade vertebrals  6/2020 TTE: LV moderate dilation, mild MR, EF 35%, LVDD 65mm, LVDS 57mm  6/2020 LHC: LM 50%, Ostial LAD 90%, CX 85%, RCA 99% mid, PLM 80%    6/18/2020 CABGx3 LIMA-LAD, SVG-OM2, SVG-PDA, IABP insertion with perclose    6/30/2020: Tunnel Cath Placement- RIGHT  8/24/2020: Tunnel Cath Removal    The following portions of the patient's history were reviewed and updated as appropriate: allergies, current medications, past family history, past medical history, past social history, past surgical history and problem list.  Recent images independently reviewed.  Available laboratory values reviewed.    PMH:  Past Medical History:   Diagnosis Date   • Arthritis      back, legs, hips.    • Black lung (CMS/HCC)    • Black lung disease (CMS/HCC)    • COPD (chronic obstructive pulmonary disease) (CMS/HCC)    • Coronary artery disease    • Heart bloc    • HL (hearing loss)    • Hyperlipidemia    • Hypertension    • Low back pain    • Myocardial infarction (CMS/HCC)      Past Surgical History:   Procedure Laterality Date   • CARDIAC CATHETERIZATION N/A 2020    Procedure: Left Heart Cath;  Surgeon: April Petersen MD;  Location: [facility] CATH INVASIVE LOCATION;  Service: Cardiology;  Laterality: N/A;   • CORONARY ARTERY BYPASS GRAFT N/A 2020    Procedure: CORONARY ARTERY BYPASS GRAFTING X 3 UTILIZING THE LEFT INTERNAL MAMMARY ARTERY AND ENDOSCOPICALLY HARVESTED VEIN FROM THE LEFT LEG    (CELL SAVER);  Surgeon: Edi Donis MD;  Location: [facility] OR;  Service: Cardiothoracic;  Laterality: N/A;  Leg Incision: 0800  Chest Incision: 0807  Vein out: 0919  Vein prepped: 0954  On bypass: 1019  Off bypass 1246     • TONSILLECTOMY       Family History   Problem Relation Age of Onset   • Heart disease Mother         MI   • No Known Problems Father      Social History     Tobacco Use   • Smoking status: Former Smoker     Packs/day: 0.50     Years: 50.00     Pack years: 25.00     Types: Cigarettes     Quit date: 2020     Years since quittin.3   • Smokeless tobacco: Never Used   Substance Use Topics   • Alcohol use: Never     Frequency: Never   • Drug use: Never       ALLERGIES:  No Known Allergies      MEDICATIONS:    Current Outpatient Medications:   •  albuterol sulfate  (90 Base) MCG/ACT inhaler, Inhale 2 puffs Every 4 (Four) Hours As Needed for Wheezing., Disp: , Rfl:   •  aspirin 81 MG chewable tablet, Chew 81 mg Daily., Disp: , Rfl:   •  atorvastatin (LIPITOR) 80 MG tablet, Take 1 tablet by mouth Daily., Disp: 30 tablet, Rfl: 11  •  clopidogrel (PLAVIX) 75 MG tablet, Take 1 tablet by mouth Daily., Disp: 30 tablet, Rfl: 11  •  Fluticasone  Furoate-Vilanterol (Breo Ellipta) 100-25 MCG/INH inhaler, Inhale 1 puff Daily., Disp: , Rfl:   •  metoprolol succinate XL (TOPROL-XL) 25 MG 24 hr tablet, Take 0.5 tablets by mouth Daily., Disp: 90 tablet, Rfl: 3  •  polycarbophil (calcium polycarbophil) 625 MG tablet tablet, Take  by mouth Daily., Disp: , Rfl:   •  Polyethylene Glycol 3350 (MIRALAX PO), Take  by mouth., Disp: , Rfl:   •  vitamin C (VITAMIN C) 500 MG tablet, Take 1 tablet by mouth 2 (Two) Times a Day., Disp: , Rfl:       Review of Systems   Constitution: Positive for malaise/fatigue (improved). Negative for decreased appetite, fever, weight gain and weight loss.   HENT: Negative for hearing loss, hoarse voice and sore throat.    Eyes: Negative for blurred vision, visual disturbance and visual halos.   Cardiovascular: Positive for chest pain (occasional). Negative for dyspnea on exertion, leg swelling and palpitations.   Respiratory: Negative for cough, shortness of breath and sputum production.    Endocrine: Positive for cold intolerance. Negative for polyuria.   Hematologic/Lymphatic: Negative for bleeding problem. Bruises/bleeds easily.   Skin: Negative for color change, nail changes, poor wound healing and suspicious lesions.   Musculoskeletal: Positive for back pain. Negative for joint pain and myalgias.   Gastrointestinal: Negative for abdominal pain, constipation, diarrhea, hematemesis, melena, nausea and vomiting.   Genitourinary: Negative for flank pain, nocturia and urgency.   Neurological: Negative for brief paralysis, focal weakness, light-headedness, loss of balance, numbness, paresthesias and weakness.            Psychiatric/Behavioral: Negative for altered mental status, depression, suicidal ideas and thoughts of violence.   Allergic/Immunologic: Negative for hives and persistent infections.         Vitals:    10/19/20 0932 10/19/20 0933   BP: (!) 182/94 (!) 182/92   BP Location: Left arm Right arm   Patient Position: Sitting Sitting  "  Pulse: 76    SpO2: 98%    Weight: 108 kg (237 lb)    Height: 185.4 cm (73\")      Physical Exam   Constitutional: He is oriented to person, place, and time. He appears well-developed.   HENT:   Head: Normocephalic and atraumatic.   Eyes: Pupils are equal, round, and reactive to light. Conjunctivae are normal.   Neck: Normal range of motion. Neck supple.   Cardiovascular: Normal rate.   Sternum Stable   Pulmonary/Chest: Effort normal and breath sounds normal. No respiratory distress.   Abdominal: Soft. Bowel sounds are normal. He exhibits distension (chronic).   Musculoskeletal: Normal range of motion. No tenderness.      Comments: Gait normal   Neurological: He is alert and oriented to person, place, and time. No cranial nerve deficit.   Skin: Skin is warm and dry. Capillary refill takes 2 to 3 seconds.   Psychiatric: Judgment normal.   Nursing note and vitals reviewed.    Lab Results   Component Value Date    WBC 6.05 07/17/2020    HGB 11.8 (L) 08/07/2020    HCT 35.5 (L) 08/07/2020    MCV 98.3 (H) 07/17/2020     07/17/2020     Lab Results   Component Value Date    GLUCOSE 103 (H) 10/19/2020    BUN 20 10/19/2020    CREATININE 1.77 (H) 10/19/2020    EGFRIFNONA 38 (L) 10/19/2020    EGFRIFAFRI  07/01/2020      Comment:      <15 Indicative of kidney failure.    BCR 11.3 10/19/2020    K 4.3 10/19/2020    CO2 33.0 (H) 10/19/2020    CALCIUM 9.0 10/19/2020    ALBUMIN 4.20 10/19/2020    AST 14 10/19/2020    ALT 12 10/19/2020         Assessment & Plan     Independent Review of Studies  Detailed discussion regarding risks, benefits, and treatment plan. Images independently reviewed. Patient understands, agrees, and wishes to proceed with plan.      1. Surgical aftercare, circulatory system  Stable Post Op CABG  Released from CT surgery  Off Lifting Restrictions  Follow up: Cardiology as scheduled  - Comprehensive Metabolic Panel    2. Carotid stenosis, asymptomatic, bilateral  Carotid Stenosis-Asymptomatic  Return " December: Repeat Duplex  - Duplex Carotid Ultrasound CAR; Future    3. Coronary artery disease involving native coronary artery of native heart without angina pectoris  Medical management.   [facility]    4. Essential hypertension  Controlled.     5. COPD (chronic obstructive pulmonary disease) with chronic bronchitis (CMS/HCC)  stable    6. Class 1 obesity due to excess calories with serious comorbidity and body mass index (BMI) of 30.0 to 30.9 in adult  Your BMI is 31 and falls within the overweight range. BMI is strongly correlated with increased health risks. Review options for weight management, heart healthy diet, and exercise programs.      7. Dyslipidemia  Lipid-lowering therapy has been proven beneficial in patients with cardio-vascular disease. Current guidelines recommend statin treatment for all patients with PAD,CAD and carotid stenosis. Statins are beneficial in preventing cardiovascular events, increasing functional capacity and lower the risk of adverse limb loss in PAD. Statins decrease the progression of plaque formation and may improve peripheral vessel lining, and aid in reversing atherosclerosis.                   This document has been electronically signed by ZHANNA José on October 28, 2020 14:06 CDT

## 2020-10-19 NOTE — PATIENT INSTRUCTIONS
Stable Post Op CABG  Released from CT surgery  Off Lifting Restrictions  Follow up: Cardiology as scheduled    Carotid Stenosis-Asymptomatic  Return December: Repeat Duplex    Your BMI is 31 and falls within the overweight range. BMI is strongly correlated with increased health risks. Review options for weight management, heart healthy diet, and exercise programs.

## 2020-11-03 ENCOUNTER — OFFICE VISIT (OUTPATIENT)
Dept: CARDIOLOGY | Facility: CLINIC | Age: 70
End: 2020-11-03

## 2020-11-03 VITALS
OXYGEN SATURATION: 97 % | HEART RATE: 69 BPM | SYSTOLIC BLOOD PRESSURE: 132 MMHG | BODY MASS INDEX: 31.14 KG/M2 | WEIGHT: 236 LBS | DIASTOLIC BLOOD PRESSURE: 70 MMHG

## 2020-11-03 DIAGNOSIS — I25.10 CORONARY ARTERY DISEASE DUE TO LIPID RICH PLAQUE: ICD-10-CM

## 2020-11-03 DIAGNOSIS — I10 HYPERTENSION, ESSENTIAL: Primary | ICD-10-CM

## 2020-11-03 DIAGNOSIS — E78.2 HYPERLIPEMIA, MIXED: ICD-10-CM

## 2020-11-03 DIAGNOSIS — I25.83 CORONARY ARTERY DISEASE DUE TO LIPID RICH PLAQUE: ICD-10-CM

## 2020-11-03 PROCEDURE — 99214 OFFICE O/P EST MOD 30 MIN: CPT | Performed by: INTERNAL MEDICINE

## 2020-11-03 RX ORDER — METOPROLOL SUCCINATE 25 MG/1
25 TABLET, EXTENDED RELEASE ORAL DAILY
Qty: 90 TABLET | Refills: 3 | Status: SHIPPED | OUTPATIENT
Start: 2020-11-03 | End: 2022-01-11

## 2020-11-03 NOTE — PROGRESS NOTES
[facility] Cardiology  OFFICE NOTE    Cardiovascular Medicine  Flaca Godinez M.D., RPVI         No referring provider defined for this encounter.    Thank you for asking me to see Jose Barrett for CAD.    History of Present Illness  This is a 70 y.o. male with:    1.  Coronary artery disease status post CABG with LIMA to LAD, SVG to OM 2, SVG to PDA June 2020  2.  Hypertension  3.  COPD   4.  hyperlipidemia  5.  CKD  6.  Ischemic cardiomyopathy    Jose Barrett is a 70 y.o. male who presents for consultation today.  Patient did have a prolonged stay after his CABG because of renal failure and ICU delirium.  Patient had to have intermittent dialysis.  Patient also had mild wound dehiscence.   Patient is here for establishment of care.    Patient has done very well since his CABG.  He has recovered well.  He is back to his baseline.  Denying any chest pain.  He is been pretty active and walking around without any limitations from chest pain.  Occasionally will have some shortness of breath when climbing flights of stairs when he is underlying COPD and usually that improves with his inhalers.    11/3/2020  No acute issues since last with bradycardia and any chest pain.  Shortness of breath is improved.  No bleeding problems.    Review of Systems - ROS  Constitution: Negative for weakness, weight gain and weight loss.   HENT: Negative for congestion.    Eyes: Negative for blurred vision.   Cardiovascular: As mentioned above  Respiratory: Negative for cough and hemoptysis.    Endocrine: Negative for polydipsia and polyuria.   Hematologic/Lymphatic: Negative for bleeding problem. Does not bruise/bleed easily.   Skin: Negative for flushing.   Musculoskeletal: Negative for neck pain and stiffness.   Gastrointestinal: Negative for abdominal pain, diarrhea, jaundice, melena, nausea and vomiting.   Genitourinary: Negative for dysuria and hematuria.   Neurological: Negative for dizziness, focal  weakness and numbness.   Psychiatric/Behavioral: Negative for altered mental status and depression.          All other systems were reviewed and were negative.    family history includes Heart disease in his mother; No Known Problems in his father.     reports that he quit smoking about 4 months ago. His smoking use included cigarettes. He has a 25.00 pack-year smoking history. He has never used smokeless tobacco. He reports that he does not drink alcohol or use drugs.    No Known Allergies      Current Outpatient Medications:   •  albuterol sulfate  (90 Base) MCG/ACT inhaler, Inhale 2 puffs Every 4 (Four) Hours As Needed for Wheezing., Disp: , Rfl:   •  aspirin 81 MG chewable tablet, Chew 81 mg Daily., Disp: , Rfl:   •  atorvastatin (LIPITOR) 80 MG tablet, Take 1 tablet by mouth Daily., Disp: 30 tablet, Rfl: 11  •  clopidogrel (PLAVIX) 75 MG tablet, Take 1 tablet by mouth Daily., Disp: 30 tablet, Rfl: 11  •  Fluticasone Furoate-Vilanterol (Breo Ellipta) 100-25 MCG/INH inhaler, Inhale 1 puff Daily., Disp: , Rfl:   •  metoprolol succinate XL (TOPROL-XL) 25 MG 24 hr tablet, Take 0.5 tablets by mouth Daily., Disp: 90 tablet, Rfl: 3  •  polycarbophil (calcium polycarbophil) 625 MG tablet tablet, Take  by mouth Daily., Disp: , Rfl:   •  Polyethylene Glycol 3350 (MIRALAX PO), Take  by mouth., Disp: , Rfl:   •  vitamin C (VITAMIN C) 500 MG tablet, Take 1 tablet by mouth 2 (Two) Times a Day., Disp: , Rfl:     Physical Exam:  Vitals:    11/03/20 1008   BP: 132/70   Pulse: 69   SpO2: 97%   Weight: 107 kg (236 lb)     Current Pain Level: none  Pulse Ox: Normal  on room air  General: alert, appears stated age and cooperative     Body Habitus: well-nourished    HEENT: Head: Normocephalic, no lesions, without obvious abnormality. No arcus senilis, xanthelasma or xanthomas.    Neuro: alert, oriented x3  Pulses: 2+ and symmetric  JVP: Volume/Pulsation: Normal.  Normal waveforms.   Appropriate inspiratory decrease.  No  Kussmaul's. No Sarah's.   Carotid Exam: no bruit normal pulsation bilaterally   Carotid Volume: normal.     Respirations: no increased work of breathing   Chest:  Normal    Pulmonary:Normal   Precordium: Normal impulses. P2 is not palpable.  RV Heave: absent  LV Heave: absent  [place]:  normal size and placement  Palpable S4: absent.  Heart rate: normal    Heart Rhythm: regular     Heart Sounds: S1: normal  S2: normal  S3: absent   S4: absent  Opening Snap: absent    Pericardial Rub:  Absent: .    Abdomen:   Appearance: normal .  Palpation: Soft, non-tender to palpation, bowel sounds positive in all four quadrants; no guarding or rebound tenderness  Extremity: no edema.   LE Skin: no rashes  LE Hair:  normal  LE Pulses: well perfused with normal pulses in the distal extremities  Pallor on elevation: Absent. Rubor on dependency: None      DATA REVIEWED:     EKG. I personally reviewed and interpreted the EKG.  July 8 normal sinus rhythm,  ST and T wave consider inferior lateral ischemia.  Complete left bundle branch block.    ECG/EMG Results (all)     None        ---------------------------------------------------  TTE/KIMBERLYN:  Results for orders placed during the hospital encounter of 06/05/20   Transthoracic Echo Complete With Contrast if Necessary Per Protocol    Narrative · The left ventricular cavity is mild-to-moderately dilated.  · The following left ventricular wall segments are hypokinetic: mid   inferolateral and mid inferoseptal.  · Mild mitral valve regurgitation is present  · Estimated EF appears to be in the range of 31 - 35%. Normal left   ventricular wall thickness noted. The following left ventricular wall   segments are hypokinetic: mid inferolateral and mid inferoseptal. The left   ventricular cavity is mild-to-moderately dilated.            --------------------------------------------------------------------------------------------------  LABS:     The CVD Risk score (D'Agostino, et al., 2008) failed  to calculate for the following reasons:    The patient has a prior MI, stroke, CHF, or peripheral vascular disease diagnosis         Lab Results   Component Value Date    GLUCOSE 103 (H) 10/19/2020    BUN 20 10/19/2020    CREATININE 1.77 (H) 10/19/2020    EGFRIFNONA 38 (L) 10/19/2020    EGFRIFAFRI  07/01/2020      Comment:      <15 Indicative of kidney failure.    BCR 11.3 10/19/2020    K 4.3 10/19/2020    CO2 33.0 (H) 10/19/2020    CALCIUM 9.0 10/19/2020    ALBUMIN 4.20 10/19/2020    AST 14 10/19/2020    ALT 12 10/19/2020     Lab Results   Component Value Date    WBC 6.05 07/17/2020    HGB 11.8 (L) 08/07/2020    HCT 35.5 (L) 08/07/2020    MCV 98.3 (H) 07/17/2020     07/17/2020     Lab Results   Component Value Date    CHOL 198 06/09/2020    TRIG 102 06/09/2020    HDL 26 (L) 06/09/2020     (H) 06/09/2020     Lab Results   Component Value Date    TSH 1.680 06/09/2020     Lab Results   Component Value Date    CKTOTAL 98 06/05/2020    CKMB 22.49 (H) 06/06/2020    TROPONINT 0.628 (C) 06/06/2020     Lab Results   Component Value Date    HGBA1C 6.60 (H) 06/06/2020     No results found for: DDIMER  Lab Results   Component Value Date    ALT 12 10/19/2020     Lab Results   Component Value Date    HGBA1C 6.60 (H) 06/06/2020     Lab Results   Component Value Date    CREATININE 1.77 (H) 10/19/2020     No results found for: IRON, TIBC, FERRITIN  Lab Results   Component Value Date    INR 1.38 (H) 06/19/2020    INR 1.55 (H) 06/18/2020    INR 1.06 06/07/2020    PROTIME 16.8 (H) 06/19/2020    PROTIME 18.4 (H) 06/18/2020    PROTIME 13.6 06/07/2020       Assessment/Plan       1.  Coronary artery disease:  Now status post CABG x3 as above in June.  Complicated postop course.  Continue aspirin and Plavix    2.  Ischemic cardiomyopathy:  Echo at the time of NSTEMI showed an EF of 31-35%.  Started him on a low-dose beta-blocker Toprol-XL and will consider adding lisinopril at next visit, holding for now since still  recovering from renal failure.  Appears euvolemic  If EF continues to stay low despite revascularization optimal medical therapy will consider referral for ICD for primary prevention.    4.  Hyperlipidemia:  Will uptitrate Lipitor to 80 mg.  Target LDL of less than 70.      Prevention:  Patient's Body mass index is 31.14 kg/m². BMI is above normal parameters. Recommendations include: exercise counseling, none (medical contraindication) and nutrition counseling.      Jose Barrett  reports that he quit smoking about 4 months ago. His smoking use included cigarettes. He has a 25.00 pack-year smoking history. He has never used smokeless tobacco..        AAA Screening:   Will need screening        This document has been electronically signed by Flaca Godinez MD on November 3, 2020 10:12 CST

## 2020-11-04 ENCOUNTER — LAB (OUTPATIENT)
Dept: LAB | Facility: HOSPITAL | Age: 70
End: 2020-11-04

## 2020-11-04 ENCOUNTER — TRANSCRIBE ORDERS (OUTPATIENT)
Dept: LAB | Facility: HOSPITAL | Age: 70
End: 2020-11-04

## 2020-11-04 DIAGNOSIS — N17.9 ACUTE NONTRAUMATIC KIDNEY INJURY (HCC): ICD-10-CM

## 2020-11-04 DIAGNOSIS — I21.4 ACUTE NON-ST-SEGMENT ELEVATION MYOCARDIAL INFARCTION (HCC): ICD-10-CM

## 2020-11-04 DIAGNOSIS — N18.6 END STAGE RENAL DISEASE (HCC): ICD-10-CM

## 2020-11-04 DIAGNOSIS — I25.10 CORONARY ARTERIOSCLEROSIS: ICD-10-CM

## 2020-11-04 DIAGNOSIS — Z99.2 DEPENDENCE ON RENAL DIALYSIS (HCC): ICD-10-CM

## 2020-11-04 DIAGNOSIS — N17.9 ACUTE NONTRAUMATIC KIDNEY INJURY (HCC): Primary | ICD-10-CM

## 2020-11-04 LAB
25(OH)D3 SERPL-MCNC: 38.7 NG/ML (ref 30–100)
ALBUMIN SERPL-MCNC: 4.3 G/DL (ref 3.5–5.2)
ALBUMIN/GLOB SERPL: 1.5 G/DL
ALP SERPL-CCNC: 75 U/L (ref 39–117)
ALT SERPL W P-5'-P-CCNC: 13 U/L (ref 1–41)
ANION GAP SERPL CALCULATED.3IONS-SCNC: 9.8 MMOL/L (ref 5–15)
AST SERPL-CCNC: 14 U/L (ref 1–40)
BASOPHILS # BLD AUTO: 0.08 10*3/MM3 (ref 0–0.2)
BASOPHILS NFR BLD AUTO: 0.8 % (ref 0–1.5)
BILIRUB SERPL-MCNC: 0.3 MG/DL (ref 0–1.2)
BUN SERPL-MCNC: 21 MG/DL (ref 8–23)
BUN/CREAT SERPL: 11.8 (ref 7–25)
CALCIUM SPEC-SCNC: 9.2 MG/DL (ref 8.6–10.5)
CHLORIDE SERPL-SCNC: 101 MMOL/L (ref 98–107)
CO2 SERPL-SCNC: 30.2 MMOL/L (ref 22–29)
CREAT SERPL-MCNC: 1.78 MG/DL (ref 0.76–1.27)
CREAT UR-MCNC: 179.5 MG/DL
DEPRECATED RDW RBC AUTO: 40.5 FL (ref 37–54)
EOSINOPHIL # BLD AUTO: 0.22 10*3/MM3 (ref 0–0.4)
EOSINOPHIL NFR BLD AUTO: 2.2 % (ref 0.3–6.2)
ERYTHROCYTE [DISTWIDTH] IN BLOOD BY AUTOMATED COUNT: 12.1 % (ref 12.3–15.4)
GFR SERPL CREATININE-BSD FRML MDRD: 38 ML/MIN/1.73
GLOBULIN UR ELPH-MCNC: 2.8 GM/DL
GLUCOSE SERPL-MCNC: 98 MG/DL (ref 65–99)
HCT VFR BLD AUTO: 41.3 % (ref 37.5–51)
HGB BLD-MCNC: 14.2 G/DL (ref 13–17.7)
IMM GRANULOCYTES # BLD AUTO: 0.02 10*3/MM3 (ref 0–0.05)
IMM GRANULOCYTES NFR BLD AUTO: 0.2 % (ref 0–0.5)
LYMPHOCYTES # BLD AUTO: 2.25 10*3/MM3 (ref 0.7–3.1)
LYMPHOCYTES NFR BLD AUTO: 23 % (ref 19.6–45.3)
MAGNESIUM SERPL-MCNC: 2.2 MG/DL (ref 1.6–2.4)
MCH RBC QN AUTO: 31.5 PG (ref 26.6–33)
MCHC RBC AUTO-ENTMCNC: 34.4 G/DL (ref 31.5–35.7)
MCV RBC AUTO: 91.6 FL (ref 79–97)
MONOCYTES # BLD AUTO: 0.77 10*3/MM3 (ref 0.1–0.9)
MONOCYTES NFR BLD AUTO: 7.9 % (ref 5–12)
NEUTROPHILS NFR BLD AUTO: 6.45 10*3/MM3 (ref 1.7–7)
NEUTROPHILS NFR BLD AUTO: 65.9 % (ref 42.7–76)
NRBC BLD AUTO-RTO: 0 /100 WBC (ref 0–0.2)
PHOSPHATE SERPL-MCNC: 4.2 MG/DL (ref 2.5–4.5)
PLATELET # BLD AUTO: 225 10*3/MM3 (ref 140–450)
PMV BLD AUTO: 10.7 FL (ref 6–12)
POTASSIUM SERPL-SCNC: 4.3 MMOL/L (ref 3.5–5.2)
PROT SERPL-MCNC: 7.1 G/DL (ref 6–8.5)
PROT UR-MCNC: 25 MG/DL
PROT/CREAT UR: 139.3 MG/G CREA (ref 0–200)
PTH-INTACT SERPL-MCNC: 38.9 PG/ML (ref 15–65)
RBC # BLD AUTO: 4.51 10*6/MM3 (ref 4.14–5.8)
SODIUM SERPL-SCNC: 141 MMOL/L (ref 136–145)
WBC # BLD AUTO: 9.79 10*3/MM3 (ref 3.4–10.8)

## 2020-11-04 PROCEDURE — 82570 ASSAY OF URINE CREATININE: CPT

## 2020-11-04 PROCEDURE — 36415 COLL VENOUS BLD VENIPUNCTURE: CPT | Performed by: INTERNAL MEDICINE

## 2020-11-04 PROCEDURE — 80053 COMPREHEN METABOLIC PANEL: CPT

## 2020-11-04 PROCEDURE — 83735 ASSAY OF MAGNESIUM: CPT

## 2020-11-04 PROCEDURE — 85025 COMPLETE CBC W/AUTO DIFF WBC: CPT

## 2020-11-04 PROCEDURE — 84100 ASSAY OF PHOSPHORUS: CPT

## 2020-11-04 PROCEDURE — 84156 ASSAY OF PROTEIN URINE: CPT

## 2020-11-04 PROCEDURE — 83970 ASSAY OF PARATHORMONE: CPT

## 2020-11-04 PROCEDURE — 82306 VITAMIN D 25 HYDROXY: CPT | Performed by: INTERNAL MEDICINE

## 2020-12-15 ENCOUNTER — OFFICE VISIT (OUTPATIENT)
Dept: CARDIAC SURGERY | Facility: CLINIC | Age: 70
End: 2020-12-15

## 2020-12-15 VITALS
SYSTOLIC BLOOD PRESSURE: 151 MMHG | WEIGHT: 243.2 LBS | HEIGHT: 73 IN | TEMPERATURE: 98.4 F | DIASTOLIC BLOOD PRESSURE: 84 MMHG | BODY MASS INDEX: 32.23 KG/M2 | OXYGEN SATURATION: 99 % | HEART RATE: 68 BPM

## 2020-12-15 DIAGNOSIS — I65.23 CAROTID STENOSIS, ASYMPTOMATIC, BILATERAL: Primary | ICD-10-CM

## 2020-12-15 DIAGNOSIS — I10 ESSENTIAL HYPERTENSION: ICD-10-CM

## 2020-12-15 DIAGNOSIS — F17.200 TOBACCO DEPENDENCE: ICD-10-CM

## 2020-12-15 DIAGNOSIS — E66.09 CLASS 1 OBESITY DUE TO EXCESS CALORIES WITH SERIOUS COMORBIDITY AND BODY MASS INDEX (BMI) OF 30.0 TO 30.9 IN ADULT: ICD-10-CM

## 2020-12-15 DIAGNOSIS — J44.9 COPD (CHRONIC OBSTRUCTIVE PULMONARY DISEASE) WITH CHRONIC BRONCHITIS (HCC): ICD-10-CM

## 2020-12-15 DIAGNOSIS — E78.5 DYSLIPIDEMIA: ICD-10-CM

## 2020-12-15 PROBLEM — Z48.812 SURGICAL AFTERCARE, CIRCULATORY SYSTEM: Status: RESOLVED | Noted: 2020-07-13 | Resolved: 2020-12-15

## 2020-12-15 PROCEDURE — 99214 OFFICE O/P EST MOD 30 MIN: CPT | Performed by: NURSE PRACTITIONER

## 2020-12-15 NOTE — PATIENT INSTRUCTIONS
mild Carotid Artery Stenosis bilateral improved Asymptomatic   Medical Management: ASA,PLAVIX,STATIN   If you should experience any neurological symptoms including but not limited to visual or speech disturbances confusion, seizures, or weakness of limbs of one side of your body notify Heart and Vascular center immediately for evaluation or if after hours present to the nearest Emergency Department.    Return 1 year    Smoking cessation advised.  Tobacco increases risk of expanding AAA, MI, CVA, PAD, carcinoma.     Your BMI is 32 and falls within the overweight range. BMI is strongly correlated with increased health risks. Review options for weight management, heart healthy diet, and exercise programs.     patient

## 2020-12-15 NOTE — PROGRESS NOTES
CVTS Office Progress Note     Jose Barrett  1950    Chief Complaint:    Chief Complaint   Patient presents with   • Carotid Artery Disease       HPI:      PCP:  Leo Gomez DO  Cardiology:  Dr. Petersen  Nephrology:  Dr. Amaya [facility]     69 y.o. male with HTN(stable, increased risk stroke, rupture), Hyperlipidemia(stable, increased risk cardiovascular events), Obesity(uncontrolled, increased risk cardiovascular events), Smoker(uncontrolled, increased risk cardiovascular events), COPD(stable, increased risk pulmonary complications) and Chronic Kidney Disease(DWIGHT post cath, increased risk renal failure)  Black lung disease.  smokes .25 PPD.  Chest pain with exertion after dealing with his tractor getting stuck in a ditch and walking up hill. He reports the pain has not recurred. He presented for inpatient evaluation with positive troponin. Cardiac cath demonstrated severe multivessel disease and depressed ejection fraction 35%.  Underwent CABGx3 with IABP post operatively.  Complicated post operative course with superimposed DWIGHT on CKD3 requiring intermediate term dialysis, renal recovery. Progressing well. Returns in follow up.    6/2020 Carotid Duplex ([facility]): LEIGHA 0-49% mPSV 114c/s Ratio 1.6, LICA 50-69% mPSV 127c/s Ratio 2.1, Antegrade vertebrals  12/15/2020: Carotid Duplex: RIGHT 0-49% (89/1.3) LEFT 0-49% (107/1.5) Antegrade     6/2020 TTE: LV moderate dilation, mild MR, EF 35%, LVDD 65mm, LVDS 57mm  6/2020 LHC: LM 50%, Ostial LAD 90%, CX 85%, RCA 99% mid, PLM 80%    6/18/2020 CABGx3 LIMA-LAD, SVG-OM2, SVG-PDA, IABP insertion with perclose    6/30/2020: Tunnel Cath Placement- RIGHT  8/24/2020: Tunnel Cath Removal    The following portions of the patient's history were reviewed and updated as appropriate: allergies, current medications, past family history, past medical history, past social history, past surgical history and problem list.  Recent images independently reviewed.  Available  laboratory values reviewed.    PMH:  Past Medical History:   Diagnosis Date   • Arthritis     back, legs, hips.    • Black lung (CMS/HCC)    • Black lung disease (CMS/HCC)    • COPD (chronic obstructive pulmonary disease) (CMS/HCC)    • Coronary artery disease    • Heart bloc    • HL (hearing loss)    • Hyperlipidemia    • Hypertension    • Low back pain    • Myocardial infarction (CMS/HCC)      Past Surgical History:   Procedure Laterality Date   • CARDIAC CATHETERIZATION N/A 2020    Procedure: Left Heart Cath;  Surgeon: April Petersen MD;  Location: [facility] CATH INVASIVE LOCATION;  Service: Cardiology;  Laterality: N/A;   • CORONARY ARTERY BYPASS GRAFT N/A 2020    Procedure: CORONARY ARTERY BYPASS GRAFTING X 3 UTILIZING THE LEFT INTERNAL MAMMARY ARTERY AND ENDOSCOPICALLY HARVESTED VEIN FROM THE LEFT LEG    (CELL SAVER);  Surgeon: Edi Donis MD;  Location: [facility] OR;  Service: Cardiothoracic;  Laterality: N/A;  Leg Incision: 0800  Chest Incision: 0807  Vein out: 0919  Vein prepped: 0954  On bypass: 1019  Off bypass 1246     • TONSILLECTOMY       Family History   Problem Relation Age of Onset   • Heart disease Mother         MI   • No Known Problems Father      Social History     Tobacco Use   • Smoking status: Current Every Day Smoker     Packs/day: 0.25     Years: 50.00     Pack years: 12.50     Types: Cigarettes     Last attempt to quit: 2020     Years since quittin.5   • Smokeless tobacco: Never Used   Substance Use Topics   • Alcohol use: Never     Frequency: Never   • Drug use: Never       ALLERGIES:  No Known Allergies      MEDICATIONS:    Current Outpatient Medications:   •  albuterol sulfate  (90 Base) MCG/ACT inhaler, Inhale 2 puffs Every 4 (Four) Hours As Needed for Wheezing., Disp: , Rfl:   •  aspirin 81 MG chewable tablet, Chew 81 mg Daily., Disp: , Rfl:   •  atorvastatin (LIPITOR) 80 MG tablet, Take 1 tablet by mouth Daily., Disp: 30 tablet, Rfl: 11  •   clopidogrel (PLAVIX) 75 MG tablet, Take 1 tablet by mouth Daily., Disp: 30 tablet, Rfl: 11  •  Fluticasone Furoate-Vilanterol (Breo Ellipta) 100-25 MCG/INH inhaler, Inhale 1 puff Daily., Disp: , Rfl:   •  metoprolol succinate XL (TOPROL-XL) 25 MG 24 hr tablet, Take 1 tablet by mouth Daily., Disp: 90 tablet, Rfl: 3  •  polycarbophil (calcium polycarbophil) 625 MG tablet tablet, Take  by mouth Daily., Disp: , Rfl:   •  Polyethylene Glycol 3350 (MIRALAX PO), Take  by mouth., Disp: , Rfl:   •  vitamin C (VITAMIN C) 500 MG tablet, Take 1 tablet by mouth 2 (Two) Times a Day., Disp: , Rfl:       Review of Systems   Constitution: Negative for decreased appetite, fever, weight gain and weight loss.   HENT: Negative for hearing loss, hoarse voice and sore throat.    Eyes: Negative for blurred vision, visual disturbance and visual halos.   Cardiovascular: Negative for chest pain, dyspnea on exertion, leg swelling and palpitations.   Respiratory: Negative for cough, shortness of breath and sputum production.    Endocrine: Positive for cold intolerance. Negative for polyuria.   Hematologic/Lymphatic: Negative for bleeding problem. Bruises/bleeds easily.   Skin: Negative for color change, nail changes, poor wound healing and suspicious lesions.   Musculoskeletal: Positive for back pain. Negative for joint pain and myalgias.   Gastrointestinal: Negative for abdominal pain, constipation, diarrhea, hematemesis, melena, nausea and vomiting.   Genitourinary: Negative for flank pain, nocturia and urgency.   Neurological: Negative for brief paralysis, focal weakness, light-headedness, loss of balance, numbness, paresthesias and weakness.            Psychiatric/Behavioral: Negative for altered mental status, depression, suicidal ideas and thoughts of violence.   Allergic/Immunologic: Negative for hives and persistent infections.         Vitals:    12/15/20 0953   BP: 151/84   BP Location: Left arm   Patient Position: Sitting   Pulse: 68  "  Temp: 98.4 °F (36.9 °C)   TempSrc: Infrared   SpO2: 99%   Weight: 110 kg (243 lb 3.2 oz)   Height: 185.4 cm (73\")     Physical Exam   Constitutional: He is oriented to person, place, and time. He appears well-developed.   HENT:   Head: Normocephalic and atraumatic.   Eyes: Pupils are equal, round, and reactive to light. Conjunctivae are normal.   Neck: Normal range of motion. Neck supple. Carotid bruit is not present.   Cardiovascular: Normal rate.   Pulmonary/Chest: Effort normal and breath sounds normal. No respiratory distress.   Abdominal: Soft. Bowel sounds are normal.   Musculoskeletal: Normal range of motion. No tenderness.      Comments: Gait normal   Neurological: He is alert and oriented to person, place, and time. No cranial nerve deficit.   Skin: Skin is warm and dry. Capillary refill takes 2 to 3 seconds.   No venous staining   Psychiatric: Judgment normal.   Nursing note and vitals reviewed.    Lab Results   Component Value Date    WBC 9.79 11/04/2020    HGB 14.2 11/04/2020    HCT 41.3 11/04/2020    MCV 91.6 11/04/2020     11/04/2020     Lab Results   Component Value Date    GLUCOSE 98 11/04/2020    BUN 21 11/04/2020    CREATININE 1.78 (H) 11/04/2020    EGFRIFNONA 38 (L) 11/04/2020    EGFRIFAFRI  07/01/2020      Comment:      <15 Indicative of kidney failure.    BCR 11.8 11/04/2020    K 4.3 11/04/2020    CO2 30.2 (H) 11/04/2020    CALCIUM 9.2 11/04/2020    ALBUMIN 4.30 11/04/2020    AST 14 11/04/2020    ALT 13 11/04/2020         Assessment & Plan     Independent Review of Studies  Detailed discussion regarding risks, benefits, and treatment plan. Images independently reviewed. Patient understands, agrees, and wishes to proceed with plan.      1. Carotid stenosis, asymptomatic, bilateral  mild Carotid Artery Stenosis bilateral improved Asymptomatic   Medical Management: ASA,PLAVIX,STATIN   If you should experience any neurological symptoms including but not limited to visual or speech " disturbances confusion, seizures, or weakness of limbs of one side of your body notify Heart and Vascular center immediately for evaluation or if after hours present to the nearest Emergency Department.    Return 1 year  - Duplex Carotid Ultrasound CAR; Future    2. Essential hypertension  Controlled.     3. Dyslipidemia  Lipid-lowering therapy has been proven beneficial in patients with cardio-vascular disease. Current guidelines recommend statin treatment for all patients with PAD,CAD and carotid stenosis. Statins are beneficial in preventing cardiovascular events, increasing functional capacity and lower the risk of adverse limb loss in PAD. Statins decrease the progression of plaque formation and may improve peripheral vessel lining, and aid in reversing atherosclerosis.       4. COPD (chronic obstructive pulmonary disease) with chronic bronchitis (CMS/HCC)  Medically managed    5. Class 1 obesity due to excess calories with serious comorbidity and body mass index (BMI) of 30.0 to 30.9 in adult  Your BMI is 32 and falls within the overweight range. BMI is strongly correlated with increased health risks. Review options for weight management, heart healthy diet, and exercise programs.     6. Tobacco dependence  Smoking cessation assistance options offered including behavioral counseling (Smoking Cessation Classes), Nicotine replacement therapy (patches or gum), pharmacologic therapy (Chantix, Wellbutrin). Understands tobacco increases risk of expanding AAA, MI, CVA, PAD, carcinoma. Discussion and question answer period 5-7 minutes. Jose Barrett  reports that he has been smoking cigarettes. He has a 12.50 pack-year smoking history. He has never used smokeless tobacco.. I have educated him on the risk of diseases from using tobacco products such as cancer, COPD and heart disease.     I advised him to quit and he is not willing to quit.    I spent 5 minutes counseling the patient.              This document has  been electronically signed by ZHANNA José on December 15, 2020 10:12 CST

## 2021-01-25 ENCOUNTER — TELEPHONE (OUTPATIENT)
Dept: FAMILY MEDICINE CLINIC | Facility: CLINIC | Age: 71
End: 2021-01-25

## 2021-01-25 NOTE — TELEPHONE ENCOUNTER
Caller: MARICRUZ NGUYEN    Relationship to patient: Emergency Contact    Best call back number: [phone]    Concerns or Questions if Applicable:     Patient spouse called and wanted to have PCP contact them to see if PCP would approve for patient to take COVID vaccination.     Travel screen questions:   N/A

## 2021-01-29 NOTE — TELEPHONE ENCOUNTER
Spoke with patients wife and let her know that it should be fine for him to take the Covid vaccine

## 2021-02-15 ENCOUNTER — APPOINTMENT (OUTPATIENT)
Dept: VACCINE CLINIC | Facility: HOSPITAL | Age: 71
End: 2021-02-15

## 2021-02-24 ENCOUNTER — IMMUNIZATION (OUTPATIENT)
Dept: VACCINE CLINIC | Facility: HOSPITAL | Age: 71
End: 2021-02-24

## 2021-02-24 PROCEDURE — 91300 HC SARSCOV02 VAC 30MCG/0.3ML IM: CPT | Performed by: NURSE PRACTITIONER

## 2021-02-24 PROCEDURE — 0001A: CPT | Performed by: NURSE PRACTITIONER

## 2021-03-03 ENCOUNTER — OFFICE VISIT (OUTPATIENT)
Dept: CARDIOLOGY | Facility: CLINIC | Age: 71
End: 2021-03-03

## 2021-03-03 VITALS
WEIGHT: 246 LBS | HEIGHT: 73 IN | DIASTOLIC BLOOD PRESSURE: 90 MMHG | SYSTOLIC BLOOD PRESSURE: 130 MMHG | HEART RATE: 74 BPM | OXYGEN SATURATION: 99 % | BODY MASS INDEX: 32.6 KG/M2

## 2021-03-03 DIAGNOSIS — R94.31 ABNORMAL ELECTROCARDIOGRAM (ECG) (EKG): ICD-10-CM

## 2021-03-03 DIAGNOSIS — I10 HYPERTENSION, ESSENTIAL: Primary | ICD-10-CM

## 2021-03-03 DIAGNOSIS — E78.2 HYPERLIPEMIA, MIXED: ICD-10-CM

## 2021-03-03 PROCEDURE — 99214 OFFICE O/P EST MOD 30 MIN: CPT | Performed by: INTERNAL MEDICINE

## 2021-03-03 NOTE — PROGRESS NOTES
[facility]  OFFICE NOTE    Cardiovascular Medicine  Flcaa Godinez M.D., RPVI         No referring provider defined for this encounter.    Thank you for asking me to see Jose Barrett for CAD.    History of Present Illness  This is a 70 y.o. male with:    1.  Coronary artery disease status post CABG with LIMA to LAD, SVG to OM 2, SVG to PDA June 2020  2.  Hypertension  3.  COPD   4.  hyperlipidemia  5.  CKD  6.  Ischemic cardiomyopathy    Jose Barrett is a 70 y.o. male who presents for consultation today.  Patient did have a prolonged stay after his CABG because of renal failure and ICU delirium.  Patient had to have intermittent dialysis.  Patient also had mild wound dehiscence.   Patient is here for establishment of care.    Patient has done very well since his CABG.  He has recovered well.  He is back to his baseline.  Denying any chest pain.  He is been pretty active and walking around without any limitations from chest pain.  Occasionally will have some shortness of breath when climbing flights of stairs when he is underlying COPD and usually that improves with his inhalers.    03/03/2021:  No acute issues and loss of breathing chest pain or shortness of breath.  No bleeding from aspirin Plavix    Review of Systems - ROS  Constitution: Negative for weakness, weight gain and weight loss.   HENT: Negative for congestion.    Eyes: Negative for blurred vision.   Cardiovascular: As mentioned above  Respiratory: Negative for cough and hemoptysis.    Endocrine: Negative for polydipsia and polyuria.   Hematologic/Lymphatic: Negative for bleeding problem. Does not bruise/bleed easily.   Skin: Negative for flushing.   Musculoskeletal: Negative for neck pain and stiffness.   Gastrointestinal: Negative for abdominal pain, diarrhea, jaundice, melena, nausea and vomiting.   Genitourinary: Negative for dysuria and hematuria.   Neurological: Negative for dizziness, focal weakness and  "numbness.   Psychiatric/Behavioral: Negative for altered mental status and depression.          All other systems were reviewed and were negative.    family history includes Heart disease in his mother; No Known Problems in his father.     reports that he has been smoking cigarettes. He has a 12.50 pack-year smoking history. He has never used smokeless tobacco. He reports that he does not drink alcohol or use drugs.    No Known Allergies      Current Outpatient Medications:   •  albuterol sulfate  (90 Base) MCG/ACT inhaler, Inhale 2 puffs Every 4 (Four) Hours As Needed for Wheezing., Disp: , Rfl:   •  aspirin 81 MG chewable tablet, Chew 81 mg Daily., Disp: , Rfl:   •  atorvastatin (LIPITOR) 80 MG tablet, Take 1 tablet by mouth Daily., Disp: 30 tablet, Rfl: 11  •  clopidogrel (PLAVIX) 75 MG tablet, Take 1 tablet by mouth Daily., Disp: 30 tablet, Rfl: 11  •  Fluticasone Furoate-Vilanterol (Breo Ellipta) 100-25 MCG/INH inhaler, Inhale 1 puff Daily., Disp: , Rfl:   •  metoprolol succinate XL (TOPROL-XL) 25 MG 24 hr tablet, Take 1 tablet by mouth Daily., Disp: 90 tablet, Rfl: 3  •  Polyethylene Glycol 3350 (MIRALAX PO), Take  by mouth., Disp: , Rfl:   •  vitamin C (VITAMIN C) 500 MG tablet, Take 1 tablet by mouth 2 (Two) Times a Day., Disp: , Rfl:   •  polycarbophil (calcium polycarbophil) 625 MG tablet tablet, Take  by mouth Daily., Disp: , Rfl:     Physical Exam:  Vitals:    03/03/21 1007   BP: 130/90   BP Location: Right arm   Patient Position: Sitting   Cuff Size: Adult   SpO2: 99%   Weight: 112 kg (246 lb)   Height: 185.4 cm (72.99\")     Current Pain Level: none  Pulse Ox: Normal  on room air  General: alert, appears stated age and cooperative     Body Habitus: well-nourished    HEENT: Head: Normocephalic, no lesions, without obvious abnormality. No arcus senilis, xanthelasma or xanthomas.    Neuro: alert, oriented x3  Pulses: 2+ and symmetric  JVP: Volume/Pulsation: Normal.  Normal waveforms.   Appropriate " inspiratory decrease.  No Kussmaul's. No Sarah's.   Carotid Exam: no bruit normal pulsation bilaterally   Carotid Volume: normal.     Respirations: no increased work of breathing   Chest:  Normal    Pulmonary:Normal   Precordium: Normal impulses. P2 is not palpable.  RV Heave: absent  LV Heave: absent  Whiteside:  normal size and placement  Palpable S4: absent.  Heart rate: normal    Heart Rhythm: regular     Heart Sounds: S1: normal  S2: normal  S3: absent   S4: absent  Opening Snap: absent    Pericardial Rub:  Absent: .    Abdomen:   Appearance: normal .  Palpation: Soft, non-tender to palpation, bowel sounds positive in all four quadrants; no guarding or rebound tenderness  Extremity: no edema.   LE Skin: no rashes  LE Hair:  normal  LE Pulses: well perfused with normal pulses in the distal extremities  Pallor on elevation: Absent. Rubor on dependency: None      DATA REVIEWED:     EKG. I personally reviewed and interpreted the EKG.  July 8 normal sinus rhythm,  ST and T wave consider inferior lateral ischemia.  Complete left bundle branch block.    ECG/EMG Results (all)     None        ---------------------------------------------------  TTE/KIMBERLYN:  Results for orders placed during the hospital encounter of 06/05/20   Transthoracic Echo Complete With Contrast if Necessary Per Protocol    Narrative · The left ventricular cavity is mild-to-moderately dilated.  · The following left ventricular wall segments are hypokinetic: mid   inferolateral and mid inferoseptal.  · Mild mitral valve regurgitation is present  · Estimated EF appears to be in the range of 31 - 35%. Normal left   ventricular wall thickness noted. The following left ventricular wall   segments are hypokinetic: mid inferolateral and mid inferoseptal. The left   ventricular cavity is mild-to-moderately dilated.            --------------------------------------------------------------------------------------------------  LABS:     The CVD Risk score  (Clay et al., 2008) failed to calculate for the following reasons:    The patient has a prior MI, stroke, CHF, or peripheral vascular disease diagnosis         Lab Results   Component Value Date    GLUCOSE 98 11/04/2020    BUN 21 11/04/2020    CREATININE 1.78 (H) 11/04/2020    EGFRIFNONA 38 (L) 11/04/2020    EGFRIFAFRI  07/01/2020      Comment:      <15 Indicative of kidney failure.    BCR 11.8 11/04/2020    K 4.3 11/04/2020    CO2 30.2 (H) 11/04/2020    CALCIUM 9.2 11/04/2020    ALBUMIN 4.30 11/04/2020    AST 14 11/04/2020    ALT 13 11/04/2020     Lab Results   Component Value Date    WBC 9.79 11/04/2020    HGB 14.2 11/04/2020    HCT 41.3 11/04/2020    MCV 91.6 11/04/2020     11/04/2020     Lab Results   Component Value Date    CHOL 198 06/09/2020    TRIG 102 06/09/2020    HDL 26 (L) 06/09/2020     (H) 06/09/2020     Lab Results   Component Value Date    TSH 1.680 06/09/2020     Lab Results   Component Value Date    CKTOTAL 98 06/05/2020    CKMB 22.49 (H) 06/06/2020    TROPONINT 0.628 (C) 06/06/2020     Lab Results   Component Value Date    HGBA1C 6.60 (H) 06/06/2020     No results found for: DDIMER  Lab Results   Component Value Date    ALT 13 11/04/2020     Lab Results   Component Value Date    HGBA1C 6.60 (H) 06/06/2020     Lab Results   Component Value Date    CREATININE 1.78 (H) 11/04/2020     No results found for: IRON, TIBC, FERRITIN  Lab Results   Component Value Date    INR 1.38 (H) 06/19/2020    INR 1.55 (H) 06/18/2020    INR 1.06 06/07/2020    PROTIME 16.8 (H) 06/19/2020    PROTIME 18.4 (H) 06/18/2020    PROTIME 13.6 06/07/2020       Assessment/Plan       1.  Coronary artery disease:  Now status post CABG x3 as above in June.  Complicated postop course.  Continue aspirin and Plavix    2.  Ischemic cardiomyopathy:  Echo at the time of NSTEMI showed an EF of 31-35%.  Started him on a low-dose beta-blocker Toprol-XL and will consider adding lisinopril at next visit, holding for now  since still recovering from renal failure. Will check renal function today, if creatinine stable will add lisinopril.  Appears euvolemic.  We will plan repeating echocardiogram in 2 to 3 months.  If EF continues to stay low despite revascularization optimal medical therapy will consider referral for ICD for primary prevention.    4.  Hyperlipidemia:  Will uptitrate Lipitor to 80 mg.  Target LDL of less than 70.      Prevention:  Patient's Body mass index is 32.46 kg/m². BMI is above normal parameters. Recommendations include: exercise counseling, none (medical contraindication) and nutrition counseling.      Jose Barrett  reports that he has been smoking cigarettes. He has a 12.50 pack-year smoking history. He has never used smokeless tobacco..        AAA Screening:   Will need screening        This document has been electronically signed by Flaca Godinez MD on March 3, 2021 10:29 CST

## 2021-03-04 ENCOUNTER — LAB (OUTPATIENT)
Dept: LAB | Facility: HOSPITAL | Age: 71
End: 2021-03-04

## 2021-03-04 ENCOUNTER — TELEPHONE (OUTPATIENT)
Dept: CARDIOLOGY | Facility: CLINIC | Age: 71
End: 2021-03-04

## 2021-03-04 DIAGNOSIS — I10 HYPERTENSION, ESSENTIAL: ICD-10-CM

## 2021-03-04 DIAGNOSIS — E78.2 HYPERLIPEMIA, MIXED: ICD-10-CM

## 2021-03-04 LAB
ALBUMIN SERPL-MCNC: 4 G/DL (ref 3.5–5.2)
ALBUMIN/GLOB SERPL: 1.3 G/DL
ALP SERPL-CCNC: 73 U/L (ref 39–117)
ALT SERPL W P-5'-P-CCNC: 20 U/L (ref 1–41)
ANION GAP SERPL CALCULATED.3IONS-SCNC: 7.7 MMOL/L (ref 5–15)
AST SERPL-CCNC: 21 U/L (ref 1–40)
BILIRUB SERPL-MCNC: 0.5 MG/DL (ref 0–1.2)
BUN SERPL-MCNC: 18 MG/DL (ref 8–23)
BUN/CREAT SERPL: 10.2 (ref 7–25)
CALCIUM SPEC-SCNC: 9.2 MG/DL (ref 8.6–10.5)
CHLORIDE SERPL-SCNC: 100 MMOL/L (ref 98–107)
CHOLEST SERPL-MCNC: 141 MG/DL (ref 0–200)
CO2 SERPL-SCNC: 31.3 MMOL/L (ref 22–29)
CREAT SERPL-MCNC: 1.76 MG/DL (ref 0.76–1.27)
DEPRECATED RDW RBC AUTO: 45.7 FL (ref 37–54)
ERYTHROCYTE [DISTWIDTH] IN BLOOD BY AUTOMATED COUNT: 12.7 % (ref 12.3–15.4)
GFR SERPL CREATININE-BSD FRML MDRD: 38 ML/MIN/1.73
GLOBULIN UR ELPH-MCNC: 3.2 GM/DL
GLUCOSE SERPL-MCNC: 105 MG/DL (ref 65–99)
HCT VFR BLD AUTO: 44.8 % (ref 37.5–51)
HDLC SERPL-MCNC: 28 MG/DL (ref 40–60)
HGB BLD-MCNC: 15 G/DL (ref 13–17.7)
LDLC SERPL CALC-MCNC: 98 MG/DL (ref 0–100)
LDLC/HDLC SERPL: 3.51 {RATIO}
MCH RBC QN AUTO: 32.5 PG (ref 26.6–33)
MCHC RBC AUTO-ENTMCNC: 33.5 G/DL (ref 31.5–35.7)
MCV RBC AUTO: 97.2 FL (ref 79–97)
PLATELET # BLD AUTO: 205 10*3/MM3 (ref 140–450)
PMV BLD AUTO: 10.5 FL (ref 6–12)
POTASSIUM SERPL-SCNC: 4.8 MMOL/L (ref 3.5–5.2)
PROT SERPL-MCNC: 7.2 G/DL (ref 6–8.5)
RBC # BLD AUTO: 4.61 10*6/MM3 (ref 4.14–5.8)
SODIUM SERPL-SCNC: 139 MMOL/L (ref 136–145)
TRIGL SERPL-MCNC: 74 MG/DL (ref 0–150)
TSH SERPL DL<=0.05 MIU/L-ACNC: 2.06 UIU/ML (ref 0.27–4.2)
VLDLC SERPL-MCNC: 15 MG/DL (ref 5–40)
WBC # BLD AUTO: 9.92 10*3/MM3 (ref 3.4–10.8)

## 2021-03-04 PROCEDURE — 84443 ASSAY THYROID STIM HORMONE: CPT

## 2021-03-04 PROCEDURE — 80053 COMPREHEN METABOLIC PANEL: CPT | Performed by: INTERNAL MEDICINE

## 2021-03-04 PROCEDURE — 85027 COMPLETE CBC AUTOMATED: CPT | Performed by: INTERNAL MEDICINE

## 2021-03-04 PROCEDURE — 36415 COLL VENOUS BLD VENIPUNCTURE: CPT | Performed by: INTERNAL MEDICINE

## 2021-03-04 PROCEDURE — 80061 LIPID PANEL: CPT | Performed by: INTERNAL MEDICINE

## 2021-03-04 NOTE — TELEPHONE ENCOUNTER
Flaca Godinez MD Brown, Karen M, JESSICA             Cr stable,   LDL improved but need to add Zetia.      No voicemail set up

## 2021-03-15 RX ORDER — EZETIMIBE 10 MG/1
10 TABLET ORAL DAILY
Qty: 90 TABLET | Refills: 3 | Status: SHIPPED | OUTPATIENT
Start: 2021-03-15 | End: 2022-03-09

## 2021-03-15 NOTE — TELEPHONE ENCOUNTER
Lab results and recommendations per Flaca Edwards MD Brown, Stefania LANG, RN  Cr stable,   LDL improved but need to add Zetia.       Patient notified and RX sent pending signature

## 2021-03-17 ENCOUNTER — IMMUNIZATION (OUTPATIENT)
Dept: VACCINE CLINIC | Facility: HOSPITAL | Age: 71
End: 2021-03-17

## 2021-03-17 PROCEDURE — 0002A: CPT | Performed by: THORACIC SURGERY (CARDIOTHORACIC VASCULAR SURGERY)

## 2021-03-17 PROCEDURE — 91300 HC SARSCOV02 VAC 30MCG/0.3ML IM: CPT | Performed by: THORACIC SURGERY (CARDIOTHORACIC VASCULAR SURGERY)

## 2021-05-05 ENCOUNTER — LAB (OUTPATIENT)
Dept: LAB | Facility: HOSPITAL | Age: 71
End: 2021-05-05

## 2021-05-05 ENCOUNTER — TRANSCRIBE ORDERS (OUTPATIENT)
Dept: LAB | Facility: HOSPITAL | Age: 71
End: 2021-05-05

## 2021-05-05 DIAGNOSIS — N17.9 ACUTE RENAL FAILURE, UNSPECIFIED ACUTE RENAL FAILURE TYPE (HCC): ICD-10-CM

## 2021-05-05 DIAGNOSIS — N18.32 STAGE 3B CHRONIC KIDNEY DISEASE (HCC): ICD-10-CM

## 2021-05-05 DIAGNOSIS — N18.32 STAGE 3B CHRONIC KIDNEY DISEASE (HCC): Primary | ICD-10-CM

## 2021-05-05 LAB
ANION GAP SERPL CALCULATED.3IONS-SCNC: 8.2 MMOL/L (ref 5–15)
BUN SERPL-MCNC: 18 MG/DL (ref 8–23)
BUN/CREAT SERPL: 10.8 (ref 7–25)
CALCIUM SPEC-SCNC: 9.3 MG/DL (ref 8.6–10.5)
CHLORIDE SERPL-SCNC: 101 MMOL/L (ref 98–107)
CO2 SERPL-SCNC: 31.8 MMOL/L (ref 22–29)
CREAT SERPL-MCNC: 1.66 MG/DL (ref 0.76–1.27)
GFR SERPL CREATININE-BSD FRML MDRD: 41 ML/MIN/1.73
GLUCOSE SERPL-MCNC: 102 MG/DL (ref 65–99)
MAGNESIUM SERPL-MCNC: 2.2 MG/DL (ref 1.6–2.4)
POTASSIUM SERPL-SCNC: 4.6 MMOL/L (ref 3.5–5.2)
SODIUM SERPL-SCNC: 141 MMOL/L (ref 136–145)

## 2021-05-05 PROCEDURE — 36415 COLL VENOUS BLD VENIPUNCTURE: CPT

## 2021-05-05 PROCEDURE — 83735 ASSAY OF MAGNESIUM: CPT

## 2021-05-05 PROCEDURE — 80048 BASIC METABOLIC PNL TOTAL CA: CPT

## 2021-06-07 ENCOUNTER — DOCUMENTATION (OUTPATIENT)
Dept: CARDIOLOGY | Facility: CLINIC | Age: 71
End: 2021-06-07

## 2021-06-07 NOTE — PROGRESS NOTES
Flaca Godinez MD Brown, Karen M, RN  EF has improved, no need for ICD at this time       Patient was notified and he had no questions at this time.

## 2021-06-29 RX ORDER — CLOPIDOGREL BISULFATE 75 MG/1
TABLET ORAL
Qty: 30 TABLET | Refills: 10 | Status: SHIPPED | OUTPATIENT
Start: 2021-06-29 | End: 2022-06-06

## 2021-07-02 ENCOUNTER — OFFICE VISIT (OUTPATIENT)
Dept: CARDIOLOGY | Facility: CLINIC | Age: 71
End: 2021-07-02

## 2021-07-02 VITALS
OXYGEN SATURATION: 97 % | BODY MASS INDEX: 32.44 KG/M2 | HEIGHT: 73 IN | DIASTOLIC BLOOD PRESSURE: 70 MMHG | WEIGHT: 244.8 LBS | SYSTOLIC BLOOD PRESSURE: 142 MMHG | HEART RATE: 61 BPM

## 2021-07-02 DIAGNOSIS — I25.10 CORONARY ARTERY DISEASE INVOLVING NATIVE CORONARY ARTERY OF NATIVE HEART WITHOUT ANGINA PECTORIS: Primary | ICD-10-CM

## 2021-07-02 PROCEDURE — 93000 ELECTROCARDIOGRAM COMPLETE: CPT | Performed by: INTERNAL MEDICINE

## 2021-07-02 PROCEDURE — 99214 OFFICE O/P EST MOD 30 MIN: CPT | Performed by: INTERNAL MEDICINE

## 2021-07-02 RX ORDER — LOSARTAN POTASSIUM 25 MG/1
50 TABLET ORAL
COMMUNITY
Start: 2021-06-14

## 2021-07-02 NOTE — PROGRESS NOTES
[facility]  OFFICE NOTE    Cardiovascular Medicine  Flaca Godinez M.D., RPVI         No referring provider defined for this encounter.    Thank you for asking me to see Jose Barrett for CAD.    History of Present Illness  This is a 70 y.o. male with:    1.  Coronary artery disease status post CABG with LIMA to LAD, SVG to OM 2, SVG to PDA June 2020  2.  Hypertension  3.  COPD   4.  hyperlipidemia  5.  CKD  6.  Ischemic cardiomyopathy    Jose Barrett is a 70 y.o. male who presents for consultation today.  Patient did have a prolonged stay after his CABG because of renal failure and ICU delirium.  Patient had to have intermittent dialysis.  Patient also had mild wound dehiscence.   Patient is here for establishment of care.    Patient has done very well since his CABG.  He has recovered well.  He is back to his baseline.  Denying any chest pain.  He is been pretty active and walking around without any limitations from chest pain.  Occasionally will have some shortness of breath when climbing flights of stairs when he is underlying COPD and usually that improves with his inhalers.    07/02/2021:  No acute issues and loss of breathing chest pain or shortness of breath.  No bleeding from aspirin Plavix  Has tolerated losartan well so far.    Review of Systems - ROS  Constitution: Negative for weakness, weight gain and weight loss.   HENT: Negative for congestion.    Eyes: Negative for blurred vision.   Cardiovascular: As mentioned above  Respiratory: Negative for cough and hemoptysis.    Endocrine: Negative for polydipsia and polyuria.   Hematologic/Lymphatic: Negative for bleeding problem. Does not bruise/bleed easily.   Skin: Negative for flushing.   Musculoskeletal: Negative for neck pain and stiffness.   Gastrointestinal: Negative for abdominal pain, diarrhea, jaundice, melena, nausea and vomiting.   Genitourinary: Negative for dysuria and hematuria.   Neurological: Negative  "for dizziness, focal weakness and numbness.   Psychiatric/Behavioral: Negative for altered mental status and depression.          All other systems were reviewed and were negative.    family history includes Heart disease in his mother; No Known Problems in his father.     reports that he has been smoking cigarettes. He has a 12.50 pack-year smoking history. He has never used smokeless tobacco. He reports that he does not drink alcohol and does not use drugs.    No Known Allergies      Current Outpatient Medications:   •  albuterol sulfate  (90 Base) MCG/ACT inhaler, Inhale 2 puffs Every 4 (Four) Hours As Needed for Wheezing., Disp: , Rfl:   •  aspirin 81 MG chewable tablet, Chew 81 mg Daily., Disp: , Rfl:   •  atorvastatin (LIPITOR) 80 MG tablet, Take 1 tablet by mouth Daily., Disp: 30 tablet, Rfl: 11  •  clopidogrel (PLAVIX) 75 MG tablet, TAKE ONE TABLET BY MOUTH DAILY, Disp: 30 tablet, Rfl: 10  •  ezetimibe (ZETIA) 10 MG tablet, Take 1 tablet by mouth Daily., Disp: 90 tablet, Rfl: 3  •  Fluticasone Furoate-Vilanterol (Breo Ellipta) 100-25 MCG/INH inhaler, Inhale 1 puff Daily., Disp: , Rfl:   •  metoprolol succinate XL (TOPROL-XL) 25 MG 24 hr tablet, Take 1 tablet by mouth Daily., Disp: 90 tablet, Rfl: 3  •  Polyethylene Glycol 3350 (MIRALAX PO), Take  by mouth., Disp: , Rfl:   •  vitamin C (VITAMIN C) 500 MG tablet, Take 1 tablet by mouth 2 (Two) Times a Day., Disp: , Rfl:   •  losartan (COZAAR) 25 MG tablet, Take 25 mg by mouth every night at bedtime., Disp: , Rfl:   •  polycarbophil (calcium polycarbophil) 625 MG tablet tablet, Take  by mouth Daily., Disp: , Rfl:     Physical Exam:  Vitals:    07/02/21 1137   BP: 142/70   BP Location: Left arm   Patient Position: Sitting   Cuff Size: Adult   Pulse: 61   SpO2: 97%   Weight: 111 kg (244 lb 12.8 oz)   Height: 185.4 cm (73\")   PainSc: 0-No pain     Current Pain Level: none  Pulse Ox: Normal  on room air  General: alert, appears stated age and cooperative   "   Body Habitus: well-nourished    HEENT: Head: Normocephalic, no lesions, without obvious abnormality. No arcus senilis, xanthelasma or xanthomas.    Neuro: alert, oriented x3  Pulses: 2+ and symmetric  JVP: Volume/Pulsation: Normal.  Normal waveforms.   Appropriate inspiratory decrease.  No Kussmaul's. No Sarah's.   Carotid Exam: no bruit normal pulsation bilaterally   Carotid Volume: normal.     Respirations: no increased work of breathing   Chest:  Normal    Pulmonary:Normal   Precordium: Normal impulses. P2 is not palpable.  RV Heave: absent  LV Heave: absent  Jenners:  normal size and placement  Palpable S4: absent.  Heart rate: normal    Heart Rhythm: regular     Heart Sounds: S1: normal  S2: normal  S3: absent   S4: absent  Opening Snap: absent    Pericardial Rub:  Absent: .    Abdomen:   Appearance: normal .  Palpation: Soft, non-tender to palpation, bowel sounds positive in all four quadrants; no guarding or rebound tenderness  Extremity: no edema.   LE Skin: no rashes  LE Hair:  normal  LE Pulses: well perfused with normal pulses in the distal extremities  Pallor on elevation: Absent. Rubor on dependency: None      DATA REVIEWED:     EKG. I personally reviewed and interpreted the EKG.  July 8 normal sinus rhythm,  ST and T wave consider inferior lateral ischemia.  Complete left bundle branch block.    ECG/EMG Results (all)     None        ---------------------------------------------------  TTE/KIMBERLYN:  Results for orders placed in visit on 06/03/21    Adult Transthoracic Echo Complete W/ Cont if Necessary Per Protocol    Interpretation Summary  · The study is technically difficult for diagnosis with poor acoustic windows  · Left ventricular ejection fraction appears to be 36 - 40%. Left ventricular systolic function is moderately decreased.  · The left ventricular cavity is mildly dilated.  · Left ventricular diastolic function is consistent with (grade Ia w/high LAP) impaired relaxation.  · The following  left ventricular wall segments are hypokinetic: mid anterior, apical anterior, apical lateral, mid inferolateral and apex hypokinetic.        --------------------------------------------------------------------------------------------------  LABS:     The CVD Risk score (Clay et al., 2008) failed to calculate for the following reasons:    The patient has a prior MI, stroke, CHF, or peripheral vascular disease diagnosis         Lab Results   Component Value Date    GLUCOSE 102 (H) 05/05/2021    BUN 18 05/05/2021    CREATININE 1.66 (H) 05/05/2021    EGFRIFNONA 41 (L) 05/05/2021    EGFRIFAFRI  07/01/2020      Comment:      <15 Indicative of kidney failure.    BCR 10.8 05/05/2021    K 4.6 05/05/2021    CO2 31.8 (H) 05/05/2021    CALCIUM 9.3 05/05/2021    ALBUMIN 4.00 03/04/2021    AST 21 03/04/2021    ALT 20 03/04/2021     Lab Results   Component Value Date    WBC 9.92 03/04/2021    HGB 15.0 03/04/2021    HCT 44.8 03/04/2021    MCV 97.2 (H) 03/04/2021     03/04/2021     Lab Results   Component Value Date    CHOL 141 03/04/2021    TRIG 74 03/04/2021    HDL 28 (L) 03/04/2021    LDL 98 03/04/2021     Lab Results   Component Value Date    TSH 2.060 03/04/2021     Lab Results   Component Value Date    CKTOTAL 98 06/05/2020    CKMB 22.49 (H) 06/06/2020    TROPONINT 0.628 (C) 06/06/2020     Lab Results   Component Value Date    HGBA1C 6.60 (H) 06/06/2020     No results found for: DDIMER  Lab Results   Component Value Date    ALT 20 03/04/2021     Lab Results   Component Value Date    HGBA1C 6.60 (H) 06/06/2020     Lab Results   Component Value Date    CREATININE 1.66 (H) 05/05/2021     No results found for: IRON, TIBC, FERRITIN  Lab Results   Component Value Date    INR 1.38 (H) 06/19/2020    INR 1.55 (H) 06/18/2020    INR 1.06 06/07/2020    PROTIME 16.8 (H) 06/19/2020    PROTIME 18.4 (H) 06/18/2020    PROTIME 13.6 06/07/2020       Assessment/Plan       1.  Coronary artery disease:  Now status post CABG x3 as  above in June.  Complicated postop course.  Continue aspirin and Plavix    2.  Ischemic cardiomyopathy:  Echo at the time of NSTEMI showed an EF of 31-35%.  Started him on a low-dose beta-blocker Toprol-XL and low-dose losartan  Creatinine is improved to 1.6, will discuss with nephrology if they are okay with adding spironolactone  Appears euvolemic.    His EF is improved 36 to 40%.  Grade 1 diastolic dysfunction.  We will hold off on ICD at this point.    4.  Hyperlipidemia:  Will uptitrate Lipitor to 80 mg.  Target LDL of less than 70.      Prevention:  Patient's Body mass index is 32.3 kg/m². BMI is above normal parameters. Recommendations include: exercise counseling, none (medical contraindication) and nutrition counseling.      Jose Barrett  reports that he has been smoking cigarettes. He has a 12.50 pack-year smoking history. He has never used smokeless tobacco..              This document has been electronically signed by Flaca Godinez MD on July 2, 2021 11:47 CDT

## 2021-07-07 ENCOUNTER — OFFICE VISIT (OUTPATIENT)
Dept: FAMILY MEDICINE CLINIC | Facility: CLINIC | Age: 71
End: 2021-07-07

## 2021-07-07 VITALS
TEMPERATURE: 98 F | HEIGHT: 73 IN | SYSTOLIC BLOOD PRESSURE: 110 MMHG | WEIGHT: 242 LBS | BODY MASS INDEX: 32.07 KG/M2 | DIASTOLIC BLOOD PRESSURE: 80 MMHG

## 2021-07-07 DIAGNOSIS — Z23 NEED FOR VACCINATION: ICD-10-CM

## 2021-07-07 DIAGNOSIS — J44.9 CHRONIC OBSTRUCTIVE PULMONARY DISEASE, UNSPECIFIED COPD TYPE (HCC): ICD-10-CM

## 2021-07-07 DIAGNOSIS — I10 ESSENTIAL HYPERTENSION: Primary | ICD-10-CM

## 2021-07-07 DIAGNOSIS — R40.0 DAYTIME SOMNOLENCE: ICD-10-CM

## 2021-07-07 DIAGNOSIS — R73.09 BLOOD SUGAR INCREASED: ICD-10-CM

## 2021-07-07 DIAGNOSIS — Z12.5 SCREENING FOR PROSTATE CANCER: ICD-10-CM

## 2021-07-07 DIAGNOSIS — E78.5 DYSLIPIDEMIA: ICD-10-CM

## 2021-07-07 LAB
QT INTERVAL: 446 MS
QTC INTERVAL: 448 MS

## 2021-07-07 PROCEDURE — 99214 OFFICE O/P EST MOD 30 MIN: CPT | Performed by: NURSE PRACTITIONER

## 2021-07-07 NOTE — PROGRESS NOTES
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 male.     COPD  There is no chest tightness, cough, difficulty breathing, frequent throat clearing, hemoptysis, hoarse voice, shortness of breath, sputum production or wheezing. This is a recurrent problem. The problem has been resolved. Associated symptoms include malaise/fatigue and rhinorrhea. Pertinent negatives include no appetite change, chest pain, dyspnea on exertion, ear congestion, ear pain, fever, headaches, heartburn, myalgias, nasal congestion, orthopnea, PND, postnasal drip, sneezing, sore throat, sweats, trouble swallowing or weight loss. He reports no improvement on treatment. His past medical history is significant for COPD. There is no history of asthma, bronchiectasis, bronchitis, emphysema or pneumonia.   Hyperlipidemia  This is a recurrent problem. The problem is controlled. He has no history of chronic renal disease, diabetes, hypothyroidism, liver disease, obesity or nephrotic syndrome. Pertinent negatives include no chest pain, focal weakness, leg pain, myalgias or shortness of breath. Current antihyperlipidemic treatment includes statins. The current treatment provides mild improvement of lipids. Compliance problems include adherence to diet.  Risk factors for coronary artery disease include dyslipidemia, hypertension and male sex.   Hypertension  This is a new problem. The problem has been gradually worsening since onset. The problem is controlled. Associated symptoms include malaise/fatigue. Pertinent negatives include no blurred vision, chest pain, headaches, neck pain, orthopnea, palpitations, peripheral edema, PND, shortness of breath or sweats. Risk factors for coronary artery disease include dyslipidemia, male gender, obesity, sedentary lifestyle and smoking/tobacco exposure. Past treatments include angiotensin blockers. Current antihypertension treatment  includes alpha 1 blockers and angiotensin blockers. The current treatment provides significant improvement. Compliance problems include diet.  There is no history of angina, kidney disease, CAD/MI, CVA, heart failure, left ventricular hypertrophy, PVD or retinopathy. There is no history of chronic renal disease, coarctation of the aorta, hyperaldosteronism, hypercortisolism, hyperparathyroidism, a hypertension causing med, pheochromocytoma, renovascular disease or a thyroid problem.   Fatigue  This is a recurrent problem. The current episode started more than 1 month ago. The problem occurs intermittently. The problem has been gradually worsening. Associated symptoms include arthralgias and fatigue. Pertinent negatives include no abdominal pain, chest pain, chills, congestion, coughing, diaphoresis, fever, headaches, joint swelling, myalgias, neck pain, numbness, sore throat, visual change, vomiting or weakness. The treatment provided mild relief.        The following portions of the patient's history were reviewed and updated as appropriate: allergies, current medications, past social history and problem list.    Review of Systems   Constitutional: Positive for fatigue and malaise/fatigue. Negative for activity change, appetite change, chills, diaphoresis, fever, unexpected weight change and weight loss.   HENT: Positive for rhinorrhea. Negative for congestion, dental problem, drooling, ear discharge, ear pain, facial swelling, hoarse voice, postnasal drip, sneezing, sore throat and trouble swallowing.    Eyes: Negative.  Negative for blurred vision, photophobia, pain, discharge, redness, itching and visual disturbance.   Respiratory: Negative.  Negative for apnea, cough, hemoptysis, sputum production, choking, chest tightness, shortness of breath, wheezing and stridor.    Cardiovascular: Negative.  Negative for chest pain, dyspnea on exertion, palpitations, orthopnea, leg swelling and PND.   Gastrointestinal:  "Negative.  Negative for abdominal distention, abdominal pain, anal bleeding, heartburn and vomiting.   Endocrine: Negative for heat intolerance, polydipsia, polyphagia and polyuria.   Genitourinary: Negative.  Negative for difficulty urinating, dysuria and enuresis.   Musculoskeletal: Positive for arthralgias and back pain. Negative for joint swelling, myalgias and neck pain.   Skin: Negative.    Allergic/Immunologic: Negative.  Negative for environmental allergies, food allergies and immunocompromised state.   Neurological: Negative for tremors, focal weakness, seizures, syncope, speech difficulty, weakness, light-headedness, numbness and headaches.   Hematological: Negative.  Negative for adenopathy. Does not bruise/bleed easily.   Psychiatric/Behavioral: Negative.  Negative for agitation, behavioral problems, confusion, decreased concentration, dysphoric mood, hallucinations, self-injury and sleep disturbance. The patient is not hyperactive.        Objective   /80   Temp 98 °F (36.7 °C) (Infrared)   Ht 185.4 cm (73\")   Wt 110 kg (242 lb)   BMI 31.93 kg/m²   Physical Exam  Vitals and nursing note reviewed.   HENT:      Head: Normocephalic.      Right Ear: Tympanic membrane normal.      Nose: Nose normal.      Mouth/Throat:      Mouth: Mucous membranes are moist.      Pharynx: No oropharyngeal exudate or posterior oropharyngeal erythema.   Eyes:      General:         Right eye: No discharge.         Left eye: No discharge.      Pupils: Pupils are equal, round, and reactive to light.   Cardiovascular:      Rate and Rhythm: Normal rate.      Pulses: Normal pulses.      Heart sounds: No friction rub. No gallop.    Pulmonary:      Effort: Pulmonary effort is normal. No respiratory distress.      Breath sounds: No stridor. Wheezing present. No rhonchi or rales.   Chest:      Chest wall: No tenderness.   Abdominal:      General: Abdomen is flat. There is no distension.      Palpations: There is no mass.      " Tenderness: There is no abdominal tenderness. There is no right CVA tenderness, left CVA tenderness, guarding or rebound.      Hernia: No hernia is present.   Musculoskeletal:         General: Normal range of motion.      Cervical back: Normal range of motion.   Skin:     General: Skin is warm.      Coloration: Skin is not jaundiced or pale.      Findings: No bruising, erythema, lesion or rash.   Neurological:      General: No focal deficit present.      Mental Status: He is alert and oriented to person, place, and time.      Cranial Nerves: No cranial nerve deficit.      Sensory: No sensory deficit.      Motor: No weakness.      Coordination: Coordination normal.      Deep Tendon Reflexes: Reflexes normal.   Psychiatric:         Mood and Affect: Mood normal.         Behavior: Behavior normal.         Assessment/Plan   Problems Addressed this Visit        Cardiac and Vasculature    Essential hypertension - Primary    Relevant Orders    Ambulatory Referral to Sleep Medicine    CBC & Differential    Comprehensive Metabolic Panel    Hemoglobin A1c    Lipid Panel    Vitamin B12    TSH    PSA Screen    Dyslipidemia    Relevant Orders    Ambulatory Referral to Sleep Medicine    CBC & Differential    Comprehensive Metabolic Panel    Hemoglobin A1c    Lipid Panel    Vitamin B12    TSH    PSA Screen       Pulmonary and Pneumonias    COPD (chronic obstructive pulmonary disease) (CMS/[facility])    Relevant Orders    Ambulatory Referral to Sleep Medicine    CBC & Differential    Comprehensive Metabolic Panel    Hemoglobin A1c    Lipid Panel    Vitamin B12    TSH    PSA Screen      Other Visit Diagnoses     Daytime somnolence        Relevant Orders    Ambulatory Referral to Sleep Medicine    CBC & Differential    Comprehensive Metabolic Panel    Hemoglobin A1c    Lipid Panel    Vitamin B12    TSH    PSA Screen    Need for vaccination        Relevant Medications    pneumococcal polysaccharide 23-valent (PNEUMOVAX-23) vaccine 0.5 mL     Blood sugar increased        Relevant Medications    pneumococcal polysaccharide 23-valent (PNEUMOVAX-23) vaccine 0.5 mL    Other Relevant Orders    Ambulatory Referral to Sleep Medicine    CBC & Differential    Comprehensive Metabolic Panel    Hemoglobin A1c    Lipid Panel    Vitamin B12    TSH    PSA Screen    Screening for prostate cancer        Relevant Orders    PSA Screen      Diagnoses       Codes Comments    Essential hypertension    -  Primary ICD-10-CM: I10  ICD-9-CM: 401.9     Chronic obstructive pulmonary disease, unspecified COPD type (CMS/[facility])     ICD-10-CM: J44.9  ICD-9-CM: 496     Dyslipidemia     ICD-10-CM: E78.5  ICD-9-CM: 272.4     Daytime somnolence     ICD-10-CM: R40.0  ICD-9-CM: 780.54     Need for vaccination     ICD-10-CM: Z23  ICD-9-CM: V05.9     Blood sugar increased     ICD-10-CM: R73.09  ICD-9-CM: 790.29     Screening for prostate cancer     ICD-10-CM: Z12.5  ICD-9-CM: V76.44            New Medications Ordered This Visit   Medications   • pneumococcal polysaccharide 23-valent (PNEUMOVAX-23) vaccine 0.5 mL       It's not just what you eat, but when you eat  Eat breakfast, and eat smaller meals throughout the day. A healthy breakfast can jumpstart your metabolism, while eating small, healthy meals (rather than the standard three large meals) keeps your energy up.   Avoid eating at night. Try to eat dinner earlier and fast for 14-16 hours until breakfast the next morning. Studies suggest that eating only when you’re most active and giving your digestive system a long break each day may help to regulate weight.     Suggest cologaurd or colonoscopy         Update pneumonia vaccine, labs as directed when fasting, sleep referral as directed   Request colonoscopy as directed -see back in 5 months, follow up sooner if needed

## 2021-11-03 RX ORDER — ATORVASTATIN CALCIUM 80 MG/1
TABLET, FILM COATED ORAL
Qty: 90 TABLET | Refills: 1 | Status: SHIPPED | OUTPATIENT
Start: 2021-11-03 | End: 2022-04-29

## 2021-11-05 ENCOUNTER — TRANSCRIBE ORDERS (OUTPATIENT)
Dept: LAB | Facility: HOSPITAL | Age: 71
End: 2021-11-05

## 2021-11-05 ENCOUNTER — LAB (OUTPATIENT)
Dept: LAB | Facility: HOSPITAL | Age: 71
End: 2021-11-05

## 2021-11-05 DIAGNOSIS — I10 HYPERTENSION, ESSENTIAL: ICD-10-CM

## 2021-11-05 DIAGNOSIS — I21.4 ACUTE NON-ST-SEGMENT ELEVATION MYOCARDIAL INFARCTION (HCC): ICD-10-CM

## 2021-11-05 DIAGNOSIS — J60 COAL WORKERS PNEUMOCONIOSIS (HCC): ICD-10-CM

## 2021-11-05 DIAGNOSIS — N18.32 STAGE 3B CHRONIC KIDNEY DISEASE (HCC): Primary | ICD-10-CM

## 2021-11-05 DIAGNOSIS — N18.32 STAGE 3B CHRONIC KIDNEY DISEASE (HCC): ICD-10-CM

## 2021-11-05 PROCEDURE — 36415 COLL VENOUS BLD VENIPUNCTURE: CPT

## 2021-11-05 PROCEDURE — 82306 VITAMIN D 25 HYDROXY: CPT

## 2021-11-05 PROCEDURE — 80048 BASIC METABOLIC PNL TOTAL CA: CPT

## 2021-11-05 PROCEDURE — 82570 ASSAY OF URINE CREATININE: CPT

## 2021-11-05 PROCEDURE — 84156 ASSAY OF PROTEIN URINE: CPT

## 2021-11-06 LAB
25(OH)D3 SERPL-MCNC: 42.4 NG/ML
ANION GAP SERPL CALCULATED.3IONS-SCNC: 3.3 MMOL/L (ref 5–15)
BUN SERPL-MCNC: 16 MG/DL (ref 8–23)
BUN/CREAT SERPL: 10.5 (ref 7–25)
CALCIUM SPEC-SCNC: 8.9 MG/DL (ref 8.6–10.5)
CHLORIDE SERPL-SCNC: 100 MMOL/L (ref 98–107)
CO2 SERPL-SCNC: 34.7 MMOL/L (ref 22–29)
CREAT SERPL-MCNC: 1.53 MG/DL (ref 0.76–1.27)
CREAT UR-MCNC: 151.8 MG/DL
GFR SERPL CREATININE-BSD FRML MDRD: 45 ML/MIN/1.73
GLUCOSE SERPL-MCNC: 112 MG/DL (ref 65–99)
POTASSIUM SERPL-SCNC: 4.2 MMOL/L (ref 3.5–5.2)
PROT UR-MCNC: 40 MG/DL
PROT/CREAT UR: 263.5 MG/G CREA (ref 0–200)
SODIUM SERPL-SCNC: 138 MMOL/L (ref 136–145)

## 2021-11-12 ENCOUNTER — OFFICE VISIT (OUTPATIENT)
Dept: CARDIOLOGY | Facility: CLINIC | Age: 71
End: 2021-11-12

## 2021-11-12 VITALS
OXYGEN SATURATION: 94 % | WEIGHT: 240 LBS | SYSTOLIC BLOOD PRESSURE: 124 MMHG | BODY MASS INDEX: 31.81 KG/M2 | HEIGHT: 73 IN | HEART RATE: 77 BPM | DIASTOLIC BLOOD PRESSURE: 80 MMHG

## 2021-11-12 DIAGNOSIS — I10 HYPERTENSION, ESSENTIAL: Primary | ICD-10-CM

## 2021-11-12 DIAGNOSIS — I42.0 CARDIOMYOPATHY, DILATED (HCC): ICD-10-CM

## 2021-11-12 PROCEDURE — 99214 OFFICE O/P EST MOD 30 MIN: CPT | Performed by: INTERNAL MEDICINE

## 2021-11-12 RX ORDER — SPIRONOLACTONE 25 MG/1
12.5 TABLET ORAL DAILY
Qty: 30 TABLET | Refills: 11 | Status: SHIPPED | OUTPATIENT
Start: 2021-11-12 | End: 2022-02-25

## 2021-11-12 NOTE — PROGRESS NOTES
[facility]  OFFICE NOTE    Cardiovascular Medicine  Flaca Godinez M.D., RPVI         No referring provider defined for this encounter.    Thank you for asking me to see Jose Barrett for CAD.    Coronary Artery Disease      This is a 71 y.o. male with:    1.  Coronary artery disease status post CABG with LIMA to LAD, SVG to OM 2, SVG to PDA June 2020  2.  Hypertension  3.  COPD   4.  hyperlipidemia  5.  CKD  6.  Ischemic cardiomyopathy    Jose Barrett is a 71 y.o. male who presents for consultation today.  Patient did have a prolonged stay after his CABG because of renal failure and ICU delirium.  Patient had to have intermittent dialysis.  Patient also had mild wound dehiscence.   Patient is here for establishment of care.    Patient has done very well since his CABG.  He has recovered well.  He is back to his baseline.  Denying any chest pain.  He is been pretty active and walking around without any limitations from chest pain.  Occasionally will have some shortness of breath when climbing flights of stairs when he is underlying COPD and usually that improves with his inhalers.    11/12/2021:  No acute issues.  Denying any chest pain.  He has chronic shortness of breath NYHA class II at baseline.  No bleeding from aspirin Plavix  Has tolerated losartan well so far.    Review of Systems - ROS  Constitution: Negative for weakness, weight gain and weight loss.   HENT: Negative for congestion.    Eyes: Negative for blurred vision.   Cardiovascular: As mentioned above  Respiratory: Negative for cough and hemoptysis.    Endocrine: Negative for polydipsia and polyuria.   Hematologic/Lymphatic: Negative for bleeding problem. Does not bruise/bleed easily.   Skin: Negative for flushing.   Musculoskeletal: Negative for neck pain and stiffness.   Gastrointestinal: Negative for abdominal pain, diarrhea, jaundice, melena, nausea and vomiting.   Genitourinary: Negative for dysuria and  "hematuria.   Neurological: Negative for dizziness, focal weakness and numbness.   Psychiatric/Behavioral: Negative for altered mental status and depression.          All other systems were reviewed and were negative.    family history includes Heart disease in his mother; No Known Problems in his father.     reports that he has been smoking cigarettes. He has a 12.50 pack-year smoking history. He has never used smokeless tobacco. He reports that he does not drink alcohol and does not use drugs.    No Known Allergies      Current Outpatient Medications:   •  albuterol sulfate  (90 Base) MCG/ACT inhaler, Inhale 2 puffs Every 4 (Four) Hours As Needed for Wheezing., Disp: , Rfl:   •  aspirin 81 MG chewable tablet, Chew 81 mg Daily., Disp: , Rfl:   •  atorvastatin (LIPITOR) 80 MG tablet, TAKE ONE TABLET BY MOUTH DAILY, Disp: 90 tablet, Rfl: 1  •  clopidogrel (PLAVIX) 75 MG tablet, TAKE ONE TABLET BY MOUTH DAILY, Disp: 30 tablet, Rfl: 10  •  ezetimibe (ZETIA) 10 MG tablet, Take 1 tablet by mouth Daily., Disp: 90 tablet, Rfl: 3  •  Fluticasone Furoate-Vilanterol (Breo Ellipta) 100-25 MCG/INH inhaler, Inhale 1 puff Daily., Disp: , Rfl:   •  losartan (COZAAR) 25 MG tablet, Take 25 mg by mouth every night at bedtime., Disp: , Rfl:   •  metoprolol succinate XL (TOPROL-XL) 25 MG 24 hr tablet, Take 1 tablet by mouth Daily., Disp: 90 tablet, Rfl: 3  •  Polyethylene Glycol 3350 (MIRALAX PO), Take  by mouth As Needed., Disp: , Rfl:   •  vitamin C (VITAMIN C) 500 MG tablet, Take 1 tablet by mouth 2 (Two) Times a Day., Disp: , Rfl:     Physical Exam:  Vitals:    11/12/21 0949   BP: 124/80   BP Location: Left arm   Patient Position: Sitting   Cuff Size: Adult   Pulse: 77   SpO2: 94%   Weight: 109 kg (240 lb)   Height: 185.4 cm (73\")   PainSc: 0-No pain     Current Pain Level: none  Pulse Ox: Normal  on room air  General: alert, appears stated age and cooperative     Body Habitus: well-nourished    HEENT: Head: Normocephalic, no " lesions, without obvious abnormality. No arcus senilis, xanthelasma or xanthomas.    Neuro: alert, oriented x3  Pulses: 2+ and symmetric  JVP: Volume/Pulsation: Normal.  Normal waveforms.   Appropriate inspiratory decrease.  No Kussmaul's. No Sarah's.   Carotid Exam: no bruit normal pulsation bilaterally   Carotid Volume: normal.     Respirations: no increased work of breathing   Chest:  Normal    Pulmonary:Normal   Precordium: Normal impulses. P2 is not palpable.  RV Heave: absent  LV Heave: absent  [place]:  normal size and placement  Palpable S4: absent.  Heart rate: normal    Heart Rhythm: regular     Heart Sounds: S1: normal  S2: normal  S3: absent   S4: absent  Opening Snap: absent    Pericardial Rub:  Absent: .    Abdomen:   Appearance: normal .  Palpation: Soft, non-tender to palpation, bowel sounds positive in all four quadrants; no guarding or rebound tenderness  Extremity: no edema.   LE Skin: no rashes  LE Hair:  normal  LE Pulses: well perfused with normal pulses in the distal extremities  Pallor on elevation: Absent. Rubor on dependency: None      DATA REVIEWED:     EKG. I personally reviewed and interpreted the EKG.  July 8 normal sinus rhythm,  ST and T wave consider inferior lateral ischemia.  Complete left bundle branch block.    ECG/EMG Results (all)     None        ---------------------------------------------------  TTE/KIMBERLYN:  Results for orders placed in visit on 06/03/21    Adult Transthoracic Echo Complete W/ Cont if Necessary Per Protocol    Interpretation Summary  · The study is technically difficult for diagnosis with poor acoustic windows  · Left ventricular ejection fraction appears to be 36 - 40%. Left ventricular systolic function is moderately decreased.  · The left ventricular cavity is mildly dilated.  · Left ventricular diastolic function is consistent with (grade Ia w/high LAP) impaired relaxation.  · The following left ventricular wall segments are hypokinetic: mid anterior,  apical anterior, apical lateral, mid inferolateral and apex hypokinetic.        --------------------------------------------------------------------------------------------------  LABS:     The CVD Risk score (Clay et al., 2008) failed to calculate for the following reasons:    The patient has a prior MI, stroke, CHF, or peripheral vascular disease diagnosis         Lab Results   Component Value Date    GLUCOSE 112 (H) 11/05/2021    BUN 16 11/05/2021    CREATININE 1.53 (H) 11/05/2021    EGFRIFNONA 45 (L) 11/05/2021    EGFRIFAFRI  07/01/2020      Comment:      <15 Indicative of kidney failure.    BCR 10.5 11/05/2021    K 4.2 11/05/2021    CO2 34.7 (H) 11/05/2021    CALCIUM 8.9 11/05/2021    ALBUMIN 4.00 03/04/2021    AST 21 03/04/2021    ALT 20 03/04/2021     Lab Results   Component Value Date    WBC 9.92 03/04/2021    HGB 15.0 03/04/2021    HCT 44.8 03/04/2021    MCV 97.2 (H) 03/04/2021     03/04/2021     Lab Results   Component Value Date    CHOL 141 03/04/2021    TRIG 74 03/04/2021    HDL 28 (L) 03/04/2021    LDL 98 03/04/2021     Lab Results   Component Value Date    TSH 2.060 03/04/2021     Lab Results   Component Value Date    CKTOTAL 98 06/05/2020    CKMB 22.49 (H) 06/06/2020    TROPONINT 0.628 (C) 06/06/2020     Lab Results   Component Value Date    HGBA1C 6.60 (H) 06/06/2020     No results found for: DDIMER  Lab Results   Component Value Date    ALT 20 03/04/2021     Lab Results   Component Value Date    HGBA1C 6.60 (H) 06/06/2020     Lab Results   Component Value Date    CREATININE 1.53 (H) 11/05/2021     No results found for: IRON, TIBC, FERRITIN  Lab Results   Component Value Date    INR 1.38 (H) 06/19/2020    INR 1.55 (H) 06/18/2020    INR 1.06 06/07/2020    PROTIME 16.8 (H) 06/19/2020    PROTIME 18.4 (H) 06/18/2020    PROTIME 13.6 06/07/2020       Assessment/Plan       1.  Coronary artery disease:  Now status post CABG x3 as above in June.  Complicated postop course.  Continue aspirin and  Plavix    2.  Ischemic cardiomyopathy:  Echo at the time of NSTEMI showed an EF of 31-35%.  Started him on a low-dose beta-blocker Toprol-XL and low-dose losartan  Creatinine is improved to 1.5, will add 12.5mg aldactone and check CMP in a wek  Appears euvolemic.    His EF is improved 36 to 40%.  Grade 1 diastolic dysfunction.  We will hold off on ICD at this point.    4.  Hyperlipidemia:  uptitrated Lipitor to 80 mg and on zetia.  Target LDL of less than 70.  Will repeat lipid panel time of CMP.      Prevention:  Patient's Body mass index is 31.66 kg/m². BMI is above normal parameters. Recommendations include: exercise counseling, none (medical contraindication) and nutrition counseling.      Jose Barrett  reports that he has been smoking cigarettes. He has a 12.50 pack-year smoking history. He has never used smokeless tobacco..              This document has been electronically signed by Flaca Godinez MD on November 12, 2021 09:59 CST

## 2022-01-11 ENCOUNTER — OFFICE VISIT (OUTPATIENT)
Dept: CARDIAC SURGERY | Facility: CLINIC | Age: 72
End: 2022-01-11

## 2022-01-11 VITALS
HEART RATE: 71 BPM | HEIGHT: 73 IN | WEIGHT: 236 LBS | DIASTOLIC BLOOD PRESSURE: 78 MMHG | SYSTOLIC BLOOD PRESSURE: 118 MMHG | OXYGEN SATURATION: 96 % | BODY MASS INDEX: 31.28 KG/M2

## 2022-01-11 DIAGNOSIS — I65.23 CAROTID STENOSIS, ASYMPTOMATIC, BILATERAL: Primary | ICD-10-CM

## 2022-01-11 DIAGNOSIS — E78.5 DYSLIPIDEMIA: ICD-10-CM

## 2022-01-11 DIAGNOSIS — I10 ESSENTIAL HYPERTENSION: ICD-10-CM

## 2022-01-11 DIAGNOSIS — F17.200 TOBACCO DEPENDENCE: ICD-10-CM

## 2022-01-11 PROCEDURE — 99214 OFFICE O/P EST MOD 30 MIN: CPT | Performed by: NURSE PRACTITIONER

## 2022-01-11 RX ORDER — METOPROLOL SUCCINATE 25 MG/1
TABLET, EXTENDED RELEASE ORAL
Qty: 90 TABLET | Refills: 1 | Status: SHIPPED | OUTPATIENT
Start: 2022-01-11 | End: 2022-08-18

## 2022-01-11 NOTE — PROGRESS NOTES
CVTS Office Progress Note     Jose Barrett  1950    Chief Complaint:    Chief Complaint   Patient presents with   • Carotid Artery Disease       HPI:      PCP:  Robina Dykes APRN  Cardiology:  Dr. Petersen  Nephrology:  Dr. Amaya [facility]     71 y.o. male with HTN(stable, increased risk stroke, rupture), Hyperlipidemia(stable, increased risk cardiovascular events), Obesity(uncontrolled, increased risk cardiovascular events), Smoker(uncontrolled, increased risk cardiovascular events), COPD(stable, increased risk pulmonary complications) and Chronic Kidney Disease(DWIGHT post cath, increased risk renal failure)  Black lung disease.  smokes .25 PPD.  Chest pain with exertion after dealing with his tractor getting stuck in a ditch and walking up hill. He reports the pain has not recurred. He presented for inpatient evaluation with positive troponin. Cardiac cath demonstrated severe multivessel disease and depressed ejection fraction 35%.  Underwent CABGx3 with IABP post operatively.  Complicated post operative course with superimposed DWIGHT on CKD3 requiring intermediate term dialysis, renal recovery. Progressing well. Returns in follow up.    6/2020 Carotid Duplex ([facility]): LEIGHA 0-49% mPSV 114c/s Ratio 1.6, LICA 50-69% mPSV 127c/s Ratio 2.1, Antegrade vertebrals  12/15/2020: Carotid Duplex: RIGHT 0-49% (89/1.3) LEFT 0-49% (107/1.5) Antegrade   1/2022: Carotid Duplex: RIGHT 50-69% (164/51cm/s, ratio 2.9) LEFT 0-49% (91/32cm/s, ratio 1.4) Antegrade      6/2020 TTE: LV moderate dilation, mild MR, EF 35%, LVDD 65mm, LVDS 57mm  6/2020 LHC: LM 50%, Ostial LAD 90%, CX 85%, RCA 99% mid, PLM 80%    6/18/2020 CABGx3 LIMA-LAD, SVG-OM2, SVG-PDA, IABP insertion with perclose    6/30/2020: Tunnel Cath Placement- RIGHT  8/24/2020: Tunnel Cath Removal    The following portions of the patient's history were reviewed and updated as appropriate: allergies, current medications, past family history, past medical history,  past social history, past surgical history and problem list.  Recent images independently reviewed.  Available laboratory values reviewed.    PMH:  Past Medical History:   Diagnosis Date   • Arthritis     back, legs, hips.    • Black lung (HCC)    • Black lung disease (HCC)    • COPD (chronic obstructive pulmonary disease) (HCC)    • Coronary artery disease    • Heart bloc    • HL (hearing loss)    • Hyperlipidemia    • Hypertension    • Low back pain    • Myocardial infarction (HCC)      Past Surgical History:   Procedure Laterality Date   • CARDIAC CATHETERIZATION N/A 6/8/2020    Procedure: Left Heart Cath;  Surgeon: April Petersen MD;  Location: [facility] CATH INVASIVE LOCATION;  Service: Cardiology;  Laterality: N/A;   • CORONARY ARTERY BYPASS GRAFT N/A 6/18/2020    Procedure: CORONARY ARTERY BYPASS GRAFTING X 3 UTILIZING THE LEFT INTERNAL MAMMARY ARTERY AND ENDOSCOPICALLY HARVESTED VEIN FROM THE LEFT LEG    (CELL SAVER);  Surgeon: Edi Donis MD;  Location: [facility] OR;  Service: Cardiothoracic;  Laterality: N/A;  Leg Incision: 0800  Chest Incision: 0807  Vein out: 0919  Vein prepped: 0954  On bypass: 1019  Off bypass 1246     • TONSILLECTOMY       Family History   Problem Relation Age of Onset   • Heart disease Mother         MI   • No Known Problems Father      Social History     Tobacco Use   • Smoking status: Current Every Day Smoker     Packs/day: 0.25     Years: 50.00     Pack years: 12.50     Types: Cigarettes   • Smokeless tobacco: Never Used   Vaping Use   • Vaping Use: Never used   Substance Use Topics   • Alcohol use: Never   • Drug use: Never       ALLERGIES:  No Known Allergies      MEDICATIONS:    Current Outpatient Medications:   •  albuterol sulfate  (90 Base) MCG/ACT inhaler, Inhale 2 puffs Every 4 (Four) Hours As Needed for Wheezing., Disp: , Rfl:   •  aspirin 81 MG chewable tablet, Chew 81 mg Daily., Disp: , Rfl:   •  atorvastatin (LIPITOR) 80 MG tablet, TAKE ONE TABLET BY  MOUTH DAILY, Disp: 90 tablet, Rfl: 1  •  clopidogrel (PLAVIX) 75 MG tablet, TAKE ONE TABLET BY MOUTH DAILY, Disp: 30 tablet, Rfl: 10  •  ezetimibe (ZETIA) 10 MG tablet, Take 1 tablet by mouth Daily., Disp: 90 tablet, Rfl: 3  •  Fluticasone Furoate-Vilanterol (Breo Ellipta) 100-25 MCG/INH inhaler, Inhale 1 puff Daily., Disp: , Rfl:   •  losartan (COZAAR) 25 MG tablet, Take 25 mg by mouth every night at bedtime., Disp: , Rfl:   •  Polyethylene Glycol 3350 (MIRALAX PO), Take  by mouth As Needed., Disp: , Rfl:   •  spironolactone (ALDACTONE) 25 MG tablet, Take 0.5 tablets by mouth Daily., Disp: 30 tablet, Rfl: 11  •  vitamin C (VITAMIN C) 500 MG tablet, Take 1 tablet by mouth 2 (Two) Times a Day., Disp: , Rfl:   •  metoprolol succinate XL (TOPROL-XL) 25 MG 24 hr tablet, TAKE ONE TABLET BY MOUTH DAILY, Disp: 90 tablet, Rfl: 1      Review of Systems   Constitutional: Negative for decreased appetite, fever, weight gain and weight loss.   HENT: Negative for hearing loss, hoarse voice and sore throat.    Eyes: Negative for blurred vision, visual disturbance and visual halos.   Cardiovascular: Negative for chest pain, dyspnea on exertion, leg swelling and palpitations.   Respiratory: Negative for cough, shortness of breath and sputum production.    Endocrine: Positive for cold intolerance. Negative for polyuria.   Hematologic/Lymphatic: Negative for bleeding problem. Bruises/bleeds easily.   Skin: Negative for color change, nail changes, poor wound healing and suspicious lesions.   Musculoskeletal: Positive for back pain. Negative for joint pain and myalgias.   Gastrointestinal: Negative for abdominal pain, constipation, diarrhea, hematemesis, melena, nausea and vomiting.   Genitourinary: Negative for flank pain, nocturia and urgency.   Neurological: Negative for brief paralysis, focal weakness, light-headedness, loss of balance, numbness, paresthesias and weakness.            Psychiatric/Behavioral: Negative for altered  "mental status, depression, suicidal ideas and thoughts of violence.   Allergic/Immunologic: Negative for hives and persistent infections.         Vitals:    01/11/22 1046   BP: 118/78   BP Location: Left arm   Patient Position: Sitting   Cuff Size: Adult   Pulse: 71   SpO2: 96%   Weight: 107 kg (236 lb)   Height: 185.4 cm (73\")     Physical Exam   Constitutional: He is oriented to person, place, and time. He appears well-developed.   HENT:   Head: Normocephalic and atraumatic.   Eyes: Pupils are equal, round, and reactive to light. Conjunctivae are normal.   Neck: Carotid bruit is not present.   Cardiovascular: Normal rate.   Pulmonary/Chest: Effort normal and breath sounds normal. No respiratory distress.   Abdominal: Soft. Bowel sounds are normal.   Musculoskeletal: Normal range of motion. No tenderness.   Neurological: He is alert and oriented to person, place, and time. No cranial nerve deficit. Gait normal.   Skin: Skin is warm and dry. Capillary refill takes 2 to 3 seconds.   No venous staining   Psychiatric: Judgment normal.   Nursing note and vitals reviewed.    Lab Results   Component Value Date    WBC 9.92 03/04/2021    HGB 15.0 03/04/2021    HCT 44.8 03/04/2021    MCV 97.2 (H) 03/04/2021     03/04/2021     Lab Results   Component Value Date    GLUCOSE 112 (H) 11/05/2021    BUN 16 11/05/2021    CREATININE 1.53 (H) 11/05/2021    EGFRIFNONA 45 (L) 11/05/2021    EGFRIFAFRI  07/01/2020      Comment:      <15 Indicative of kidney failure.    BCR 10.5 11/05/2021    K 4.2 11/05/2021    CO2 34.7 (H) 11/05/2021    CALCIUM 8.9 11/05/2021    ALBUMIN 4.00 03/04/2021    AST 21 03/04/2021    ALT 20 03/04/2021         Assessment & Plan     Independent Review of Studies  Detailed discussion regarding risks, benefits, and treatment plan. Images independently reviewed. Patient understands, agrees, and wishes to proceed with plan.      1. Carotid stenosis, asymptomatic, bilateral  moderate Carotid Artery Stenosis " right slightly worsened Asymptomatic   Mild carotid artery stenosis left. stable  Medical Management: ASA,PLAVIX,STATIN   If you should experience any neurological symptoms including but not limited to visual or speech disturbances confusion, seizures, or weakness of limbs of one side of your body notify Heart and Vascular center immediately for evaluation or if after hours present to the nearest Emergency Department.    Return 1 year  - Duplex Carotid Ultrasound CAR; Future    2. Essential hypertension  controlled    3. Dyslipidemia  Lipid-lowering therapy has been proven beneficial in patients with cardio-vascular disease. Current guidelines recommend statin treatment for all patients with PAD,CAD and carotid stenosis. Statins are beneficial in preventing cardiovascular events, increasing functional capacity and lower the risk of adverse limb loss in PAD. Statins decrease the progression of plaque formation and may improve peripheral vessel lining, and aid in reversing atherosclerosis.      4. Tobacco dependence  Smoking cessation assistance options offered including behavioral counseling (Smoking Cessation Classes), Nicotine replacement therapy (patches or gum), pharmacologic therapy (Chantix, Wellbutrin). Understands tobacco increases risk of expanding AAA, MI, CVA, PAD, carcinoma. Discussion and question answer period 5-7 minutes. Jose Barrett  reports that he has been smoking cigarettes. He has a 12.50 pack-year smoking history. He has never used smokeless tobacco.. I have educated him on the risk of diseases from using tobacco products such as cancer, COPD and heart disease.     I advised him to quit and he is not willing to quit.    I spent 5 minutes counseling the patient.        Advance Care Planning discussed and  Informational packet given to patient. Advance Care Plan on file: No    Patient's Body mass index is 31.14 kg/m². indicating that he is obese (BMI >30). Obesity-related health conditions  include the following: hypertension, coronary heart disease, dyslipidemias and peripheral vascular disease. Obesity is unchanged. BMI is is above average; BMI management plan is completed. We discussed portion control and increasing exercise..    Time spent 30 minutes in face to face evaluation, reviewing of medical history, tests, and procedures. Independent interpretation of vascular studies, ordering additional tests, documentation, and coordination of care.   Counseling and education with the patient and family regarding treatment options, plan of care, and hoped for outcomes. All questions answered.              This document has been electronically signed by ZHANNA José on January 11, 2022 15:06 CST

## 2022-01-11 NOTE — PATIENT INSTRUCTIONS
moderate Carotid Artery Stenosis right slightly worsened Asymptomatic   Mild carotid artery stenosis left. stable  Medical Management: ASA,PLAVIX,STATIN   If you should experience any neurological symptoms including but not limited to visual or speech disturbances confusion, seizures, or weakness of limbs of one side of your body notify Heart and Vascular center immediately for evaluation or if after hours present to the nearest Emergency Department.    Return 1 year    Smoking cessation advised.  Tobacco increases risk of expanding AAA, MI, CVA, PAD, carcinoma.

## 2022-02-25 ENCOUNTER — OFFICE VISIT (OUTPATIENT)
Dept: FAMILY MEDICINE CLINIC | Facility: CLINIC | Age: 72
End: 2022-02-25

## 2022-02-25 VITALS
BODY MASS INDEX: 30.62 KG/M2 | HEIGHT: 73 IN | DIASTOLIC BLOOD PRESSURE: 72 MMHG | HEART RATE: 68 BPM | SYSTOLIC BLOOD PRESSURE: 124 MMHG | OXYGEN SATURATION: 96 % | WEIGHT: 231 LBS

## 2022-02-25 DIAGNOSIS — J44.9 COPD (CHRONIC OBSTRUCTIVE PULMONARY DISEASE) WITH CHRONIC BRONCHITIS: ICD-10-CM

## 2022-02-25 DIAGNOSIS — Z12.5 PROSTATE CANCER SCREENING: ICD-10-CM

## 2022-02-25 DIAGNOSIS — E78.5 DYSLIPIDEMIA: ICD-10-CM

## 2022-02-25 DIAGNOSIS — R73.09 BLOOD SUGAR INCREASED: ICD-10-CM

## 2022-02-25 DIAGNOSIS — I10 ESSENTIAL HYPERTENSION: Primary | ICD-10-CM

## 2022-02-25 DIAGNOSIS — R53.81 MALAISE: ICD-10-CM

## 2022-02-25 PROCEDURE — 90662 IIV NO PRSV INCREASED AG IM: CPT | Performed by: NURSE PRACTITIONER

## 2022-02-25 PROCEDURE — 99214 OFFICE O/P EST MOD 30 MIN: CPT | Performed by: NURSE PRACTITIONER

## 2022-02-25 PROCEDURE — G0008 ADMIN INFLUENZA VIRUS VAC: HCPCS | Performed by: NURSE PRACTITIONER

## 2022-02-25 NOTE — PROGRESS NOTES
Chief Complaint   Patient presents with   • Hypertension     6 month check up    • Immunizations     Subjective   Jose Barrett is a 71 y.o. male.           COPD  He complains of wheezing. There is no chest tightness, cough, difficulty breathing, frequent throat clearing, hemoptysis, hoarse voice, shortness of breath or sputum production. This is a recurrent problem. The problem has been resolved. Associated symptoms include malaise/fatigue and rhinorrhea. Pertinent negatives include no appetite change, chest pain, dyspnea on exertion, ear congestion, ear pain, fever, headaches, heartburn, myalgias, nasal congestion, orthopnea, PND, postnasal drip, sneezing, sore throat, sweats, trouble swallowing or weight loss. He reports no improvement on treatment. His past medical history is significant for COPD. There is no history of asthma, bronchiectasis, bronchitis, emphysema or pneumonia.   Hyperlipidemia  This is a recurrent problem. The problem is controlled. He has no history of chronic renal disease, diabetes, hypothyroidism, liver disease, obesity or nephrotic syndrome. Pertinent negatives include no chest pain, focal weakness, leg pain, myalgias or shortness of breath. Current antihyperlipidemic treatment includes statins. The current treatment provides mild improvement of lipids. Compliance problems include adherence to diet.  Risk factors for coronary artery disease include dyslipidemia, hypertension and male sex.   Hypertension  This is a new problem. The problem has been gradually worsening since onset. The problem is controlled. Associated symptoms include malaise/fatigue. Pertinent negatives include no blurred vision, chest pain, headaches, neck pain, orthopnea, palpitations, peripheral edema, PND, shortness of breath or sweats. Risk factors for coronary artery disease include dyslipidemia, male gender, obesity, sedentary lifestyle and smoking/tobacco exposure. Past treatments include angiotensin  blockers. Current antihypertension treatment includes alpha 1 blockers and angiotensin blockers. The current treatment provides significant improvement. Compliance problems include diet.  There is no history of angina, kidney disease, CAD/MI, CVA, heart failure, left ventricular hypertrophy, PVD or retinopathy. There is no history of chronic renal disease, coarctation of the aorta, hyperaldosteronism, hypercortisolism, hyperparathyroidism, a hypertension causing med, pheochromocytoma, renovascular disease or a thyroid problem.   Fatigue  This is a recurrent problem. The current episode started more than 1 month ago. The problem occurs intermittently. The problem has been gradually worsening. Associated symptoms include arthralgias and fatigue. Pertinent negatives include no abdominal pain, chest pain, chills, congestion, coughing, diaphoresis, fever, headaches, joint swelling, myalgias, nausea, neck pain, numbness, sore throat, visual change, vomiting or weakness. The treatment provided mild relief.   Blood Sugar Problem  This is a new problem. The current episode started more than 1 month ago. The problem occurs intermittently. The problem has been rapidly worsening. Associated symptoms include arthralgias and fatigue. Pertinent negatives include no abdominal pain, chest pain, chills, congestion, coughing, diaphoresis, fever, headaches, joint swelling, myalgias, nausea, neck pain, numbness, sore throat, visual change, vomiting or weakness. Treatments tried: eating well  The treatment provided mild relief.        The following portions of the patient's history were reviewed and updated as appropriate: allergies, current medications, past social history and problem list.    Review of Systems   Constitutional: Positive for fatigue and malaise/fatigue. Negative for activity change, appetite change, chills, diaphoresis, fever, unexpected weight change and weight loss.   HENT: Positive for rhinorrhea. Negative for  "congestion, dental problem, drooling, ear discharge, ear pain, facial swelling, hoarse voice, mouth sores, nosebleeds, postnasal drip, sinus pressure, sinus pain, sneezing, sore throat and trouble swallowing.    Eyes: Negative.  Negative for blurred vision, photophobia, pain, discharge, redness, itching and visual disturbance.   Respiratory: Positive for wheezing. Negative for apnea, cough, hemoptysis, sputum production, choking, chest tightness, shortness of breath and stridor.    Cardiovascular: Negative.  Negative for chest pain, dyspnea on exertion, palpitations, orthopnea, leg swelling and PND.   Gastrointestinal: Negative.  Negative for abdominal distention, abdominal pain, anal bleeding, blood in stool, heartburn, nausea, rectal pain and vomiting.   Endocrine: Negative for heat intolerance, polydipsia, polyphagia and polyuria.   Genitourinary: Negative.  Negative for difficulty urinating, dysuria and enuresis.   Musculoskeletal: Positive for arthralgias and back pain. Negative for joint swelling, myalgias and neck pain.   Skin: Negative.    Allergic/Immunologic: Negative.  Negative for environmental allergies, food allergies and immunocompromised state.   Neurological: Negative for tremors, focal weakness, seizures, syncope, facial asymmetry, speech difficulty, weakness, light-headedness, numbness and headaches.   Hematological: Negative.  Negative for adenopathy. Does not bruise/bleed easily.   Psychiatric/Behavioral: Negative.  Negative for agitation, behavioral problems, confusion, decreased concentration, dysphoric mood, hallucinations, self-injury and sleep disturbance. The patient is not nervous/anxious and is not hyperactive.        Objective   /72   Pulse 68   Ht 185.4 cm (73\")   Wt 105 kg (231 lb)   SpO2 96%   BMI 30.48 kg/m²   Physical Exam  Vitals and nursing note reviewed.   Constitutional:       Appearance: Normal appearance.   HENT:      Head: Normocephalic.      Right Ear: Tympanic " membrane normal. There is no impacted cerumen.      Left Ear: Tympanic membrane normal. There is no impacted cerumen.      Nose: Nose normal. No congestion or rhinorrhea.      Mouth/Throat:      Mouth: Mucous membranes are moist.      Pharynx: No oropharyngeal exudate or posterior oropharyngeal erythema.   Eyes:      General: No scleral icterus.        Right eye: No discharge.         Left eye: No discharge.      Pupils: Pupils are equal, round, and reactive to light.   Cardiovascular:      Rate and Rhythm: Normal rate.      Pulses: Normal pulses.      Heart sounds: No murmur heard.  No friction rub. No gallop.    Pulmonary:      Effort: Pulmonary effort is normal. No respiratory distress.      Breath sounds: No stridor. Wheezing present. No rhonchi or rales.   Chest:      Chest wall: No tenderness.   Abdominal:      General: Abdomen is flat. There is no distension.      Palpations: There is no mass.      Tenderness: There is no abdominal tenderness. There is no right CVA tenderness, left CVA tenderness, guarding or rebound.      Hernia: No hernia is present.   Musculoskeletal:         General: No swelling, tenderness, deformity or signs of injury. Normal range of motion.      Cervical back: Normal range of motion.      Right lower leg: No edema.      Left lower leg: No edema.   Skin:     General: Skin is warm.      Coloration: Skin is not jaundiced or pale.      Findings: No bruising, erythema, lesion or rash.   Neurological:      General: No focal deficit present.      Mental Status: He is alert and oriented to person, place, and time.      Cranial Nerves: No cranial nerve deficit.      Sensory: No sensory deficit.      Motor: No weakness.      Coordination: Coordination normal.      Gait: Gait normal.      Deep Tendon Reflexes: Reflexes normal.   Psychiatric:         Mood and Affect: Mood normal.         Behavior: Behavior normal.              Assessment/Plan     Problems Addressed this Visit        Cardiac and  Vasculature    Essential hypertension - Primary    Relevant Orders    CBC & Differential    Comprehensive Metabolic Panel    Hemoglobin A1c    Lipid Panel    Vitamin B12    TSH    PSA Screen    Dyslipidemia    Relevant Orders    CBC & Differential    Comprehensive Metabolic Panel    Hemoglobin A1c    Lipid Panel    Vitamin B12    TSH    PSA Screen       Pulmonary and Pneumonias    COPD (chronic obstructive pulmonary disease) with chronic bronchitis (HCC)      Other Visit Diagnoses     Prostate cancer screening        Relevant Orders    PSA Screen    Malaise        Blood sugar increased        Relevant Orders    CBC & Differential    Comprehensive Metabolic Panel    Hemoglobin A1c    Lipid Panel    Vitamin B12    TSH    PSA Screen      Diagnoses       Codes Comments    Essential hypertension    -  Primary ICD-10-CM: I10  ICD-9-CM: 401.9     COPD (chronic obstructive pulmonary disease) with chronic bronchitis (HCC)     ICD-10-CM: J44.9  ICD-9-CM: 491.20     Dyslipidemia     ICD-10-CM: E78.5  ICD-9-CM: 272.4     Prostate cancer screening     ICD-10-CM: Z12.5  ICD-9-CM: V76.44     Malaise     ICD-10-CM: R53.81  ICD-9-CM: 780.79     Blood sugar increased     ICD-10-CM: R73.09  ICD-9-CM: 790.29          No orders of the defined types were placed in this encounter.    Current Outpatient Medications on File Prior to Visit   Medication Sig Dispense Refill   • albuterol sulfate  (90 Base) MCG/ACT inhaler Inhale 2 puffs Every 4 (Four) Hours As Needed for Wheezing.     • aspirin 81 MG chewable tablet Chew 81 mg Daily.     • atorvastatin (LIPITOR) 80 MG tablet TAKE ONE TABLET BY MOUTH DAILY 90 tablet 1   • clopidogrel (PLAVIX) 75 MG tablet TAKE ONE TABLET BY MOUTH DAILY 30 tablet 10   • ezetimibe (ZETIA) 10 MG tablet Take 1 tablet by mouth Daily. 90 tablet 3   • Fluticasone Furoate-Vilanterol (Breo Ellipta) 100-25 MCG/INH inhaler Inhale 1 puff Daily.     • losartan (COZAAR) 25 MG tablet Take 25 mg by mouth every night at  bedtime.     • metoprolol succinate XL (TOPROL-XL) 25 MG 24 hr tablet TAKE ONE TABLET BY MOUTH DAILY 90 tablet 1   • Polyethylene Glycol 3350 (MIRALAX PO) Take  by mouth As Needed.     • vitamin C (VITAMIN C) 500 MG tablet Take 1 tablet by mouth 2 (Two) Times a Day.     • [DISCONTINUED] spironolactone (ALDACTONE) 25 MG tablet Take 0.5 tablets by mouth Daily. 30 tablet 11     No current facility-administered medications on file prior to visit.      20 minutes   Follow Up {Instructions Charge Capture  Follow-up Communications :23}  Return in about 6 months (around 8/25/2022).       It's not just what you eat, but when you eat  Eat breakfast, and eat smaller meals throughout the day. A healthy breakfast can jumpstart your metabolism, while eating small, healthy meals (rather than the standard three large meals) keeps your energy up.   Avoid eating at night. Try to eat dinner earlier and fast for 14-16 hours until breakfast the next morning. Studies suggest that eating only when you’re most active and giving your digestive system a long break each day may help to regulate weight.        labs as directed, consider colonoscopy , fasting lab work-see back in 6 months, follow up sooner if needed-flu vaccine today -suggest shingles and tdap-patient will check with the pharmacy also call us back if wants to proceed with the colonoscopy     meds as directed -diet discussed   Answers for HPI/ROS submitted by the patient on 2/25/2022  What is the primary reason for your visit?: Physical

## 2022-03-02 ENCOUNTER — PATIENT ROUNDING (BHMG ONLY) (OUTPATIENT)
Dept: FAMILY MEDICINE CLINIC | Facility: CLINIC | Age: 72
End: 2022-03-02

## 2022-03-02 NOTE — PROGRESS NOTES
March 2, 2022        I spoke with Mr Barrett regarding his last office visit on 02/25/2022 to see how is appt was. Patient stated he was very impressed with his care. Everything was taken care of and all his questions and concerns were handled efficiently. He would not change a thing in this office.      Thank you, and have a great day.

## 2022-03-03 ENCOUNTER — LAB (OUTPATIENT)
Dept: LAB | Facility: HOSPITAL | Age: 72
End: 2022-03-03

## 2022-03-03 LAB
ALBUMIN SERPL-MCNC: 4 G/DL (ref 3.5–5.2)
ALBUMIN/GLOB SERPL: 1.5 G/DL
ALP SERPL-CCNC: 76 U/L (ref 39–117)
ALT SERPL W P-5'-P-CCNC: 26 U/L (ref 1–41)
ANION GAP SERPL CALCULATED.3IONS-SCNC: 8.4 MMOL/L (ref 5–15)
AST SERPL-CCNC: 16 U/L (ref 1–40)
BASOPHILS # BLD AUTO: 0.07 10*3/MM3 (ref 0–0.2)
BASOPHILS NFR BLD AUTO: 0.6 % (ref 0–1.5)
BILIRUB SERPL-MCNC: 0.7 MG/DL (ref 0–1.2)
BUN SERPL-MCNC: 19 MG/DL (ref 8–23)
BUN/CREAT SERPL: 10.6 (ref 7–25)
CALCIUM SPEC-SCNC: 9.2 MG/DL (ref 8.6–10.5)
CHLORIDE SERPL-SCNC: 100 MMOL/L (ref 98–107)
CHOLEST SERPL-MCNC: 122 MG/DL (ref 0–200)
CO2 SERPL-SCNC: 30.6 MMOL/L (ref 22–29)
CREAT SERPL-MCNC: 1.8 MG/DL (ref 0.76–1.27)
DEPRECATED RDW RBC AUTO: 42.8 FL (ref 37–54)
EGFRCR SERPLBLD CKD-EPI 2021: 39.7 ML/MIN/1.73
EOSINOPHIL # BLD AUTO: 0.24 10*3/MM3 (ref 0–0.4)
EOSINOPHIL NFR BLD AUTO: 2.2 % (ref 0.3–6.2)
ERYTHROCYTE [DISTWIDTH] IN BLOOD BY AUTOMATED COUNT: 12.2 % (ref 12.3–15.4)
GLOBULIN UR ELPH-MCNC: 2.7 GM/DL
GLUCOSE SERPL-MCNC: 98 MG/DL (ref 65–99)
HBA1C MFR BLD: 6.2 % (ref 4.8–5.6)
HCT VFR BLD AUTO: 45.3 % (ref 37.5–51)
HDLC SERPL-MCNC: 26 MG/DL (ref 40–60)
HGB BLD-MCNC: 14.9 G/DL (ref 13–17.7)
IMM GRANULOCYTES # BLD AUTO: 0.03 10*3/MM3 (ref 0–0.05)
IMM GRANULOCYTES NFR BLD AUTO: 0.3 % (ref 0–0.5)
LDLC SERPL CALC-MCNC: 82 MG/DL (ref 0–100)
LDLC/HDLC SERPL: 3.18 {RATIO}
LYMPHOCYTES # BLD AUTO: 2.24 10*3/MM3 (ref 0.7–3.1)
LYMPHOCYTES NFR BLD AUTO: 20.8 % (ref 19.6–45.3)
MCH RBC QN AUTO: 31.4 PG (ref 26.6–33)
MCHC RBC AUTO-ENTMCNC: 32.9 G/DL (ref 31.5–35.7)
MCV RBC AUTO: 95.4 FL (ref 79–97)
MONOCYTES # BLD AUTO: 0.8 10*3/MM3 (ref 0.1–0.9)
MONOCYTES NFR BLD AUTO: 7.4 % (ref 5–12)
NEUTROPHILS NFR BLD AUTO: 68.7 % (ref 42.7–76)
NEUTROPHILS NFR BLD AUTO: 7.41 10*3/MM3 (ref 1.7–7)
NRBC BLD AUTO-RTO: 0 /100 WBC (ref 0–0.2)
PLATELET # BLD AUTO: 220 10*3/MM3 (ref 140–450)
PMV BLD AUTO: 10.6 FL (ref 6–12)
POTASSIUM SERPL-SCNC: 4.6 MMOL/L (ref 3.5–5.2)
PROT SERPL-MCNC: 6.7 G/DL (ref 6–8.5)
PSA SERPL-MCNC: 2.89 NG/ML (ref 0–4)
RBC # BLD AUTO: 4.75 10*6/MM3 (ref 4.14–5.8)
SODIUM SERPL-SCNC: 139 MMOL/L (ref 136–145)
TRIGL SERPL-MCNC: 66 MG/DL (ref 0–150)
TSH SERPL DL<=0.05 MIU/L-ACNC: 1.89 UIU/ML (ref 0.27–4.2)
VIT B12 BLD-MCNC: 261 PG/ML (ref 211–946)
VLDLC SERPL-MCNC: 14 MG/DL (ref 5–40)
WBC NRBC COR # BLD: 10.79 10*3/MM3 (ref 3.4–10.8)

## 2022-03-03 PROCEDURE — G0103 PSA SCREENING: HCPCS | Performed by: NURSE PRACTITIONER

## 2022-03-03 PROCEDURE — 82607 VITAMIN B-12: CPT | Performed by: NURSE PRACTITIONER

## 2022-03-03 PROCEDURE — 83036 HEMOGLOBIN GLYCOSYLATED A1C: CPT | Performed by: NURSE PRACTITIONER

## 2022-03-03 PROCEDURE — 80053 COMPREHEN METABOLIC PANEL: CPT | Performed by: NURSE PRACTITIONER

## 2022-03-03 PROCEDURE — 36415 COLL VENOUS BLD VENIPUNCTURE: CPT | Performed by: NURSE PRACTITIONER

## 2022-03-03 PROCEDURE — 80061 LIPID PANEL: CPT | Performed by: NURSE PRACTITIONER

## 2022-03-03 PROCEDURE — 84443 ASSAY THYROID STIM HORMONE: CPT | Performed by: NURSE PRACTITIONER

## 2022-03-03 PROCEDURE — 85025 COMPLETE CBC W/AUTO DIFF WBC: CPT | Performed by: NURSE PRACTITIONER

## 2022-03-04 ENCOUNTER — TELEPHONE (OUTPATIENT)
Dept: FAMILY MEDICINE CLINIC | Facility: CLINIC | Age: 72
End: 2022-03-04

## 2022-03-04 NOTE — TELEPHONE ENCOUNTER
Per ZHANNA Quarles, Ms. Barrett has been called with recent lab results & recommendations.  Continue current medications and follow-up as planned or sooner if any problems.    Yes, He sees Dr. Amaya, he is not due back to see him until November       ----- Message from ZHANNA Bedoya sent at 3/4/2022  7:59 AM CST -----  Can you let him know his b12 is low-needs vitamin b12 5000mcg daily, a1c is improved -renal function is up a little -see if he sees nephrology-its stable but up a little-make sure he is drinking enough water -otherwise no changes needed

## 2022-03-09 RX ORDER — EZETIMIBE 10 MG/1
TABLET ORAL
Qty: 90 TABLET | Refills: 3 | Status: SHIPPED | OUTPATIENT
Start: 2022-03-09 | End: 2023-03-16

## 2022-03-13 ENCOUNTER — APPOINTMENT (OUTPATIENT)
Dept: CT IMAGING | Facility: HOSPITAL | Age: 72
End: 2022-03-13

## 2022-03-13 ENCOUNTER — HOSPITAL ENCOUNTER (EMERGENCY)
Facility: HOSPITAL | Age: 72
Discharge: HOME | End: 2022-03-13
Attending: EMERGENCY MEDICINE | Admitting: EMERGENCY MEDICINE

## 2022-03-13 VITALS
RESPIRATION RATE: 22 BRPM | BODY MASS INDEX: 30.09 KG/M2 | HEART RATE: 70 BPM | OXYGEN SATURATION: 94 % | TEMPERATURE: 97.8 F | WEIGHT: 227 LBS | DIASTOLIC BLOOD PRESSURE: 65 MMHG | HEIGHT: 73 IN | SYSTOLIC BLOOD PRESSURE: 125 MMHG

## 2022-03-13 DIAGNOSIS — J90 PLEURAL EFFUSION: ICD-10-CM

## 2022-03-13 DIAGNOSIS — J44.1 COPD WITH ACUTE EXACERBATION: Primary | ICD-10-CM

## 2022-03-13 PROBLEM — N18.32 STAGE 3B CHRONIC KIDNEY DISEASE: Status: ACTIVE | Noted: 2020-11-11

## 2022-03-13 PROBLEM — I21.4 ACUTE NON-ST SEGMENT ELEVATION MYOCARDIAL INFARCTION: Status: ACTIVE | Noted: 2020-11-11

## 2022-03-13 PROBLEM — J60 COAL WORKERS' PNEUMOCONIOSIS: Status: ACTIVE | Noted: 2019-05-29

## 2022-03-13 PROBLEM — J44.89 CHRONIC OBSTRUCTIVE BRONCHITIS: Status: ACTIVE | Noted: 2019-05-29

## 2022-03-13 PROBLEM — I65.23 BILATERAL CAROTID ARTERY STENOSIS: Status: ACTIVE | Noted: 2020-08-28

## 2022-03-13 PROBLEM — J44.9 CHRONIC OBSTRUCTIVE BRONCHITIS: Status: ACTIVE | Noted: 2019-05-29

## 2022-03-13 LAB
ALBUMIN SERPL-MCNC: 4.2 G/DL (ref 3.5–5.2)
ALBUMIN/GLOB SERPL: 1.6 G/DL
ALP SERPL-CCNC: 84 U/L (ref 39–117)
ALT SERPL W P-5'-P-CCNC: 24 U/L (ref 1–41)
ANION GAP SERPL CALCULATED.3IONS-SCNC: 6 MMOL/L (ref 5–15)
AST SERPL-CCNC: 15 U/L (ref 1–40)
BASOPHILS # BLD AUTO: 0.07 10*3/MM3 (ref 0–0.2)
BASOPHILS NFR BLD AUTO: 0.6 % (ref 0–1.5)
BILIRUB SERPL-MCNC: 0.6 MG/DL (ref 0–1.2)
BUN SERPL-MCNC: 21 MG/DL (ref 8–23)
BUN/CREAT SERPL: 11.2 (ref 7–25)
CALCIUM SPEC-SCNC: 9.2 MG/DL (ref 8.6–10.5)
CHLORIDE SERPL-SCNC: 101 MMOL/L (ref 98–107)
CO2 SERPL-SCNC: 32 MMOL/L (ref 22–29)
CREAT SERPL-MCNC: 1.87 MG/DL (ref 0.76–1.27)
D-LACTATE SERPL-SCNC: 1 MMOL/L (ref 0.5–2)
DEPRECATED RDW RBC AUTO: 48.4 FL (ref 37–54)
EGFRCR SERPLBLD CKD-EPI 2021: 38 ML/MIN/1.73
EOSINOPHIL # BLD AUTO: 0.19 10*3/MM3 (ref 0–0.4)
EOSINOPHIL NFR BLD AUTO: 1.7 % (ref 0.3–6.2)
ERYTHROCYTE [DISTWIDTH] IN BLOOD BY AUTOMATED COUNT: 13.2 % (ref 12.3–15.4)
GLOBULIN UR ELPH-MCNC: 2.7 GM/DL
GLUCOSE SERPL-MCNC: 116 MG/DL (ref 65–99)
HCT VFR BLD AUTO: 46.1 % (ref 37.5–51)
HGB BLD-MCNC: 14.8 G/DL (ref 13–17.7)
HOLD SPECIMEN: NORMAL
HOLD SPECIMEN: NORMAL
IMM GRANULOCYTES # BLD AUTO: 0.07 10*3/MM3 (ref 0–0.05)
IMM GRANULOCYTES NFR BLD AUTO: 0.6 % (ref 0–0.5)
LYMPHOCYTES # BLD AUTO: 1.57 10*3/MM3 (ref 0.7–3.1)
LYMPHOCYTES NFR BLD AUTO: 13.8 % (ref 19.6–45.3)
MCH RBC QN AUTO: 31.8 PG (ref 26.6–33)
MCHC RBC AUTO-ENTMCNC: 32.1 G/DL (ref 31.5–35.7)
MCV RBC AUTO: 98.9 FL (ref 79–97)
MONOCYTES # BLD AUTO: 0.79 10*3/MM3 (ref 0.1–0.9)
MONOCYTES NFR BLD AUTO: 6.9 % (ref 5–12)
NEUTROPHILS NFR BLD AUTO: 76.4 % (ref 42.7–76)
NEUTROPHILS NFR BLD AUTO: 8.69 10*3/MM3 (ref 1.7–7)
NRBC BLD AUTO-RTO: 0 /100 WBC (ref 0–0.2)
NT-PROBNP SERPL-MCNC: 683.6 PG/ML (ref 0–900)
PLATELET # BLD AUTO: 222 10*3/MM3 (ref 140–450)
PMV BLD AUTO: 9.7 FL (ref 6–12)
POTASSIUM SERPL-SCNC: 4.9 MMOL/L (ref 3.5–5.2)
PROT SERPL-MCNC: 6.9 G/DL (ref 6–8.5)
RBC # BLD AUTO: 4.66 10*6/MM3 (ref 4.14–5.8)
SODIUM SERPL-SCNC: 139 MMOL/L (ref 136–145)
TROPONIN T SERPL-MCNC: <0.01 NG/ML (ref 0–0.03)
WBC NRBC COR # BLD: 11.38 10*3/MM3 (ref 3.4–10.8)
WHOLE BLOOD HOLD SPECIMEN: NORMAL
WHOLE BLOOD HOLD SPECIMEN: NORMAL

## 2022-03-13 PROCEDURE — 99283 EMERGENCY DEPT VISIT LOW MDM: CPT

## 2022-03-13 PROCEDURE — 93010 ELECTROCARDIOGRAM REPORT: CPT | Performed by: INTERNAL MEDICINE

## 2022-03-13 PROCEDURE — 96366 THER/PROPH/DIAG IV INF ADDON: CPT

## 2022-03-13 PROCEDURE — 84484 ASSAY OF TROPONIN QUANT: CPT | Performed by: EMERGENCY MEDICINE

## 2022-03-13 PROCEDURE — 80053 COMPREHEN METABOLIC PANEL: CPT | Performed by: EMERGENCY MEDICINE

## 2022-03-13 PROCEDURE — 36415 COLL VENOUS BLD VENIPUNCTURE: CPT

## 2022-03-13 PROCEDURE — 83880 ASSAY OF NATRIURETIC PEPTIDE: CPT | Performed by: EMERGENCY MEDICINE

## 2022-03-13 PROCEDURE — 87040 BLOOD CULTURE FOR BACTERIA: CPT | Performed by: EMERGENCY MEDICINE

## 2022-03-13 PROCEDURE — 96375 TX/PRO/DX INJ NEW DRUG ADDON: CPT

## 2022-03-13 PROCEDURE — 83605 ASSAY OF LACTIC ACID: CPT | Performed by: EMERGENCY MEDICINE

## 2022-03-13 PROCEDURE — 25010000002 CEFTRIAXONE PER 250 MG: Performed by: EMERGENCY MEDICINE

## 2022-03-13 PROCEDURE — 99284 EMERGENCY DEPT VISIT MOD MDM: CPT

## 2022-03-13 PROCEDURE — 25010000002 AZITHROMYCIN PER 500 MG: Performed by: EMERGENCY MEDICINE

## 2022-03-13 PROCEDURE — 71250 CT THORAX DX C-: CPT

## 2022-03-13 PROCEDURE — 93005 ELECTROCARDIOGRAM TRACING: CPT | Performed by: EMERGENCY MEDICINE

## 2022-03-13 PROCEDURE — 85025 COMPLETE CBC W/AUTO DIFF WBC: CPT | Performed by: EMERGENCY MEDICINE

## 2022-03-13 PROCEDURE — 96365 THER/PROPH/DIAG IV INF INIT: CPT

## 2022-03-13 PROCEDURE — 25010000002 METHYLPREDNISOLONE PER 125 MG: Performed by: EMERGENCY MEDICINE

## 2022-03-13 RX ORDER — METHYLPREDNISOLONE SODIUM SUCCINATE 125 MG/2ML
125 INJECTION, POWDER, LYOPHILIZED, FOR SOLUTION INTRAMUSCULAR; INTRAVENOUS ONCE
Status: COMPLETED | OUTPATIENT
Start: 2022-03-13 | End: 2022-03-13

## 2022-03-13 RX ORDER — SODIUM CHLORIDE 0.9 % (FLUSH) 0.9 %
10 SYRINGE (ML) INJECTION AS NEEDED
Status: DISCONTINUED | OUTPATIENT
Start: 2022-03-13 | End: 2022-03-13 | Stop reason: HOSPADM

## 2022-03-13 RX ORDER — PREDNISONE 50 MG/1
50 TABLET ORAL DAILY
Qty: 5 TABLET | Refills: 0 | Status: SHIPPED | OUTPATIENT
Start: 2022-03-13 | End: 2022-04-18

## 2022-03-13 RX ORDER — BENZONATATE 100 MG/1
100 CAPSULE ORAL 3 TIMES DAILY PRN
Qty: 30 CAPSULE | Refills: 0 | Status: SHIPPED | OUTPATIENT
Start: 2022-03-13 | End: 2022-04-18

## 2022-03-13 RX ORDER — DOXYCYCLINE 100 MG/1
100 CAPSULE ORAL 2 TIMES DAILY
Qty: 14 CAPSULE | Refills: 0 | Status: SHIPPED | OUTPATIENT
Start: 2022-03-13 | End: 2022-04-18

## 2022-03-13 RX ADMIN — CEFTRIAXONE SODIUM 1 G: 10 INJECTION, POWDER, FOR SOLUTION INTRAVENOUS at 18:10

## 2022-03-13 RX ADMIN — AZITHROMYCIN MONOHYDRATE 500 MG: 500 INJECTION, POWDER, LYOPHILIZED, FOR SOLUTION INTRAVENOUS at 18:11

## 2022-03-13 RX ADMIN — Medication 10 ML: at 18:11

## 2022-03-13 RX ADMIN — METHYLPREDNISOLONE SODIUM SUCCINATE 125 MG: 125 INJECTION, POWDER, FOR SOLUTION INTRAMUSCULAR; INTRAVENOUS at 19:16

## 2022-03-13 NOTE — ED PROVIDER NOTES
Subjective   Patient presents patient with complaint of shortness of breath.  Patient also complains of some right-sided chest discomfort with cough and deep breathing.  Patient she was seen at the clinic today, stated that he did not like how his x-ray looks and sent to the ER for blood work and further evaluation.  Patient denies any fevers at home.  There is been no bowel or bladder changes.  Patient is bringing up increased sputum.          Review of Systems   Constitutional: Negative.  Negative for appetite change, chills and fever.   HENT: Negative.  Negative for congestion.    Eyes: Negative.  Negative for photophobia and visual disturbance.   Respiratory: Positive for chest tightness and shortness of breath. Negative for cough.    Cardiovascular: Negative.  Negative for chest pain and palpitations.   Gastrointestinal: Negative.  Negative for abdominal pain, constipation, diarrhea, nausea and vomiting.   Endocrine: Negative.    Genitourinary: Negative.  Negative for decreased urine volume, dysuria, flank pain and hematuria.   Musculoskeletal: Negative.  Negative for arthralgias, back pain, myalgias, neck pain and neck stiffness.   Skin: Negative.  Negative for pallor.   Neurological: Negative.  Negative for dizziness, syncope, weakness, light-headedness, numbness and headaches.   Psychiatric/Behavioral: Negative.  Negative for confusion and suicidal ideas. The patient is not nervous/anxious.    All other systems reviewed and are negative.      Past Medical History:   Diagnosis Date   • Arthritis     back, legs, hips.    • Black lung (HCC)    • Black lung disease (HCC)    • COPD (chronic obstructive pulmonary disease) (HCC)    • Coronary artery disease    • Heart bloc    • HL (hearing loss)    • Hyperlipidemia    • Hypertension    • Low back pain    • Myocardial infarction (HCC)        No Known Allergies    Past Surgical History:   Procedure Laterality Date   • CARDIAC CATHETERIZATION N/A 6/8/2020     Procedure: Left Heart Cath;  Surgeon: April Petersen MD;  Location: [facility] CATH INVASIVE LOCATION;  Service: Cardiology;  Laterality: N/A;   • CORONARY ARTERY BYPASS GRAFT N/A 6/18/2020    Procedure: CORONARY ARTERY BYPASS GRAFTING X 3 UTILIZING THE LEFT INTERNAL MAMMARY ARTERY AND ENDOSCOPICALLY HARVESTED VEIN FROM THE LEFT LEG    (CELL SAVER);  Surgeon: Edi Donis MD;  Location: [facility] OR;  Service: Cardiothoracic;  Laterality: N/A;  Leg Incision: 0800  Chest Incision: 0807  Vein out: 0919  Vein prepped: 0954  On bypass: 1019  Off bypass 1246     • TONSILLECTOMY         Family History   Problem Relation Age of Onset   • Heart disease Mother         MI   • No Known Problems Father        Social History     Socioeconomic History   • Marital status:    Tobacco Use   • Smoking status: Current Every Day Smoker     Packs/day: 0.25     Years: 50.00     Pack years: 12.50     Types: Cigarettes   • Smokeless tobacco: Never Used   Vaping Use   • Vaping Use: Never used   Substance and Sexual Activity   • Alcohol use: Never   • Drug use: Never   • Sexual activity: Defer           Objective   Physical Exam  Vitals and nursing note reviewed.   Constitutional:       General: He is not in acute distress.     Appearance: He is well-developed. He is not ill-appearing or diaphoretic.   HENT:      Head: Normocephalic and atraumatic.   Eyes:      Conjunctiva/sclera: Conjunctivae normal.   Neck:      Vascular: No JVD.   Cardiovascular:      Rate and Rhythm: Normal rate and regular rhythm.      Heart sounds: Normal heart sounds. No murmur heard.    No friction rub. No gallop.   Pulmonary:      Effort: Pulmonary effort is normal. No respiratory distress.      Breath sounds: No wheezing or rales.   Chest:      Chest wall: No tenderness.   Abdominal:      General: Bowel sounds are normal. There is no distension.      Palpations: Abdomen is soft. There is no mass.      Tenderness: There is no abdominal tenderness.  There is no guarding or rebound.   Musculoskeletal:         General: Normal range of motion.      Cervical back: Normal range of motion and neck supple.   Skin:     General: Skin is warm and dry.      Capillary Refill: Capillary refill takes less than 2 seconds.   Neurological:      General: No focal deficit present.      Mental Status: He is alert and oriented to person, place, and time.   Psychiatric:         Mood and Affect: Mood normal.         Behavior: Behavior normal.         Thought Content: Thought content normal.         Judgment: Judgment normal.         Procedures           ED Course                                         Labs Reviewed   COMPREHENSIVE METABOLIC PANEL - Abnormal; Notable for the following components:       Result Value    Glucose 116 (*)     Creatinine 1.87 (*)     CO2 32.0 (*)     eGFR 38.0 (*)     All other components within normal limits    Narrative:     GFR Normal >60  Chronic Kidney Disease <60  Kidney Failure <15     CBC WITH AUTO DIFFERENTIAL - Abnormal; Notable for the following components:    WBC 11.38 (*)     MCV 98.9 (*)     Neutrophil % 76.4 (*)     Lymphocyte % 13.8 (*)     Immature Grans % 0.6 (*)     Neutrophils, Absolute 8.69 (*)     Immature Grans, Absolute 0.07 (*)     All other components within normal limits   BNP (IN-HOUSE) - Normal    Narrative:     Among patients with dyspnea, NT-proBNP is highly sensitive for the detection of acute congestive heart failure. In addition NT-proBNP of <300 pg/ml effectively rules out acute congestive heart failure with 99% negative predictive value.    Results may be falsely decreased if patient taking Biotin.     TROPONIN (IN-HOUSE) - Normal    Narrative:     Troponin T Reference Range:  <= 0.03 ng/mL-   Negative for AMI  >0.03 ng/mL-     Abnormal for myocardial necrosis.  Clinicians would have to utilize clinical acumen, EKG, Troponin and serial changes to determine if it is an Acute Myocardial Infarction or myocardial injury due  to an underlying chronic condition.       Results may be falsely decreased if patient taking Biotin.     LACTIC ACID, PLASMA - Normal   BLOOD CULTURE   BLOOD CULTURE   RAINBOW DRAW    Narrative:     The following orders were created for panel order [place] Draw.  Procedure                               Abnormality         Status                     ---------                               -----------         ------                     Green Top (Gel)[[id number]]                                  Final result               Lavender Top[[id number]]                                     Final result               Gold Top - SST[[id number]]                                   Final result               Light Blue Top[[id number]]                                   Final result                 Please view results for these tests on the individual orders.   CBC AND DIFFERENTIAL    Narrative:     The following orders were created for panel order CBC & Differential.  Procedure                               Abnormality         Status                     ---------                               -----------         ------                     CBC Auto Differential[[id number]]        Abnormal            Final result                 Please view results for these tests on the individual orders.   GREEN TOP   LAVENDER TOP   GOLD TOP - SST   LIGHT BLUE TOP       CT Chest Without Contrast Diagnostic   Final Result      1.  Calcified pleural plaques at the right lung base.   2.  Small loculated left pleural effusion with some minimal   atelectasis or scarring.              Electronically signed by:  Skyler Nelson MD  3/13/2022 6:55 PM CDT   Workstation: [id number]                    [facility]    Final diagnoses:   COPD with acute exacerbation (HCC)   Pleural effusion       ED Disposition  ED Disposition     ED Disposition   Discharge    Condition   Stable    Comment   --             Robina Dykes, APRN  [address]  [address]  [place] [id number] [phone]    On 3/16/2022  As needed, If not improved for further evaluation and care         Medication List      New Prescriptions    benzonatate 100 MG capsule  Commonly known as: TESSALON  Take 1 capsule by mouth 3 (Three) Times a Day As Needed for Cough.     doxycycline 100 MG capsule  Commonly known as: MONODOX  Take 1 capsule by mouth 2 (Two) Times a Day.     predniSONE 50 MG tablet  Commonly known as: DELTASONE  Take 1 tablet by mouth Daily.           Where to Get Your Medications      These medications were sent to [facility] - [place], [place] - [facility] [address] - [phone]  - [phone]   [address], [place] [address]    Phone: [phone]   · benzonatate 100 MG capsule  · doxycycline 100 MG capsule  · predniSONE 50 MG tablet          Julian Wu MD  03/13/22 [id number]

## 2022-03-13 NOTE — ED NOTES
Patient c/o upper chest pain and shortness of breath. Was sent by urgent care. Received a chest x-ray at urgent care and was told that he has pneumonia. Has been feeling sick for 4-5 days but got worse yesterday.

## 2022-03-14 NOTE — DISCHARGE INSTRUCTIONS
Start antibiotic and steroid.  Followup with your doctor for further monitoring on your pleural effusion.  Return with any new or worsening symptoms, or any concerns.

## 2022-03-18 LAB
BACTERIA SPEC AEROBE CULT: NORMAL
BACTERIA SPEC AEROBE CULT: NORMAL

## 2022-03-22 ENCOUNTER — HOSPITAL ENCOUNTER (EMERGENCY)
Facility: HOSPITAL | Age: 72
Discharge: HOME | End: 2022-03-22
Attending: EMERGENCY MEDICINE | Admitting: FAMILY MEDICINE

## 2022-03-22 ENCOUNTER — APPOINTMENT (OUTPATIENT)
Dept: CT IMAGING | Facility: HOSPITAL | Age: 72
End: 2022-03-22

## 2022-03-22 ENCOUNTER — APPOINTMENT (OUTPATIENT)
Dept: GENERAL RADIOLOGY | Facility: HOSPITAL | Age: 72
End: 2022-03-22

## 2022-03-22 VITALS
SYSTOLIC BLOOD PRESSURE: 112 MMHG | OXYGEN SATURATION: 90 % | RESPIRATION RATE: 18 BRPM | TEMPERATURE: 97.5 F | WEIGHT: 237.6 LBS | HEIGHT: 73 IN | DIASTOLIC BLOOD PRESSURE: 58 MMHG | BODY MASS INDEX: 31.49 KG/M2 | HEART RATE: 80 BPM

## 2022-03-22 DIAGNOSIS — N18.9 CHRONIC KIDNEY DISEASE, UNSPECIFIED CKD STAGE: ICD-10-CM

## 2022-03-22 DIAGNOSIS — J42 CHRONIC BRONCHITIS, UNSPECIFIED CHRONIC BRONCHITIS TYPE: ICD-10-CM

## 2022-03-22 DIAGNOSIS — J90 PLEURAL EFFUSION: Primary | ICD-10-CM

## 2022-03-22 LAB
ALBUMIN SERPL-MCNC: 3.6 G/DL (ref 3.5–5.2)
ALBUMIN/GLOB SERPL: 1.6 G/DL
ALP SERPL-CCNC: 70 U/L (ref 39–117)
ALT SERPL W P-5'-P-CCNC: 60 U/L (ref 1–41)
ANION GAP SERPL CALCULATED.3IONS-SCNC: 7 MMOL/L (ref 5–15)
AST SERPL-CCNC: 22 U/L (ref 1–40)
BASOPHILS # BLD AUTO: 0.08 10*3/MM3 (ref 0–0.2)
BASOPHILS NFR BLD AUTO: 0.4 % (ref 0–1.5)
BILIRUB SERPL-MCNC: 0.6 MG/DL (ref 0–1.2)
BUN SERPL-MCNC: 31 MG/DL (ref 8–23)
BUN/CREAT SERPL: 20 (ref 7–25)
CALCIUM SPEC-SCNC: 8.4 MG/DL (ref 8.6–10.5)
CHLORIDE SERPL-SCNC: 99 MMOL/L (ref 98–107)
CO2 SERPL-SCNC: 30 MMOL/L (ref 22–29)
CREAT SERPL-MCNC: 1.55 MG/DL (ref 0.76–1.27)
DEPRECATED RDW RBC AUTO: 48.4 FL (ref 37–54)
EGFRCR SERPLBLD CKD-EPI 2021: 47.6 ML/MIN/1.73
EOSINOPHIL # BLD AUTO: 0.18 10*3/MM3 (ref 0–0.4)
EOSINOPHIL NFR BLD AUTO: 0.9 % (ref 0.3–6.2)
ERYTHROCYTE [DISTWIDTH] IN BLOOD BY AUTOMATED COUNT: 13.3 % (ref 12.3–15.4)
FLUAV RNA RESP QL NAA+PROBE: NOT DETECTED
FLUBV RNA RESP QL NAA+PROBE: NOT DETECTED
GLOBULIN UR ELPH-MCNC: 2.3 GM/DL
GLUCOSE SERPL-MCNC: 91 MG/DL (ref 65–99)
HCT VFR BLD AUTO: 43.5 % (ref 37.5–51)
HGB BLD-MCNC: 14.4 G/DL (ref 13–17.7)
HOLD SPECIMEN: NORMAL
IMM GRANULOCYTES # BLD AUTO: 0.71 10*3/MM3 (ref 0–0.05)
IMM GRANULOCYTES NFR BLD AUTO: 3.7 % (ref 0–0.5)
LYMPHOCYTES # BLD AUTO: 2.15 10*3/MM3 (ref 0.7–3.1)
LYMPHOCYTES NFR BLD AUTO: 11.2 % (ref 19.6–45.3)
MAGNESIUM SERPL-MCNC: 2.2 MG/DL (ref 1.6–2.4)
MCH RBC QN AUTO: 32.4 PG (ref 26.6–33)
MCHC RBC AUTO-ENTMCNC: 33.1 G/DL (ref 31.5–35.7)
MCV RBC AUTO: 98 FL (ref 79–97)
MONOCYTES # BLD AUTO: 1.93 10*3/MM3 (ref 0.1–0.9)
MONOCYTES NFR BLD AUTO: 10 % (ref 5–12)
NEUTROPHILS NFR BLD AUTO: 14.23 10*3/MM3 (ref 1.7–7)
NEUTROPHILS NFR BLD AUTO: 73.8 % (ref 42.7–76)
NRBC BLD AUTO-RTO: 0 /100 WBC (ref 0–0.2)
NT-PROBNP SERPL-MCNC: 444.1 PG/ML (ref 0–900)
PLATELET # BLD AUTO: 186 10*3/MM3 (ref 140–450)
PMV BLD AUTO: 10.3 FL (ref 6–12)
POTASSIUM SERPL-SCNC: 4.9 MMOL/L (ref 3.5–5.2)
PROT SERPL-MCNC: 5.9 G/DL (ref 6–8.5)
RBC # BLD AUTO: 4.44 10*6/MM3 (ref 4.14–5.8)
SARS-COV-2 RNA RESP QL NAA+PROBE: NOT DETECTED
SODIUM SERPL-SCNC: 136 MMOL/L (ref 136–145)
TROPONIN T SERPL-MCNC: <0.01 NG/ML (ref 0–0.03)
WBC NRBC COR # BLD: 19.28 10*3/MM3 (ref 3.4–10.8)

## 2022-03-22 PROCEDURE — 71275 CT ANGIOGRAPHY CHEST: CPT

## 2022-03-22 PROCEDURE — 84484 ASSAY OF TROPONIN QUANT: CPT | Performed by: EMERGENCY MEDICINE

## 2022-03-22 PROCEDURE — 93005 ELECTROCARDIOGRAM TRACING: CPT | Performed by: EMERGENCY MEDICINE

## 2022-03-22 PROCEDURE — 99283 EMERGENCY DEPT VISIT LOW MDM: CPT

## 2022-03-22 PROCEDURE — 71045 X-RAY EXAM CHEST 1 VIEW: CPT

## 2022-03-22 PROCEDURE — 25010000002 METHYLPREDNISOLONE PER 125 MG: Performed by: EMERGENCY MEDICINE

## 2022-03-22 PROCEDURE — 94640 AIRWAY INHALATION TREATMENT: CPT

## 2022-03-22 PROCEDURE — 83880 ASSAY OF NATRIURETIC PEPTIDE: CPT | Performed by: EMERGENCY MEDICINE

## 2022-03-22 PROCEDURE — 83735 ASSAY OF MAGNESIUM: CPT | Performed by: EMERGENCY MEDICINE

## 2022-03-22 PROCEDURE — 94799 UNLISTED PULMONARY SVC/PX: CPT

## 2022-03-22 PROCEDURE — 93005 ELECTROCARDIOGRAM TRACING: CPT

## 2022-03-22 PROCEDURE — 93010 ELECTROCARDIOGRAM REPORT: CPT | Performed by: INTERNAL MEDICINE

## 2022-03-22 PROCEDURE — 87636 SARSCOV2 & INF A&B AMP PRB: CPT | Performed by: EMERGENCY MEDICINE

## 2022-03-22 PROCEDURE — 99284 EMERGENCY DEPT VISIT MOD MDM: CPT

## 2022-03-22 PROCEDURE — 96374 THER/PROPH/DIAG INJ IV PUSH: CPT

## 2022-03-22 PROCEDURE — 0 IOPAMIDOL PER 1 ML: Performed by: FAMILY MEDICINE

## 2022-03-22 PROCEDURE — 85025 COMPLETE CBC W/AUTO DIFF WBC: CPT | Performed by: EMERGENCY MEDICINE

## 2022-03-22 PROCEDURE — 80053 COMPREHEN METABOLIC PANEL: CPT | Performed by: EMERGENCY MEDICINE

## 2022-03-22 RX ORDER — SODIUM CHLORIDE 0.9 % (FLUSH) 0.9 %
10 SYRINGE (ML) INJECTION AS NEEDED
Status: DISCONTINUED | OUTPATIENT
Start: 2022-03-22 | End: 2022-03-22 | Stop reason: HOSPADM

## 2022-03-22 RX ORDER — METHYLPREDNISOLONE SODIUM SUCCINATE 125 MG/2ML
125 INJECTION, POWDER, LYOPHILIZED, FOR SOLUTION INTRAMUSCULAR; INTRAVENOUS ONCE
Status: COMPLETED | OUTPATIENT
Start: 2022-03-22 | End: 2022-03-22

## 2022-03-22 RX ORDER — AZITHROMYCIN 250 MG/1
TABLET, FILM COATED ORAL
Qty: 6 TABLET | Refills: 0 | Status: SHIPPED | OUTPATIENT
Start: 2022-03-22 | End: 2022-03-24

## 2022-03-22 RX ORDER — IPRATROPIUM BROMIDE AND ALBUTEROL SULFATE 2.5; .5 MG/3ML; MG/3ML
3 SOLUTION RESPIRATORY (INHALATION) ONCE
Status: COMPLETED | OUTPATIENT
Start: 2022-03-22 | End: 2022-03-22

## 2022-03-22 RX ORDER — METHYLPREDNISOLONE 4 MG/1
TABLET ORAL
Qty: 21 TABLET | Refills: 0 | Status: SHIPPED | OUTPATIENT
Start: 2022-03-22 | End: 2022-04-18

## 2022-03-22 RX ADMIN — IOPAMIDOL 56 ML: 755 INJECTION, SOLUTION INTRAVENOUS at 08:01

## 2022-03-22 RX ADMIN — METHYLPREDNISOLONE SODIUM SUCCINATE 125 MG: 125 INJECTION, POWDER, FOR SOLUTION INTRAMUSCULAR; INTRAVENOUS at 06:25

## 2022-03-22 RX ADMIN — IPRATROPIUM BROMIDE AND ALBUTEROL SULFATE 3 ML: .5; 2.5 SOLUTION RESPIRATORY (INHALATION) at 07:22

## 2022-03-22 NOTE — ED PROVIDER NOTES
Subjective   71-year-old male with history of nonoxygen dependent COPD as well as hypertension, hyperlipidemia and previous MI presents emergency department with right-sided chest pain this evening.  He had similar pain last week when he was seen in this emergency department and diagnosed with a COPD exacerbation.  He was also found to have a pleural effusion on the left side.  He reports while on medications including prednisone, doxycycline, and a cough medicine his pain resolved but he finished that after 5 days and now his pain is returned.  Inhalers are not helping much at home.    Family history, surgical history, social history, current medications and allergies are reviewed with the patient and triage documentation and vitals are reviewed.      History provided by:  Patient, medical records and spouse   used: No        Review of Systems   Constitutional: Negative for chills and fever.   HENT: Negative for congestion and sore throat.    Eyes: Negative for photophobia and visual disturbance.   Respiratory: Positive for cough and shortness of breath. Negative for wheezing.    Cardiovascular: Positive for chest pain. Negative for palpitations and leg swelling.   Gastrointestinal: Negative for abdominal pain, diarrhea, nausea and vomiting.   Endocrine: Negative for polydipsia, polyphagia and polyuria.   Genitourinary: Negative for dysuria, flank pain, frequency and urgency.   Musculoskeletal: Negative for arthralgias, back pain, myalgias and neck pain.   Skin: Negative for rash and wound.   Allergic/Immunologic: Negative.    Neurological: Negative for weakness, light-headedness, numbness and headaches.   Hematological: Negative.    Psychiatric/Behavioral: Negative.        Past Medical History:   Diagnosis Date   • Arthritis     back, legs, hips.    • Black lung (HCC)    • Black lung disease (HCC)    • COPD (chronic obstructive pulmonary disease) (HCC)    • Coronary artery disease    • Heart  bloc    • HL (hearing loss)    • Hyperlipidemia    • Hypertension    • Low back pain    • Myocardial infarction (HCC)        No Known Allergies    Past Surgical History:   Procedure Laterality Date   • CARDIAC CATHETERIZATION N/A 6/8/2020    Procedure: Left Heart Cath;  Surgeon: April Petersen MD;  Location: [facility] CATH INVASIVE LOCATION;  Service: Cardiology;  Laterality: N/A;   • CORONARY ARTERY BYPASS GRAFT N/A 6/18/2020    Procedure: CORONARY ARTERY BYPASS GRAFTING X 3 UTILIZING THE LEFT INTERNAL MAMMARY ARTERY AND ENDOSCOPICALLY HARVESTED VEIN FROM THE LEFT LEG    (CELL SAVER);  Surgeon: Edi Donis MD;  Location: [facility] OR;  Service: Cardiothoracic;  Laterality: N/A;  Leg Incision: 0800  Chest Incision: 0807  Vein out: 0919  Vein prepped: 0954  On bypass: 1019  Off bypass 1246     • TONSILLECTOMY         Family History   Problem Relation Age of Onset   • Heart disease Mother         MI   • No Known Problems Father        Social History     Socioeconomic History   • Marital status:    Tobacco Use   • Smoking status: Current Every Day Smoker     Packs/day: 0.25     Years: 50.00     Pack years: 12.50     Types: Cigarettes   • Smokeless tobacco: Never Used   Vaping Use   • Vaping Use: Never used   Substance and Sexual Activity   • Alcohol use: Never   • Drug use: Never   • Sexual activity: Defer           Objective   Physical Exam  Vitals and nursing note reviewed.   Constitutional:       General: He is not in acute distress.     Appearance: He is well-developed. He is obese. He is not ill-appearing, toxic-appearing or diaphoretic.   HENT:      Head: Normocephalic.   Eyes:      Pupils: Pupils are equal, round, and reactive to light.   Neck:      Vascular: No JVD.   Cardiovascular:      Rate and Rhythm: Normal rate and regular rhythm.  No extrasystoles are present.     Pulses:           Radial pulses are 2+ on the right side and 2+ on the left side.        Dorsalis pedis pulses are 2+ on  the right side and 2+ on the left side.      Heart sounds: Normal heart sounds. No murmur heard.  Pulmonary:      Effort: Pulmonary effort is normal. No tachypnea, accessory muscle usage or respiratory distress.      Breath sounds: Examination of the right-upper field reveals wheezing. Examination of the left-upper field reveals wheezing. Examination of the right-lower field reveals decreased breath sounds and wheezing. Examination of the left-lower field reveals decreased breath sounds and wheezing. Decreased breath sounds and wheezing present. No rhonchi or rales.   Chest:      Chest wall: No tenderness or crepitus.   Abdominal:      General: Bowel sounds are normal.      Palpations: Abdomen is soft. There is no hepatomegaly or splenomegaly.      Tenderness: There is no abdominal tenderness.   Musculoskeletal:         General: Normal range of motion.      Cervical back: Normal range of motion and neck supple.      Right lower leg: No tenderness. No edema.      Left lower leg: No tenderness. No edema.   Skin:     General: Skin is warm and dry.      Capillary Refill: Capillary refill takes less than 2 seconds.      Coloration: Skin is not cyanotic or pale.      Findings: No erythema or rash.   Neurological:      General: No focal deficit present.      Mental Status: He is alert and oriented to person, place, and time.   Psychiatric:         Mood and Affect: Mood normal.         Behavior: Behavior normal.         Procedures  none         ED Course  ED Course as of 03/22/22 0956   Tue Mar 22, 2022   0955 Labs and imaging were discussed with the patient.  He had just finished a steroid pack and states that he did receive significant relief, but now that he has finished the steroids his symptoms have worsened.  He was advised to quit smoking, and has not smoked for the past 2 to 3 weeks.  He was also given instructions to follow-up with his primary care provider in the next few days and to inquire about a home nebulizer  machine.  Patient wishes to be discharged home and prefers to follow-up with outpatient therapy. [CB]      ED Course User Index  [CB] Robb Thomas MD      Labs Reviewed   COMPREHENSIVE METABOLIC PANEL - Abnormal; Notable for the following components:       Result Value    BUN 31 (*)     Creatinine 1.55 (*)     CO2 30.0 (*)     Calcium 8.4 (*)     Total Protein 5.9 (*)     ALT (SGPT) 60 (*)     eGFR 47.6 (*)     All other components within normal limits    Narrative:     GFR Normal >60  Chronic Kidney Disease <60  Kidney Failure <15     CBC WITH AUTO DIFFERENTIAL - Abnormal; Notable for the following components:    WBC 19.28 (*)     MCV 98.0 (*)     Lymphocyte % 11.2 (*)     Immature Grans % 3.7 (*)     Neutrophils, Absolute 14.23 (*)     Monocytes, Absolute 1.93 (*)     Immature Grans, Absolute 0.71 (*)     All other components within normal limits   COVID-19 AND FLU A/B, NP SWAB IN TRANSPORT MEDIA 8-12 HR TAT - Normal    Narrative:     Fact sheet for providers: https://www.fda.gov/media/115188/download    Fact sheet for patients: https://www.fda.gov/media/241820/download    Test performed by PCR.   BNP (IN-HOUSE) - Normal    Narrative:     Among patients with dyspnea, NT-proBNP is highly sensitive for the detection of acute congestive heart failure. In addition NT-proBNP of <300 pg/ml effectively rules out acute congestive heart failure with 99% negative predictive value.    Results may be falsely decreased if patient taking Biotin.     TROPONIN (IN-HOUSE) - Normal    Narrative:     Troponin T Reference Range:  <= 0.03 ng/mL-   Negative for AMI  >0.03 ng/mL-     Abnormal for myocardial necrosis.  Clinicians would have to utilize clinical acumen, EKG, Troponin and serial changes to determine if it is an Acute Myocardial Infarction or myocardial injury due to an underlying chronic condition.       Results may be falsely decreased if patient taking Biotin.     MAGNESIUM - Normal   CBC AND DIFFERENTIAL     Narrative:     The following orders were created for panel order CBC & Differential.  Procedure                               Abnormality         Status                     ---------                               -----------         ------                     CBC Auto Differential[[id number]]        Abnormal            Final result               Scan Slide[[id number]]                                                                    Please view results for these tests on the individual orders.   EXTRA TUBES    Narrative:     The following orders were created for panel order Extra Tubes.  Procedure                               Abnormality         Status                     ---------                               -----------         ------                     Gold Top - SST[[id number]]                                   Final result                 Please view results for these tests on the individual orders.   GOLD TOP - SST     XR Chest 2 View    Result Date: 3/13/2022  Narrative: XR CHEST 2 VW RPID: XR CHEST 2 VIEWS CLINICAL HISTORY: 71 years old Male with  cough, chest congesiton, R05.9 Cough, unspecified R09.89 Other specified symptoms and signs involving the circulatory and respiratory systems COMPARISON: 7/17/2020 Number of views: 2 FINDINGS: The left CP angle is blunted. This may be from a pleural effusion with resultant subsegmental passive atelectasis. This is new relative to the prior study. There is been prior median sternotomy. A right upper lobe granuloma is unchanged. Nodularity in the right lung base is also stable and likely granulomatous in origin.     Impression: New opacity in the left CP angle region which may represent a combination of pleural fluid and atelectasis or infiltrate. Electronically signed by:  Nas Lewis MD  3/13/2022 6:14 PM CDT Workstation: [id number]    CT Chest Without Contrast Diagnostic    Result Date: 3/13/2022  Narrative: INDICATION: CP, sob EXAMINATION: CT CHEST WITHOUT  CONTRAST - CT CHEST WITHOUT IV CONTRAST TECHNIQUE: Helically acquired images were obtained of the chest. A radiation dose optimization technique was used for this scan. IV Contrast dosage and agent: None. COMPARISON: Chest x-ray performed earlier today ____________________________________________ FINDINGS:  LUNGS, PLEURA AND LARGE AIRWAYS: There is pleural calcification at the right lung base with some parenchymal linear scar. There is a small loculated left pleural effusion. This is associated with some minimal atelectasis or scarring. There is no evidence of any focal consolidation or mass. SOFT TISSUES: Soft tissues of the chest wall are unremarkable. There is no axillary adenopathy. HEART AND PERICARDIUM: Heart size is upper limits of normal. There is no pericardial effusion. VESSELS: Aorta and pulmonary artery are normal in caliber on this noncontrast study. MEDIASTINUM AND SERGO: There are multiple calcified granulomas. There is no mass or adenopathy Esophagus is unremarkable.  No hiatal hernia. UPPER ABDOMEN: No acute pathology. BONES: There is no evidence of fracture or focal bone lesion.     Impression: 1.  Calcified pleural plaques at the right lung base. 2.  Small loculated left pleural effusion with some minimal atelectasis or scarring.  Electronically signed by:  Skyler Nelson MD  3/13/2022 6:55 PM CDT Workstation: [id number]    XR Chest 1 View    Result Date: 3/22/2022  Narrative: PROCEDURE: Single chest view portable REASON FOR EXAM:chest pain, short of breath FINDINGS: Comparison exam dated March 13, 2022. Stable postsurgical changes with median sternotomy. Cardiac size appears within normal limits. Pulmonary vasculature appears within normal limits. A few scattered calcified lung parenchymal granulomas consistent with old granulomatous disease. Left lung base peripheral oval lesion which has surrounding thickened borders and lucent center. This lesion measures 4.95 x 3.73 cm. Lungs are otherwise clear.  Very small left pleural effusion. Pleural spaces are otherwise unremarkable. No acute osseous abnormality.     Impression: 1.  Evidence of old granulomatous disease. 2.  Left lung base peripheral oval lesion which has surrounding thickened borders and lucent center. This lesion measures 4.95 x 3.73 cm. This lesion is of uncertain etiology. The differential would include necrotizing pneumonia versus lung abscess versus autoimmune etiology (Padron's granulomatosis, rheumatoid nodule) versus less likely septic or bland vascular emboli sequela or carcinoma. Recommend clinical correlation and consideration of CT to further evaluate. 3.  Very small left pleural effusion. Electronically signed by:  Richard Winkler MD  3/22/2022 7:21 AM CDT Workstation: [id number]    CT Angiogram Chest    Result Date: 3/22/2022  Narrative: PROCEDURE: CT/CTA THORAX/PULMONARY WITH CONTRAST CLINICAL INFORMATION:  lung mass, rule out PE   TECHNIQUE: Axial images were obtained and multiplanar reconstructions were made.  This exam was performed using radiation doses that are as low as reasonably achievable (ALARA). This exam was performed according to our departmental dose optimization program, which includes automated exposure control, adjustment of the mA and/or KV according to patient size and/or use of iterative reconstruction technique. CONTRAST:   100 cc intravenous Isovue 370 COMPARISON: Chest CT 3/13/2022 Note: Reconstructed MIP images were obtained in multiple obliquities. No 3-D surface rendered images were obtained for this exam. FINDINGS: PULMONARY CTA: The main pulmonary arteries and their major branches are opacified with contrast without evidence of abnormal filling defects. OTHER VASCULAR: There is coronary artery calcification. The patient has undergone coronary artery bypass graft surgery. LUNGS/PLEURA/AIRWAYS:  Calcified pleural plaques seen in the right hemithorax. There is some nodularity in the posterior medial right pleural  surface. Small left pleural effusion. In the left lung base laterally there is an area of likely represents rounded atelectasis and an adjacent lingular area of subsegmental atelectasis. No significant change since 3/13/2022. MEDIASTINUM, SERGO AND LYMPH NODES: The mediastinum, sergo and lymph nodes are normal. HEART AND PERICARDIUM: The heart and pericardium are normal. UPPER ABDOMEN: Unremarkable OSSEOUS STRUCTURES: Unremarkable.     Impression: No evidence of pulmonary embolus. Calcified pleural plaques seen in the right hemithorax. There is some nodularity in the posterior medial right pleural surface. Small left pleural effusion. In the left lung base laterally there is an area of likely represents rounded atelectasis and an adjacent lingular area of subsegmental atelectasis. No significant change since 3/13/2022. A follow-up chest CT in 3-6 months is recommended. Electronically signed by:  Nayan Marin MD  3/22/2022 9:12 AM CDT Workstation: [id number]    EKG March 22, 2022 at 0 527 reveals normal sinus rhythm rate of 80 bpm.  Some baseline artifact with no obvious ST elevation or depression.  T wave inversion in 1, 2, aVL.  No evidence of acute ischemia.  QTc 433.  Unchanged from previous EKG.        MDM  Number of Diagnoses or Management Options     Amount and/or Complexity of Data Reviewed  Clinical lab tests: reviewed  Tests in the radiology section of CPT®: reviewed  Discuss the patient with other providers: yes    Patient Progress  Patient progress: stable    0700 patient was awaiting results of laboratory studies and x-ray imaging at the end of my shift.  Patient will be signed out to Dr. Thomas.  Please see his documentation for final disposition.    Final diagnoses:   Pleural effusion   Chronic kidney disease, unspecified CKD stage   Chronic bronchitis, unspecified chronic bronchitis type (HCC)       ED Disposition  ED Disposition     ED Disposition   Discharge    Condition   Stable    Comment   --              Robina Dykes, APRN  [address]  [address]  [place] [address]  [phone]    In 2 days           Medication List      New Prescriptions    azithromycin 250 MG tablet  Commonly known as: Zithromax Z-Jay  Take 2 tablets the first day, then 1 tablet daily for 4 days.     methylPREDNISolone 4 MG dose pack  Commonly known as: MEDROL  Take as directed on package instructions.           Where to Get Your Medications      These medications were sent to [facility] - [place], [place] - [address] - [phone]  - [phone]   [address], [place] [address]    Phone: [phone]   · azithromycin 250 MG tablet  · methylPREDNISolone 4 MG dose pack          Robb Thomas MD  03/22/22 0917

## 2022-03-24 ENCOUNTER — OFFICE VISIT (OUTPATIENT)
Dept: FAMILY MEDICINE CLINIC | Facility: CLINIC | Age: 72
End: 2022-03-24

## 2022-03-24 VITALS
WEIGHT: 237 LBS | DIASTOLIC BLOOD PRESSURE: 72 MMHG | HEIGHT: 73 IN | SYSTOLIC BLOOD PRESSURE: 140 MMHG | BODY MASS INDEX: 31.41 KG/M2 | OXYGEN SATURATION: 86 % | HEART RATE: 63 BPM

## 2022-03-24 DIAGNOSIS — J44.9 CHRONIC OBSTRUCTIVE BRONCHITIS: Primary | ICD-10-CM

## 2022-03-24 DIAGNOSIS — R79.81 LOW OXYGEN SATURATION: ICD-10-CM

## 2022-03-24 PROCEDURE — 99213 OFFICE O/P EST LOW 20 MIN: CPT | Performed by: NURSE PRACTITIONER

## 2022-03-24 NOTE — PROGRESS NOTES
Chief Complaint   Patient presents with   • COPD   • Shortness of Breath     ER Follow up      Subjective   Jose Barrett is a 71 y.o. male.           Presents with ER follow up x 2 visits-see report-copd exacerbation -still soa and coughing     COPD  He complains of chest tightness, cough, difficulty breathing, shortness of breath, sputum production and wheezing. There is no frequent throat clearing or hemoptysis. This is a recurrent problem. The current episode started more than 1 month ago. The problem occurs intermittently. The problem has been gradually worsening. The cough is productive of sputum, hacking, barking, productive and hoarse. Associated symptoms include dyspnea on exertion, malaise/fatigue, nasal congestion, postnasal drip and rhinorrhea. Pertinent negatives include no ear congestion, ear pain, fever, headaches, myalgias, orthopnea, PND, sweats, trouble swallowing or weight loss. His symptoms are aggravated by URI. His symptoms are alleviated by steroid inhaler and beta-agonist (antbiotic and steroids). He reports moderate improvement on treatment. His past medical history is significant for COPD and pneumonia. There is no history of asthma, bronchiectasis, bronchitis or emphysema.   Shortness of Breath  This is a recurrent problem. The current episode started more than 1 month ago. The problem occurs daily. The problem has been waxing and waning. Associated symptoms include rhinorrhea, sputum production and wheezing. Pertinent negatives include no ear pain, fever, headaches, hemoptysis or PND. Risk factors: copd/black lung  He has tried steroid inhalers for the symptoms. The treatment provided mild relief. His past medical history is significant for COPD and pneumonia. There is no history of allergies, aspirin allergies or asthma.        The following portions of the patient's history were reviewed and updated as appropriate: allergies, current medications, past social history and problem  "list.    Review of Systems   Constitutional: Positive for malaise/fatigue. Negative for fever and weight loss.   HENT: Positive for postnasal drip and rhinorrhea. Negative for ear pain and trouble swallowing.    Eyes: Negative.    Respiratory: Positive for cough, sputum production, shortness of breath and wheezing. Negative for hemoptysis.    Cardiovascular: Positive for dyspnea on exertion. Negative for PND.   Musculoskeletal: Negative for myalgias.   Neurological: Negative for headaches.       Objective   /72   Pulse 63   Ht 185.4 cm (73\")   Wt 108 kg (237 lb)   SpO2 (!) 86%   BMI 31.27 kg/m²   Physical Exam  Vitals and nursing note reviewed.   Constitutional:       Appearance: Normal appearance.      Comments: Shortness of breath with coughing    HENT:      Head: Normocephalic.      Right Ear: Tympanic membrane normal. There is no impacted cerumen.      Left Ear: Tympanic membrane normal. There is no impacted cerumen.      Nose: Nose normal. No congestion or rhinorrhea.      Mouth/Throat:      Mouth: Mucous membranes are moist.      Pharynx: No oropharyngeal exudate or posterior oropharyngeal erythema.   Eyes:      General: No scleral icterus.        Right eye: No discharge.         Left eye: No discharge.      Pupils: Pupils are equal, round, and reactive to light.   Cardiovascular:      Rate and Rhythm: Normal rate.      Pulses: Normal pulses.      Heart sounds: No murmur heard.    No friction rub. No gallop.   Pulmonary:      Effort: Pulmonary effort is normal. No respiratory distress.      Breath sounds: No stridor. Wheezing present. No rhonchi or rales.   Chest:      Chest wall: No tenderness.   Abdominal:      General: Abdomen is flat. There is no distension.      Palpations: There is no mass.      Tenderness: There is no abdominal tenderness. There is no right CVA tenderness, left CVA tenderness, guarding or rebound.      Hernia: No hernia is present.   Musculoskeletal:         General: No " swelling, tenderness, deformity or signs of injury. Normal range of motion.      Cervical back: Normal range of motion.      Right lower leg: No edema.      Left lower leg: No edema.   Skin:     General: Skin is warm.      Coloration: Skin is not jaundiced or pale.      Findings: No bruising, erythema, lesion or rash.   Neurological:      General: No focal deficit present.      Mental Status: He is alert and oriented to person, place, and time.      Cranial Nerves: No cranial nerve deficit.      Sensory: No sensory deficit.      Motor: No weakness.      Coordination: Coordination normal.      Gait: Gait normal.      Deep Tendon Reflexes: Reflexes normal.   Psychiatric:         Mood and Affect: Mood normal.         Behavior: Behavior normal.              Assessment/Plan     Problems Addressed this Visit        Pulmonary and Pneumonias    Chronic obstructive bronchitis (HCC) - Primary    Relevant Orders    Ambulatory Referral to Pulmonology      Other Visit Diagnoses     Low oxygen saturation        Relevant Orders    Ambulatory Referral to Pulmonology      Diagnoses       Codes Comments    Chronic obstructive bronchitis (HCC)    -  Primary ICD-10-CM: J44.9  ICD-9-CM: 491.20     Low oxygen saturation     ICD-10-CM: R79.81  ICD-9-CM: 790.91          No orders of the defined types were placed in this encounter.    Current Outpatient Medications on File Prior to Visit   Medication Sig Dispense Refill   • albuterol sulfate  (90 Base) MCG/ACT inhaler Inhale 2 puffs Every 4 (Four) Hours As Needed for Wheezing.     • aspirin 81 MG chewable tablet Chew 81 mg Daily.     • atorvastatin (LIPITOR) 80 MG tablet TAKE ONE TABLET BY MOUTH DAILY 90 tablet 1   • benzonatate (TESSALON) 100 MG capsule Take 1 capsule by mouth 3 (Three) Times a Day As Needed for Cough. 30 capsule 0   • clopidogrel (PLAVIX) 75 MG tablet TAKE ONE TABLET BY MOUTH DAILY 30 tablet 10   • doxycycline (MONODOX) 100 MG capsule Take 1 capsule by mouth 2  (Two) Times a Day. 14 capsule 0   • ezetimibe (ZETIA) 10 MG tablet TAKE ONE TABLET BY MOUTH DAILY 90 tablet 3   • Fluticasone Furoate-Vilanterol (BREO ELLIPTA) 100-25 MCG/INH inhaler Inhale 1 puff Daily.     • losartan (COZAAR) 25 MG tablet Take 25 mg by mouth every night at bedtime.     • methylPREDNISolone (MEDROL) 4 MG dose pack Take as directed on package instructions. 21 tablet 0   • metoprolol succinate XL (TOPROL-XL) 25 MG 24 hr tablet TAKE ONE TABLET BY MOUTH DAILY 90 tablet 1   • Polyethylene Glycol 3350 (MIRALAX PO) Take  by mouth As Needed.     • predniSONE (DELTASONE) 50 MG tablet Take 1 tablet by mouth Daily. 5 tablet 0   • Spironolactone (ALDACTONE PO) Take  by mouth Daily. 1/2 tablet     • vitamin C (VITAMIN C) 500 MG tablet Take 1 tablet by mouth 2 (Two) Times a Day.     • [DISCONTINUED] azithromycin (Zithromax Z-Jay) 250 MG tablet Take 2 tablets the first day, then 1 tablet daily for 4 days. 6 tablet 0     No current facility-administered medications on file prior to visit.       15 minutes   Follow Up   Return in about 3 months (around 6/24/2022).     meds as directed         Add home 02 2-3 liters prn sats under 86 ra-follow up with pulmonary as directed, return in 3 months for repeat ct of chest as directed, patient agrees   Continue home meds-consider admission if symptoms persist or worsen, patient agrees

## 2022-03-28 ENCOUNTER — TELEPHONE (OUTPATIENT)
Dept: FAMILY MEDICINE CLINIC | Facility: CLINIC | Age: 72
End: 2022-03-28

## 2022-03-28 NOTE — TELEPHONE ENCOUNTER
Patients wife called stating there isn't a diagnosis code on the order for Oxygen, she wants someone to call her about the issue. She said [facility] told her is needs a diagnosis code.     Please Advise    Thanks

## 2022-04-02 LAB
QT INTERVAL: 402 MS
QTC INTERVAL: 434 MS

## 2022-04-03 LAB
QT INTERVAL: 376 MS
QTC INTERVAL: 433 MS

## 2022-04-29 RX ORDER — ATORVASTATIN CALCIUM 80 MG/1
TABLET, FILM COATED ORAL
Qty: 90 TABLET | Refills: 1 | Status: SHIPPED | OUTPATIENT
Start: 2022-04-29 | End: 2022-09-07

## 2022-05-13 ENCOUNTER — OFFICE VISIT (OUTPATIENT)
Dept: CARDIOLOGY | Facility: CLINIC | Age: 72
End: 2022-05-13

## 2022-05-13 VITALS
HEART RATE: 70 BPM | WEIGHT: 231.4 LBS | SYSTOLIC BLOOD PRESSURE: 110 MMHG | DIASTOLIC BLOOD PRESSURE: 70 MMHG | BODY MASS INDEX: 30.67 KG/M2 | OXYGEN SATURATION: 97 % | HEIGHT: 73 IN

## 2022-05-13 DIAGNOSIS — E78.2 HYPERLIPEMIA, MIXED: ICD-10-CM

## 2022-05-13 DIAGNOSIS — I25.83 CORONARY ARTERY DISEASE DUE TO LIPID RICH PLAQUE: ICD-10-CM

## 2022-05-13 DIAGNOSIS — I10 HYPERTENSION, ESSENTIAL: Primary | ICD-10-CM

## 2022-05-13 DIAGNOSIS — I25.10 CORONARY ARTERY DISEASE DUE TO LIPID RICH PLAQUE: ICD-10-CM

## 2022-05-13 PROCEDURE — 99214 OFFICE O/P EST MOD 30 MIN: CPT | Performed by: INTERNAL MEDICINE

## 2022-05-13 NOTE — PROGRESS NOTES
[facility] Cardiology  OFFICE NOTE    Cardiovascular Medicine  Flaca Godinez M.D., RPVI         No referring provider defined for this encounter.    Thank you for asking me to see Jose Barrett for CAD.    Coronary Artery Disease    Cardiomyopathy      This is a 71 y.o. male with:    1.  Coronary artery disease status post CABG with LIMA to LAD, SVG to OM 2, SVG to PDA June 2020  2.  Hypertension  3.  COPD   4.  hyperlipidemia  5.  CKD  6.  Ischemic cardiomyopathy    Jose Barrett is a 71 y.o. male who presents for consultation today.  Patient did have a prolonged stay after his CABG because of renal failure and ICU delirium.  Patient had to have intermittent dialysis.  Patient also had mild wound dehiscence.   Patient is here for establishment of care.    Patient has done very well since his CABG.  He has recovered well.  He is back to his baseline.  Denying any chest pain.  He is been pretty active and walking around without any limitations from chest pain.  Occasionally will have some shortness of breath when climbing flights of stairs when he is underlying COPD and usually that improves with his inhalers.    11/12/2021:  No acute issues.  Denying any chest pain.  He has chronic shortness of breath NYHA class II at baseline.  No bleeding from aspirin Plavix  Has tolerated losartan well so far.    05/13/2022:  Had frequent visits for COPD exacerbation, also developed shingles.  Shortness of breath has improved but unfortunately continues to smoke.    Review of Systems - ROS  Constitution: Negative for weakness, weight gain and weight loss.   HENT: Negative for congestion.    Eyes: Negative for blurred vision.   Cardiovascular: As mentioned above  Respiratory: Negative for cough and hemoptysis.    Endocrine: Negative for polydipsia and polyuria.   Hematologic/Lymphatic: Negative for bleeding problem. Does not bruise/bleed easily.   Skin: Negative for flushing.   Musculoskeletal: Negative  for neck pain and stiffness.   Gastrointestinal: Negative for abdominal pain, diarrhea, jaundice, melena, nausea and vomiting.   Genitourinary: Negative for dysuria and hematuria.   Neurological: Negative for dizziness, focal weakness and numbness.   Psychiatric/Behavioral: Negative for altered mental status and depression.          All other systems were reviewed and were negative.    family history includes Heart disease in his mother; No Known Problems in his father.     reports that he has been smoking cigarettes. He has a 12.50 pack-year smoking history. He has never used smokeless tobacco. He reports that he does not drink alcohol and does not use drugs.    No Known Allergies      Current Outpatient Medications:   •  albuterol sulfate  (90 Base) MCG/ACT inhaler, Inhale 2 puffs Every 4 (Four) Hours As Needed for Wheezing., Disp: , Rfl:   •  aspirin 81 MG chewable tablet, Chew 81 mg Daily., Disp: , Rfl:   •  atorvastatin (LIPITOR) 80 MG tablet, TAKE ONE TABLET BY MOUTH DAILY, Disp: 90 tablet, Rfl: 1  •  clopidogrel (PLAVIX) 75 MG tablet, TAKE ONE TABLET BY MOUTH DAILY, Disp: 30 tablet, Rfl: 10  •  ezetimibe (ZETIA) 10 MG tablet, TAKE ONE TABLET BY MOUTH DAILY, Disp: 90 tablet, Rfl: 3  •  Fluticasone Furoate-Vilanterol (BREO ELLIPTA) 100-25 MCG/INH inhaler, Inhale 1 puff Daily., Disp: , Rfl:   •  losartan (COZAAR) 25 MG tablet, Take 25 mg by mouth every night at bedtime., Disp: , Rfl:   •  metoprolol succinate XL (TOPROL-XL) 25 MG 24 hr tablet, TAKE ONE TABLET BY MOUTH DAILY, Disp: 90 tablet, Rfl: 1  •  vitamin C (VITAMIN C) 500 MG tablet, Take 1 tablet by mouth 2 (Two) Times a Day., Disp: , Rfl:   •  doxycycline (MONODOX) 100 MG capsule, Take 1 capsule by mouth 2 (Two) Times a Day., Disp: 20 capsule, Rfl: 0  •  Polyethylene Glycol 3350 (MIRALAX PO), Take  by mouth As Needed., Disp: , Rfl:   •  Spironolactone (ALDACTONE PO), Take  by mouth Daily. 1/2 tablet, Disp: , Rfl:     Physical Exam:  Vitals:     "05/13/22 0937   BP: 110/70   BP Location: Right arm   Patient Position: Sitting   Cuff Size: Adult   Pulse: 70   SpO2: 97%   Weight: 105 kg (231 lb 6.4 oz)   Height: 185.4 cm (73\")   PainSc: 0-No pain     Current Pain Level: none  Pulse Ox: Normal  on room air  General: alert, appears stated age and cooperative     Body Habitus: well-nourished    HEENT: Head: Normocephalic, no lesions, without obvious abnormality. No arcus senilis, xanthelasma or xanthomas.    Neuro: alert, oriented x3  Pulses: 2+ and symmetric  JVP: Volume/Pulsation: Normal.  Normal waveforms.   Appropriate inspiratory decrease.  No Kussmaul's. No Sarah's.   Carotid Exam: no bruit normal pulsation bilaterally   Carotid Volume: normal.     Respirations: no increased work of breathing   Chest:  Normal    Pulmonary:Normal   Precordium: Normal impulses. P2 is not palpable.  RV Heave: absent  LV Heave: absent  [place]:  normal size and placement  Palpable S4: absent.  Heart rate: normal    Heart Rhythm: regular     Heart Sounds: S1: normal  S2: normal  S3: absent   S4: absent  Opening Snap: absent    Pericardial Rub:  Absent: .    Abdomen:   Appearance: normal .  Palpation: Soft, non-tender to palpation, bowel sounds positive in all four quadrants; no guarding or rebound tenderness  Extremity: no edema.   LE Skin: no rashes  LE Hair:  normal  LE Pulses: well perfused with normal pulses in the distal extremities  Pallor on elevation: Absent. Rubor on dependency: None      DATA REVIEWED:     EKG. I personally reviewed and interpreted the EKG.  July 8 normal sinus rhythm,  ST and T wave consider inferior lateral ischemia.  left bundle branch block.    ECG/EMG Results (all)     None        ---------------------------------------------------  TTE/KIMBERLYN:  Results for orders placed in visit on 06/03/21    Adult Transthoracic Echo Complete W/ Cont if Necessary Per Protocol    Interpretation Summary  · The study is technically difficult for diagnosis with poor " acoustic windows  · Left ventricular ejection fraction appears to be 36 - 40%. Left ventricular systolic function is moderately decreased.  · The left ventricular cavity is mildly dilated.  · Left ventricular diastolic function is consistent with (grade Ia w/high LAP) impaired relaxation.  · The following left ventricular wall segments are hypokinetic: mid anterior, apical anterior, apical lateral, mid inferolateral and apex hypokinetic.        --------------------------------------------------------------------------------------------------  LABS:     The CVD Risk score (Clay et al., 2008) failed to calculate for the following reasons:    The patient has a prior MI, stroke, CHF, or peripheral vascular disease diagnosis         Lab Results   Component Value Date    GLUCOSE 91 03/22/2022    BUN 31 (H) 03/22/2022    CREATININE 1.55 (H) 03/22/2022    EGFRIFNONA 45 (L) 11/05/2021    EGFRIFAFRI  07/01/2020      Comment:      <15 Indicative of kidney failure.    BCR 20.0 03/22/2022    K 4.9 03/22/2022    CO2 30.0 (H) 03/22/2022    CALCIUM 8.4 (L) 03/22/2022    ALBUMIN 3.60 03/22/2022    AST 22 03/22/2022    ALT 60 (H) 03/22/2022     Lab Results   Component Value Date    WBC 19.28 (H) 03/22/2022    HGB 14.4 03/22/2022    HCT 43.5 03/22/2022    MCV 98.0 (H) 03/22/2022     03/22/2022     Lab Results   Component Value Date    CHOL 122 03/03/2022    TRIG 66 03/03/2022    HDL 26 (L) 03/03/2022    LDL 82 03/03/2022     Lab Results   Component Value Date    TSH 1.890 03/03/2022     Lab Results   Component Value Date    CKTOTAL 98 06/05/2020    CKMB 22.49 (H) 06/06/2020    TROPONINT <0.010 03/22/2022     Lab Results   Component Value Date    HGBA1C 6.20 (H) 03/03/2022     No results found for: DDIMER  Lab Results   Component Value Date    ALT 60 (H) 03/22/2022     Lab Results   Component Value Date    HGBA1C 6.20 (H) 03/03/2022    HGBA1C 6.60 (H) 06/06/2020     Lab Results   Component Value Date    CREATININE 1.55  (H) 03/22/2022     No results found for: IRON, TIBC, FERRITIN  Lab Results   Component Value Date    INR 1.38 (H) 06/19/2020    INR 1.55 (H) 06/18/2020    INR 1.06 06/07/2020    PROTIME 16.8 (H) 06/19/2020    PROTIME 18.4 (H) 06/18/2020    PROTIME 13.6 06/07/2020       Assessment/Plan       1.  Coronary artery disease:  Now status post CABG x3 as above in June.  Complicated postop course.  Continue aspirin and Plavix    2.  Ischemic cardiomyopathy:  Echo at the time of NSTEMI showed an EF of 31-35%.  Started him on a low-dose beta-blocker Toprol-XL and low-dose losartan  His creatinine improved so I start him on Aldactone but had DWIGHT so has been stopped again.  We will hold off Aldactone at this point.  His EF is improved 36 to 40%.  Grade 1 diastolic dysfunction.  We will hold off on ICD at this point.    4.  Hyperlipidemia:  uptitrated Lipitor to 80 mg and on zetia.  LDL is 82.       Prevention:  Patient's Body mass index is 30.53 kg/m². BMI is above normal parameters. Recommendations include: exercise counseling, none (medical contraindication) and nutrition counseling.      Jose Barrett  reports that he has been smoking cigarettes. He has a 12.50 pack-year smoking history. He has never used smokeless tobacco..              This document has been electronically signed by Flaca Godinez MD on May 13, 2022 09:48 CDT

## 2022-06-06 RX ORDER — CLOPIDOGREL BISULFATE 75 MG/1
TABLET ORAL
Qty: 1 TABLET | Refills: 10 | Status: SHIPPED | OUTPATIENT
Start: 2022-06-06

## 2022-08-15 ENCOUNTER — TELEPHONE (OUTPATIENT)
Dept: FAMILY MEDICINE CLINIC | Facility: CLINIC | Age: 72
End: 2022-08-15

## 2022-08-15 NOTE — TELEPHONE ENCOUNTER
Pt's wife said that she did a home test today and he tested positive for Covid. He is having sporatic shortness of breath/nasal congestion/coughing up phelgm. He's been using his inhalers. With his health issues she didn't know what to give him or if you could call in something. Phone is [phone].

## 2022-08-15 NOTE — TELEPHONE ENCOUNTER
Can you let him know I would not suggest it with his renal function and will have to be off of plavix while taking the meds-have him to go ER if his breathing gets worse, make sure he was vaccinated

## 2022-08-18 RX ORDER — METOPROLOL SUCCINATE 25 MG/1
TABLET, EXTENDED RELEASE ORAL
Qty: 90 TABLET | Refills: 3 | Status: SHIPPED | OUTPATIENT
Start: 2022-08-18

## 2022-09-07 RX ORDER — ATORVASTATIN CALCIUM 80 MG/1
TABLET, FILM COATED ORAL
Qty: 30 TABLET | Refills: 0 | Status: SHIPPED | OUTPATIENT
Start: 2022-09-07 | End: 2022-12-15

## 2022-12-02 ENCOUNTER — LAB (OUTPATIENT)
Dept: LAB | Facility: HOSPITAL | Age: 72
End: 2022-12-02

## 2022-12-02 ENCOUNTER — TRANSCRIBE ORDERS (OUTPATIENT)
Dept: LAB | Facility: HOSPITAL | Age: 72
End: 2022-12-02

## 2022-12-02 DIAGNOSIS — N18.32 CHRONIC KIDNEY DISEASE (CKD) STAGE G3B/A1, MODERATELY DECREASED GLOMERULAR FILTRATION RATE (GFR) BETWEEN 30-44 ML/MIN/1.73 SQUARE METER AND ALBUMINURIA CREATININE RATIO LESS THAN 30 MG/G (CMS/H*: ICD-10-CM

## 2022-12-02 DIAGNOSIS — N17.0 ACUTE KIDNEY FAILURE WITH LESION OF TUBULAR NECROSIS: Primary | ICD-10-CM

## 2022-12-02 DIAGNOSIS — E78.5 DYSLIPIDEMIA: ICD-10-CM

## 2022-12-02 DIAGNOSIS — N17.0 ACUTE KIDNEY FAILURE WITH LESION OF TUBULAR NECROSIS: ICD-10-CM

## 2022-12-02 DIAGNOSIS — J60 COAL WORKERS' PNEUMOCONIOSIS: ICD-10-CM

## 2022-12-02 DIAGNOSIS — I65.23 BILATERAL CAROTID ARTERY OCCLUSION: ICD-10-CM

## 2022-12-02 DIAGNOSIS — I10 ESSENTIAL HYPERTENSION, MALIGNANT: ICD-10-CM

## 2022-12-02 DIAGNOSIS — J44.9 OBSTRUCTIVE CHRONIC BRONCHITIS WITHOUT EXACERBATION: ICD-10-CM

## 2022-12-02 LAB
ANION GAP SERPL CALCULATED.3IONS-SCNC: 9.1 MMOL/L (ref 5–15)
BASOPHILS # BLD AUTO: 0.07 10*3/MM3 (ref 0–0.2)
BASOPHILS NFR BLD AUTO: 0.7 % (ref 0–1.5)
BUN SERPL-MCNC: 18 MG/DL (ref 8–23)
BUN/CREAT SERPL: 12.7 (ref 7–25)
CALCIUM SPEC-SCNC: 9.2 MG/DL (ref 8.6–10.5)
CHLORIDE SERPL-SCNC: 99 MMOL/L (ref 98–107)
CO2 SERPL-SCNC: 31.9 MMOL/L (ref 22–29)
CREAT SERPL-MCNC: 1.42 MG/DL (ref 0.76–1.27)
CREAT UR-MCNC: 252.1 MG/DL
DEPRECATED RDW RBC AUTO: 42.2 FL (ref 37–54)
EGFRCR SERPLBLD CKD-EPI 2021: 52.5 ML/MIN/1.73
EOSINOPHIL # BLD AUTO: 0.35 10*3/MM3 (ref 0–0.4)
EOSINOPHIL NFR BLD AUTO: 3.4 % (ref 0.3–6.2)
ERYTHROCYTE [DISTWIDTH] IN BLOOD BY AUTOMATED COUNT: 12.3 % (ref 12.3–15.4)
GLUCOSE SERPL-MCNC: 92 MG/DL (ref 65–99)
HCT VFR BLD AUTO: 45.8 % (ref 37.5–51)
HGB BLD-MCNC: 15 G/DL (ref 13–17.7)
IMM GRANULOCYTES # BLD AUTO: 0.04 10*3/MM3 (ref 0–0.05)
IMM GRANULOCYTES NFR BLD AUTO: 0.4 % (ref 0–0.5)
LYMPHOCYTES # BLD AUTO: 2.06 10*3/MM3 (ref 0.7–3.1)
LYMPHOCYTES NFR BLD AUTO: 19.7 % (ref 19.6–45.3)
MAGNESIUM SERPL-MCNC: 2 MG/DL (ref 1.6–2.4)
MCH RBC QN AUTO: 30.8 PG (ref 26.6–33)
MCHC RBC AUTO-ENTMCNC: 32.8 G/DL (ref 31.5–35.7)
MCV RBC AUTO: 94 FL (ref 79–97)
MONOCYTES # BLD AUTO: 0.95 10*3/MM3 (ref 0.1–0.9)
MONOCYTES NFR BLD AUTO: 9.1 % (ref 5–12)
NEUTROPHILS NFR BLD AUTO: 6.97 10*3/MM3 (ref 1.7–7)
NEUTROPHILS NFR BLD AUTO: 66.7 % (ref 42.7–76)
NRBC BLD AUTO-RTO: 0 /100 WBC (ref 0–0.2)
PHOSPHATE SERPL-MCNC: 3.7 MG/DL (ref 2.5–4.5)
PLATELET # BLD AUTO: 230 10*3/MM3 (ref 140–450)
PMV BLD AUTO: 10.8 FL (ref 6–12)
POTASSIUM SERPL-SCNC: 4.3 MMOL/L (ref 3.5–5.2)
PROT ?TM UR-MCNC: 91.2 MG/DL
PROT/CREAT UR: 361.8 MG/G CREA (ref 0–200)
RBC # BLD AUTO: 4.87 10*6/MM3 (ref 4.14–5.8)
SODIUM SERPL-SCNC: 140 MMOL/L (ref 136–145)
WBC NRBC COR # BLD: 10.44 10*3/MM3 (ref 3.4–10.8)

## 2022-12-02 PROCEDURE — 80048 BASIC METABOLIC PNL TOTAL CA: CPT

## 2022-12-02 PROCEDURE — 84100 ASSAY OF PHOSPHORUS: CPT

## 2022-12-02 PROCEDURE — 82570 ASSAY OF URINE CREATININE: CPT

## 2022-12-02 PROCEDURE — 83735 ASSAY OF MAGNESIUM: CPT

## 2022-12-02 PROCEDURE — 84156 ASSAY OF PROTEIN URINE: CPT

## 2022-12-02 PROCEDURE — 85025 COMPLETE CBC W/AUTO DIFF WBC: CPT

## 2022-12-02 PROCEDURE — 36415 COLL VENOUS BLD VENIPUNCTURE: CPT

## 2022-12-15 RX ORDER — ATORVASTATIN CALCIUM 80 MG/1
TABLET, FILM COATED ORAL
Qty: 30 TABLET | Refills: 0 | Status: SHIPPED | OUTPATIENT
Start: 2022-12-15 | End: 2023-02-14

## 2023-02-14 RX ORDER — ATORVASTATIN CALCIUM 80 MG/1
TABLET, FILM COATED ORAL
Qty: 30 TABLET | Refills: 3 | Status: SHIPPED | OUTPATIENT
Start: 2023-02-14

## 2023-02-22 ENCOUNTER — OFFICE VISIT (OUTPATIENT)
Dept: CARDIAC SURGERY | Facility: CLINIC | Age: 73
End: 2023-02-22
Payer: MEDICARE

## 2023-02-22 VITALS
HEART RATE: 72 BPM | HEIGHT: 73 IN | OXYGEN SATURATION: 98 % | SYSTOLIC BLOOD PRESSURE: 130 MMHG | BODY MASS INDEX: 31.81 KG/M2 | WEIGHT: 240 LBS | DIASTOLIC BLOOD PRESSURE: 76 MMHG

## 2023-02-22 DIAGNOSIS — I10 ESSENTIAL HYPERTENSION: ICD-10-CM

## 2023-02-22 DIAGNOSIS — F17.200 TOBACCO DEPENDENCE: ICD-10-CM

## 2023-02-22 DIAGNOSIS — I65.23 CAROTID STENOSIS, ASYMPTOMATIC, BILATERAL: Primary | ICD-10-CM

## 2023-02-22 DIAGNOSIS — E78.5 DYSLIPIDEMIA: ICD-10-CM

## 2023-02-22 PROCEDURE — 99214 OFFICE O/P EST MOD 30 MIN: CPT | Performed by: NURSE PRACTITIONER

## 2023-03-16 RX ORDER — EZETIMIBE 10 MG/1
TABLET ORAL
Qty: 90 TABLET | Refills: 3 | Status: SHIPPED | OUTPATIENT
Start: 2023-03-16

## 2023-03-23 ENCOUNTER — OFFICE VISIT (OUTPATIENT)
Dept: CARDIOLOGY | Facility: CLINIC | Age: 73
End: 2023-03-23
Payer: MEDICARE

## 2023-03-23 VITALS
OXYGEN SATURATION: 97 % | HEIGHT: 73 IN | SYSTOLIC BLOOD PRESSURE: 138 MMHG | DIASTOLIC BLOOD PRESSURE: 78 MMHG | BODY MASS INDEX: 31.46 KG/M2 | HEART RATE: 68 BPM | WEIGHT: 237.4 LBS

## 2023-03-23 DIAGNOSIS — I25.10 CORONARY ARTERY DISEASE INVOLVING NATIVE CORONARY ARTERY OF NATIVE HEART, UNSPECIFIED WHETHER ANGINA PRESENT: Primary | ICD-10-CM

## 2023-03-23 DIAGNOSIS — E78.2 HYPERLIPEMIA, MIXED: ICD-10-CM

## 2023-03-23 DIAGNOSIS — R09.89 OTHER SPECIFIED SYMPTOMS AND SIGNS INVOLVING THE CIRCULATORY AND RESPIRATORY SYSTEMS: ICD-10-CM

## 2023-03-23 DIAGNOSIS — I10 HYPERTENSION, ESSENTIAL: ICD-10-CM

## 2023-03-23 PROCEDURE — 3078F DIAST BP <80 MM HG: CPT | Performed by: INTERNAL MEDICINE

## 2023-03-23 PROCEDURE — 93000 ELECTROCARDIOGRAM COMPLETE: CPT | Performed by: INTERNAL MEDICINE

## 2023-03-23 PROCEDURE — 1159F MED LIST DOCD IN RCRD: CPT | Performed by: INTERNAL MEDICINE

## 2023-03-23 PROCEDURE — 99214 OFFICE O/P EST MOD 30 MIN: CPT | Performed by: INTERNAL MEDICINE

## 2023-03-23 PROCEDURE — 1160F RVW MEDS BY RX/DR IN RCRD: CPT | Performed by: INTERNAL MEDICINE

## 2023-03-23 PROCEDURE — 3075F SYST BP GE 130 - 139MM HG: CPT | Performed by: INTERNAL MEDICINE

## 2023-03-23 RX ORDER — TIOTROPIUM BROMIDE 18 UG/1
CAPSULE ORAL; RESPIRATORY (INHALATION)
COMMUNITY
Start: 2023-03-02

## 2023-03-23 NOTE — PROGRESS NOTES
[facility] Cardiology  OFFICE NOTE    Cardiovascular Medicine  Flaca Godinez M.D., RPVI         No referring provider defined for this encounter.    Thank you for asking me to see Jose Barrett for CAD.    This is a 72 y.o. male with:    1.  Coronary artery disease status post CABG with LIMA to LAD, SVG to OM 2, SVG to PDA June 2020  2.  Hypertension  3.  COPD   4.  hyperlipidemia  5.  CKD  6.  Ischemic cardiomyopathy    Jose Barrett is a 72 y.o. male who presents for consultation today.  Patient did have a prolonged stay after his CABG because of renal failure and ICU delirium.  Patient had to have intermittent dialysis.  Patient also had mild wound dehiscence.   Patient is here for establishment of care.    Patient has done very well since his CABG.  He has recovered well.  He is back to his baseline.  Denying any chest pain.  He is been pretty active and walking around without any limitations from chest pain.  Occasionally will have some shortness of breath when climbing flights of stairs when he is underlying COPD and usually that improves with his inhalers.    03/23/2023:  Chronic shortness of breath at baseline.  Continues to smoke intermittently.  Denying any chest pain.    11/12/2021:  No acute issues.  Denying any chest pain.  He has chronic shortness of breath NYHA class II at baseline.  No bleeding from aspirin Plavix  Has tolerated losartan well so far.    05/13/2022:  Had frequent visits for COPD exacerbation, also developed shingles.  Shortness of breath has improved but unfortunately continues to smoke.    Review of Systems - ROS  Constitution: Negative for weakness, weight gain and weight loss.   HENT: Negative for congestion.    Eyes: Negative for blurred vision.   Cardiovascular: As mentioned above  Respiratory: Negative for cough and hemoptysis.    Endocrine: Negative for polydipsia and polyuria.   Hematologic/Lymphatic: Negative for bleeding problem. Does not  bruise/bleed easily.   Skin: Negative for flushing.   Musculoskeletal: Negative for neck pain and stiffness.   Gastrointestinal: Negative for abdominal pain, diarrhea, jaundice, melena, nausea and vomiting.   Genitourinary: Negative for dysuria and hematuria.   Neurological: Negative for dizziness, focal weakness and numbness.   Psychiatric/Behavioral: Negative for altered mental status and depression.     I reviewed the ROS as documented here and confirmed the accuracy of it with the patient today. 3/23/2023        All other systems were reviewed and were negative.    family history includes Heart disease in his mother; No Known Problems in his father.     reports that he has been smoking cigarettes. He has a 12.50 pack-year smoking history. He has been exposed to tobacco smoke. He has never used smokeless tobacco. He reports that he does not drink alcohol and does not use drugs.    No Known Allergies      Current Outpatient Medications:   •  albuterol sulfate  (90 Base) MCG/ACT inhaler, Inhale 2 puffs Every 4 (Four) Hours As Needed for Wheezing., Disp: , Rfl:   •  aspirin 81 MG chewable tablet, Chew 81 mg Daily., Disp: , Rfl:   •  atorvastatin (LIPITOR) 80 MG tablet, TAKE ONE TABLET BY MOUTH DAILY, Disp: 30 tablet, Rfl: 3  •  clopidogrel (PLAVIX) 75 MG tablet, TAKE ONE TABLET BY MOUTH DAILY, Disp: 1 tablet, Rfl: 10  •  ezetimibe (ZETIA) 10 MG tablet, TAKE ONE TABLET BY MOUTH DAILY, Disp: 90 tablet, Rfl: 3  •  Fluticasone Furoate-Vilanterol (BREO ELLIPTA) 100-25 MCG/INH inhaler, Inhale 1 puff Daily., Disp: , Rfl:   •  losartan (COZAAR) 25 MG tablet, Take 25 mg by mouth every night at bedtime., Disp: , Rfl:   •  metoprolol succinate XL (TOPROL-XL) 25 MG 24 hr tablet, TAKE ONE TABLET BY MOUTH DAILY, Disp: 90 tablet, Rfl: 3  •  Polyethylene Glycol 3350 (MIRALAX PO), Take  by mouth As Needed., Disp: , Rfl:   •  Spironolactone (ALDACTONE PO), Take  by mouth Daily. 1/2 tablet, Disp: , Rfl:   •  vitamin C (VITAMIN  "C) 500 MG tablet, Take 1 tablet by mouth 2 (Two) Times a Day., Disp: , Rfl:     Physical Exam:  Vitals:    03/23/23 1019   BP: 138/78   BP Location: Left arm   Patient Position: Sitting   Cuff Size: Large Adult   Pulse: 68   SpO2: 97%   Weight: 108 kg (237 lb 6.4 oz)   Height: 185.4 cm (73\")   PainSc: 0-No pain   PainLoc: Chest     Current Pain Level: none  Pulse Ox: Normal  on room air  General: alert, appears stated age and cooperative     Body Habitus: well-nourished    HEENT: Head: Normocephalic, no lesions, without obvious abnormality. No arcus senilis, xanthelasma or xanthomas.    Neuro: alert, oriented x3  Pulses: 2+ and symmetric  JVP: Volume/Pulsation: Normal.  Normal waveforms.   Appropriate inspiratory decrease.  No Kussmaul's. No Sarah's.   Carotid Exam: no bruit normal pulsation bilaterally   Carotid Volume: normal.     Respirations: no increased work of breathing   Chest:  Normal    Pulmonary:Normal   Precordium: Normal impulses. P2 is not palpable.  RV Heave: absent  LV Heave: absent  [place]:  normal size and placement  Palpable S4: absent.  Heart rate: normal    Heart Rhythm: regular     Heart Sounds: S1: normal  S2: normal  S3: absent   S4: absent  Opening Snap: absent    Pericardial Rub:  Absent: .    Abdomen:   Appearance: normal .  Palpation: Soft, non-tender to palpation, bowel sounds positive in all four quadrants; no guarding or rebound tenderness  Extremity: no edema.   LE Skin: no rashes  LE Hair:  normal  LE Pulses: well perfused with normal pulses in the distal extremities  Pallor on elevation: Absent. Rubor on dependency: None      DATA REVIEWED:     EKG. I personally reviewed and interpreted the EKG.  July 8 normal sinus rhythm,  ST and T wave consider inferior lateral ischemia.  left bundle branch block.    ECG/EMG Results (all)     None        ---------------------------------------------------  TTE/KIMBERLYN:  Results for orders placed in visit on 06/03/21    Adult Transthoracic Echo " Complete W/ Cont if Necessary Per Protocol    Interpretation Summary  · The study is technically difficult for diagnosis with poor acoustic windows  · Left ventricular ejection fraction appears to be 36 - 40%. Left ventricular systolic function is moderately decreased.  · The left ventricular cavity is mildly dilated.  · Left ventricular diastolic function is consistent with (grade Ia w/high LAP) impaired relaxation.  · The following left ventricular wall segments are hypokinetic: mid anterior, apical anterior, apical lateral, mid inferolateral and apex hypokinetic.        --------------------------------------------------------------------------------------------------  LABS:     The CVD Risk score (Clay, et al., 2008) failed to calculate for the following reasons:    The patient has a prior MI, stroke, CHF, or peripheral vascular disease diagnosis         Lab Results   Component Value Date    GLUCOSE 92 12/02/2022    BUN 18 12/02/2022    CREATININE 1.42 (H) 12/02/2022    EGFRIFNONA 45 (L) 11/05/2021    EGFRIFAFRI  07/01/2020      Comment:      <15 Indicative of kidney failure.    BCR 12.7 12/02/2022    K 4.3 12/02/2022    CO2 31.9 (H) 12/02/2022    CALCIUM 9.2 12/02/2022    ALBUMIN 3.60 03/22/2022    AST 22 03/22/2022    ALT 60 (H) 03/22/2022     Lab Results   Component Value Date    WBC 10.44 12/02/2022    HGB 15.0 12/02/2022    HCT 45.8 12/02/2022    MCV 94.0 12/02/2022     12/02/2022     Lab Results   Component Value Date    CHOL 122 03/03/2022    TRIG 66 03/03/2022    HDL 26 (L) 03/03/2022    LDL 82 03/03/2022     Lab Results   Component Value Date    TSH 1.890 03/03/2022     Lab Results   Component Value Date    CKTOTAL 98 06/05/2020    CKMB 22.49 (H) 06/06/2020    TROPONINT <0.010 03/22/2022     Lab Results   Component Value Date    HGBA1C 6.20 (H) 03/03/2022     No results found for: DDIMER  Lab Results   Component Value Date    ALT 60 (H) 03/22/2022     Lab Results   Component Value Date     HGBA1C 6.20 (H) 03/03/2022    HGBA1C 6.60 (H) 06/06/2020     Lab Results   Component Value Date    CREATININE 1.42 (H) 12/02/2022     No results found for: IRON, TIBC, FERRITIN  Lab Results   Component Value Date    INR 1.38 (H) 06/19/2020    INR 1.55 (H) 06/18/2020    INR 1.06 06/07/2020    PROTIME 16.8 (H) 06/19/2020    PROTIME 18.4 (H) 06/18/2020    PROTIME 13.6 06/07/2020       Assessment/Plan       1.  Coronary artery disease:  Now status post CABG x3 as above in June.  Complicated postop course.  Continue aspirin and Plavix    2.  Ischemic cardiomyopathy:  Echo at the time of NSTEMI showed an EF of 31-35%.  Started him on a low-dose beta-blocker Toprol-XL and low-dose losartan 50mg  His creatinine improved so I start him on Aldactone but had DWIGHT so has been stopped again.  We will hold off Aldactone at this point.  His EF is improved 36 to 40%.  Grade 1 diastolic dysfunction.  We will hold off on ICD at this point.  Will see if can insurance will approve Jardiance.    4.  Hyperlipidemia:  uptitrated Lipitor to 80 mg and on zetia.  LDL is 82.  Repeat lipid panel and CMP.      Reported 1 episode of transient discoloration of his right foot and feeling cold.  We will check of ABIs.    Prevention:  Patient's Body mass index is 31.32 kg/m². BMI is above normal parameters. Recommendations include: exercise counseling, none (medical contraindication) and nutrition counseling.      Jose Barrett  reports that he has been smoking cigarettes. He has a 12.50 pack-year smoking history. He has been exposed to tobacco smoke. He has never used smokeless tobacco..              This document has been electronically signed by Flaca Godinez MD on March 23, 2023 10:21 CDT

## 2023-03-24 LAB
QT INTERVAL: 418 MS
QTC INTERVAL: 444 MS

## 2023-04-05 ENCOUNTER — LAB (OUTPATIENT)
Dept: LAB | Facility: HOSPITAL | Age: 73
End: 2023-04-05
Payer: MEDICARE

## 2023-04-05 PROCEDURE — 80053 COMPREHEN METABOLIC PANEL: CPT | Performed by: INTERNAL MEDICINE

## 2023-04-05 PROCEDURE — 80061 LIPID PANEL: CPT | Performed by: INTERNAL MEDICINE

## 2023-04-05 PROCEDURE — 36415 COLL VENOUS BLD VENIPUNCTURE: CPT | Performed by: INTERNAL MEDICINE

## 2023-04-06 ENCOUNTER — TELEPHONE (OUTPATIENT)
Dept: CARDIOLOGY | Facility: CLINIC | Age: 73
End: 2023-04-06
Payer: MEDICARE

## 2023-04-06 LAB
ALBUMIN SERPL-MCNC: 3.9 G/DL (ref 3.5–5.2)
ALBUMIN/GLOB SERPL: 1.3 G/DL
ALP SERPL-CCNC: 79 U/L (ref 39–117)
ALT SERPL W P-5'-P-CCNC: 13 U/L (ref 1–41)
ANION GAP SERPL CALCULATED.3IONS-SCNC: 7.7 MMOL/L (ref 5–15)
AST SERPL-CCNC: 18 U/L (ref 1–40)
BILIRUB SERPL-MCNC: 0.7 MG/DL (ref 0–1.2)
BUN SERPL-MCNC: 20 MG/DL (ref 8–23)
BUN/CREAT SERPL: 12.4 (ref 7–25)
CALCIUM SPEC-SCNC: 9.3 MG/DL (ref 8.6–10.5)
CHLORIDE SERPL-SCNC: 102 MMOL/L (ref 98–107)
CHOLEST SERPL-MCNC: 139 MG/DL (ref 0–200)
CO2 SERPL-SCNC: 31.3 MMOL/L (ref 22–29)
CREAT SERPL-MCNC: 1.61 MG/DL (ref 0.76–1.27)
EGFRCR SERPLBLD CKD-EPI 2021: 45.2 ML/MIN/1.73
GLOBULIN UR ELPH-MCNC: 2.9 GM/DL
GLUCOSE SERPL-MCNC: 100 MG/DL (ref 65–99)
HDLC SERPL-MCNC: 25 MG/DL (ref 40–60)
LDLC SERPL CALC-MCNC: 99 MG/DL (ref 0–100)
LDLC/HDLC SERPL: 3.94 {RATIO}
POTASSIUM SERPL-SCNC: 4.3 MMOL/L (ref 3.5–5.2)
PROT SERPL-MCNC: 6.8 G/DL (ref 6–8.5)
SODIUM SERPL-SCNC: 141 MMOL/L (ref 136–145)
TRIGL SERPL-MCNC: 77 MG/DL (ref 0–150)
VLDLC SERPL-MCNC: 15 MG/DL (ref 5–40)

## 2023-04-06 NOTE — TELEPHONE ENCOUNTER
----- Message from Flaca Godinez MD sent at 4/6/2023 12:00 PM CDT -----  Has Ckd and has been in that range.     LDL still above target, would qualify for repatha      ----- Message -----  From: Lab, Background User  Sent: 4/6/2023   1:16 AM CDT  To: Flaca Godinez MD    Patient contacted with lab results per dr. Godinez. The patient was agreeable to try repatha is the cost of medication is affordable for him. rx sent to his pharmacy.

## 2023-04-07 ENCOUNTER — TELEPHONE (OUTPATIENT)
Dept: FAMILY MEDICINE CLINIC | Facility: CLINIC | Age: 73
End: 2023-04-07
Payer: MEDICARE

## 2023-04-07 NOTE — TELEPHONE ENCOUNTER
Per ZHANNA Quarles, Ms. Barrett has been called with recent lab results & recommendations.  Continue current medications and follow-up as planned or sooner if any problems.     Robina Dykes APRN    Can you let him know labs are stable. Cholesterol is good. Renal function is elevated but stable

## 2023-04-24 LAB
BH CV LOWER ARTERIAL LEFT ABI RATIO: 1.11
BH CV LOWER ARTERIAL LEFT DORSALIS PEDIS SYS MAX: 156
BH CV LOWER ARTERIAL LEFT POST TIBIAL SYS MAX: 175
BH CV LOWER ARTERIAL RIGHT ABI RATIO: 1.13
BH CV LOWER ARTERIAL RIGHT DORSALIS PEDIS SYS MAX: 162
BH CV LOWER ARTERIAL RIGHT POST TIBIAL SYS MAX: 178
MAXIMAL PREDICTED HEART RATE: 148 BPM
STRESS TARGET HR: 126 BPM
UPPER ARTERIAL LEFT ARM BRACHIAL SYS MAX: 157 MMHG
UPPER ARTERIAL RIGHT ARM BRACHIAL SYS MAX: 157 MMHG

## 2023-04-26 ENCOUNTER — OFFICE VISIT (OUTPATIENT)
Dept: FAMILY MEDICINE CLINIC | Facility: CLINIC | Age: 73
End: 2023-04-26
Payer: MEDICARE

## 2023-04-26 ENCOUNTER — LAB (OUTPATIENT)
Dept: LAB | Facility: HOSPITAL | Age: 73
End: 2023-04-26
Payer: MEDICARE

## 2023-04-26 VITALS
SYSTOLIC BLOOD PRESSURE: 120 MMHG | HEIGHT: 73 IN | HEART RATE: 74 BPM | WEIGHT: 234 LBS | BODY MASS INDEX: 31.01 KG/M2 | DIASTOLIC BLOOD PRESSURE: 70 MMHG | OXYGEN SATURATION: 94 %

## 2023-04-26 DIAGNOSIS — R73.9 ELEVATED BLOOD SUGAR: ICD-10-CM

## 2023-04-26 DIAGNOSIS — I10 ESSENTIAL HYPERTENSION: Primary | ICD-10-CM

## 2023-04-26 DIAGNOSIS — Z23 ENCOUNTER FOR IMMUNIZATION: ICD-10-CM

## 2023-04-26 DIAGNOSIS — Z91.89 PNEUMOCOCCAL VACCINATION INDICATED: ICD-10-CM

## 2023-04-26 DIAGNOSIS — Z12.5 SPECIAL SCREENING, PROSTATE CANCER: ICD-10-CM

## 2023-04-26 PROCEDURE — 36415 COLL VENOUS BLD VENIPUNCTURE: CPT | Performed by: NURSE PRACTITIONER

## 2023-04-26 PROCEDURE — 84443 ASSAY THYROID STIM HORMONE: CPT | Performed by: NURSE PRACTITIONER

## 2023-04-26 PROCEDURE — 83036 HEMOGLOBIN GLYCOSYLATED A1C: CPT | Performed by: NURSE PRACTITIONER

## 2023-04-26 PROCEDURE — G0103 PSA SCREENING: HCPCS | Performed by: NURSE PRACTITIONER

## 2023-04-26 NOTE — PROGRESS NOTES
The ABCs of the Annual Wellness Visit  Subsequent Medicare Wellness Visit    Subjective      Jose Barrett is a 72 y.o. male who presents for a Subsequent Medicare Wellness Visit.    The following portions of the patient's history were reviewed and   updated as appropriate: allergies, current medications, past family history, past medical history, past social history, past surgical history and problem list.    Compared to one year ago, the patient feels his physical   health is the same.    Compared to one year ago, the patient feels his mental   health is the same.    Recent Hospitalizations:  He was not admitted to the hospital during the last year.       Current Medical Providers:  Patient Care Team:  Robina Dykes APRN as PCP - General (Family Medicine)  Edi Donis MD as Surgeon (Cardiothoracic Surgery)  April Petersen MD as Consulting Physician (Cardiology)  Russ Amaya MD as Consulting Physician (Nephrology)  Codi Holloway APRN as Nurse Practitioner (Cardiothoracic Surgery)    Outpatient Medications Prior to Visit   Medication Sig Dispense Refill   • albuterol sulfate  (90 Base) MCG/ACT inhaler Inhale 2 puffs Every 4 (Four) Hours As Needed for Wheezing.     • aspirin 81 MG chewable tablet Chew 1 tablet Daily.     • atorvastatin (LIPITOR) 80 MG tablet TAKE ONE TABLET BY MOUTH DAILY 30 tablet 3   • clopidogrel (PLAVIX) 75 MG tablet TAKE ONE TABLET BY MOUTH DAILY 1 tablet 10   • ezetimibe (ZETIA) 10 MG tablet TAKE ONE TABLET BY MOUTH DAILY 90 tablet 3   • Fluticasone Furoate-Vilanterol (BREO ELLIPTA) 100-25 MCG/INH inhaler Inhale 1 puff Daily.     • losartan (COZAAR) 25 MG tablet Take 2 tablets by mouth every night at bedtime.     • metoprolol succinate XL (TOPROL-XL) 25 MG 24 hr tablet TAKE ONE TABLET BY MOUTH DAILY 90 tablet 3   • Spiriva HandiHaler 18 MCG per inhalation capsule PLACE 1 CAPSULE (18 MCG TOTAL) INTO DEVICE AND INHALE DAILY.     • vitamin C (VITAMIN  C) 500 MG tablet Take 1 tablet by mouth 2 (Two) Times a Day. (Patient taking differently: Take 1 tablet by mouth Daily.)     • empagliflozin (Jardiance) 10 MG tablet tablet Take 1 tablet by mouth Daily. 30 tablet 3   • Evolocumab (REPATHA) solution auto-injector SureClick injection Inject 1 mL under the skin into the appropriate area as directed Every 14 (Fourteen) Days. 1 mL 11   • Polyethylene Glycol 3350 (MIRALAX PO) Take  by mouth As Needed.       No facility-administered medications prior to visit.       No opioid medication identified on active medication list. I have reviewed chart for other potential  high risk medication/s and harmful drug interactions in the elderly.          Aspirin is on active medication list. Aspirin use is indicated based on review of current medical condition/s. Pros and cons of this therapy have been discussed today. Benefits of this medication outweigh potential harm.  Patient has been encouraged to continue taking this medication.  .      Patient Active Problem List   Diagnosis   • Precordial pain   • NSTEMI (non-ST elevated myocardial infarction)   • Essential hypertension   • COPD (chronic obstructive pulmonary disease)   • Coronary artery disease involving native coronary artery of native heart with unstable angina pectoris   • Coronary artery disease involving native coronary artery of native heart without angina pectoris   • COPD (chronic obstructive pulmonary disease) with chronic bronchitis   • CWP (coalworkers pneumoconiosis)   • Tobacco dependence   • Carotid stenosis, asymptomatic, bilateral   • Dyslipidemia   • Class 1 obesity due to excess calories with serious comorbidity and body mass index (BMI) of 30.0 to 30.9 in adult   • Acute non-ST segment elevation myocardial infarction   • Stage 3b chronic kidney disease   • Bilateral carotid artery stenosis   • Chronic obstructive bronchitis   • Coal workers' pneumoconiosis     Advance Care Planning   Advance Care Planning    "  Advance Directive is not on file.  ACP discussion was held with the patient during this visit. Patient does not have an advance directive, declines further assistance.     Objective    Vitals:    04/26/23 1259   BP: 120/70   Pulse: 74   SpO2: 94%   Weight: 106 kg (234 lb)   Height: 185.4 cm (73\")   PainSc: 0-No pain     Estimated body mass index is 30.87 kg/m² as calculated from the following:    Height as of this encounter: 185.4 cm (73\").    Weight as of this encounter: 106 kg (234 lb).    BMI is >= 30 and <35. (Class 1 Obesity). The following options were offered after discussion;: exercise counseling/recommendations      Does the patient have evidence of cognitive impairment?   No    Lab Results   Component Value Date    TRIG 77 04/05/2023    HDL 25 (L) 04/05/2023    LDL 99 04/05/2023    VLDL 15 04/05/2023          HEALTH RISK ASSESSMENT    Smoking Status:  Social History     Tobacco Use   Smoking Status Every Day   • Packs/day: 0.25   • Years: 50.00   • Pack years: 12.50   • Types: Cigarettes   • Passive exposure: Current   Smokeless Tobacco Never     Alcohol Consumption:  Social History     Substance and Sexual Activity   Alcohol Use Never     Fall Risk Screen:    STEADI Fall Risk Assessment has not been completed.    Depression Screening:       View : No data to display.                Health Habits and Functional and Cognitive Screening:       View : No data to display.                Age-appropriate Screening Schedule:  Refer to the list below for future screening recommendations based on patient's age, sex and/or medical conditions. Orders for these recommended tests are listed in the plan section. The patient has been provided with a written plan.    Health Maintenance   Topic Date Due   • Hepatitis B (1 of 3 - 3-dose series) Never done   • TDAP/TD VACCINES (1 - Tdap) Never done   • ZOSTER VACCINE (1 of 2) Never done   • AAA SCREEN (ONE-TIME)  Never done   • INFLUENZA VACCINE  08/01/2023   • LIPID PANEL "  04/05/2024   • ANNUAL WELLNESS VISIT  04/26/2024   • HEPATITIS C SCREENING  Completed   • COVID-19 Vaccine  Completed   • Pneumococcal Vaccine 65+  Completed   • COLORECTAL CANCER SCREENING  Discontinued                  CMS Preventative Services Quick Reference  Risk Factors Identified During Encounter:    None Identified  Inactivity/Sedentary: Patient was advised to exercise at least 150 minutes a week per CDC recommendations.    The above risks/problems have been discussed with the patient.  Pertinent information has been shared with the patient in the After Visit Summary.    Diagnoses and all orders for this visit:    1. Essential hypertension (Primary)  -     Hemoglobin A1c  -     TSH  -     PSA Screen  -     Pneumococcal Conjugate Vaccine 20-Valent (PCV20)    2. Special screening, prostate cancer  -     PSA Screen    3. Elevated blood sugar  -     Hemoglobin A1c    4. Pneumococcal vaccination indicated  -     Pneumococcal Conjugate Vaccine 20-Valent (PCV20)    5. Encounter for immunization  -     Pneumococcal Conjugate Vaccine 20-Valent (PCV20)        Follow Up:   Next Medicare Wellness visit to be scheduled in 1 year.      An After Visit Summary and PPPS were made available to the patient.      Chief Complaint   Patient presents with   • Annual Exam     Subjective   Jose Barrett is a 72 y.o. male.           History of Present Illness     The following portions of the patient's history were reviewed and updated as appropriate: allergies, current medications, past social history and problem list.    Review of Systems   Constitutional: Negative for fever.   HENT: Positive for postnasal drip and rhinorrhea. Negative for ear pain and trouble swallowing.    Eyes: Negative.    Respiratory: Negative for cough, shortness of breath and wheezing.    Endocrine: Negative.    Genitourinary: Negative.    Musculoskeletal: Negative for myalgias.   Allergic/Immunologic: Negative.    Neurological: Negative for headaches.  "  Hematological: Negative.    Psychiatric/Behavioral: Negative.        Objective   /70   Pulse 74   Ht 185.4 cm (73\")   Wt 106 kg (234 lb)   SpO2 94%   BMI 30.87 kg/m²   Physical Exam         Assessment & Plan     Problems Addressed this Visit        Cardiac and Vasculature    Essential hypertension - Primary    Relevant Orders    Hemoglobin A1c    TSH    PSA Screen    Pneumococcal Conjugate Vaccine 20-Valent (PCV20) (Completed)   Other Visit Diagnoses     Special screening, prostate cancer        Relevant Orders    PSA Screen    Elevated blood sugar        Relevant Orders    Hemoglobin A1c    Pneumococcal vaccination indicated        Relevant Orders    Pneumococcal Conjugate Vaccine 20-Valent (PCV20) (Completed)    Encounter for immunization        Relevant Orders    Pneumococcal Conjugate Vaccine 20-Valent (PCV20) (Completed)      Diagnoses       Codes Comments    Essential hypertension    -  Primary ICD-10-CM: I10  ICD-9-CM: 401.9     Special screening, prostate cancer     ICD-10-CM: Z12.5  ICD-9-CM: V76.44     Elevated blood sugar     ICD-10-CM: R73.9  ICD-9-CM: 790.29     Pneumococcal vaccination indicated     ICD-10-CM: Z91.89  ICD-9-CM: V49.89     Encounter for immunization     ICD-10-CM: Z23  ICD-9-CM: V03.89          No orders of the defined types were placed in this encounter.    Current Outpatient Medications on File Prior to Visit   Medication Sig Dispense Refill   • albuterol sulfate  (90 Base) MCG/ACT inhaler Inhale 2 puffs Every 4 (Four) Hours As Needed for Wheezing.     • aspirin 81 MG chewable tablet Chew 1 tablet Daily.     • atorvastatin (LIPITOR) 80 MG tablet TAKE ONE TABLET BY MOUTH DAILY 30 tablet 3   • clopidogrel (PLAVIX) 75 MG tablet TAKE ONE TABLET BY MOUTH DAILY 1 tablet 10   • ezetimibe (ZETIA) 10 MG tablet TAKE ONE TABLET BY MOUTH DAILY 90 tablet 3   • Fluticasone Furoate-Vilanterol (BREO ELLIPTA) 100-25 MCG/INH inhaler Inhale 1 puff Daily.     • losartan (COZAAR) 25 MG " tablet Take 2 tablets by mouth every night at bedtime.     • metoprolol succinate XL (TOPROL-XL) 25 MG 24 hr tablet TAKE ONE TABLET BY MOUTH DAILY 90 tablet 3   • Spiriva HandiHaler 18 MCG per inhalation capsule PLACE 1 CAPSULE (18 MCG TOTAL) INTO DEVICE AND INHALE DAILY.     • vitamin C (VITAMIN C) 500 MG tablet Take 1 tablet by mouth 2 (Two) Times a Day. (Patient taking differently: Take 1 tablet by mouth Daily.)     • [DISCONTINUED] empagliflozin (Jardiance) 10 MG tablet tablet Take 1 tablet by mouth Daily. 30 tablet 3   • [DISCONTINUED] Evolocumab (REPATHA) solution auto-injector SureClick injection Inject 1 mL under the skin into the appropriate area as directed Every 14 (Fourteen) Days. 1 mL 11   • [DISCONTINUED] Polyethylene Glycol 3350 (MIRALAX PO) Take  by mouth As Needed.       No current facility-administered medications on file prior to visit.       20 minutes   Follow Up   No follow-ups on file.     It's not just what you eat, but when you eat  Eat breakfast, and eat smaller meals throughout the day. A healthy breakfast can jumpstart your metabolism, while eating small, healthy meals (rather than the standard three large meals) keeps your energy up.   Avoid eating at night. Try to eat dinner earlier and fast for 14-16 hours until breakfast the next morning. Studies suggest that eating only when you’re most active and giving your digestive system a long break each day may help to regulate weight.   Update pneumonia vaccine  , labs as directed     Medicare wellness as directed-labs as directed

## 2023-04-27 LAB
HBA1C MFR BLD: 6.2 % (ref 4.8–5.6)
PSA SERPL-MCNC: 3.23 NG/ML (ref 0–4)
TSH SERPL DL<=0.05 MIU/L-ACNC: 1.32 UIU/ML (ref 0.27–4.2)

## 2023-05-12 PROBLEM — R09.02 HYPOXEMIA: Status: ACTIVE | Noted: 2022-05-03

## 2023-05-22 ENCOUNTER — OFFICE VISIT (OUTPATIENT)
Dept: FAMILY MEDICINE CLINIC | Facility: CLINIC | Age: 73
End: 2023-05-22
Payer: MEDICARE

## 2023-05-22 VITALS
DIASTOLIC BLOOD PRESSURE: 70 MMHG | WEIGHT: 230 LBS | HEART RATE: 71 BPM | SYSTOLIC BLOOD PRESSURE: 120 MMHG | BODY MASS INDEX: 30.48 KG/M2 | HEIGHT: 73 IN | OXYGEN SATURATION: 96 %

## 2023-05-22 DIAGNOSIS — Z87.01 HISTORY OF RECENT PNEUMONIA: Primary | ICD-10-CM

## 2023-05-22 DIAGNOSIS — R05.9 COUGH, UNSPECIFIED TYPE: ICD-10-CM

## 2023-05-22 NOTE — PROGRESS NOTES
"  Chief Complaint   Patient presents with   • Pneumonia     Follow up      Subjective   Jose Barrett is a 72 y.o. male.           Pneumonia  He complains of cough. This is a new problem. The current episode started 1 to 4 weeks ago. The problem occurs daily. The problem has been gradually improving. The cough is productive of sputum. Associated symptoms include postnasal drip and rhinorrhea. His past medical history is significant for COPD and pneumonia. There is no history of asthma or bronchiectasis.   Cough  This is a recurrent problem. The current episode started 1 to 4 weeks ago. The problem has been waxing and waning. The cough is productive of sputum. Associated symptoms include postnasal drip and rhinorrhea. The treatment provided mild relief. His past medical history is significant for COPD and pneumonia. There is no history of asthma or bronchiectasis.        The following portions of the patient's history were reviewed and updated as appropriate: allergies, current medications, past social history and problem list.    Review of Systems   Constitutional: Negative.    HENT: Positive for congestion, postnasal drip and rhinorrhea.    Eyes: Negative.    Respiratory: Positive for cough. Negative for apnea.    Cardiovascular: Negative.    Gastrointestinal: Negative.  Negative for abdominal distention and abdominal pain.   Musculoskeletal: Positive for arthralgias.       Objective   /70   Pulse 71   Ht 185.4 cm (73\")   Wt 104 kg (230 lb)   SpO2 96%   BMI 30.34 kg/m²   Physical Exam  Constitutional:       Appearance: Normal appearance.   HENT:      Head: Normocephalic.      Right Ear: Tympanic membrane normal.      Nose: Nose normal.   Eyes:      Pupils: Pupils are equal, round, and reactive to light.   Cardiovascular:      Rate and Rhythm: Normal rate.      Pulses: Normal pulses.   Pulmonary:      Breath sounds: Wheezing present.      Comments: Diffuse wheezing inspiratory -chronic copd -uses " inhalers   Abdominal:      General: Abdomen is flat.   Skin:     General: Skin is warm.   Neurological:      General: No focal deficit present.              Assessment & Plan     Problems Addressed this Visit    None  Visit Diagnoses     History of recent pneumonia    -  Primary    Relevant Orders    XR Chest PA & Lateral (Completed)      Diagnoses       Codes Comments    History of recent pneumonia    -  Primary ICD-10-CM: Z87.01  ICD-9-CM: V12.61          No orders of the defined types were placed in this encounter.    Current Outpatient Medications on File Prior to Visit   Medication Sig Dispense Refill   • albuterol (PROVENTIL) (2.5 MG/3ML) 0.083% nebulizer solution Take 2.5 mg by nebulization Every 4 (Four) Hours As Needed for Wheezing. 30 each 2   • albuterol sulfate  (90 Base) MCG/ACT inhaler Inhale 2 puffs Every 4 (Four) Hours As Needed for Wheezing.     • amoxicillin (AMOXIL) 500 MG capsule Take 2 capsules by mouth 3 (Three) Times a Day for 10 days. 60 capsule 0   • aspirin 81 MG chewable tablet Chew 1 tablet Daily.     • atorvastatin (LIPITOR) 80 MG tablet TAKE ONE TABLET BY MOUTH DAILY 30 tablet 3   • clopidogrel (PLAVIX) 75 MG tablet TAKE ONE TABLET BY MOUTH DAILY 1 tablet 10   • doxycycline (MONODOX) 100 MG capsule Take 1 capsule by mouth 2 (Two) Times a Day for 10 days. 20 capsule 0   • ezetimibe (ZETIA) 10 MG tablet TAKE ONE TABLET BY MOUTH DAILY 90 tablet 3   • Fluticasone Furoate-Vilanterol (BREO ELLIPTA) 100-25 MCG/INH inhaler Inhale 1 puff Daily.     • losartan (COZAAR) 25 MG tablet Take 2 tablets by mouth every night at bedtime.     • metFORMIN (Glucophage) 500 MG tablet Take 1 tablet by mouth Daily With Breakfast. 90 tablet 3   • metoprolol succinate XL (TOPROL-XL) 25 MG 24 hr tablet TAKE ONE TABLET BY MOUTH DAILY 90 tablet 3   • Spiriva HandiHaler 18 MCG per inhalation capsule PLACE 1 CAPSULE (18 MCG TOTAL) INTO DEVICE AND INHALE DAILY.     • vitamin C (VITAMIN C) 500 MG tablet Take 1  tablet by mouth 2 (Two) Times a Day. (Patient taking differently: Take 1 tablet by mouth Daily.)       No current facility-administered medications on file prior to visit.        Follow Up   No follow-ups on file.        [phone] 5/22/2023      Narrative & Impression  INDICATION:  Repeat x-ray from last week.     COMPARISON:  5/12/2023.     FINDINGS:  Cardiac size is slightly enlarged.  Persistent patchy airspace opacification at  the left lung base with a small left pleural effusion.  There is no  pneumothorax.  Mild patchy airspace opacification at the right lung base.  Sternotomy wires are present.  Osseous structures are intact.     IMPRESSION:  No significant interval change.    Continue meds, continue inhalers, meds as directed, follow up if worsen  Patient agrees     Continue smoking cessation as directed  See back if worsen  Patient agrees with plan of action

## 2023-06-13 RX ORDER — CLOPIDOGREL BISULFATE 75 MG/1
TABLET ORAL
Qty: 90 TABLET | Refills: 3 | Status: SHIPPED | OUTPATIENT
Start: 2023-06-13

## 2023-08-18 RX ORDER — METOPROLOL SUCCINATE 25 MG/1
TABLET, EXTENDED RELEASE ORAL
Qty: 90 TABLET | Refills: 3 | Status: SHIPPED | OUTPATIENT
Start: 2023-08-18

## (undated) DEVICE — SPNG GZ WOVN 4X4IN 12PLY 10/BX STRL

## (undated) DEVICE — MODEL BT2000 P/N 700287-012KIT CONTENTS: MANIFOLD WITH SALINE AND CONTRAST PORTS, SALINE TUBING WITH SPIKE AND HAND SYRINGE, TRANSDUCER: Brand: BT2000 AUTOMATED MANIFOLD KIT

## (undated) DEVICE — APPL CHLORAPREP W/TINT 26ML ORNG

## (undated) DEVICE — SUT PDS 2 0 CT1 27IN CLR Z259H

## (undated) DEVICE — SAFESECURE,SECUREMENT,FOLEY CATH,STERILE: Brand: MEDLINE

## (undated) DEVICE — DECANT BG O JET

## (undated) DEVICE — SUT SILK 0 FSL 18IN 678G

## (undated) DEVICE — SUT NUROLON 2 0 CT1 18IN CR8 C522D

## (undated) DEVICE — PINNACLE INTRODUCER SHEATH: Brand: PINNACLE

## (undated) DEVICE — SHEET,DRAPE,53X77,STERILE: Brand: MEDLINE

## (undated) DEVICE — ACROBAT-I VACUUM POSITIONER SYSTEM: Brand: ACROBAT-I POSITIONER

## (undated) DEVICE — SUT SILK 1 LBYRNTH TIES 30IN A307H

## (undated) DEVICE — INTENDED FOR TISSUE SEPARATION, AND OTHER PROCEDURES THAT REQUIRE A SHARP SURGICAL BLADE TO PUNCTURE OR CUT.: Brand: BARD-PARKER ® DISPOSABLE SCALPELS

## (undated) DEVICE — 24 FR EXTENDED LENGTH – SILICONE CATHETER: Brand: SILICONE THORACIC CATHETERS

## (undated) DEVICE — ST CVR PROB PULLUP ULTRASND 5X48IN

## (undated) DEVICE — SUT MONOCRYL 4/0 PS2 27IN Y426H ETY426H

## (undated) DEVICE — SYR SLP TP 10ML DISP

## (undated) DEVICE — SUT PROLENE CARDIO C1D 6/0 24IN 8726H

## (undated) DEVICE — GLIDEPATH HEMODIALYSIS CATH, ST, DL, 14.5 FR. 23CM: Brand: GLIDEPATH LONG-TERM HEMODIALYSIS CATHETER WITH PRELOADED STYLET

## (undated) DEVICE — BALN IAB SENSATION PLS F/O 40CC 7.5F 15CM SYS

## (undated) DEVICE — ADHS LIQ MASTISOL 2/3ML

## (undated) DEVICE — SUT ETHLN 2/0 FS 18IN 664H

## (undated) DEVICE — BIOPATCH™ ANTIMICROBIAL DRESSING WITH CHLORHEXIDINE GLUCONATE IS A HYDROPHILLIC POLYURETHANE ABSORPTIVE FOAM WITH CHLORHEXIDINE GLUCONATE (CHG) WHICH INHIBITS BACTERIAL GROWTH UNDER THE DRESSING. THE DRESSING IS INTENDED TO BE USED TO ABSORB EXUDATE, COVER A WOUND CAUSED BY VASCULAR AND NONVASCULAR PERCUTANEOUS MEDICAL DEVICES DURING SURGERY, AS WELL AS REDUCE LOCAL INFECTION AND COLONIZATION OF MICROORGANISMS.: Brand: BIOPATCH

## (undated) DEVICE — SUT SILK 0 TIES 18IN A186H BX36

## (undated) DEVICE — SUT VIC 3/0 SH 27IN J416H

## (undated) DEVICE — SYS VASOVIEW HEMOPRO ENDOSCOPIC HARVST VESL

## (undated) DEVICE — SUT ETHIBOND RB1 3/0 36IN X558H

## (undated) DEVICE — STERILE POLYISOPRENE POWDER-FREE SURGICAL GLOVES WITH EMOLLIENT COATING: Brand: PROTEXIS

## (undated) DEVICE — ADAPT LUER STUB INTRAMEDIC PE/160/190 18G CA/1000

## (undated) DEVICE — BLD SCLPL BEAVR MINI STR 2BVL 180D LF

## (undated) DEVICE — GW PERIPH GUIDERIGHT STD/J/TP PTFE/PCOAT SS 0.038IN 5X150CM

## (undated) DEVICE — 28 FR STRAIGHT – SILICONE CATHETER: Brand: SILICONE THORACIC CATHETERS

## (undated) DEVICE — PK OPN HEART 60

## (undated) DEVICE — PK CATH LAB 60

## (undated) DEVICE — KT INTRO MINISTICK MAX W/GW PALLADIUM ECHO 4F 21G 7CM

## (undated) DEVICE — UNDYED BRAIDED (POLYGLACTIN 910), SYNTHETIC ABSORBABLE SUTURE: Brand: COATED VICRYL

## (undated) DEVICE — SKIN AFFIX SURG ADHESIVE 72/CS 0.55ML: Brand: MEDLINE

## (undated) DEVICE — Device

## (undated) DEVICE — SUT VIC 2 TP 1MS/4 27IN DYED J649G

## (undated) DEVICE — SUT ETHIBOND 2/0 SH1 36IN X763H

## (undated) DEVICE — SUT PROLN 4/0 RB1 D/A 36IN 8557H

## (undated) DEVICE — TEMP PACING WIRE: Brand: MYO/WIRE

## (undated) DEVICE — PRESSURE MONITORING INJECTION LINE 6FT. M/F: Brand: PRESSURE MONITORING INJECTION LINE

## (undated) DEVICE — SHEET, T, LAPAROTOMY, STERILE: Brand: MEDLINE

## (undated) DEVICE — CONTAINER,SPECIMEN,OR STERILE,4OZ: Brand: MEDLINE

## (undated) DEVICE — ELECTRODE,RT,STRESS,FOAM,50PK: Brand: MEDLINE

## (undated) DEVICE — GOWN,AURORA,NOREINF,RAGLAN,XL,STERILE: Brand: MEDLINE

## (undated) DEVICE — SUT PROLN 3/0 SH D/A 36IN 8522H

## (undated) DEVICE — CATH DIAG IMPULSE M/ PK 145 5FR

## (undated) DEVICE — OASIS DRAIN, SINGLE, INLINE & ATS COMPATIBLE: Brand: OASIS

## (undated) DEVICE — MYNXGRIP 6F/7F: Brand: MYNXGRIP

## (undated) DEVICE — PERCLOSE PROGLIDE™ SUTURE-MEDIATED CLOSURE SYSTEM: Brand: PERCLOSE PROGLIDE™

## (undated) DEVICE — GAUZE,SPONGE,4"X4",16PLY,XRAY,STRL,LF: Brand: MEDLINE

## (undated) DEVICE — CATH IV JELCO 20GAX1 1/4IN

## (undated) DEVICE — INTRO SHEATH ART/FEM ENGAGE .038 6F12CM

## (undated) DEVICE — A2000 MULTI-USE SYRINGE KIT, P/N 701277-003KIT CONTENTS: 100ML CONTRAST RESERVOIR AND TUBING WITH CONTRAST SPIKE AND CLAMP: Brand: A2000 MULTI-USE SYRINGE KIT

## (undated) DEVICE — DRSNG WND BORDR/ADHS NONADHR/GZ LF 4X10IN STRL

## (undated) DEVICE — SUT ETHIB 3/0 SH DA 36IN X522H

## (undated) DEVICE — CORONARY ARTERY BYPASS GRAFT MARKERS, STAINLESS STEEL, DISTAL, WITHOUT HOLDER: Brand: ANASTOMARK CORONARY ARTERY BYPASS GRAFT MARKERS, STAINLESS STEEL, DISTAL

## (undated) DEVICE — SUT PROLENE 7-0 BV175-7 24IN DB ETH8766H

## (undated) DEVICE — SOL NACL 0.9PCT 1000ML

## (undated) DEVICE — DOUBLE MALE LL ADAPTER SPECIAL ADAPTER TO CONVERT A FEMALE FITTING TO MALE LL: Brand: DOUBLE MALE LL ADAPTER

## (undated) DEVICE — SUT VICRYL 2-0 REEL 54IN J206G

## (undated) DEVICE — PK ANGIO LF 60

## (undated) DEVICE — PRESSURE MONITORING LINES PRESSURE OPAQUE PE TUBING TUBING 4FT. LINE W/MALE LUER LOCK CONNECTORS ON EACH END: Brand: PRESSURE MONITORING LINES